# Patient Record
Sex: MALE | Race: WHITE | NOT HISPANIC OR LATINO | Employment: FULL TIME | URBAN - METROPOLITAN AREA
[De-identification: names, ages, dates, MRNs, and addresses within clinical notes are randomized per-mention and may not be internally consistent; named-entity substitution may affect disease eponyms.]

---

## 2018-08-27 ENCOUNTER — OFFICE VISIT CONVERTED (OUTPATIENT)
Dept: UROLOGY | Facility: CLINIC | Age: 53
End: 2018-08-27
Attending: UROLOGY

## 2018-09-14 ENCOUNTER — OFFICE VISIT CONVERTED (OUTPATIENT)
Dept: UROLOGY | Facility: CLINIC | Age: 53
End: 2018-09-14
Attending: UROLOGY

## 2018-12-21 ENCOUNTER — OFFICE VISIT CONVERTED (OUTPATIENT)
Dept: UROLOGY | Facility: CLINIC | Age: 53
End: 2018-12-21
Attending: UROLOGY

## 2019-05-01 ENCOUNTER — HOSPITAL ENCOUNTER (OUTPATIENT)
Dept: SLEEP MEDICINE | Facility: HOSPITAL | Age: 54
Discharge: HOME | End: 2019-05-01
Attending: INTERNAL MEDICINE

## 2019-05-02 ENCOUNTER — OUTSIDE FACILITY SERVICE (OUTPATIENT)
Dept: SLEEP MEDICINE | Facility: HOSPITAL | Age: 54
End: 2019-05-02

## 2019-05-02 PROCEDURE — 99999 PR OFFICE/OUTPT VISIT,PROCEDURE ONLY: CPT | Performed by: INTERNAL MEDICINE

## 2019-05-16 ENCOUNTER — CONVERSION ENCOUNTER (OUTPATIENT)
Dept: FAMILY MEDICINE CLINIC | Facility: CLINIC | Age: 54
End: 2019-05-16

## 2019-05-16 ENCOUNTER — OFFICE VISIT CONVERTED (OUTPATIENT)
Dept: FAMILY MEDICINE CLINIC | Facility: CLINIC | Age: 54
End: 2019-05-16
Attending: PHYSICIAN ASSISTANT

## 2019-06-04 ENCOUNTER — HOSPITAL ENCOUNTER (OUTPATIENT)
Dept: LAB | Facility: HOSPITAL | Age: 54
Discharge: HOME | End: 2019-06-04
Attending: PHYSICIAN ASSISTANT

## 2019-06-04 LAB
25(OH)D3 SERPL-MCNC: 36.8 NG/ML (ref 30–100)
ALBUMIN SERPL-MCNC: 4.3 G/DL (ref 3.5–5)
ALBUMIN/GLOB SERPL: 1.3 {RATIO} (ref 1.4–2.6)
ALP SERPL-CCNC: 52 U/L (ref 56–119)
ALT SERPL-CCNC: 27 U/L (ref 10–40)
ANION GAP SERPL CALC-SCNC: 16 MMOL/L (ref 8–19)
APPEARANCE UR: ABNORMAL
AST SERPL-CCNC: 29 U/L (ref 15–50)
BASOPHILS # BLD AUTO: 0.02 10*3/UL (ref 0–0.2)
BASOPHILS NFR BLD AUTO: 0.3 % (ref 0–3)
BILIRUB SERPL-MCNC: 0.64 MG/DL (ref 0.2–1.3)
BILIRUB UR QL: NEGATIVE
BUN SERPL-MCNC: 16 MG/DL (ref 5–25)
BUN/CREAT SERPL: 13 {RATIO} (ref 6–20)
CALCIUM SERPL-MCNC: 8.9 MG/DL (ref 8.7–10.4)
CHLORIDE SERPL-SCNC: 95 MMOL/L (ref 99–111)
CHOLEST SERPL-MCNC: 202 MG/DL (ref 107–200)
CHOLEST/HDLC SERPL: 5.8 {RATIO} (ref 3–6)
COLOR UR: ABNORMAL
CONV ABS IMM GRAN: 0.02 10*3/UL (ref 0–0.2)
CONV CO2: 28 MMOL/L (ref 22–32)
CONV COLLECTION SOURCE (UA): ABNORMAL
CONV CREATININE URINE, RANDOM: 376 MG/DL (ref 10–300)
CONV CRYSTALS: ABNORMAL /[HPF]
CONV IMMATURE GRAN: 0.3 % (ref 0–1.8)
CONV MICROALBUM.,U,RANDOM: 15.7 MG/L (ref 0–20)
CONV TOTAL PROTEIN: 7.5 G/DL (ref 6.3–8.2)
CONV UROBILINOGEN IN URINE BY AUTOMATED TEST STRIP: 1 {EHRLICHU}/DL (ref 0.1–1)
CREAT UR-MCNC: 1.2 MG/DL (ref 0.7–1.2)
DEPRECATED RDW RBC AUTO: 45.1 FL (ref 35.1–43.9)
EOSINOPHIL # BLD AUTO: 0.13 10*3/UL (ref 0–0.7)
EOSINOPHIL # BLD AUTO: 1.9 % (ref 0–7)
ERYTHROCYTE [DISTWIDTH] IN BLOOD BY AUTOMATED COUNT: 14.3 % (ref 11.6–14.4)
EST. AVERAGE GLUCOSE BLD GHB EST-MCNC: 180 MG/DL
GFR SERPLBLD BASED ON 1.73 SQ M-ARVRAT: >60 ML/MIN/{1.73_M2}
GLOBULIN UR ELPH-MCNC: 3.2 G/DL (ref 2–3.5)
GLUCOSE SERPL-MCNC: 198 MG/DL (ref 70–99)
GLUCOSE UR QL: 500 MG/DL
HBA1C MFR BLD: 17.1 G/DL (ref 14–18)
HBA1C MFR BLD: 7.9 % (ref 3.5–5.7)
HCT VFR BLD AUTO: 49.9 % (ref 42–52)
HDLC SERPL-MCNC: 35 MG/DL (ref 40–60)
HGB UR QL STRIP: NEGATIVE
KETONES UR QL STRIP: ABNORMAL MG/DL
LDLC SERPL CALC-MCNC: 141 MG/DL (ref 70–100)
LEUKOCYTE ESTERASE UR QL STRIP: NEGATIVE
LYMPHOCYTES # BLD AUTO: 1.39 10*3/UL (ref 1–5)
MCH RBC QN AUTO: 29.6 PG (ref 27–31)
MCHC RBC AUTO-ENTMCNC: 34.3 G/DL (ref 33–37)
MCV RBC AUTO: 86.5 FL (ref 80–96)
MICROALBUMIN/CREAT UR: 4.2 MG/G{CRE} (ref 0–25)
MONOCYTES # BLD AUTO: 0.7 10*3/UL (ref 0.2–1.2)
MONOCYTES NFR BLD AUTO: 10.2 % (ref 3–10)
MUCOUS THREADS URNS QL MICRO: ABNORMAL
NEUTROPHILS # BLD AUTO: 4.58 10*3/UL (ref 2–8)
NEUTROPHILS NFR BLD AUTO: 67 % (ref 30–85)
NITRITE UR QL STRIP: NEGATIVE
NRBC CBCN: 0 % (ref 0–0.7)
OSMOLALITY SERPL CALC.SUM OF ELEC: 287 MOSM/KG (ref 273–304)
PH UR STRIP.AUTO: 6 [PH] (ref 5–8)
PLATELET # BLD AUTO: 210 10*3/UL (ref 130–400)
PMV BLD AUTO: 11.2 FL (ref 9.4–12.4)
POTASSIUM SERPL-SCNC: 4.2 MMOL/L (ref 3.5–5.3)
PROT UR QL: ABNORMAL MG/DL
PSA SERPL-MCNC: 1.57 NG/ML (ref 0–4)
RBC # BLD AUTO: 5.77 10*6/UL (ref 4.7–6.1)
RBC #/AREA URNS HPF: ABNORMAL /[HPF]
SODIUM SERPL-SCNC: 135 MMOL/L (ref 135–147)
SP GR UR: 1.03 (ref 1–1.03)
T4 FREE SERPL-MCNC: 1.3 NG/DL (ref 0.9–1.8)
TESTOST SERPL-MCNC: >1500 NG/DL (ref 193–740)
TRIGL SERPL-MCNC: 130 MG/DL (ref 40–150)
TSH SERPL-ACNC: 1.62 M[IU]/L (ref 0.27–4.2)
URATE SERPL-MCNC: 5.4 MG/DL (ref 3.5–8.5)
VARIANT LYMPHS NFR BLD MANUAL: 20.3 % (ref 20–45)
VLDLC SERPL-MCNC: 26 MG/DL (ref 5–37)
WBC # BLD AUTO: 6.84 10*3/UL (ref 4.8–10.8)
WBC #/AREA URNS HPF: ABNORMAL /[HPF]

## 2019-06-05 LAB
CONV HEPATITIS C AB WITH REFLEX TO CONFIRMATION: <0.1 S/CO RATIO (ref 0–0.9)
CONV HEPATITIS COMMENT: NORMAL

## 2019-06-06 ENCOUNTER — HOSPITAL ENCOUNTER (OUTPATIENT)
Dept: SLEEP MEDICINE | Facility: HOSPITAL | Age: 54
Discharge: HOME | End: 2019-06-06
Attending: INTERNAL MEDICINE

## 2019-06-07 ENCOUNTER — OUTSIDE FACILITY SERVICE (OUTPATIENT)
Dept: SLEEP MEDICINE | Facility: HOSPITAL | Age: 54
End: 2019-06-07

## 2019-06-07 ENCOUNTER — HOSPITAL ENCOUNTER (OUTPATIENT)
Dept: SLEEP MEDICINE | Facility: HOSPITAL | Age: 54
Discharge: HOME | End: 2019-06-07
Attending: INTERNAL MEDICINE

## 2019-06-07 ENCOUNTER — HOSPITAL ENCOUNTER (OUTPATIENT)
Dept: LAB | Facility: HOSPITAL | Age: 54
Discharge: HOME | End: 2019-06-07
Attending: INTERNAL MEDICINE

## 2019-06-07 LAB
AMPHETAMINES UR QL SCN: NEGATIVE
BARBITURATES UR QL SCN: NEGATIVE
BENZODIAZ UR QL SCN: NEGATIVE
CONV COCAINE, UR: NEGATIVE
METHADONE UR QL SCN: NEGATIVE
OPIATES TESTED UR SCN: NEGATIVE
OXYCODONE UR QL SCN: NEGATIVE
PCP UR QL: NEGATIVE
THC SERPLBLD CFM-MCNC: NEGATIVE NG/ML

## 2019-06-07 PROCEDURE — OUTSIDEPOS PR OUTSIDE POS PLACEHOLDER: Performed by: INTERNAL MEDICINE

## 2019-06-12 ENCOUNTER — OUTSIDE FACILITY SERVICE (OUTPATIENT)
Dept: SLEEP MEDICINE | Facility: HOSPITAL | Age: 54
End: 2019-06-12

## 2019-06-12 PROCEDURE — OUTSIDEPOS PR OUTSIDE POS PLACEHOLDER: Performed by: INTERNAL MEDICINE

## 2019-06-17 ENCOUNTER — OFFICE VISIT CONVERTED (OUTPATIENT)
Dept: FAMILY MEDICINE CLINIC | Facility: CLINIC | Age: 54
End: 2019-06-17
Attending: PHYSICIAN ASSISTANT

## 2019-06-27 ENCOUNTER — HOSPITAL ENCOUNTER (OUTPATIENT)
Dept: OTHER | Facility: HOSPITAL | Age: 54
Discharge: HOME | End: 2019-06-27

## 2019-07-03 ENCOUNTER — OUTSIDE FACILITY SERVICE (OUTPATIENT)
Dept: SLEEP MEDICINE | Facility: HOSPITAL | Age: 54
End: 2019-07-03

## 2019-07-03 ENCOUNTER — HOSPITAL ENCOUNTER (OUTPATIENT)
Dept: SLEEP MEDICINE | Facility: HOSPITAL | Age: 54
Discharge: HOME | End: 2019-07-03
Attending: INTERNAL MEDICINE

## 2019-07-03 PROCEDURE — 99214 OFFICE O/P EST MOD 30 MIN: CPT | Performed by: INTERNAL MEDICINE

## 2019-07-24 ENCOUNTER — HOSPITAL ENCOUNTER (OUTPATIENT)
Dept: LAB | Facility: HOSPITAL | Age: 54
Discharge: HOME | End: 2019-07-24
Attending: NURSE PRACTITIONER

## 2019-07-24 ENCOUNTER — HOSPITAL ENCOUNTER (OUTPATIENT)
Dept: GENERAL RADIOLOGY | Facility: HOSPITAL | Age: 54
Discharge: HOME | End: 2019-07-24
Attending: NURSE PRACTITIONER

## 2019-07-24 ENCOUNTER — HOSPITAL ENCOUNTER (OUTPATIENT)
Dept: FAMILY MEDICINE CLINIC | Facility: CLINIC | Age: 54
Discharge: HOME | End: 2019-07-24
Attending: NURSE PRACTITIONER

## 2019-07-24 ENCOUNTER — OFFICE VISIT CONVERTED (OUTPATIENT)
Dept: FAMILY MEDICINE CLINIC | Facility: CLINIC | Age: 54
End: 2019-07-24
Attending: NURSE PRACTITIONER

## 2019-07-24 LAB
BASOPHILS # BLD AUTO: 0.02 10*3/UL (ref 0–0.2)
BASOPHILS NFR BLD AUTO: 0.3 % (ref 0–3)
CONV ABS IMM GRAN: 0.02 10*3/UL (ref 0–0.2)
CONV IMMATURE GRAN: 0.3 % (ref 0–1.8)
DEPRECATED RDW RBC AUTO: 42.6 FL (ref 35.1–43.9)
EOSINOPHIL # BLD AUTO: 0.22 10*3/UL (ref 0–0.7)
EOSINOPHIL # BLD AUTO: 3.3 % (ref 0–7)
ERYTHROCYTE [DISTWIDTH] IN BLOOD BY AUTOMATED COUNT: 13.3 % (ref 11.6–14.4)
HBA1C MFR BLD: 16.6 G/DL (ref 14–18)
HCT VFR BLD AUTO: 49.1 % (ref 42–52)
LYMPHOCYTES # BLD AUTO: 1.14 10*3/UL (ref 1–5)
MCH RBC QN AUTO: 29.5 PG (ref 27–31)
MCHC RBC AUTO-ENTMCNC: 33.8 G/DL (ref 33–37)
MCV RBC AUTO: 87.4 FL (ref 80–96)
MONOCYTES # BLD AUTO: 0.78 10*3/UL (ref 0.2–1.2)
MONOCYTES NFR BLD AUTO: 11.6 % (ref 3–10)
NEUTROPHILS # BLD AUTO: 4.56 10*3/UL (ref 2–8)
NEUTROPHILS NFR BLD AUTO: 67.6 % (ref 30–85)
NRBC CBCN: 0 % (ref 0–0.7)
PLATELET # BLD AUTO: 223 10*3/UL (ref 130–400)
PMV BLD AUTO: 10.8 FL (ref 9.4–12.4)
RBC # BLD AUTO: 5.62 10*6/UL (ref 4.7–6.1)
VARIANT LYMPHS NFR BLD MANUAL: 16.9 % (ref 20–45)
VIT B12 SERPL-MCNC: 410 PG/ML (ref 211–911)
WBC # BLD AUTO: 6.74 10*3/UL (ref 4.8–10.8)

## 2019-07-26 LAB — BACTERIA UR CULT: NORMAL

## 2019-07-30 ENCOUNTER — CONVERSION ENCOUNTER (OUTPATIENT)
Dept: SURGERY | Facility: CLINIC | Age: 54
End: 2019-07-30

## 2019-07-30 ENCOUNTER — HOSPITAL ENCOUNTER (OUTPATIENT)
Dept: OTHER | Facility: HOSPITAL | Age: 54
Discharge: HOME | End: 2019-07-30
Attending: NURSE PRACTITIONER

## 2019-07-30 ENCOUNTER — OFFICE VISIT CONVERTED (OUTPATIENT)
Dept: SURGERY | Facility: CLINIC | Age: 54
End: 2019-07-30
Attending: NURSE PRACTITIONER

## 2019-07-30 LAB
C DIFF TOX B STL QL CT TISS CULT: NEGATIVE
CONV 027 TOXIN: NEGATIVE

## 2019-08-01 LAB — BACTERIA SPEC AEROBE CULT: NORMAL

## 2019-08-02 ENCOUNTER — OFFICE VISIT CONVERTED (OUTPATIENT)
Dept: UROLOGY | Facility: CLINIC | Age: 54
End: 2019-08-02
Attending: UROLOGY

## 2019-08-08 ENCOUNTER — CONVERSION ENCOUNTER (OUTPATIENT)
Dept: FAMILY MEDICINE CLINIC | Facility: CLINIC | Age: 54
End: 2019-08-08

## 2019-08-08 ENCOUNTER — OFFICE VISIT CONVERTED (OUTPATIENT)
Dept: FAMILY MEDICINE CLINIC | Facility: CLINIC | Age: 54
End: 2019-08-08
Attending: PHYSICIAN ASSISTANT

## 2019-08-12 ENCOUNTER — HOSPITAL ENCOUNTER (OUTPATIENT)
Dept: LAB | Facility: HOSPITAL | Age: 54
Discharge: HOME | End: 2019-08-12
Attending: PHYSICIAN ASSISTANT

## 2019-08-12 LAB
CORTIS AM PEAK SERPL-MCNC: 12.3 UG/DL (ref 6–18.4)
FSH SERPL-ACNC: 0.3 M[IU]/ML
LH SERPL-ACNC: 0.1 M[IU]/ML
PROLACTIN SERPL-MCNC: 6.83 NG/ML

## 2019-08-14 LAB — CONV ESTROGENS, TOTAL, SERUM: 202 PG/ML (ref 40–115)

## 2019-08-15 LAB
CONV QUANTIFERON TB GOLD: NEGATIVE
QUANTIFERON CRITERIA: NORMAL
QUANTIFERON MITOGEN VALUE: >10 IU/ML
QUANTIFERON NIL VALUE: 0.02 IU/ML
QUANTIFERON TB1 AG VALUE: 0.02 IU/ML
QUANTIFERON TB2 AG VALUE: 0.02 IU/ML

## 2019-08-29 ENCOUNTER — HOSPITAL ENCOUNTER (OUTPATIENT)
Dept: GASTROENTEROLOGY | Facility: HOSPITAL | Age: 54
Setting detail: HOSPITAL OUTPATIENT SURGERY
Discharge: HOME | End: 2019-08-29
Attending: SURGERY

## 2019-08-29 LAB — GLUCOSE BLD-MCNC: 130 MG/DL (ref 70–99)

## 2019-09-04 ENCOUNTER — OUTSIDE FACILITY SERVICE (OUTPATIENT)
Dept: SLEEP MEDICINE | Facility: HOSPITAL | Age: 54
End: 2019-09-04

## 2019-09-04 ENCOUNTER — CONVERSION ENCOUNTER (OUTPATIENT)
Dept: SURGERY | Facility: CLINIC | Age: 54
End: 2019-09-04

## 2019-09-04 ENCOUNTER — OFFICE VISIT CONVERTED (OUTPATIENT)
Dept: UROLOGY | Facility: CLINIC | Age: 54
End: 2019-09-04
Attending: UROLOGY

## 2019-09-04 ENCOUNTER — HOSPITAL ENCOUNTER (OUTPATIENT)
Dept: SLEEP MEDICINE | Facility: HOSPITAL | Age: 54
Discharge: HOME | End: 2019-09-04
Attending: INTERNAL MEDICINE

## 2019-09-04 PROCEDURE — 99214 OFFICE O/P EST MOD 30 MIN: CPT | Performed by: INTERNAL MEDICINE

## 2019-09-13 ENCOUNTER — OFFICE VISIT CONVERTED (OUTPATIENT)
Dept: SURGERY | Facility: CLINIC | Age: 54
End: 2019-09-13
Attending: NURSE PRACTITIONER

## 2019-10-16 ENCOUNTER — OUTSIDE FACILITY SERVICE (OUTPATIENT)
Dept: SLEEP MEDICINE | Facility: HOSPITAL | Age: 54
End: 2019-10-16

## 2019-10-16 ENCOUNTER — HOSPITAL ENCOUNTER (OUTPATIENT)
Dept: SLEEP MEDICINE | Facility: HOSPITAL | Age: 54
Discharge: HOME | End: 2019-10-16
Attending: INTERNAL MEDICINE

## 2019-10-16 PROCEDURE — 99214 OFFICE O/P EST MOD 30 MIN: CPT | Performed by: INTERNAL MEDICINE

## 2019-11-09 ENCOUNTER — HOSPITAL ENCOUNTER (OUTPATIENT)
Dept: OTHER | Facility: HOSPITAL | Age: 54
Discharge: HOME | End: 2019-11-09
Attending: PHYSICIAN ASSISTANT

## 2019-11-09 LAB
25(OH)D3 SERPL-MCNC: 35.4 NG/ML (ref 30–100)
ALBUMIN SERPL-MCNC: 4.5 G/DL (ref 3.5–5)
ALBUMIN/GLOB SERPL: 1.8 {RATIO} (ref 1.4–2.6)
ALP SERPL-CCNC: 43 U/L (ref 56–119)
ALT SERPL-CCNC: 23 U/L (ref 10–40)
ANION GAP SERPL CALC-SCNC: 20 MMOL/L (ref 8–19)
APPEARANCE UR: CLEAR
AST SERPL-CCNC: 24 U/L (ref 15–50)
BASOPHILS # BLD AUTO: 0.03 10*3/UL (ref 0–0.2)
BASOPHILS NFR BLD AUTO: 0.5 % (ref 0–3)
BILIRUB SERPL-MCNC: 0.84 MG/DL (ref 0.2–1.3)
BILIRUB UR QL: NEGATIVE
BUN SERPL-MCNC: 13 MG/DL (ref 5–25)
BUN/CREAT SERPL: 12 {RATIO} (ref 6–20)
CALCIUM SERPL-MCNC: 9.2 MG/DL (ref 8.7–10.4)
CHLORIDE SERPL-SCNC: 99 MMOL/L (ref 99–111)
CHOLEST SERPL-MCNC: 123 MG/DL (ref 107–200)
CHOLEST/HDLC SERPL: 2.5 {RATIO} (ref 3–6)
COLOR UR: YELLOW
CONV ABS IMM GRAN: 0.01 10*3/UL (ref 0–0.2)
CONV CO2: 25 MMOL/L (ref 22–32)
CONV COLLECTION SOURCE (UA): ABNORMAL
CONV IMMATURE GRAN: 0.2 % (ref 0–1.8)
CONV TOTAL PROTEIN: 7 G/DL (ref 6.3–8.2)
CONV UROBILINOGEN IN URINE BY AUTOMATED TEST STRIP: 1 {EHRLICHU}/DL (ref 0.1–1)
CREAT UR-MCNC: 1.13 MG/DL (ref 0.7–1.2)
DEPRECATED RDW RBC AUTO: 44.8 FL (ref 35.1–43.9)
EOSINOPHIL # BLD AUTO: 0.13 10*3/UL (ref 0–0.7)
EOSINOPHIL # BLD AUTO: 2.1 % (ref 0–7)
ERYTHROCYTE [DISTWIDTH] IN BLOOD BY AUTOMATED COUNT: 13.9 % (ref 11.6–14.4)
EST. AVERAGE GLUCOSE BLD GHB EST-MCNC: 157 MG/DL
GFR SERPLBLD BASED ON 1.73 SQ M-ARVRAT: >60 ML/MIN/{1.73_M2}
GLOBULIN UR ELPH-MCNC: 2.5 G/DL (ref 2–3.5)
GLUCOSE SERPL-MCNC: 155 MG/DL (ref 70–99)
GLUCOSE UR QL: >=1000 MG/DL
HBA1C MFR BLD: 7.1 % (ref 3.5–5.7)
HCT VFR BLD AUTO: 48.9 % (ref 42–52)
HDLC SERPL-MCNC: 49 MG/DL (ref 40–60)
HGB BLD-MCNC: 16.2 G/DL (ref 14–18)
HGB UR QL STRIP: NEGATIVE
KETONES UR QL STRIP: NEGATIVE MG/DL
LDLC SERPL CALC-MCNC: 62 MG/DL (ref 70–100)
LEUKOCYTE ESTERASE UR QL STRIP: NEGATIVE
LYMPHOCYTES # BLD AUTO: 1.48 10*3/UL (ref 1–5)
LYMPHOCYTES NFR BLD AUTO: 24.1 % (ref 20–45)
MCH RBC QN AUTO: 28.8 PG (ref 27–31)
MCHC RBC AUTO-ENTMCNC: 33.1 G/DL (ref 33–37)
MCV RBC AUTO: 87 FL (ref 80–96)
MONOCYTES # BLD AUTO: 0.54 10*3/UL (ref 0.2–1.2)
MONOCYTES NFR BLD AUTO: 8.8 % (ref 3–10)
NEUTROPHILS # BLD AUTO: 3.94 10*3/UL (ref 2–8)
NEUTROPHILS NFR BLD AUTO: 64.3 % (ref 30–85)
NITRITE UR QL STRIP: NEGATIVE
NRBC CBCN: 0 % (ref 0–0.7)
OSMOLALITY SERPL CALC.SUM OF ELEC: 293 MOSM/KG (ref 273–304)
PH UR STRIP.AUTO: 6 [PH] (ref 5–8)
PLATELET # BLD AUTO: 208 10*3/UL (ref 130–400)
PMV BLD AUTO: 9.9 FL (ref 9.4–12.4)
POTASSIUM SERPL-SCNC: 4.2 MMOL/L (ref 3.5–5.3)
PROT UR QL: NEGATIVE MG/DL
PSA SERPL-MCNC: 2.31 NG/ML (ref 0–4)
RBC # BLD AUTO: 5.62 10*6/UL (ref 4.7–6.1)
SODIUM SERPL-SCNC: 140 MMOL/L (ref 135–147)
SP GR UR: 1.03 (ref 1–1.03)
T4 FREE SERPL-MCNC: 1.3 NG/DL (ref 0.9–1.8)
TESTOST SERPL-MCNC: 1228 NG/DL (ref 193–740)
TRIGL SERPL-MCNC: 58 MG/DL (ref 40–150)
TSH SERPL-ACNC: 1.64 M[IU]/L (ref 0.27–4.2)
VLDLC SERPL-MCNC: 12 MG/DL (ref 5–37)
WBC # BLD AUTO: 6.13 10*3/UL (ref 4.8–10.8)

## 2019-11-13 LAB — CONV ESTROGENS, TOTAL, SERUM: 34 PG/ML (ref 40–115)

## 2019-11-14 ENCOUNTER — OFFICE VISIT CONVERTED (OUTPATIENT)
Dept: FAMILY MEDICINE CLINIC | Facility: CLINIC | Age: 54
End: 2019-11-14
Attending: PHYSICIAN ASSISTANT

## 2019-12-05 ENCOUNTER — OFFICE VISIT CONVERTED (OUTPATIENT)
Dept: ORTHOPEDIC SURGERY | Facility: CLINIC | Age: 54
End: 2019-12-05
Attending: ORTHOPAEDIC SURGERY

## 2020-01-04 ENCOUNTER — HOSPITAL ENCOUNTER (OUTPATIENT)
Dept: OTHER | Facility: HOSPITAL | Age: 55
Discharge: HOME | End: 2020-01-04
Attending: INTERNAL MEDICINE

## 2020-01-04 LAB
FERRITIN SERPL-MCNC: 36 NG/ML (ref 30–300)
FSH SERPL-ACNC: 2.2 M[IU]/ML
LH SERPL-ACNC: 5.2 M[IU]/ML
PROLACTIN SERPL-MCNC: 6.24 NG/ML
TESTOST SERPL-MCNC: 424 NG/DL (ref 193–740)

## 2020-01-08 LAB
CONV ESTROGENS, TOTAL, SERUM: 161 PG/ML (ref 40–115)
METANEPH FREE SERPL-SCNC: 42 PG/ML (ref 0–62)
NORMETANEPHRINE, PL: 54 PG/ML (ref 0–145)

## 2020-01-15 ENCOUNTER — HOSPITAL ENCOUNTER (OUTPATIENT)
Dept: PERIOP | Facility: HOSPITAL | Age: 55
Setting detail: HOSPITAL OUTPATIENT SURGERY
Discharge: HOME | End: 2020-01-15
Attending: ORTHOPAEDIC SURGERY

## 2020-01-15 LAB
ANION GAP SERPL CALC-SCNC: 14 MMOL/L (ref 8–19)
BUN SERPL-MCNC: 16 MG/DL (ref 5–25)
BUN/CREAT SERPL: 15 {RATIO} (ref 6–20)
CALCIUM SERPL-MCNC: 8.9 MG/DL (ref 8.7–10.4)
CHLORIDE SERPL-SCNC: 97 MMOL/L (ref 99–111)
CONV CO2: 30 MMOL/L (ref 22–32)
CREAT UR-MCNC: 1.05 MG/DL (ref 0.7–1.2)
GFR SERPLBLD BASED ON 1.73 SQ M-ARVRAT: >60 ML/MIN/{1.73_M2}
GLUCOSE BLD-MCNC: 157 MG/DL (ref 70–99)
GLUCOSE SERPL-MCNC: 169 MG/DL (ref 70–99)
OSMOLALITY SERPL CALC.SUM OF ELEC: 289 MOSM/KG (ref 273–304)
POTASSIUM SERPL-SCNC: 4.1 MMOL/L (ref 3.5–5.3)
SODIUM SERPL-SCNC: 137 MMOL/L (ref 135–147)

## 2020-01-30 ENCOUNTER — OFFICE VISIT CONVERTED (OUTPATIENT)
Dept: ORTHOPEDIC SURGERY | Facility: CLINIC | Age: 55
End: 2020-01-30
Attending: PHYSICIAN ASSISTANT

## 2020-02-11 ENCOUNTER — CONVERSION ENCOUNTER (OUTPATIENT)
Dept: ORTHOPEDIC SURGERY | Facility: CLINIC | Age: 55
End: 2020-02-11

## 2020-02-11 ENCOUNTER — OFFICE VISIT CONVERTED (OUTPATIENT)
Dept: ORTHOPEDIC SURGERY | Facility: CLINIC | Age: 55
End: 2020-02-11
Attending: PHYSICIAN ASSISTANT

## 2020-02-14 ENCOUNTER — OFFICE VISIT CONVERTED (OUTPATIENT)
Dept: FAMILY MEDICINE CLINIC | Facility: CLINIC | Age: 55
End: 2020-02-14
Attending: PHYSICIAN ASSISTANT

## 2020-02-14 ENCOUNTER — CONVERSION ENCOUNTER (OUTPATIENT)
Dept: FAMILY MEDICINE CLINIC | Facility: CLINIC | Age: 55
End: 2020-02-14

## 2020-02-18 ENCOUNTER — CONVERSION ENCOUNTER (OUTPATIENT)
Dept: ORTHOPEDIC SURGERY | Facility: CLINIC | Age: 55
End: 2020-02-18
Attending: PHYSICIAN ASSISTANT

## 2020-03-10 ENCOUNTER — OFFICE VISIT CONVERTED (OUTPATIENT)
Dept: ORTHOPEDIC SURGERY | Facility: CLINIC | Age: 55
End: 2020-03-10
Attending: PHYSICIAN ASSISTANT

## 2020-03-31 ENCOUNTER — OFFICE VISIT CONVERTED (OUTPATIENT)
Dept: ORTHOPEDIC SURGERY | Facility: CLINIC | Age: 55
End: 2020-03-31
Attending: PHYSICIAN ASSISTANT

## 2020-05-05 ENCOUNTER — CONVERSION ENCOUNTER (OUTPATIENT)
Dept: ORTHOPEDIC SURGERY | Facility: CLINIC | Age: 55
End: 2020-05-05

## 2020-05-05 ENCOUNTER — OFFICE VISIT CONVERTED (OUTPATIENT)
Dept: ORTHOPEDIC SURGERY | Facility: CLINIC | Age: 55
End: 2020-05-05
Attending: PHYSICIAN ASSISTANT

## 2020-06-08 ENCOUNTER — HOSPITAL ENCOUNTER (OUTPATIENT)
Dept: LAB | Facility: HOSPITAL | Age: 55
Discharge: HOME | End: 2020-06-08
Attending: PHYSICIAN ASSISTANT

## 2020-06-08 LAB
25(OH)D3 SERPL-MCNC: 32 NG/ML (ref 30–100)
ALBUMIN SERPL-MCNC: 4.4 G/DL (ref 3.5–5)
ALBUMIN/GLOB SERPL: 1.5 {RATIO} (ref 1.4–2.6)
ALP SERPL-CCNC: 51 U/L (ref 56–119)
ALT SERPL-CCNC: 18 U/L (ref 10–40)
ANION GAP SERPL CALC-SCNC: 19 MMOL/L (ref 8–19)
APPEARANCE UR: CLEAR
AST SERPL-CCNC: 18 U/L (ref 15–50)
BASOPHILS # BLD AUTO: 0.03 10*3/UL (ref 0–0.2)
BASOPHILS NFR BLD AUTO: 0.4 % (ref 0–3)
BILIRUB SERPL-MCNC: 0.85 MG/DL (ref 0.2–1.3)
BILIRUB UR QL: NEGATIVE
BUN SERPL-MCNC: 17 MG/DL (ref 5–25)
BUN/CREAT SERPL: 16 {RATIO} (ref 6–20)
CALCIUM SERPL-MCNC: 9.1 MG/DL (ref 8.7–10.4)
CHLORIDE SERPL-SCNC: 96 MMOL/L (ref 99–111)
CHOLEST SERPL-MCNC: 142 MG/DL (ref 107–200)
CHOLEST/HDLC SERPL: 3.4 {RATIO} (ref 3–6)
COLOR UR: YELLOW
CONV ABS IMM GRAN: 0.01 10*3/UL (ref 0–0.2)
CONV CO2: 26 MMOL/L (ref 22–32)
CONV COLLECTION SOURCE (UA): ABNORMAL
CONV IMMATURE GRAN: 0.1 % (ref 0–1.8)
CONV TOTAL PROTEIN: 7.3 G/DL (ref 6.3–8.2)
CONV UROBILINOGEN IN URINE BY AUTOMATED TEST STRIP: 1 {EHRLICHU}/DL (ref 0.1–1)
CREAT UR-MCNC: 1.09 MG/DL (ref 0.7–1.2)
DEPRECATED RDW RBC AUTO: 42.4 FL (ref 35.1–43.9)
EOSINOPHIL # BLD AUTO: 0.38 10*3/UL (ref 0–0.7)
EOSINOPHIL # BLD AUTO: 5.5 % (ref 0–7)
ERYTHROCYTE [DISTWIDTH] IN BLOOD BY AUTOMATED COUNT: 12.5 % (ref 11.6–14.4)
EST. AVERAGE GLUCOSE BLD GHB EST-MCNC: 180 MG/DL
GFR SERPLBLD BASED ON 1.73 SQ M-ARVRAT: >60 ML/MIN/{1.73_M2}
GLOBULIN UR ELPH-MCNC: 2.9 G/DL (ref 2–3.5)
GLUCOSE SERPL-MCNC: 186 MG/DL (ref 70–99)
GLUCOSE UR QL: >=1000 MG/DL
HBA1C MFR BLD: 7.9 % (ref 3.5–5.7)
HCT VFR BLD AUTO: 50.7 % (ref 42–52)
HDLC SERPL-MCNC: 42 MG/DL (ref 40–60)
HGB BLD-MCNC: 17.6 G/DL (ref 14–18)
HGB UR QL STRIP: NEGATIVE
KETONES UR QL STRIP: ABNORMAL MG/DL
LDLC SERPL CALC-MCNC: 67 MG/DL (ref 70–100)
LEUKOCYTE ESTERASE UR QL STRIP: NEGATIVE
LYMPHOCYTES # BLD AUTO: 1.48 10*3/UL (ref 1–5)
LYMPHOCYTES NFR BLD AUTO: 21.5 % (ref 20–45)
MCH RBC QN AUTO: 32 PG (ref 27–31)
MCHC RBC AUTO-ENTMCNC: 34.7 G/DL (ref 33–37)
MCV RBC AUTO: 92.2 FL (ref 80–96)
MONOCYTES # BLD AUTO: 0.64 10*3/UL (ref 0.2–1.2)
MONOCYTES NFR BLD AUTO: 9.3 % (ref 3–10)
NEUTROPHILS # BLD AUTO: 4.33 10*3/UL (ref 2–8)
NEUTROPHILS NFR BLD AUTO: 63.2 % (ref 30–85)
NITRITE UR QL STRIP: NEGATIVE
NRBC CBCN: 0 % (ref 0–0.7)
OSMOLALITY SERPL CALC.SUM OF ELEC: 290 MOSM/KG (ref 273–304)
PH UR STRIP.AUTO: 7 [PH] (ref 5–8)
PLATELET # BLD AUTO: 197 10*3/UL (ref 130–400)
PMV BLD AUTO: 10.6 FL (ref 9.4–12.4)
POTASSIUM SERPL-SCNC: 3.6 MMOL/L (ref 3.5–5.3)
PROT UR QL: NEGATIVE MG/DL
PSA SERPL-MCNC: 1.76 NG/ML (ref 0–4)
RBC # BLD AUTO: 5.5 10*6/UL (ref 4.7–6.1)
SODIUM SERPL-SCNC: 137 MMOL/L (ref 135–147)
SP GR UR: 1.04 (ref 1–1.03)
T4 FREE SERPL-MCNC: 1.5 NG/DL (ref 0.9–1.8)
TESTOST SERPL-MCNC: 524 NG/DL (ref 193–740)
TRIGL SERPL-MCNC: 166 MG/DL (ref 40–150)
TSH SERPL-ACNC: 2.62 M[IU]/L (ref 0.27–4.2)
VLDLC SERPL-MCNC: 33 MG/DL (ref 5–37)
WBC # BLD AUTO: 6.87 10*3/UL (ref 4.8–10.8)

## 2020-06-12 ENCOUNTER — CONVERSION ENCOUNTER (OUTPATIENT)
Dept: FAMILY MEDICINE CLINIC | Facility: CLINIC | Age: 55
End: 2020-06-12

## 2020-06-12 ENCOUNTER — OFFICE VISIT CONVERTED (OUTPATIENT)
Dept: FAMILY MEDICINE CLINIC | Facility: CLINIC | Age: 55
End: 2020-06-12
Attending: PHYSICIAN ASSISTANT

## 2020-06-16 ENCOUNTER — OFFICE VISIT CONVERTED (OUTPATIENT)
Dept: ORTHOPEDIC SURGERY | Facility: CLINIC | Age: 55
End: 2020-06-16
Attending: PHYSICIAN ASSISTANT

## 2020-06-22 ENCOUNTER — OUTSIDE FACILITY SERVICE (OUTPATIENT)
Dept: SLEEP MEDICINE | Facility: HOSPITAL | Age: 55
End: 2020-06-22

## 2020-06-22 ENCOUNTER — HOSPITAL ENCOUNTER (OUTPATIENT)
Dept: SLEEP MEDICINE | Facility: HOSPITAL | Age: 55
Discharge: HOME | End: 2020-06-22
Attending: INTERNAL MEDICINE

## 2020-06-22 PROCEDURE — 99213 OFFICE O/P EST LOW 20 MIN: CPT | Performed by: INTERNAL MEDICINE

## 2020-06-30 ENCOUNTER — CONVERSION ENCOUNTER (OUTPATIENT)
Dept: ORTHOPEDIC SURGERY | Facility: CLINIC | Age: 55
End: 2020-06-30

## 2020-06-30 ENCOUNTER — OFFICE VISIT CONVERTED (OUTPATIENT)
Dept: ORTHOPEDIC SURGERY | Facility: CLINIC | Age: 55
End: 2020-06-30
Attending: PHYSICIAN ASSISTANT

## 2020-09-22 ENCOUNTER — OFFICE VISIT CONVERTED (OUTPATIENT)
Dept: ORTHOPEDIC SURGERY | Facility: CLINIC | Age: 55
End: 2020-09-22
Attending: ORTHOPAEDIC SURGERY

## 2020-10-21 ENCOUNTER — HOSPITAL ENCOUNTER (OUTPATIENT)
Dept: LAB | Facility: HOSPITAL | Age: 55
Discharge: HOME | End: 2020-10-21
Attending: PHYSICIAN ASSISTANT

## 2020-10-21 LAB
25(OH)D3 SERPL-MCNC: 34.3 NG/ML (ref 30–100)
ALBUMIN SERPL-MCNC: 4.2 G/DL (ref 3.5–5)
ALBUMIN/GLOB SERPL: 1.5 {RATIO} (ref 1.4–2.6)
ALP SERPL-CCNC: 46 U/L (ref 56–119)
ALT SERPL-CCNC: 17 U/L (ref 10–40)
AMPHETAMINES UR QL SCN: POSITIVE
ANION GAP SERPL CALC-SCNC: 17 MMOL/L (ref 8–19)
APPEARANCE UR: CLEAR
AST SERPL-CCNC: 18 U/L (ref 15–50)
BARBITURATES UR QL SCN: NEGATIVE
BASOPHILS # BLD AUTO: 0.02 10*3/UL (ref 0–0.2)
BASOPHILS NFR BLD AUTO: 0.3 % (ref 0–3)
BENZODIAZ UR QL SCN: NEGATIVE
BILIRUB SERPL-MCNC: 0.73 MG/DL (ref 0.2–1.3)
BILIRUB UR QL: NEGATIVE
BUN SERPL-MCNC: 12 MG/DL (ref 5–25)
BUN/CREAT SERPL: 11 {RATIO} (ref 6–20)
CALCIUM SERPL-MCNC: 9.5 MG/DL (ref 8.7–10.4)
CHLORIDE SERPL-SCNC: 99 MMOL/L (ref 99–111)
CHOLEST SERPL-MCNC: 113 MG/DL (ref 107–200)
CHOLEST/HDLC SERPL: 2.4 {RATIO} (ref 3–6)
COLOR UR: YELLOW
CONV ABS IMM GRAN: 0.01 10*3/UL (ref 0–0.2)
CONV CO2: 27 MMOL/L (ref 22–32)
CONV COCAINE, UR: NEGATIVE
CONV COLLECTION SOURCE (UA): ABNORMAL
CONV CREATININE URINE, RANDOM: 109.3 MG/DL (ref 10–300)
CONV IMMATURE GRAN: 0.2 % (ref 0–1.8)
CONV MICROALBUM.,U,RANDOM: <12 MG/L (ref 0–20)
CONV TOTAL PROTEIN: 7 G/DL (ref 6.3–8.2)
CONV UROBILINOGEN IN URINE BY AUTOMATED TEST STRIP: 1 {EHRLICHU}/DL (ref 0.1–1)
CREAT UR-MCNC: 1.05 MG/DL (ref 0.7–1.2)
DEPRECATED RDW RBC AUTO: 42 FL (ref 35.1–43.9)
EOSINOPHIL # BLD AUTO: 0.08 10*3/UL (ref 0–0.7)
EOSINOPHIL # BLD AUTO: 1.3 % (ref 0–7)
ERYTHROCYTE [DISTWIDTH] IN BLOOD BY AUTOMATED COUNT: 12.3 % (ref 11.6–14.4)
EST. AVERAGE GLUCOSE BLD GHB EST-MCNC: 134 MG/DL
GFR SERPLBLD BASED ON 1.73 SQ M-ARVRAT: >60 ML/MIN/{1.73_M2}
GLOBULIN UR ELPH-MCNC: 2.8 G/DL (ref 2–3.5)
GLUCOSE SERPL-MCNC: 142 MG/DL (ref 70–99)
GLUCOSE UR QL: >=1000 MG/DL
HBA1C MFR BLD: 6.3 % (ref 3.5–5.7)
HCT VFR BLD AUTO: 47.9 % (ref 42–52)
HDLC SERPL-MCNC: 48 MG/DL (ref 40–60)
HGB BLD-MCNC: 16.7 G/DL (ref 14–18)
HGB UR QL STRIP: NEGATIVE
KETONES UR QL STRIP: NEGATIVE MG/DL
LDLC SERPL CALC-MCNC: 55 MG/DL (ref 70–100)
LEUKOCYTE ESTERASE UR QL STRIP: NEGATIVE
LYMPHOCYTES # BLD AUTO: 1.24 10*3/UL (ref 1–5)
LYMPHOCYTES NFR BLD AUTO: 19.7 % (ref 20–45)
MCH RBC QN AUTO: 32.3 PG (ref 27–31)
MCHC RBC AUTO-ENTMCNC: 34.9 G/DL (ref 33–37)
MCV RBC AUTO: 92.6 FL (ref 80–96)
METHADONE UR QL SCN: NEGATIVE
MICROALBUMIN/CREAT UR: 11 MG/G{CRE} (ref 0–25)
MONOCYTES # BLD AUTO: 0.53 10*3/UL (ref 0.2–1.2)
MONOCYTES NFR BLD AUTO: 8.4 % (ref 3–10)
NEUTROPHILS # BLD AUTO: 4.4 10*3/UL (ref 2–8)
NEUTROPHILS NFR BLD AUTO: 70.1 % (ref 30–85)
NITRITE UR QL STRIP: NEGATIVE
NRBC CBCN: 0 % (ref 0–0.7)
OPIATES TESTED UR SCN: NEGATIVE
OSMOLALITY SERPL CALC.SUM OF ELEC: 290 MOSM/KG (ref 273–304)
OXYCODONE UR QL SCN: NEGATIVE
PCP UR QL: NEGATIVE
PH UR STRIP.AUTO: 7 [PH] (ref 5–8)
PLATELET # BLD AUTO: 198 10*3/UL (ref 130–400)
PMV BLD AUTO: 10.5 FL (ref 9.4–12.4)
POTASSIUM SERPL-SCNC: 4 MMOL/L (ref 3.5–5.3)
PROT UR QL: NEGATIVE MG/DL
PSA SERPL-MCNC: 1.79 NG/ML (ref 0–4)
RBC # BLD AUTO: 5.17 10*6/UL (ref 4.7–6.1)
SODIUM SERPL-SCNC: 139 MMOL/L (ref 135–147)
SP GR UR: 1.04 (ref 1–1.03)
T4 FREE SERPL-MCNC: 1.2 NG/DL (ref 0.9–1.8)
TESTOST SERPL-MCNC: 568 NG/DL (ref 193–740)
THC SERPLBLD CFM-MCNC: NEGATIVE NG/ML
TRIGL SERPL-MCNC: 51 MG/DL (ref 40–150)
TSH SERPL-ACNC: 1.46 M[IU]/L (ref 0.27–4.2)
VLDLC SERPL-MCNC: 10 MG/DL (ref 5–37)
WBC # BLD AUTO: 6.28 10*3/UL (ref 4.8–10.8)

## 2020-10-27 ENCOUNTER — OFFICE VISIT CONVERTED (OUTPATIENT)
Dept: FAMILY MEDICINE CLINIC | Facility: CLINIC | Age: 55
End: 2020-10-27
Attending: PHYSICIAN ASSISTANT

## 2020-10-27 ENCOUNTER — CONVERSION ENCOUNTER (OUTPATIENT)
Dept: FAMILY MEDICINE CLINIC | Facility: CLINIC | Age: 55
End: 2020-10-27

## 2021-01-26 ENCOUNTER — CONVERSION ENCOUNTER (OUTPATIENT)
Dept: FAMILY MEDICINE CLINIC | Facility: CLINIC | Age: 56
End: 2021-01-26

## 2021-01-26 ENCOUNTER — HOSPITAL ENCOUNTER (OUTPATIENT)
Dept: GENERAL RADIOLOGY | Facility: HOSPITAL | Age: 56
Discharge: HOME | End: 2021-01-26
Attending: NURSE PRACTITIONER

## 2021-01-26 ENCOUNTER — OFFICE VISIT CONVERTED (OUTPATIENT)
Dept: FAMILY MEDICINE CLINIC | Facility: CLINIC | Age: 56
End: 2021-01-26
Attending: NURSE PRACTITIONER

## 2021-03-01 ENCOUNTER — HOSPITAL ENCOUNTER (OUTPATIENT)
Dept: SLEEP MEDICINE | Facility: HOSPITAL | Age: 56
Discharge: HOME | End: 2021-03-01
Attending: INTERNAL MEDICINE

## 2021-03-01 ENCOUNTER — OUTSIDE FACILITY SERVICE (OUTPATIENT)
Dept: SLEEP MEDICINE | Facility: HOSPITAL | Age: 56
End: 2021-03-01

## 2021-03-01 PROCEDURE — 99214 OFFICE O/P EST MOD 30 MIN: CPT | Performed by: INTERNAL MEDICINE

## 2021-03-02 ENCOUNTER — OFFICE VISIT CONVERTED (OUTPATIENT)
Dept: ORTHOPEDIC SURGERY | Facility: CLINIC | Age: 56
End: 2021-03-02
Attending: ORTHOPAEDIC SURGERY

## 2021-03-17 ENCOUNTER — HOSPITAL ENCOUNTER (OUTPATIENT)
Dept: MRI IMAGING | Facility: HOSPITAL | Age: 56
Discharge: HOME | End: 2021-03-17
Attending: ORTHOPAEDIC SURGERY

## 2021-03-23 ENCOUNTER — OFFICE VISIT CONVERTED (OUTPATIENT)
Dept: ORTHOPEDIC SURGERY | Facility: CLINIC | Age: 56
End: 2021-03-23
Attending: ORTHOPAEDIC SURGERY

## 2021-05-12 NOTE — PROGRESS NOTES
Progress Note      Patient Name: Jung Burkett   Patient ID: [id number]   Sex: Male   YOB: 1965    Primary Care Provider: Fausto Schrader PA-C   Referring Provider: Poli Snyder    Visit Date: March 31, 2020    Provider: Cindy Maloney PA-C   Location: [facility]   Location Address: [address] [place] [address]  [id number]   Location Phone: [phone]          Chief Complaint  · Right ankle pain      History Of Present Illness  Jung Burkett is a 55 year old /White male who presents today to [facility].      Patient is following up for right ankle injury, right distal fibula fracture sustained 2/8/20 after falling down stairs. Patient is wearing a tennis shoe. Patient states pain with walking at the anterior shin, lateral ankle.           Past Medical History  Allergic rhinitis, chronic; Anxiety; Anxiety disorder; Arthritis; Blood Diseases; Cervicalgia; Depression; Diabetes; Diabetes mellitus, type 2; Diabetic Eye Exam Last Completed; Diabetic Foot Exam Last Completed; Essential hypertension; GERD; Hepatitis C Screening; High blood pressure; High cholesterol; Hyperlipemia; Hyperlipidemia; Hypertension; Hypogonadism in male; Low testosterone in male; Major depressive disorder; Nicotine dependence; Prostate Disorder; Reflux; Screening PSA (prostate specific antigen); Seasonal allergies; Vitamin D deficiency         Past Surgical History  Cholecystectomy; Colonoscopy; Gallbladder; Tonsillectomy         Medication List  Adderall 20 mg oral tablet; albuterol sulfate 2.5 mg/0.5 mL inhalation solution for nebulization; Arimidex 1 mg oral tablet; atorvastatin 20 mg oral tablet; Dexilant 60 mg oral capsule,biphase delayed releas; Flomax 0.4 mg oral capsule; Hyzaar 100-25 mg oral tablet; metoprolol succinate 50 mg oral tablet extended release 24 hr; Percocet 7.5-325 mg oral tablet; Shingrix (PF) 50 mcg/0.5 mL intramuscular suspension for reconstitution; Synjardy  "12.5-1,000 mg oral tablet; Victoza 3-Kelton 0.6 mg/0.1 mL (18 mg/3 mL) subcutaneous pen injector         Allergy List  hydrocodone-acetaminophen; NSAIDS; SULFA (SULFONAMIDES)         Family Medical History  Stroke; Heart Disease; Cancer, Unspecified; Diabetes, unspecified type; Rheumatoid arthritis; Heart Attack (MI); Prostate cancer; Renal Calculus; Family history of colon cancer; Family history of breast cancer; Family history of Arthritis         Social History  Alcohol (Current every day); Alcohol Use (Current some day); Caffeine (Current every day); lives with spouse; ; .; No known infection risk; Recreational Drug Use (Never); Second hand smoke exposure (Current some day); Tobacco (Current some day); Working         Review of Systems  · Constitutional  o Denies  o : fever, chills, weight loss  · Cardiovascular  o Denies  o : chest pain, shortness of breath  · Gastrointestinal  o Denies  o : liver disease, heartburn, nausea, blood in stools  · Genitourinary  o Denies  o : painful urination, blood in urine  · Integument  o Denies  o : rash, itching  · Neurologic  o Denies  o : headache, weakness, loss of consciousness  · Musculoskeletal  o Denies  o : painful, swollen joints  · Psychiatric  o Denies  o : drug/alcohol addiction, anxiety, depression      Vitals  Date Time BP Position Site L\R Cuff Size HR RR TEMP (F) WT  HT  BMI kg/m2 BSA m2 O2 Sat        03/31/2020 03:05 PM      100 - R   199lbs 6oz 5'  8\" 30.31 2.08 95 %          Physical Examination  · Constitutional  o Appearance  o : well developed, well-nourished, no obvious deformities present  · Head and Face  o Head  o :   § Inspection  § : normocephalic  o Face  o :   § Inspection  § : no facial lesions  · Eyes  o Conjunctivae  o : conjunctivae normal  o Sclerae  o : sclerae white  · Ears, Nose, Mouth and Throat  o Ears  o :   § External Ears  § : appearance within normal limits  § Hearing  § : intact  o Nose  o :   § External Nose  § : " appearance normal  · Neck  o Inspection/Palpation  o : normal appearance  o Range of Motion  o : full range of motion  · Respiratory  o Respiratory Effort  o : breathing unlabored  o Inspection of Chest  o : normal appearance  o Auscultation of Lungs  o : no audible wheezing or rales  · Cardiovascular  o Heart  o : regular rate  · Gastrointestinal  o Abdominal Examination  o : soft and non-tender  · Skin and Subcutaneous Tissue  o General Inspection  o : intact, no rashes  · Psychiatric  o General  o : Alert and oriented x3  o Judgement and Insight  o : judgment and insight intact  o Mood and Affect  o : mood normal, affect appropriate  · In Office Procedures  o View  o : AP/LATERAL  o Site  o : right, ankle   o Indication  o : Right ankle pain  o Study  o : X-rays ordered, taken in the office, and reviewed today.  o Xray  o : good healing distal fibula fracture  o Comparative Data  o : Comparative Data found and reviewed today   · Right Ankle-Street  o Inspection  o : swelling  o Palpation  o : tenderness at distal fibula  o Range of Motion  o : full dorsiflexion, full plantarflexion, full inversion, full eversion  o Strength  o : full dorsiflexion, full plantarflexion, full inversion, full eversion  o Neurovascular  o : normal sensation, dorsal pedal pulse 2+, posterior tibialis pulse 2+          Assessment  · Right ankle pain, unspecified chronicity     719.47/M25.571  · Fibula fracture     823.81/S82.409A      Plan  · Orders  o Ankle (Right) 2 views X-Ray ([id number]) - 719.47/M25.571 - 03/31/2020  · Medications  o Medications have been Reconciled  o Transition of Care or Provider Policy  · Instructions  o Reviewed the patient's Past Medical, Social, and Family history as well as the ROS at today's visit, no changes.  o Call or return if worsening symptoms.  o Exercise handout given.  o X-ray ordered, taken and reviewed at this visit.  o Follow Up in 4 weeks.   o Follow up in 6 weeks.  o Electronically Identified  Patient Education Materials Provided Electronically     follow up 1 month x ray             Electronically Signed by: Cindy Maloney PA-C -Author on March 31, 2020 03:48:10 PM

## 2021-05-13 NOTE — PROGRESS NOTES
Progress Note      Patient Name: Jung Burkett   Patient ID: [id number]   Sex: Male   YOB: 1965    Primary Care Provider: Fausto Schrader PA-C   Referring Provider: Poli Snyder    Visit Date: September 22, 2020    Provider: Ulisses Kenney MD   Location: [facility]   Location Address: [address] [place] [address]  [id number]   Location Phone: [phone]          Chief Complaint  · Right Wrist Pain      History Of Present Illness  Jung Burkett is a 55 year old /White male who presents today to [facility].      Patient presents today with a follow-up for right wrist pain. Patient has a history of s/p right CTR and right knee arthroscopy 1/15/20 by Dr. Kenney. Patient states that his wrist started bothering him when he went back to work. Patient states that when he squeezes his wrist it causes pain and his  is weak. Patient states he experiences a sharp pain in his wrist. Patient states if he pushes down on his thumb it pops and it gives him some relief.                       Past Medical History  Allergic rhinitis, chronic; Anxiety; Anxiety disorder; Arthritis; Blood Diseases; Cervicalgia; Depression; Diabetes; Diabetes mellitus, type 2; Diabetic Eye Exam Last Completed; Diabetic Foot Exam Last Completed; Essential hypertension; GERD; Hepatitis C Screening; High blood pressure; High cholesterol; Hyperlipemia; Hyperlipidemia; Hypertension; Hypogonadism in male; Low testosterone in male; Major depressive disorder; Nicotine dependence; Prostate Disorder; Reflux; Screening PSA (prostate specific antigen); Seasonal allergies; Vitamin D deficiency         Past Surgical History  Cholecystectomy; Colonoscopy; Gallbladder; Tonsillectomy         Medication List  Adderall 20 mg oral tablet; albuterol sulfate 2.5 mg/0.5 mL inhalation solution for nebulization; atorvastatin 20 mg oral tablet; Bydureon BCise 2 mg/0.85 mL subcutaneous auto-injector; Dexilant 60 mg oral  "capsule,biphase delayed releas; dicyclomine 20 mg oral tablet; Flomax 0.4 mg oral capsule; Hyzaar 100-25 mg oral tablet; metoprolol succinate 50 mg oral tablet extended release 24 hr; Shingrix (PF) 50 mcg/0.5 mL intramuscular suspension for reconstitution; Synjardy 12.5-1,000 mg oral tablet; trazodone 100 mg oral tablet         Allergy List  hydrocodone-acetaminophen; NSAIDS; SULFA (SULFONAMIDES)         Family Medical History  Stroke; Heart Disease; Cancer, Unspecified; Diabetes, unspecified type; Rheumatoid arthritis; Heart Attack (MI); Prostate cancer; Renal Calculus; Family history of colon cancer; Family history of breast cancer; Family history of Arthritis         Social History  Alcohol (Current every day); Alcohol Use (Current some day); Caffeine (Current every day); lives with spouse; ; .; No known infection risk; Recreational Drug Use (Never); Second hand smoke exposure (Current some day); Tobacco (Current some day); Working         Immunizations  Name Date Admin   Hepatitis A    Hepatitis A    Influenza    Influenza    Tdap          Review of Systems  · Constitutional  o Denies  o : fever, chills, weight loss  · Cardiovascular  o Denies  o : chest pain, shortness of breath  · Gastrointestinal  o Denies  o : liver disease, heartburn, nausea, blood in stools  · Genitourinary  o Denies  o : painful urination, blood in urine  · Integument  o Denies  o : rash, itching  · Neurologic  o Denies  o : headache, weakness, loss of consciousness  · Musculoskeletal  o Denies  o : painful, swollen joints  · Psychiatric  o Denies  o : drug/alcohol addiction, anxiety, depression      Vitals  Date Time BP Position Site L\R Cuff Size HR RR TEMP (F) WT  HT  BMI kg/m2 BSA m2 O2 Sat        09/22/2020 03:34 PM      82 - R   178lbs 0oz 5'  8\" 27.06 1.97 96 %          Physical Examination  · Constitutional  o Appearance  o : well developed, well-nourished, no obvious deformities present  · Head and " Face  o Head  o :   § Inspection  § : normocephalic  o Face  o :   § Inspection  § : no facial lesions  · Eyes  o Conjunctivae  o : conjunctivae normal  o Sclerae  o : sclerae white  · Ears, Nose, Mouth and Throat  o Ears  o :   § External Ears  § : appearance within normal limits  § Hearing  § : intact  o Nose  o :   § External Nose  § : appearance normal  · Neck  o Inspection/Palpation  o : normal appearance  o Range of Motion  o : full range of motion  · Respiratory  o Respiratory Effort  o : breathing unlabored  o Inspection of Chest  o : normal appearance  o Auscultation of Lungs  o : no audible wheezing or rales  · Cardiovascular  o Heart  o : regular rate  · Gastrointestinal  o Abdominal Examination  o : soft and non-tender  · Skin and Subcutaneous Tissue  o General Inspection  o : intact, no rashes  · Psychiatric  o General  o : Alert and oriented x3  o Judgement and Insight  o : judgment and insight intact  o Mood and Affect  o : mood normal, affect appropriate  · Right Wrist  o Inspection  o : Sensation grossly intact. Neurovascular intact. Pulses normal. Non-tender to palpation. No swelling, skin discoloration or atrophy. Good ROM of the wrist. Good ROM of thumb. Patient able to make a fist and wiggle fingers. Tenderness on the palm.   · Injection Note/Aspiration Note  o Site  o : right wrist  o Procedure  o : Procedure: After educating the patient, patient gave consent for procedure. After using Chloraprep, the joint space was injected. The patient tolerated the procedure well.   o Medication  o : 80 mg of DepoMedrol with 1cc of 1% Lidocaine          Assessment  · Aftercare : Right carpal tunnel release     V54.81  · Right wrist pain     719.43/M25.531      Plan  · Orders  o Depo-Medrol injection 80mg () - - 09/22/2020   Lot [id number] Exp 07 2021 [employer] Administered by JAMISON Kenney MD  o Arthrocentesis of wrist (20605) - - 09/22/2020   Lot [id number] Exp 08 01 2021 [employer] Administered by JAMISON  Pablito CHAPARRO  · Medications  o Medications have been Reconciled  o Transition of Care or Provider Policy  · Instructions  o Dr. Kenney saw and examined the patient and agrees with plan.   o X-rays reviewed by Dr. Kenney.  o Reviewed the patient's Past Medical, Social, and Family history as well as the ROS at today's visit, no changes.  o Call or return if worsening symptoms.  o Exercise handout given.  o Follow Up PRN.  o This note was transcribed by Marisela Puente. mc  o Discussed diagnosis and treatment options with the patient. Discussed bracing and injections. Patient opted for injection and tolerated it well. Patient also opted for getting a brace and at home exercises. Patient will follow-up PRN.             Electronically Signed by: Marisela Puente-, Other -Author on September 24, 2020 04:09:59 PM  Electronically Co-signed by: Ulisses Kenney MD -Reviewer on September 25, 2020 08:02:19 AM

## 2021-05-13 NOTE — PROGRESS NOTES
Progress Note      Patient Name: Jung Burkett   Patient ID: [id number]   Sex: Male   YOB: 1965    Primary Care Provider: Fausto Schrader PA-C   Referring Provider: Poli Snyder    Visit Date: June 30, 2020    Provider: Cindy Maloney PA-C   Location: [facility]   Location Address: [address] [place] [address]  [id number]   Location Phone: [phone]          Chief Complaint  · Right ankle pain  · Right knee pain      History Of Present Illness  Jung Burkett is a 55 year old /White male who presents today to [facility].      Patient is following up for right ankle injury, right distal fibula fracture sustained 2/8/20 after falling down stairs. Patient is wearing a tennis shoe. Patient states minimal pain with walking.       Past Medical History  Allergic rhinitis, chronic; Anxiety; Anxiety disorder; Arthritis; Blood Diseases; Cervicalgia; Depression; Diabetes; Diabetes mellitus, type 2; Diabetic Eye Exam Last Completed; Diabetic Foot Exam Last Completed; Essential hypertension; GERD; Hepatitis C Screening; High blood pressure; High cholesterol; Hyperlipemia; Hyperlipidemia; Hypertension; Hypogonadism in male; Low testosterone in male; Major depressive disorder; Nicotine dependence; Prostate Disorder; Reflux; Screening PSA (prostate specific antigen); Seasonal allergies; Vitamin D deficiency         Past Surgical History  Cholecystectomy; Colonoscopy; Gallbladder; Tonsillectomy         Medication List  Adderall 20 mg oral tablet; albuterol sulfate 2.5 mg/0.5 mL inhalation solution for nebulization; atorvastatin 20 mg oral tablet; Bydureon BCise 2 mg/0.85 mL subcutaneous auto-injector; Dexilant 60 mg oral capsule,biphase delayed releas; dicyclomine 20 mg oral tablet; Flomax 0.4 mg oral capsule; Hyzaar 100-25 mg oral tablet; metoprolol succinate 50 mg oral tablet extended release 24 hr; Shingrix (PF) 50 mcg/0.5 mL intramuscular suspension for reconstitution;  "Synjardy 12.5-1,000 mg oral tablet; trazodone 100 mg oral tablet         Allergy List  hydrocodone-acetaminophen; NSAIDS; SULFA (SULFONAMIDES)         Family Medical History  Stroke; Heart Disease; Cancer, Unspecified; Diabetes, unspecified type; Rheumatoid arthritis; Heart Attack (MI); Prostate cancer; Renal Calculus; Family history of colon cancer; Family history of breast cancer; Family history of Arthritis         Social History  Alcohol (Current every day); Alcohol Use (Current some day); Caffeine (Current every day); lives with spouse; ; .; No known infection risk; Recreational Drug Use (Never); Second hand smoke exposure (Current some day); Tobacco (Current some day); Working         Review of Systems  · Constitutional  o Denies  o : fever, chills, weight loss  · Cardiovascular  o Denies  o : chest pain, shortness of breath  · Gastrointestinal  o Denies  o : liver disease, heartburn, nausea, blood in stools  · Genitourinary  o Denies  o : painful urination, blood in urine  · Integument  o Denies  o : rash, itching  · Neurologic  o Denies  o : headache, weakness, loss of consciousness  · Musculoskeletal  o Denies  o : painful, swollen joints  · Psychiatric  o Denies  o : drug/alcohol addiction, anxiety, depression      Vitals  Date Time BP Position Site L\R Cuff Size HR RR TEMP (F) WT  HT  BMI kg/m2 BSA m2 O2 Sat        06/30/2020 03:15 PM      102 - R   193lbs 8oz 5'  8\" 29.42 2.05 98 %          Physical Examination  · Constitutional  o Appearance  o : well developed, well-nourished, no obvious deformities present  · Head and Face  o Head  o :   § Inspection  § : normocephalic  o Face  o :   § Inspection  § : no facial lesions  · Eyes  o Conjunctivae  o : conjunctivae normal  o Sclerae  o : sclerae white  · Ears, Nose, Mouth and Throat  o Ears  o :   § External Ears  § : appearance within normal limits  § Hearing  § : intact  o Nose  o :   § External Nose  § : appearance " normal  · Neck  o Inspection/Palpation  o : normal appearance  o Range of Motion  o : full range of motion  · Respiratory  o Respiratory Effort  o : breathing unlabored  o Inspection of Chest  o : normal appearance  o Auscultation of Lungs  o : no audible wheezing or rales  · Cardiovascular  o Heart  o : regular rate  · Gastrointestinal  o Abdominal Examination  o : soft and non-tender  · Skin and Subcutaneous Tissue  o General Inspection  o : intact, no rashes  · Psychiatric  o General  o : Alert and oriented x3  o Judgement and Insight  o : judgment and insight intact  o Mood and Affect  o : mood normal, affect appropriate  · Right Ankle-Street  o Inspection  o : no redness, no swelling, normal alignment, normal arch  o Palpation  o : ligaments non tender to palpation, No tenderness at base of the 5th Metatarsal , No tenderness of navicular, No tenderness at cuboid, No tenderness at tibiofibular synesmosis  o Range of Motion  o : full dorsiflexion, full plantarflexion, full inversion, full eversion  o Strength  o : full dorsiflexion, full plantarflexion, full inversion, full eversion  o Neurovascular  o : normal sensation, dorsal pedal pulse 2+, posterior tibialis pulse 2+  o Special Tests  o : normal heel raise, normal resisted eversion, normal resisted inversion, negative anterior drawer, negative squeeze test, negative talar tilt, negative proprioception, Negative Tinel's Test          Assessment  · Right ankle pain, unspecified chronicity     719.47/M25.571  · Right knee pain, unspecified chronicity     719.46/M25.561  · Fibula fracture     823.81/S82.409A      Plan  · Medications  o Medications have been Reconciled  o Transition of Care or Provider Policy  · Instructions  o Reviewed the patient's Past Medical, Social, and Family history as well as the ROS at today's visit, no changes.  o Call or return if worsening symptoms.  o Follow Up PRN.  o Electronically Identified Patient Education Materials Provided  Electronically            Electronically Signed by: Cindy Maloney PA-C -Author on June 30, 2020 04:33:34 PM

## 2021-05-13 NOTE — PROGRESS NOTES
Progress Note      Patient Name: Jung Burkett   Patient ID: [id number]   Sex: Male   YOB: 1965    Primary Care Provider: Fausto Schrader PA-C   Referring Provider: Poli Snyder    Visit Date: May 5, 2020    Provider: Cindy Maloney PA-C   Location: [facility]   Location Address: [address] [place] [address]  [id number]   Location Phone: [phone]          Chief Complaint  · Right ankle pain      History Of Present Illness  Jung Burkett is a 55 year old /White male who presents today to [facility].      Patient is following up for right ankle injury, right distal fibula fracture sustained 2/8/20 after falling down stairs. Patient is wearing a tennis shoe. Patient states pain with walking at the anterior shin, lateral ankle.  Patient states mild burning pain on the lateral side of the ankle.               Past Medical History  Allergic rhinitis, chronic; Anxiety; Anxiety disorder; Arthritis; Blood Diseases; Cervicalgia; Depression; Diabetes; Diabetes mellitus, type 2; Diabetic Eye Exam Last Completed; Diabetic Foot Exam Last Completed; Essential hypertension; GERD; Hepatitis C Screening; High blood pressure; High cholesterol; Hyperlipemia; Hyperlipidemia; Hypertension; Hypogonadism in male; Low testosterone in male; Major depressive disorder; Nicotine dependence; Prostate Disorder; Reflux; Screening PSA (prostate specific antigen); Seasonal allergies; Vitamin D deficiency         Past Surgical History  Cholecystectomy; Colonoscopy; Gallbladder; Tonsillectomy         Medication List  Adderall 20 mg oral tablet; albuterol sulfate 2.5 mg/0.5 mL inhalation solution for nebulization; atorvastatin 20 mg oral tablet; Dexilant 60 mg oral capsule,biphase delayed releas; Flomax 0.4 mg oral capsule; Hyzaar 100-25 mg oral tablet; metoprolol succinate 50 mg oral tablet extended release 24 hr; Percocet 7.5-325 mg oral tablet; Shingrix (PF) 50 mcg/0.5 mL intramuscular  "suspension for reconstitution; Synjardy 12.5-1,000 mg oral tablet; Victoza 3-Kelton 0.6 mg/0.1 mL (18 mg/3 mL) subcutaneous pen injector         Allergy List  hydrocodone-acetaminophen; NSAIDS; SULFA (SULFONAMIDES)         Family Medical History  Stroke; Heart Disease; Cancer, Unspecified; Diabetes, unspecified type; Rheumatoid arthritis; Heart Attack (MI); Prostate cancer; Renal Calculus; Family history of colon cancer; Family history of breast cancer; Family history of Arthritis         Social History  Alcohol (Current every day); Alcohol Use (Current some day); Caffeine (Current every day); lives with spouse; ; .; No known infection risk; Recreational Drug Use (Never); Second hand smoke exposure (Current some day); Tobacco (Current some day); Working         Review of Systems  · Constitutional  o Denies  o : fever, chills, weight loss  · Cardiovascular  o Denies  o : chest pain, shortness of breath  · Gastrointestinal  o Denies  o : liver disease, heartburn, nausea, blood in stools  · Genitourinary  o Denies  o : painful urination, blood in urine  · Integument  o Denies  o : rash, itching  · Neurologic  o Denies  o : headache, weakness, loss of consciousness  · Musculoskeletal  o Denies  o : painful, swollen joints  · Psychiatric  o Denies  o : drug/alcohol addiction, anxiety, depression      Vitals  Date Time BP Position Site L\R Cuff Size HR RR TEMP (F) WT  HT  BMI kg/m2 BSA m2 O2 Sat        05/05/2020 02:05 PM      110 - R   194lbs 8oz 5'  8\" 29.57 2.06 98 %          Physical Examination  · Constitutional  o Appearance  o : well developed, well-nourished, no obvious deformities present  · Head and Face  o Head  o :   § Inspection  § : normocephalic  o Face  o :   § Inspection  § : no facial lesions  · Eyes  o Conjunctivae  o : conjunctivae normal  o Sclerae  o : sclerae white  · Ears, Nose, Mouth and Throat  o Ears  o :   § External Ears  § : appearance within normal limits  § Hearing  § : " intact  o Nose  o :   § External Nose  § : appearance normal  · Neck  o Inspection/Palpation  o : normal appearance  o Range of Motion  o : full range of motion  · Respiratory  o Respiratory Effort  o : breathing unlabored  o Inspection of Chest  o : normal appearance  o Auscultation of Lungs  o : no audible wheezing or rales  · Cardiovascular  o Heart  o : regular rate  · Gastrointestinal  o Abdominal Examination  o : soft and non-tender  · Skin and Subcutaneous Tissue  o General Inspection  o : intact, no rashes  · Psychiatric  o General  o : Alert and oriented x3  o Judgement and Insight  o : judgment and insight intact  o Mood and Affect  o : mood normal, affect appropriate  · In Office Procedures  o View  o : AP/LATERAL  o Site  o : right, ankle  o Indication  o : Right ankle pain  o Study  o : X-rays ordered, taken in the office, and reviewed today.  o Xray  o : good healing distal fibula fracture  o Comparative Data  o : Comparative Data found and reviewed today   · Right Ankle-Street  o Inspection  o : limping gait, mild swelling, normal alignment, normal arch  o Palpation  o : ligaments non tender to palpation, No tenderness at base of the 5th Metatarsal , No tenderness of navicular, No tenderness at cuboid, No tenderness at tibiofibular synesmosis  o Range of Motion  o : full dorsiflexion, full plantarflexion, full inversion, full eversion  o Strength  o : full dorsiflexion, full plantarflexion, weak inversion, weak eversion   o Neurovascular  o : normal sensation, dorsal pedal pulse 2+, posterior tibialis pulse 2+          Assessment  · Right ankle pain, unspecified chronicity     719.47/M25.571  · Fibula fracture     823.81/S82.409A      Plan  · Orders  o Ankle (Right) [facility] Preferred View ([id number]) - 719.47/M25.571 - 05/05/2020  · Medications  o Medications have been Reconciled  o Transition of Care or Provider Policy  · Instructions  o Reviewed the patient's Past Medical, Social, and Family history as  well as the ROS at today's visit, no changes.  o Call or return if worsening symptoms.  o Exercise handout given.  o X-ray ordered, taken and reviewed at this visit.  o Follow up in 6 weeks.  o Electronically Identified Patient Education Materials Provided Electronically     Continue Physical therapy at [facility]  Follow up 6 weeks, x ray  Off work due to job requirements             Electronically Signed by: Cindy Maloney PA-C -Author on May 5, 2020 02:35:49 PM

## 2021-05-13 NOTE — PROGRESS NOTES
Progress Note      Patient Name: Jung Burkett   Patient ID: [id number]   Sex: Male   YOB: 1965    Primary Care Provider: Fausto Schrader PA-C   Referring Provider: Poli Snyder    Visit Date: June 16, 2020    Provider: Cindy Maloney PA-C   Location: [facility]   Location Address: [address] [place] [address]  [id number]   Location Phone: [phone]          Chief Complaint  · Right ankle pain      History Of Present Illness  Jung Burkett is a 55 year old /White male who presents today to [facility].      Patient is following up for right ankle injury, right distal fibula fracture sustained 2/8/20 after falling down stairs. Patient is wearing a tennis shoe. Patient states pain with walking at the anterior shin, lateral ankle.  Patient states mild burning pain on the lateral side of the ankle.               Past Medical History  Allergic rhinitis, chronic; Anxiety; Anxiety disorder; Arthritis; Blood Diseases; Cervicalgia; Depression; Diabetes; Diabetes mellitus, type 2; Diabetic Eye Exam Last Completed; Diabetic Foot Exam Last Completed; Essential hypertension; GERD; Hepatitis C Screening; High blood pressure; High cholesterol; Hyperlipemia; Hyperlipidemia; Hypertension; Hypogonadism in male; Low testosterone in male; Major depressive disorder; Nicotine dependence; Prostate Disorder; Reflux; Screening PSA (prostate specific antigen); Seasonal allergies; Vitamin D deficiency         Past Surgical History  Cholecystectomy; Colonoscopy; Gallbladder; Tonsillectomy         Medication List  Adderall 20 mg oral tablet; albuterol sulfate 2.5 mg/0.5 mL inhalation solution for nebulization; atorvastatin 20 mg oral tablet; Bydureon BCise 2 mg/0.85 mL subcutaneous auto-injector; Dexilant 60 mg oral capsule,biphase delayed releas; dicyclomine 20 mg oral tablet; Flomax 0.4 mg oral capsule; Hyzaar 100-25 mg oral tablet; metoprolol succinate 50 mg oral tablet extended  "release 24 hr; Shingrix (PF) 50 mcg/0.5 mL intramuscular suspension for reconstitution; Synjardy 12.5-1,000 mg oral tablet; trazodone 100 mg oral tablet         Allergy List  hydrocodone-acetaminophen; NSAIDS; SULFA (SULFONAMIDES)         Family Medical History  Stroke; Heart Disease; Cancer, Unspecified; Diabetes, unspecified type; Rheumatoid arthritis; Heart Attack (MI); Prostate cancer; Renal Calculus; Family history of colon cancer; Family history of breast cancer; Family history of Arthritis         Social History  Alcohol (Current every day); Alcohol Use (Current some day); Caffeine (Current every day); lives with spouse; ; .; No known infection risk; Recreational Drug Use (Never); Second hand smoke exposure (Current some day); Tobacco (Current some day); Working         Review of Systems  · Constitutional  o Denies  o : fever, chills, weight loss  · Cardiovascular  o Denies  o : chest pain, shortness of breath  · Gastrointestinal  o Denies  o : liver disease, heartburn, nausea, blood in stools  · Genitourinary  o Denies  o : painful urination, blood in urine  · Integument  o Denies  o : rash, itching  · Neurologic  o Denies  o : headache, weakness, loss of consciousness  · Musculoskeletal  o Denies  o : painful, swollen joints  · Psychiatric  o Denies  o : drug/alcohol addiction, anxiety, depression      Vitals  Date Time BP Position Site L\R Cuff Size HR RR TEMP (F) WT  HT  BMI kg/m2 BSA m2 O2 Sat        06/16/2020 01:58 PM      92 - R   201lbs 4oz 5'  8\" 30.6 2.09 97 %          Physical Examination  · Constitutional  o Appearance  o : well developed, well-nourished, no obvious deformities present  · Head and Face  o Head  o :   § Inspection  § : normocephalic  o Face  o :   § Inspection  § : no facial lesions  · Eyes  o Conjunctivae  o : conjunctivae normal  o Sclerae  o : sclerae white  · Ears, Nose, Mouth and Throat  o Ears  o :   § External Ears  § : appearance within normal " limits  § Hearing  § : intact  o Nose  o :   § External Nose  § : appearance normal  · Neck  o Inspection/Palpation  o : normal appearance  o Range of Motion  o : full range of motion  · Respiratory  o Respiratory Effort  o : breathing unlabored  o Inspection of Chest  o : normal appearance  o Auscultation of Lungs  o : no audible wheezing or rales  · Cardiovascular  o Heart  o : regular rate  · Gastrointestinal  o Abdominal Examination  o : soft and non-tender  · Skin and Subcutaneous Tissue  o General Inspection  o : intact, no rashes  · Psychiatric  o General  o : Alert and oriented x3  o Judgement and Insight  o : judgment and insight intact  o Mood and Affect  o : mood normal, affect appropriate  · In Office Procedures  o View  o : AP/LATERAL  o Site  o : right, ankle  o Indication  o : Right ankle pain  o Study  o : X-rays ordered, taken in the office, and reviewed today.  o Xray  o : good healing distal fibula fracture  o Comparative Data  o : Comparative Data found and reviewed today   · Right Ankle-Street  o Inspection  o : swelling present, no atrophy, limping gait, ecchymosis , able to bear weight   o Palpation  o : ligaments non tender to palpation, No tenderness at base of the 5th Metatarsal , No tenderness of navicular, No tenderness at cuboid, No tenderness at tibiofibular synesmosis  o Range of Motion  o : full dorsiflexion, full plantarflexion, full inversion, full eversion  o Strength  o : weak dorsiflexion, full plantarflexion, weak inversion, full eversion  o Neurovascular  o : normal sensation, dorsal pedal pulse 2+, posterior tibialis pulse 2+          Assessment  · Right ankle pain, unspecified chronicity     719.47/M25.571  · Fibula fracture     823.81/S82.409A  · Ankle sprain     845.00/S93.409A      Plan  · Orders  o Ankle (Right) [facility] Preferred View ([id number]) - 719.47/M25.571 - 06/16/2020  · Medications  o Medications have been Reconciled  o Transition of Care or Provider  Policy  · Instructions  o Reviewed the patient's Past Medical, Social, and Family history as well as the ROS at today's visit, no changes.  o Call or return if worsening symptoms.  o Exercise handout given.  o X-ray ordered, taken and reviewed at this visit.  o Follow Up in 2 weeks.  o Electronically Identified Patient Education Materials Provided Electronically     follow up 2 weeks, discuss return to work status. Patient will be required to work 10 hours/day 5 days a week without restrictions.               Electronically Signed by: Cindy Maloney PA-C -Author on June 16, 2020 02:38:25 PM

## 2021-05-13 NOTE — PROGRESS NOTES
Progress Note      Patient Name: Jung Burkett   Patient ID: [id number]   Sex: Male   YOB: 1965    Primary Care Provider: Fausto Schrader PA-C   Referring Provider: Poli Snyder    Visit Date: June 12, 2020    Provider: Fausto Schrader PA-C   Location: [facility]   Location Address: [address] [place] [address], [address]  [place], [place]  [id number]   Location Phone: [phone]          Chief Complaint  · 6 mth f/u      History Of Present Illness  Jung Burkett is a 55 year old /White male who presents for evaluation and treatment of:      6 mth follow up    Pt c/o recurring gastro disturbance.  He states that after eating, within 15-20 minutes he has to run to the bathroom.  Having recurrent episodes of diarrhea.  Ongoing problem.  Referred to Gen Surg for a CLN/ENDO performed on 3/29/19 by Dr. Lay.   Large bleeding internal hemorrhoids.  Colon/EGD - normal  Pt has been having some IBS diarrhea.      DM: Pt is currently prescribed Synjardy 12.5/1000mg.  He states that he took the last one today.  Assumed that his medication would probably change.  His last A1C was 7.9% on 6/8/20.  Increase of 0.8% from 11/19.  States he is no longer taking Victoza.      HTN:  currently prescribed Hyzaar 100-25mg QD and Metoprolol 50mg QD.  No complaints or concerns with medication.  BP WNL today.    HLD: Taking Atorvastatin 20mg QHS.  No side effects noted.  Cholesterol levels are mainly WNL with exception of Trig that are 166.    Pt has been having issues with sleep - he has been taking something to help him sleep - hydroxycycline which has not helped       Past Medical History  Disease Name Date Onset Notes   Allergic rhinitis, chronic --  --    Anxiety --  --    Anxiety disorder 05/17/2019 --    Arthritis --  --    Blood Diseases --  --    Cervicalgia --  --    Depression --  --    Diabetes --  --    Diabetes mellitus, type 2 05/17/2019 --    Diabetic Eye Exam Last Completed Jan 2019 20/20 per pt    Diabetic Foot Exam Last Completed within few mo Dr. Goldman per pt   Essential hypertension 05/17/2019 --    GERD --  --    Hepatitis C Screening 06/04/2019 Neg   High blood pressure --  --    High cholesterol --  --    Hyperlipemia --  --    Hyperlipidemia --  --    Hypertension --  --    Hypogonadism in male --  --    Low testosterone in male 05/17/2019 --    Major depressive disorder 05/17/2019 --    Nicotine dependence 05/17/2019 --    Prostate Disorder --  --    Reflux --  --    Screening PSA (prostate specific antigen) 06/04/2019 --    Seasonal allergies --  --    Vitamin D deficiency 05/17/2019 --          Past Surgical History  Procedure Name Date Notes   Cholecystectomy 1997 --    Colonoscopy 3/29/19 [facility] - repeat in 5 years   Gallbladder --  --    Tonsillectomy --  --          Medication List  Name Date Started Instructions   Adderall 20 mg oral tablet  take 1 tablet (20 mg) by oral route 2 times per day before breakfast and at 2 pm   albuterol sulfate 2.5 mg/0.5 mL inhalation solution for nebulization 07/24/2019 inhale 0.5 milliliter (2.5 mg) by nebulization route every 8 hours as needed   atorvastatin 20 mg oral tablet 10/31/2019 take 1 tablet (20 mg) by oral route once daily at bedtime for 90 days   Bydureon BCise 2 mg/0.85 mL subcutaneous auto-injector 06/12/2020 inject 2 mg by subcutaneous route every 7 days in the abdomen, thighs, or outer area of upper arm rotating injection sites for 30 days   Dexilant 60 mg oral capsule,biphase delayed releas 05/12/2020 take 1 capsule (60 mg) by oral route once daily for 90 days   Flomax 0.4 mg oral capsule 09/04/2019 take 1 capsule (0.4 mg) by oral route once daily 1/2 hour following the same meal each day for 90 days   Hyzaar 100-25 mg oral tablet 02/14/2020 take 1 tablet by oral route once daily   metoprolol succinate 50 mg oral tablet extended release 24 hr 06/05/2020 TAKE 1 TABLET BY MOUTH ONCE DAILY   Shingrix (PF) 50 mcg/0.5 mL intramuscular suspension  for reconstitution 05/16/2019 inject 0.5 milliliter (50 mcg) by intramuscular route once repeat 0.5 mL dose 2 to 6 months after the first dose (total of 2 doses)   Synjardy 12.5-1,000 mg oral tablet 06/12/2020 TAKE 1 TABLET BY MOUTH TWO TIMES DAILY for 90 days   trazodone 100 mg oral tablet 06/12/2020 take 1 tablet (100 mg) by oral route once daily at bedtime for 90 days         Allergy List  Allergen Name Date Reaction Notes   hydrocodone-acetaminophen --  nose itches --    NSAIDS --  --  --    SULFA (SULFONAMIDES) --  --  --          Family Medical History  Disease Name Relative/Age Notes   Stroke Father/   Father   Heart Disease Father/   Father   Cancer, Unspecified Mother/   Mother   Diabetes, unspecified type Mother/   Mother  Mother; Grandmother (maternal); Aunt (maternal)   Rheumatoid arthritis Mother/40s   --    Heart Attack (MI) Grandmother (maternal)/  Mother/   --    Prostate cancer Grandfather (maternal)/   Grandfather (maternal)   Renal Calculus Grandfather (maternal)/   Grandfather (maternal)   Family history of colon cancer Grandfather (paternal)/60s  Grandmother (paternal)/   Grandmother (paternal); Grandfather (paternal)/60s   Family history of breast cancer Grandmother (maternal)/50s  Mother/40s   Mother/40s; Aunt/40s; Grandmother (maternal)/50s   Family history of Arthritis Mother/   Mother         Social History  Finding Status Start/Stop Quantity Notes   Alcohol Current every day 0/0 --  drinks daily; beer   Alcohol Use Current some day --/-- --  drinks weekly, 2 drinks per day, has been drinking for 11-20 years   Caffeine Current every day 0/0 --  drinks regularly; coffee; 3-4 times per day   lives with spouse --  --/-- --  --     --  --/-- --  --    . --  --/-- --  --    No known infection risk --  --/-- --  --    Recreational Drug Use Never --/-- --  no   Second hand smoke exposure Current some day 0/0 --  yes   Tobacco Current some day --/-- 1 PPD current some day smoker, 1  "packs per day, smoked 6-10 years  05/16/2019 - Pt states he does not smoke cigarettes, smokes 1 cigar daily current some days smoker; started smoking at age 39; smoked 1 cigarette(s) per day   Working --  --/-- --  --          Immunizations  NameDate Admin Mfg Trade Name Lot Number Route Inj VIS Given VIS Publication   Hepatitis A06/12/2020 SKB HAVRIX-ADULT [id number] IM LD 06/12/2020    Comments:    Hepatitis A05/16/2019 MSD VAQTA-ADULT [id number] IM LD 05/16/2019    Comments: pt tolerated well   Cfmvrhfox44/10/2019 PMC Fluzone Quadrivalent [id number] IM LD 10/10/2019    Comments: PATIENT TOLERATED WELL   Xuxrkzfbp89/01/2018 NE Not Entered  IM NE 05/16/2019    Comments: pt states received at Dr. Hood (prior PCP)   Tdap05/16/2019 SKB BOOSTRIX [id number] IM RD 05/16/2019    Comments: pt tolerated well         Review of Systems  · Constitutional  o Denies  o : fever, fatigue, weight loss, weight gain  · Cardiovascular  o Denies  o : lower extremity edema, claudication, chest pressure, palpitations  · Respiratory  o Denies  o : shortness of breath, wheezing, cough, hemoptysis, dyspnea on exertion  · Gastrointestinal  o Admits  o : diarrhea  o Denies  o : nausea, vomiting, constipation, abdominal pain      Vitals  Date Time BP Position Site L\R Cuff Size HR RR TEMP (F) WT  HT  BMI kg/m2 BSA m2 O2 Sat        06/12/2020 07:02 /62 Sitting    80 - R   193lbs 16oz 5'  8\" 29.5 2.05 96 %          Physical Examination  · Constitutional  o Appearance  o : overweight, well developed, alert, in no acute distress  · Head and Face  o Head  o : normocephalic, atraumatic  · Ears, Nose, Mouth and Throat  o Ears  o :   § External Ears  § : external auditory canal appearance normal, no discharge present  § Otoscopic Examination  § : tympanic membranes pearly white/gray bilaterally  o Nose  o :   § External Nose  § : no lesions noted  § Nasopharynx  § : no discharge present  o Oral Cavity  o :   § Oral Mucosa  § : oral mucosa light " pink  o Throat  o :   § Oropharynx  § : tonsils without exudate, no palatal petechiae  · Neck  o Inspection/Palpation  o : normal appearance, no masses or tenderness, trachea midline  o Thyroid  o : gland size normal, nontender, no nodules or masses present on palpation  · Respiratory  o Respiratory Effort  o : breathing unlabored  o Inspection of Chest  o : chest rise symmetric bilaterally  o Auscultation of Lungs  o : clear to auscultation bilaterally throughout inspiration and expiration  · Cardiovascular  o Heart  o :   § Auscultation of Heart  § : regular rate and rhythm, no murmurs, gallops or rubs  o Peripheral Vascular System  o :   § Extremities  § : no edema  · Gastrointestinal  o Abdominal Examination  o : abdomen nontender to palpation, tone normal without rigidity or guarding, no masses present, abdominal contour scaphoid  o Liver and spleen  o : no hepatomegaly present, liver nontender to palpation, spleen not palpable  o Hernias  o : no hernias present  · Lymphatic  o Neck  o : no cervical lymphadenopathy, no supraclavicular lymphadenopathy  · Psychiatric  o Mood and Affect  o : mood normal, affect appropriate     SKIN - Scalp - erythematous scaly, thickened area           Assessment  · Anxiety disorder     300.00/F41.9  · Depression     311/F32.9  · Diabetes mellitus, type 2     250.00/E11.9  · Diarrhea     787.91/R19.7  · Essential hypertension     401.9/I10  · Hyperlipidemia     272.4/E78.5  · Insomnia, unspecified     780.52/G47.00  · Nicotine dependence     305.1/F17.200  · Screening for depression     V79.0/Z13.89  · Need for hepatitis A vaccination     V05.3/Z23  · Low testosterone in male     790.99/R79.89  · Prostate Disorder     602.9/N42.9  · Hypogonadism in male     257.2/E29.1      Plan  · Orders  o ACO-17: Screened for tobacco use AND received tobacco cessation intervention (4004F) - 305.1/F17.200 - 06/12/2020  o ACO-18: Positive screen for clinical depression using a standardized tool  and a follow-up plan documented () - V79.0/Z13.89 - 06/12/2020  o Immunization Admin Fee (Single) ([facility]) (51971) - V05.3/Z23 - 06/12/2020  o Havrix Adult Vaccine (18 yrs and older) (88290) - V05.3/Z23 - 06/12/2020   Vaccine - Hepatitis A; Dose: 0.5; Site: Left Deltoid; Route: Intramuscular; Date: 06/12/2020 07:47:00; Exp: 05/20/2022; Lot: [id number]; Mfg: Vimty; TradeName: HAVRIX-ADULT; Administered By: Louann Dickson MA; Comment: N/A  o ACO-39: Current medications updated and reviewed () - - 06/12/2020  o ACO-14: Influenza immunization administered or previously received () - - 06/12/2020  o ACO-19: Colorectal cancer screening results documented and reviewed (3017F) - - 06/12/2020  o ACO - Pt declines to or was not able to provide an Advance Care Plan or name a Surrogate Decision Maker (1124F) - - 06/12/2020  o DERMATOLOGY CONSULTATION (DERMA) - - 06/12/2020  · Medications  o dicyclomine 20 mg oral tablet   SIG: take 1 tablet (20 mg) by oral route 4 times per day   DISP: (60) tablets with 2 refills  Prescribed on 06/12/2020     o Bydureon BCise 2 mg/0.85 mL subcutaneous auto-injector   SIG: inject 2 mg by subcutaneous route every 7 days in the abdomen, thighs, or outer area of upper arm rotating injection sites   DISP: (4) Auto-injectors with 5 refills  Prescribed on 06/12/2020     o trazodone 100 mg oral tablet   SIG: take 1 tablet (100 mg) by oral route once daily at bedtime   DISP: (30) tablets with 2 refills  Prescribed on 06/12/2020     o Synjardy 12.5-1,000 mg oral tablet   SIG: TAKE 1 TABLET BY MOUTH TWO TIMES DAILY   DISP: (180) Tablet with 1 refills  Adjusted on 06/12/2020     o Medications have been Reconciled  o Transition of Care or Provider Policy  · Instructions  o Continue blood sugar monitoring daily and record. Bring your log to office visits. Call the office for readings below 70 and above 250 or any complications.  o Daily foot care. Avoid walking barefoot. Annual Dilated Eye  Exam.  o Discussed with patient blood pressure monitoring, hemoglobin A1C levels need to be below 7.0, and LDL (Lipid) goals below 70.  o Patient advised to monitor blood pressure (B/P) at home and journal readings. Patient informed that a B/P reading at home of more than 130/80 is considered hypertension. For readings greater [id number]/90 or higher patient is advised to follow up in the office with readings for management. Patient advised to limit sodium intake.  o Patient was educated and given low cholesterol diet information.  o Recommended exercise program to assist with cholesterol, weight loss and overall health improvement.  o Advised that cheeses and other sources of dairy fats, animal fats, fast food, and the extras (candy, pastries, pies, doughnuts and cookies) all contain LDL raising nutrients. Advised to increase fruits, vegetables, whole grains, and to monitor portion sizes.   o *Form of nicotine being used: cigs  o Patient was strongly encouraged to discontinue use of any nicotine containing product or minimize the use of the product.  o Depression Screen completed and scanned into the EMR under the designated folder within the patient's documents.  o Today's PHQ-9 result is _11__  o Take all medications as prescribed/directed.  o Patient instructed/educated on their diet and exercise program.  o Patient was educated/instructed on their diagnosis, treatment and medications prior to discharge from the clinic today.  o Patient counseled to stop smoking.  o Patient counseled to reduce calorie intake.  o Patient was instructed to exercise regularly.  o Discussed Covid-19 precautions including, but not limited to, social distancing, avoid touching your face, and hand washing.   · Disposition  o Call or Return if symptoms worsen or persist.  o F/U in 6 months  o Care Transition            Electronically Signed by: FARIDA SullivanC -Author on June 12, 2020 10:10:16 AM

## 2021-05-13 NOTE — PROGRESS NOTES
Progress Note      Patient Name: Jung Burkett   Patient ID: [id number]   Sex: Male   YOB: 1965    Primary Care Provider: Fausto Schrader PA-C   Referring Provider: Poli Snyder    Visit Date: October 27, 2020    Provider: Fausto Schrader PA-C   Location: [facility]   Location Address: [address] [place] [address], [address]  [place], [place]  [id number]   Location Phone: [phone]          Chief Complaint  · 4 month follow up  · discuss A1C from 10/20 labs      History Of Present Illness  Jung Burkett is a 55 year old /White male who presents for evaluation and treatment of: 4 month follow up.      pt presents today for 4 month follow up anxiety, DM2, HTN and HLD.    pt states he would like to dicuss recent labs from 10/20    Labs 10/20-A1C 6.3  king 9/20  UDS 10/20    pt will be getting flu vaccine today.    Pt is due his foot exam    No CP, SOA, HA    Pt is doing well overall.  He cont to have sweats, seeing derm         Past Medical History  Disease Name Date Onset Notes   Allergic rhinitis, chronic --  --    Anxiety --  --    Anxiety disorder 05/17/2019 --    Arthritis --  --    Blood Diseases --  --    Cervicalgia --  --    Depression --  --    Diabetes --  --    Diabetes mellitus, type 2 05/17/2019 --    Diabetic Eye Exam Last Completed Jan 2019 20/20 per pt   Diabetic Foot Exam Last Completed within few mo Dr. Goldman per pt   Essential hypertension 05/17/2019 --    GERD --  --    Hepatitis C Screening 06/04/2019 Neg   High blood pressure --  --    High cholesterol --  --    Hyperlipemia --  --    Hyperlipidemia --  --    Hypertension --  --    Hypogonadism in male --  --    Low testosterone in male 05/17/2019 --    Major depressive disorder 05/17/2019 --    Nicotine dependence 05/17/2019 --    Prostate Disorder --  --    Reflux --  --    Screening PSA (prostate specific antigen) 06/04/2019 --    Seasonal allergies --  --    Vitamin D deficiency 05/17/2019 --           Past Surgical History  Procedure Name Date Notes   Cholecystectomy 1997 --    Colonoscopy 3/29/19 [facility] - repeat in 5 years   Gallbladder --  --    Tonsillectomy --  --          Medication List  Name Date Started Instructions   Adderall 20 mg oral tablet  take 1 tablet (20 mg) by oral route 2 times per day before breakfast and at 2 pm   albuterol sulfate 2.5 mg/0.5 mL inhalation solution for nebulization 07/24/2019 inhale 0.5 milliliter (2.5 mg) by nebulization route every 8 hours as needed   atorvastatin 20 mg oral tablet 08/24/2020 TAKE 1 TABLET BY MOUTH ONCE DAILY AT BEDTIME   Bydureon BCise 2 mg/0.85 mL subcutaneous auto-injector 10/27/2020 inject 2 mg by subcutaneous route every 7 days in the abdomen, thighs, or outer area of upper arm rotating injection sites for 30 days   Dexilant 60 mg oral capsule,biphase delayed releas 10/27/2020 take 1 capsule (60 mg) by oral route once daily for 90 days   dicyclomine 20 mg oral tablet 08/21/2020 take 1 tablet (20 mg) by oral route 4 times per day for 90 days   Flomax 0.4 mg oral capsule 10/27/2020 take 1 capsule (0.4 mg) by oral route once daily 1/2 hour following the same meal each day for 90 days   glycopyrrolate 2 mg oral tablet  take 1 tablet by oral route 2 times a day for 90 days   Hyzaar 100-25 mg oral tablet 10/27/2020 take 1 tablet by oral route once daily for 90 days   metoprolol succinate 50 mg oral tablet extended release 24 hr 08/24/2020 TAKE 1 TABLET BY MOUTH ONCE DAILY   Shingrix (PF) 50 mcg/0.5 mL intramuscular suspension for reconstitution 10/27/2020 inject 0.5 milliliter (50 mcg) by intramuscular route once repeat 0.5 mL dose 2 to 6 months after the first dose (total of 2 doses)   Synjardy 12.5-1,000 mg oral tablet 06/12/2020 TAKE 1 TABLET BY MOUTH TWO TIMES DAILY for 90 days   trazodone 100 mg oral tablet 06/12/2020 take 1 tablet (100 mg) by oral route once daily at bedtime for 90 days         Allergy List  Allergen Name Date Reaction Notes    hydrocodone-acetaminophen --  nose itches --    NSAIDS --  --  --    SULFA (SULFONAMIDES) --  --  --        Allergies Reconciled  Family Medical History  Disease Name Relative/Age Notes   Stroke Father/   Father   Heart Disease Father/   Father   Cancer, Unspecified Mother/   Mother   Diabetes, unspecified type Mother/   Mother  Mother; Grandmother (maternal); Aunt (maternal)   Rheumatoid arthritis Mother/40s   --    Heart Attack (MI) Grandmother (maternal)/  Mother/   --    Prostate cancer Grandfather (maternal)/   Grandfather (maternal)   Renal Calculus Grandfather (maternal)/   Grandfather (maternal)   Family history of colon cancer Grandfather (paternal)/60s  Grandmother (paternal)/   Grandmother (paternal); Grandfather (paternal)/60s   Family history of breast cancer Grandmother (maternal)/50s  Mother/40s   Mother/40s; Aunt/40s; Grandmother (maternal)/50s   Family history of Arthritis Mother/   Mother         Social History  Finding Status Start/Stop Quantity Notes   Alcohol Current every day 0/0 --  drinks daily; beer   Alcohol Use Current some day --/-- --  drinks weekly, 2 drinks per day, has been drinking for 11-20 years   Caffeine Current every day 0/0 --  drinks regularly; coffee; 3-4 times per day   lives with spouse --  --/-- --  --     --  --/-- --  --    . --  --/-- --  --    No known infection risk --  --/-- --  --    Recreational Drug Use Never --/-- --  no   Second hand smoke exposure Current some day 0/0 --  yes   Tobacco Current some day --/-- 1 PPD current some day smoker, 1 packs per day, smoked 6-10 years  05/16/2019 - Pt states he does not smoke cigarettes, smokes 1 cigar daily current some days smoker; started smoking at age 39; smoked 1 cigarette(s) per day   Working --  --/-- --  --          Immunizations  NameDate Admin Mfg Trade Name Lot Number Route Inj VIS Given VIS Publication   Hepatitis A06/12/2020 SKB HAVRIX-ADULT [id number] IM LD 06/12/2020    Comments:    Hepatitis  "A05/16/2019 [facility] VAQTA-ADULT [id number] IM LD 05/16/2019    Comments: pt tolerated well   Nerizwrvd95/27/2020 PMC Fluzone Quadrivalent [id number] IM LD 10/27/2020 08/15/2019   Comments: pt tolerated well   Tdap05/16/2019 SKB BOOSTRIX [id number] IM RD 05/16/2019    Comments: pt tolerated well         Review of Systems  · Constitutional  o Denies  o : fever, fatigue, weight loss, weight gain  · Cardiovascular  o Denies  o : lower extremity edema, claudication, chest pressure, palpitations  · Respiratory  o Denies  o : shortness of breath, wheezing, cough, hemoptysis, dyspnea on exertion  · Gastrointestinal  o Denies  o : nausea, vomiting, diarrhea, constipation, abdominal pain      Vitals  Date Time BP Position Site L\R Cuff Size HR RR TEMP (F) WT  HT  BMI kg/m2 BSA m2 O2 Sat FR L/min FiO2        10/27/2020 02:19 /75 Sitting    82 - R   176lbs 8oz 5'  8\" 26.84 1.96 97 %            Physical Examination  · Constitutional  o Appearance  o : well developed, well-nourished, no acute distress  · Head and Face  o Head  o : normocephalic, atraumatic  · Neck  o Inspection/Palpation  o : normal appearance, no masses or tenderness, trachea midline  o Thyroid  o : gland size normal, nontender, no nodules or masses present on palpation  · Respiratory  o Respiratory Effort  o : breathing unlabored  o Inspection of Chest  o : chest rise symmetric bilaterally  o Auscultation of Lungs  o : clear to auscultation bilaterally throughout inspiration and expiration  · Cardiovascular  o Heart  o :   § Auscultation of Heart  § : regular rate and rhythm, no murmurs, gallops or rubs  o Peripheral Vascular System  o :   § Extremities  § : no edema  · Lymphatic  o Neck  o : no cervical lymphadenopathy, no supraclavicular lymphadenopathy  · Psychiatric  o Mood and Affect  o : mood normal, affect appropriate  · Left DM Foot Exam  o Sensation  o : normal sensory exam perceptible to 10-gram nylon monofilament exam (5/5), vibration and light " touch.  o Visual Inspection  o : visual inspection is normal with no signs of breakdown, ulcerations or deformities unless otherwise noted.   o Vascular  o : palpable dorsalis pedis and posterir tibialis pulses present, normal capillary refill  · Right DM Foot Exam  o Sensation  o : normal sensory exam perceptible to 10-gram nylon monofilament exam (5/5), vibration and light touch.  o Visual Inspection  o : visual inspection is normal with no signs of breakdown, ulcerations or deformities unless otherwise noted.   o Vascular  o : palpable dorsalis pedis and posterir tibialis pulses present, normal capillary refill          Results  · In-Office Procedures  o Lab procedure  § IOP - Urinalysis without Microscopy (Clinitek) [facility] (84726)   § Color Ur: Yellow   § Clarity Ur: Clear   § Glucose Ur Ql Strip: 500 mg/dL   § Bilirub Ur Ql Strip: Negative   § Ketones Ur Ql Strip: Negative   § Sp Gr Ur Qn: 1.015   § Hgb Ur Ql Strip: Negative   § pH Ur-LsCnc: 6.0   § Prot Ur Ql Strip: Negative   § Urobilinogen Ur Strip-mCnc: 0.2 E.U./dL   § Nitrite Ur Ql Strip: Negative   § WBC Est Ur Ql Strip: Negative   § IOP - Glucose, Fingerstick (24007)   § Glucose SerPl-mCnc: 142 mg/dL      Assessment  · Diabetes mellitus, type 2     250.00/E11.9  · Nicotine dependence     305.1/F17.200  · Need for influenza vaccination     V04.81/Z23  · Diabetes     250.92  · GERD     530.81  · High blood pressure     401.9/I10  · High cholesterol     272.0/E78.0  · Prostate Disorder     602.9/N42.9  · Hypogonadism in male     257.2/E29.1      Plan  · Orders  o Diabetic Foot (Motor and Sensory) Exam Completed [facility] (, , 2028F) - 250.00/E11.9 - 10/28/2020  o ACO-17: Screened for tobacco use AND received tobacco cessation intervention (4004F) - 305.1/F17.200 - 10/27/2020  o Immunization Admin Fee (Single) ([facility]) (21503) - V04.81/Z23 - 10/27/2020  o Fluzone Quadrivalent Vaccine, age 6 months + (15517) - V04.81/Z23 - 10/27/2020   Vaccine - Influenza;  Dose: 0.5; Site: Left Deltoid; Route: Intramuscular; Date: 10/27/2020 14:25:00; Exp: 06/30/2021; Lot: [id number]; Mfg: sanmPort pasteur; TradeName: Fluzone Quadrivalent; Administered By: Shruti Barcenas MA; Comment: pt tolerated well  o ACO-14: Influenza immunization administered or previously received () - V04.81/Z23 - 10/27/2020  o ACO-39: Current medications updated and reviewed (1159F, ) - - 10/27/2020  · Medications  o Bydureon BCise 2 mg/0.85 mL subcutaneous auto-injector   SIG: inject 2 mg by subcutaneous route every 7 days in the abdomen, thighs, or outer area of upper arm rotating injection sites for 30 days   DISP: (4) Pre-filled Pen Syringe with 5 refills  Refilled on 10/27/2020     o Dexilant 60 mg oral capsule,biphase delayed releas   SIG: take 1 capsule (60 mg) by oral route once daily for 90 days   DISP: (90) Capsule with 1 refills  Refilled on 10/27/2020     o Flomax 0.4 mg oral capsule   SIG: take 1 capsule (0.4 mg) by oral route once daily 1/2 hour following the same meal each day for 90 days   DISP: (90) Capsule with 4 refills  Refilled on 10/27/2020     o Hyzaar 100-25 mg oral tablet   SIG: take 1 tablet by oral route once daily for 90 days   DISP: (90) Tablet with 3 refills  Refilled on 10/27/2020     o Shingrix (PF) 50 mcg/0.5 mL intramuscular suspension for reconstitution   SIG: inject 0.5 milliliter (50 mcg) by intramuscular route once repeat 0.5 mL dose 2 to 6 months after the first dose (total of 2 doses)   DISP: (1) Kit with 0 refills  Refilled on 10/27/2020     o Medications have been Reconciled  o Transition of Care or Provider Policy  · Instructions  o Continue blood sugar monitoring daily and record. Bring your log to office visits. Call the office for readings below 70 and above 250 or any complications.  o Daily foot care. Avoid walking barefoot. Annual Dilated Eye Exam.  o Discussed with patient blood pressure monitoring, hemoglobin A1C levels need to be below 7.0, and LDL  (Lipid) goals below 70.  o *Form of nicotine being used: cigars  o Patient was strongly encouraged to discontinue use of any nicotine containing product or minimize the use of the product.  o Take all medications as prescribed/directed.  o Patient instructed/educated on their diet and exercise program.  o Patient was educated/instructed on their diagnosis, treatment and medications prior to discharge from the clinic today.  o Patient counseled to stop smoking.  o Discussed Covid-19 precautions including, but not limited to, social distancing, avoid touching your face, and hand washing.   · Disposition  o Call or Return if symptoms worsen or persist.  o F/U in 4-6 months  o Care Transition            Electronically Signed by: FARIDA SullivanC -Author on October 28, 2020 07:50:03 PM

## 2021-05-14 VITALS — HEIGHT: 68 IN | WEIGHT: 185 LBS | HEART RATE: 90 BPM | BODY MASS INDEX: 28.04 KG/M2 | OXYGEN SATURATION: 98 %

## 2021-05-14 VITALS — OXYGEN SATURATION: 94 % | HEART RATE: 90 BPM | BODY MASS INDEX: 26.52 KG/M2 | HEIGHT: 68 IN | WEIGHT: 175 LBS

## 2021-05-14 VITALS — HEIGHT: 68 IN | BODY MASS INDEX: 26.98 KG/M2 | WEIGHT: 178 LBS | OXYGEN SATURATION: 96 % | HEART RATE: 82 BPM

## 2021-05-14 VITALS
WEIGHT: 182 LBS | DIASTOLIC BLOOD PRESSURE: 61 MMHG | SYSTOLIC BLOOD PRESSURE: 107 MMHG | HEART RATE: 80 BPM | OXYGEN SATURATION: 99 %

## 2021-05-14 VITALS
OXYGEN SATURATION: 97 % | SYSTOLIC BLOOD PRESSURE: 111 MMHG | HEART RATE: 82 BPM | BODY MASS INDEX: 26.75 KG/M2 | HEIGHT: 68 IN | DIASTOLIC BLOOD PRESSURE: 75 MMHG | WEIGHT: 176.5 LBS

## 2021-05-14 NOTE — PROGRESS NOTES
Progress Note      Patient Name: Jung Burkett   Patient ID: [id number]   Sex: Male   YOB: 1965    Primary Care Provider: Fausto Schrader PA-C   Referring Provider: Poli Snyder    Visit Date: January 26, 2021    Provider: MERE Pérez   Location: [facility]   Location Address: [address] [place] [address], [address]  [place], [place]  [id number]   Location Phone: [phone]          Chief Complaint  · Lower back pain      History Of Present Illness  Jung Burkett is a 55 year old /White male who presents for evaluation and treatment of:      Pt states that he has been feeling like this for over a week and half.    Pt having lower back pain on the left side that radiates into the front of his abdominal with stabbing sharp pain. Admits urinary urgency. Denies dysuria, urgency, change in stream or color of urine, fever or urinary retention.    Pt states that yesterday he had frequency at least every hour. U/A performed in the office today which was normal. Denies fever, N/V. Admits left side sided back pain that radiates to the groin. Denies hx of kidney stones. Admits pain 3 on scale 0-10 but at times is a 10. States the pain is unbearing at times-he had to leave work today. States the pain hits its  so bad it causes him to fall. Ordered CT renal stone protocol and urine culture. Toradol 60mg IM administered in the office today.     Pt states that he went to the Chiropractor this morning and told patient that Verberte #2 is locked up. States he did an adjustment-it was painful-he was told he would be sore for a couple days. Prescribed flexeril 5mg tid prn muscle spasms.     Pt in pain when he walks and sits down, or any kid of movement.       Past Medical History  Disease Name Date Onset Notes   Allergic rhinitis, chronic --  --    Anxiety --  --    Anxiety disorder 05/17/2019 --    Arthritis --  --    Blood Diseases --  --    Cervicalgia --  --    Depression --  --     Diabetes --  --    Diabetes mellitus, type 2 05/17/2019 --    Diabetic Eye Exam Last Completed Jan 2019 20/20 per pt   Diabetic Foot Exam Last Completed within few mo Dr. Goldman per pt   Essential hypertension 05/17/2019 --    GERD --  --    Hepatitis C Screening 06/04/2019 Neg   High blood pressure --  --    High cholesterol --  --    Hyperlipemia --  --    Hyperlipidemia --  --    Hypertension --  --    Major depressive disorder 05/17/2019 --    Nicotine dependence 05/17/2019 --    Prostate Disorder --  --    Reflux --  --    Screening PSA (prostate specific antigen) 06/04/2019 --    Seasonal allergies --  --    Vitamin D deficiency 05/17/2019 --          Past Surgical History  Procedure Name Date Notes   Cholecystectomy 1997 --    Colonoscopy 3/29/19 [facility] - repeat in 5 years   Gallbladder --  --    Tonsillectomy --  --          Medication List  Name Date Started Instructions   Adderall 20 mg oral tablet  take 1 tablet (20 mg) by oral route 2 times per day before breakfast and at 2 pm   albuterol sulfate 2.5 mg/0.5 mL inhalation solution for nebulization 07/24/2019 inhale 0.5 milliliter (2.5 mg) by nebulization route every 8 hours as needed   atorvastatin 20 mg oral tablet 08/24/2020 TAKE 1 TABLET BY MOUTH ONCE DAILY AT BEDTIME   Bydureon BCise 2 mg/0.85 mL subcutaneous auto-injector 10/27/2020 inject 2 mg by subcutaneous route every 7 days in the abdomen, thighs, or outer area of upper arm rotating injection sites for 30 days   Dexilant 60 mg oral capsule,biphase delayed releas 10/27/2020 take 1 capsule (60 mg) by oral route once daily for 90 days   dicyclomine 20 mg oral tablet 08/21/2020 take 1 tablet (20 mg) by oral route 4 times per day for 90 days   Flomax 0.4 mg oral capsule 10/27/2020 take 1 capsule (0.4 mg) by oral route once daily 1/2 hour following the same meal each day for 90 days   glycopyrrolate 2 mg oral tablet  take 1 tablet by oral route 2 times a day for 90 days   Hyzaar 100-25 mg oral  tablet 10/27/2020 take 1 tablet by oral route once daily for 90 days   metoprolol succinate 50 mg oral tablet extended release 24 hr 08/24/2020 TAKE 1 TABLET BY MOUTH ONCE DAILY   Shingrix (PF) 50 mcg/0.5 mL intramuscular suspension for reconstitution 10/27/2020 inject 0.5 milliliter (50 mcg) by intramuscular route once repeat 0.5 mL dose 2 to 6 months after the first dose (total of 2 doses)   Synjardy 12.5-1,000 mg oral tablet 06/12/2020 TAKE 1 TABLET BY MOUTH TWO TIMES DAILY for 90 days   trazodone 100 mg oral tablet 12/02/2020 TAKE 1 TABLET BY MOUTH ONCE DAILY AT BEDTIME         Allergy List  Allergen Name Date Reaction Notes   hydrocodone-acetaminophen --  nose itches --    NSAIDS --  --  --    SULFA (SULFONAMIDES) --  --  --          Family Medical History  Disease Name Relative/Age Notes   Stroke Father/   Father   Heart Disease Father/   Father   Cancer, Unspecified Mother/   Mother   Diabetes, unspecified type Mother/   Mother  Mother; Grandmother (maternal); Aunt (maternal)   Rheumatoid arthritis Mother/40s   --    Heart Attack (MI) Grandmother (maternal)/  Mother/   --    Prostate cancer Grandfather (maternal)/   Grandfather (maternal)   Renal Calculus Grandfather (maternal)/   Grandfather (maternal)   Family history of colon cancer Grandfather (paternal)/60s  Grandmother (paternal)/   Grandmother (paternal); Grandfather (paternal)/60s   Family history of breast cancer Grandmother (maternal)/50s  Mother/40s   Mother/40s; Aunt/40s; Grandmother (maternal)/50s   Family history of Arthritis Mother/   Mother         Social History  Finding Status Start/Stop Quantity Notes   Alcohol Current every day 0/0 --  drinks daily; beer   Alcohol Use Current some day --/-- --  drinks weekly, 2 drinks per day, has been drinking for 11-20 years   Caffeine Current every day 0/0 --  drinks regularly; coffee; 3-4 times per day   lives with spouse --  --/-- --  --     --  --/-- --  --    . --  --/-- --  --    No  "known infection risk --  --/-- --  --    Recreational Drug Use Never --/-- --  no   Second hand smoke exposure Current some day 0/0 --  yes   Tobacco Current some day --/-- 1 PPD current some day smoker, 1 packs per day, smoked 6-10 years  05/16/2019 - Pt states he does not smoke cigarettes, smokes 1 cigar daily current some days smoker; started smoking at age 39; smoked 1 cigarette(s) per day   Working --  --/-- --  --          Immunizations  NameDate Admin Mfg Trade Name Lot Number Route Inj VIS Given VIS Publication   Hepatitis A06/12/2020 SKB HAVRIX-ADULT [id number] IM LD 06/12/2020    Comments:    Hepatitis A05/16/2019 [facility] VAQTA-ADULT [id number] IM LD 05/16/2019    Comments: pt tolerated well   Rizmtqzrj33/27/2020 PMC Fluzone Quadrivalent [id number] IM LD 10/27/2020 08/15/2019   Comments: pt tolerated well   Tdap05/16/2019 SKB BOOSTRIX [id number] IM RD 05/16/2019    Comments: pt tolerated well         Review of Systems  · Constitutional  o Denies  o : fever, fatigue, weight loss, weight gain  · Cardiovascular  o Denies  o : lower extremity edema, claudication, chest pressure, palpitations  · Respiratory  o Denies  o : shortness of breath, wheezing, cough, hemoptysis, dyspnea on exertion  · Gastrointestinal  o Denies  o : nausea, vomiting, diarrhea, constipation, abdominal pain  · Genitourinary  o Admits  o : frequency  o Denies  o : urgency, dysuria, change in urine color, urinary retention, difficulty voiding, urinary hesitancy, decreased stream  · Musculoskeletal  o Admits  o : back pain      Vitals  Date Time BP Position Site L\R Cuff Size HR RR TEMP (F) WT  HT  BMI kg/m2 BSA m2 O2 Sat FR L/min FiO2 HC       10/27/2020 02:19 /75 Sitting    82 - R   176lbs 8oz 5'  8\" 26.84 1.96 97 %      01/26/2021 10:27 /61 Sitting    80 - R   182lbs 0oz    99 %  21%          Physical Examination  · Constitutional  o Appearance  o : alert, in no acute distress, well developed, well-nourished  · Respiratory  o Auscultation of " Lungs  o : normal breath sounds throughout, no wheeze, rhonchi, or crackles  · Cardiovascular  o Heart  o : Regular rate and rhythm, Normal S1,S2 , No cardiac murmers, No S3 or S4 gallop or rubs  · Skin and Subcutaneous Tissue  o General Inspection  o : no rashes, normal skin color, warm and dry  o Digits and Nails  o : no clubbing, cyanosis, deformities or edema present, normal appearing nails  · Neurologic  o Mental Status Examination  o : alert and oriented to time, place, and person. Gait and Station: normal gait, able to stand without difficulty  · Psychiatric  o Judgment and Insight  o : judgment and insight intact  o Mood and Affect  o : normal mood and affect  · Back  o Inspection  o : no gross deformities  o Palpation  o : no tenderness to palpation, no swelling  o Range of Motion  o : normal range of motion  o Gait  o : normal gait  o Special Tests  o : negative straight leg raise          Results  · In-Office Procedures  o Lab procedure  § IOP - Urinalysis without Microscopy (Clinitek) [facility] (17038)   § Color Ur: Yellow   § Clarity Ur: Clear   § Glucose Ur Ql Strip: 500 mg/dL   § Bilirub Ur Ql Strip: Negative   § Ketones Ur Ql Strip: Negative   § Sp Gr Ur Qn: 1.015   § Hgb Ur Ql Strip: Negative   § pH Ur-LsCnc: 6.0   § Prot Ur Ql Strip: Negative   § Urobilinogen Ur Strip-mCnc: 0.2 E.U./dL   § Nitrite Ur Ql Strip: Negative   § WBC Est Ur Ql Strip: Negative       Assessment  · Nicotine dependence     305.1/F17.200  · Urinary frequency     788.41/R35.0  · Back pain     724.5/M54.9      Plan  · Orders  o ACO-17: Screened for tobacco use AND received tobacco cessation intervention (6984F) - 305.1/F17.200 - 01/26/2021  o ACO - Pt declines to or was not able to provide an Advance Care Plan or name a Surrogate Decision Maker (1124F) - - 01/26/2021  o IM - Injection Fee [facility] (25739) - 724.5/M54.9 - 01/26/2021  o ACO-39: Current medications updated and reviewed (1159F, ) - - 01/26/2021  o CT Renal Stone Protocol  (CT Abdomen and Pelvis without IV Contrast) [facility]; no Oral Prep (13067) - 788.41/R35.0, 724.5/M54.9 - 01/26/2021  o Urine Culture [facility] (07517) - 788.41/R35.0, 724.5/M54.9 - 01/26/2021  o 4.00 - Toradol Injection 60mg (-1) - 724.5/M54.9 - 01/26/2021   Injection - Toradol 60 mg; Dose: 60 mg; Site: Right Gluteus; Route: intramuscular; Date: 01/26/2021 12:18:20; Exp: 11/01/2021; Lot: [id number]; Mfg: N/A; TradeName: ketorolac; Location: [facility]; Administered By: Fany Rooney MA; Comment:   · Medications  o cyclobenzaprine 5 mg oral tablet   SIG: take 1 tablet (5 mg) by oral route 3 times per day prn   DISP: (30) Tablet with 0 refills  Prescribed on 01/26/2021     o Medications have been Reconciled  o Transition of Care or Provider Policy  · Instructions  o *Form of nicotine being used:   o Patient was strongly encouraged to discontinue use of any nicotine containing product or minimize the use of the product.  o Take all medications as prescribed/directed.  o Patient was educated/instructed on their diagnosis, treatment and medications prior to discharge from the clinic today.  o Patient instructed to seek medical attention urgently for new or worsening symptoms.  o Call the office with any concerns or questions.  o Bring all medicines with their bottles to each office visit.  o Electronically Identified Patient Education Materials Provided Electronically  · Disposition  o Call or Return if symptoms worsen or persist.            Electronically Signed by: MERE Pérez -Author on January 26, 2021 12:33:21 PM

## 2021-05-14 NOTE — PROGRESS NOTES
Progress Note      Patient Name: Jung Burkett   Patient ID: [id number]   Sex: Male   YOB: 1965    Primary Care Provider: Fausto Schrader PA-C   Referring Provider: Poli Snyder    Visit Date: March 2, 2021    Provider: Ulisses Kenney MD   Location: [facility]   Location Address: [address] [place] [address]  [id number]   Location Phone: [phone]          Chief Complaint  · Right Knee Pain      History Of Present Illness  Jung Burkett is a 56 year old /White male who presents today to [facility].      Patient presents today for an evaluation of right knee pain. Patient states he has been having increasing medial sided knee pain with no known injury or trauma. He has a history of a right knee arthroscopy on 1/15/20 and right distal fibular fracture on 2/8/20. He states he has a fear he re-tore his medial meniscus again because the pain feels similar.       Past Medical History  Allergic rhinitis, chronic; Anxiety; Anxiety disorder; Arthritis; Blood Diseases; Cervicalgia; Depression; Diabetes; Diabetes mellitus, type 2; Diabetic Eye Exam Last Completed; Diabetic Foot Exam Last Completed; Essential hypertension; GERD; Hepatitis C Screening; High blood pressure; High cholesterol; Hyperlipemia; Hyperlipidemia; Hypertension; Major depressive disorder; Nicotine dependence; Prostate Disorder; Reflux; Screening PSA (prostate specific antigen); Seasonal allergies; Vitamin D deficiency         Past Surgical History  Cholecystectomy; Colonoscopy; Gallbladder; Joint Surgery; Surgical Clips; Tonsillectomy         Medication List  Adderall 20 mg oral tablet; albuterol sulfate 2.5 mg/0.5 mL inhalation solution for nebulization; atorvastatin 20 mg oral tablet; Bydureon BCise 2 mg/0.85 mL subcutaneous auto-injector; cyclobenzaprine 5 mg oral tablet; Dexilant 60 mg oral capsule,biphase delayed releas; dicyclomine 20 mg oral tablet; Flomax 0.4 mg oral capsule;  "glycopyrrolate 2 mg oral tablet; Hyzaar 100-25 mg oral tablet; Medrol (Kelton) 4 mg oral tablets,dose pack; metoprolol succinate 50 mg oral tablet extended release 24 hr; Shingrix (PF) 50 mcg/0.5 mL intramuscular suspension for reconstitution; Synjardy 12.5-1,000 mg oral tablet; trazodone 100 mg oral tablet         Allergy List  hydrocodone-acetaminophen; NSAIDS; SULFA (SULFONAMIDES)         Family Medical History  Stroke; Heart Disease; Cancer, Unspecified; Diabetes, unspecified type; Rheumatoid arthritis; Heart Attack (MI); Prostate cancer; Renal Calculus; Family history of colon cancer; Family history of breast cancer; Family history of Arthritis         Social History  Alcohol (Current every day); Alcohol Use (Current some day); Caffeine (Current every day); lives with spouse; ; .; No known infection risk; Recreational Drug Use (Never); Second hand smoke exposure (Current some day); Tobacco (Current some day); Working         Immunizations  Name Date Admin   Hepatitis A 06/12/2020   Hepatitis A 05/16/2019   Influenza 10/27/2020   Influenza 10/10/2019   Influenza 10/01/2018   Tdap 05/16/2019         Review of Systems  · Constitutional  o Denies  o : fever, chills, weight loss  · Cardiovascular  o Denies  o : chest pain, shortness of breath  · Gastrointestinal  o Denies  o : liver disease, heartburn, nausea, blood in stools  · Genitourinary  o Denies  o : painful urination, blood in urine  · Integument  o Denies  o : rash, itching  · Neurologic  o Denies  o : headache, weakness, loss of consciousness  · Musculoskeletal  o Denies  o : painful, swollen joints  · Psychiatric  o Denies  o : drug/alcohol addiction, anxiety, depression      Vitals  Date Time BP Position Site L\R Cuff Size HR RR TEMP (F) WT  HT  BMI kg/m2 BSA m2 O2 Sat FR L/min FiO2 HC       03/02/2021 03:29 PM      90 - R   174lbs 16oz 5'  8\" 26.61 1.95 94 %            Physical Examination  · Constitutional  o Appearance  o : well developed, " well-nourished, no obvious deformities present  · Head and Face  o Head  o :   § Inspection  § : normocephalic  o Face  o :   § Inspection  § : no facial lesions  · Eyes  o Conjunctivae  o : conjunctivae normal  o Sclerae  o : sclerae white  · Ears, Nose, Mouth and Throat  o Ears  o :   § External Ears  § : appearance within normal limits  § Hearing  § : intact  o Nose  o :   § External Nose  § : appearance normal  · Neck  o Inspection/Palpation  o : normal appearance  o Range of Motion  o : full range of motion  · Respiratory  o Respiratory Effort  o : breathing unlabored  o Inspection of Chest  o : normal appearance  o Auscultation of Lungs  o : no audible wheezing or rales  · Cardiovascular  o Heart  o : regular rate  · Gastrointestinal  o Abdominal Examination  o : soft and non-tender  · Skin and Subcutaneous Tissue  o General Inspection  o : intact, no rashes  · Psychiatric  o General  o : Alert and oriented x3  o Judgement and Insight  o : judgment and insight intact  o Mood and Affect  o : mood normal, affect appropriate  · Right Knee  o Inspection  o : Sensation grossly intact. Neurovascular intact. Skin intact. Calf supple, non-tender. Pulses are pleasant. Full weight bearing. Non-antalgic gait. Tender medial joint line. Non-tender lateral joint line. Mild swelling. No skin discoloration or atrophy. Full flexion and extension. Stable to valgus/varus stress. Good strength in quadriceps, hamstrings, dorsiflexors, and plantar flexors.   · In Office Procedures  o View  o : LAT/SUNRISE/STANDING   o Site  o : right, knee  o Indication  o : Right Knee Pain  o Study  o : X-rays ordered, taken in the office, and reviewed today.  o Xray  o : No fracture or dislocation.           Assessment  · Right knee pain, unspecified chronicity     719.46/M25.561  · Right knee injury     959.7/S89.90XA      Plan  · Orders  o Knee (Right) [facility] Preferred View ([id number]) - 719.46/M25.561 - 03/02/2021  · Medications  o Medications  have been Reconciled  o Transition of Care or Provider Policy  · Instructions  o Dr. Kenney saw and examined the patient and agrees with plan.   o X-rays reviewed by Dr. Kenney.  o Reviewed the patient's Past Medical, Social, and Family history as well as the ROS at today's visit, no changes.  o Call or return if worsening symptoms.  o Follow up after MRI.  o Discussed diagnosis and treatment options with the patient. Patient opted for a right knee MRI.  o The above service was scribed by Marisela Puente on my behalf and I attest to the accuracy of the note. mc            Electronically Signed by: Marisela Puente-, Other -Author on March 4, 2021 02:17:58 PM  Electronically Co-signed by: Ulisses Kenney MD -Reviewer on March 4, 2021 08:57:57 PM

## 2021-05-14 NOTE — PROGRESS NOTES
Progress Note      Patient Name: Jung Burkett   Patient ID: [id number]   Sex: Male   YOB: 1965    Primary Care Provider: Fausto Schrader PA-C   Referring Provider: Poli Snyder    Visit Date: March 23, 2021    Provider: Ulisses Kenney MD   Location: [facility]   Location Address: [address] [place] [address]  [id number]   Location Phone: [phone]          Chief Complaint  · Right Knee Pain - MRI Results      History Of Present Illness  Jung Burkett is a 56 year old /White male who presents today to [facility].      Patient presents today for a follow-up of right knee pain. Patient states he has been having increasing medial sided knee pain with no known injury or trauma. He has a history of a right knee arthroscopy on 1/15/20 and right distal fibular fracture on 2/8/20. He states he has a fear he re-tore his medial meniscus again because the pain feels similar. Patient presents today with MRI results of his right knee. Patient states today he has moderate right knee pain.       Past Medical History  Allergic rhinitis, chronic; Anxiety; Anxiety Disorder; Arthritis; Blood Diseases; Cervicalgia; Depression; Diabetes; Diabetes mellitus, type 2; Diabetic Eye Exam Last Completed; Diabetic Foot Exam Last Completed; Essential hypertension; GERD; Hepatitis C Screening; High blood pressure; High cholesterol; Hyperlipemia; Hyperlipidemia; Hypertension; Major depressive disorder; Nicotine dependence; Prostate Disorder; Reflux; Screening PSA (prostate specific antigen); Seasonal allergies; Vitamin D deficiency         Past Surgical History  Cholecystectomy; Colonoscopy; Gallbladder; Joint Surgery; Surgical Clips; Tonsillectomy         Medication List  Adderall 20 mg oral tablet; albuterol sulfate 2.5 mg/0.5 mL inhalation solution for nebulization; atorvastatin 20 mg oral tablet; Bydureon BCise 2 mg/0.85 mL subcutaneous auto-injector; cyclobenzaprine 5 mg oral  tablet; Dexilant 60 mg oral capsule,biphase delayed releas; dicyclomine 20 mg oral tablet; Flomax 0.4 mg oral capsule; glycopyrrolate 2 mg oral tablet; Hyzaar 100-25 mg oral tablet; Medrol (Kelton) 4 mg oral tablets,dose pack; metoprolol succinate 50 mg oral tablet extended release 24 hr; Shingrix (PF) 50 mcg/0.5 mL intramuscular suspension for reconstitution; Synjardy 12.5-1,000 mg oral tablet; trazodone 100 mg oral tablet; Voltaren 1 % topical gel         Allergy List  hydrocodone-acetaminophen; NSAIDS; SULFA (SULFONAMIDES)       Allergies Reconciled  Family Medical History  Stroke; Heart Disease; Cancer, Unspecified; Diabetes, unspecified type; Rheumatoid arthritis; Heart Attack (MI); Prostate cancer; Renal Calculus; Family history of colon cancer; Family history of breast cancer; Family history of Arthritis         Social History  Alcohol (Current every day); Alcohol Use (Current some day); Caffeine (Current every day); lives with spouse; ; .; No known infection risk; Recreational Drug Use (Never); Second hand smoke exposure (Current some day); Tobacco (Current some day); Working         Immunizations  Name Date Admin   Hepatitis A 06/12/2020   Hepatitis A 05/16/2019   Influenza 10/27/2020   Influenza 10/10/2019   Influenza 10/01/2018   Tdap 05/16/2019         Review of Systems  · Constitutional  o Denies  o : fever, chills, weight loss  · Cardiovascular  o Denies  o : chest pain, shortness of breath  · Gastrointestinal  o Denies  o : liver disease, heartburn, nausea, blood in stools  · Genitourinary  o Denies  o : painful urination, blood in urine  · Integument  o Denies  o : rash, itching  · Neurologic  o Denies  o : headache, weakness, loss of consciousness  · Musculoskeletal  o Denies  o : painful, swollen joints  · Psychiatric  o Denies  o : drug/alcohol addiction, anxiety, depression      Vitals  Date Time BP Position Site L\R Cuff Size HR RR TEMP (F) WT  HT  BMI kg/m2 BSA m2 O2 Sat FR L/min  "FiO2 HC       03/23/2021 03:24 PM      90 - R   184lbs 16oz 5'  8\" 28.13 2.01 98 %            Physical Examination  · Constitutional  o Appearance  o : well developed, well-nourished, no obvious deformities present  · Head and Face  o Head  o :   § Inspection  § : normocephalic  o Face  o :   § Inspection  § : no facial lesions  · Eyes  o Conjunctivae  o : conjunctivae normal  o Sclerae  o : sclerae white  · Ears, Nose, Mouth and Throat  o Ears  o :   § External Ears  § : appearance within normal limits  § Hearing  § : intact  o Nose  o :   § External Nose  § : appearance normal  · Neck  o Inspection/Palpation  o : normal appearance  o Range of Motion  o : full range of motion  · Respiratory  o Respiratory Effort  o : breathing unlabored  o Inspection of Chest  o : normal appearance  o Auscultation of Lungs  o : no audible wheezing or rales  · Cardiovascular  o Heart  o : regular rate  · Gastrointestinal  o Abdominal Examination  o : soft and non-tender  · Skin and Subcutaneous Tissue  o General Inspection  o : intact, no rashes  · Psychiatric  o General  o : Alert and oriented x3  o Judgement and Insight  o : judgment and insight intact  o Mood and Affect  o : mood normal, affect appropriate  · Right Knee  o Inspection  o : Sensation grossly intact. Neurovascular intact. Skin intact. Calf supple, non-tender. Pulses are pleasant. Full weight bearing. Non-antalgic gait. Tender medial joint line. Non-tender lateral joint line. Mild swelling. No skin discoloration or atrophy. Full flexion and extension. Stable to valgus/varus stress. Good strength in quadriceps, hamstrings, dorsiflexors, and plantar flexors.   · Imaging  o Imaging  o : 3/17/21 RIGHT KNEE MRI: 1. Partial-thickness curvilinear radial tear through the medial meniscus posterior horn with attenuation of the meniscal body that most likely represents postoperative changes from partial meniscectomy. 2. Mild chondromalacia in the medial compartment. 3. Small " joint effusion with mild synovial thickening and a thin popliteal cyst.           Assessment  · Primary osteoarthritis of right knee     715.16/M17.11  · Right Knee MMT (medial meniscus tear)     836.0/S83.249A  · Right knee pain, unspecified chronicity     719.46/M25.561      Plan  · Medications  o Medications have been Reconciled  o Transition of Care or Provider Policy  · Instructions  o Dr. Kenney saw and examined the patient and agrees with plan.   o MRI reviewed by Dr. Kenney.  o Reviewed the patient's Past Medical, Social, and Family history as well as the ROS at today's visit, no changes.  o Call or return if worsening symptoms.  o Follow Up PRN.  o The above service was scribed by Marisela Puente on my behalf and I attest to the accuracy of the note. mc   o Discussed treatment plans with the patient. Discussed conservative management options such as injections, bracing, medication and therapy. Patient states he has a brace at home that he does occasionally use. Patient given a prescription for Voltaren gel for his knee.            Electronically Signed by: Marisela Puente-, Other -Author on March 25, 2021 11:43:03 AM  Electronically Co-signed by: Ulisses Kenney MD -Reviewer on March 25, 2021 07:50:52 PM

## 2021-05-15 VITALS
HEART RATE: 70 BPM | WEIGHT: 195 LBS | BODY MASS INDEX: 29.55 KG/M2 | SYSTOLIC BLOOD PRESSURE: 118 MMHG | OXYGEN SATURATION: 98 % | DIASTOLIC BLOOD PRESSURE: 64 MMHG | HEIGHT: 68 IN

## 2021-05-15 VITALS
HEART RATE: 100 BPM | BODY MASS INDEX: 30.65 KG/M2 | OXYGEN SATURATION: 98 % | SYSTOLIC BLOOD PRESSURE: 153 MMHG | TEMPERATURE: 98.7 F | HEIGHT: 68 IN | WEIGHT: 202.25 LBS | DIASTOLIC BLOOD PRESSURE: 91 MMHG

## 2021-05-15 VITALS
HEART RATE: 80 BPM | OXYGEN SATURATION: 96 % | BODY MASS INDEX: 29.4 KG/M2 | SYSTOLIC BLOOD PRESSURE: 110 MMHG | DIASTOLIC BLOOD PRESSURE: 62 MMHG | WEIGHT: 194 LBS | HEIGHT: 68 IN

## 2021-05-15 VITALS — OXYGEN SATURATION: 99 % | HEIGHT: 68 IN | WEIGHT: 185 LBS | BODY MASS INDEX: 28.04 KG/M2 | HEART RATE: 113 BPM

## 2021-05-15 VITALS
HEIGHT: 68 IN | OXYGEN SATURATION: 100 % | DIASTOLIC BLOOD PRESSURE: 82 MMHG | SYSTOLIC BLOOD PRESSURE: 119 MMHG | WEIGHT: 190 LBS | HEART RATE: 111 BPM | DIASTOLIC BLOOD PRESSURE: 89 MMHG | SYSTOLIC BLOOD PRESSURE: 137 MMHG | HEART RATE: 125 BPM | BODY MASS INDEX: 28.79 KG/M2

## 2021-05-15 VITALS — HEART RATE: 100 BPM | OXYGEN SATURATION: 95 % | WEIGHT: 199.37 LBS | BODY MASS INDEX: 30.21 KG/M2 | HEIGHT: 68 IN

## 2021-05-15 VITALS — BODY MASS INDEX: 29.48 KG/M2 | HEART RATE: 110 BPM | WEIGHT: 194.5 LBS | OXYGEN SATURATION: 98 % | HEIGHT: 68 IN

## 2021-05-15 VITALS — HEIGHT: 68 IN | RESPIRATION RATE: 16 BRPM | WEIGHT: 197.37 LBS | BODY MASS INDEX: 29.91 KG/M2

## 2021-05-15 VITALS
HEART RATE: 91 BPM | WEIGHT: 204.37 LBS | HEIGHT: 68 IN | BODY MASS INDEX: 30.97 KG/M2 | DIASTOLIC BLOOD PRESSURE: 80 MMHG | SYSTOLIC BLOOD PRESSURE: 146 MMHG | OXYGEN SATURATION: 95 %

## 2021-05-15 VITALS — HEIGHT: 68 IN | BODY MASS INDEX: 29.33 KG/M2 | WEIGHT: 193.5 LBS | OXYGEN SATURATION: 98 % | HEART RATE: 102 BPM

## 2021-05-15 VITALS — WEIGHT: 188 LBS | BODY MASS INDEX: 28.49 KG/M2 | HEIGHT: 68 IN | HEART RATE: 111 BPM | OXYGEN SATURATION: 98 %

## 2021-05-15 VITALS
OXYGEN SATURATION: 96 % | BODY MASS INDEX: 30.92 KG/M2 | HEART RATE: 87 BPM | HEIGHT: 68 IN | DIASTOLIC BLOOD PRESSURE: 95 MMHG | SYSTOLIC BLOOD PRESSURE: 147 MMHG | WEIGHT: 204 LBS

## 2021-05-15 VITALS — HEIGHT: 68 IN | BODY MASS INDEX: 28.64 KG/M2 | WEIGHT: 189 LBS | RESPIRATION RATE: 16 BRPM

## 2021-05-15 VITALS — WEIGHT: 201.25 LBS | OXYGEN SATURATION: 97 % | BODY MASS INDEX: 30.5 KG/M2 | HEIGHT: 68 IN | HEART RATE: 92 BPM

## 2021-05-15 VITALS — BODY MASS INDEX: 29.86 KG/M2 | WEIGHT: 197 LBS | RESPIRATION RATE: 14 BRPM | HEIGHT: 68 IN

## 2021-05-15 VITALS — HEART RATE: 78 BPM | OXYGEN SATURATION: 98 % | BODY MASS INDEX: 28.04 KG/M2 | WEIGHT: 185 LBS | HEIGHT: 68 IN

## 2021-05-15 VITALS — HEART RATE: 112 BPM | HEIGHT: 68 IN | WEIGHT: 188 LBS | BODY MASS INDEX: 28.49 KG/M2 | OXYGEN SATURATION: 96 %

## 2021-05-15 VITALS — HEART RATE: 82 BPM | BODY MASS INDEX: 28.04 KG/M2 | WEIGHT: 185 LBS | OXYGEN SATURATION: 95 % | HEIGHT: 68 IN

## 2021-05-15 VITALS
HEIGHT: 68 IN | SYSTOLIC BLOOD PRESSURE: 119 MMHG | HEART RATE: 99 BPM | OXYGEN SATURATION: 99 % | BODY MASS INDEX: 29.33 KG/M2 | DIASTOLIC BLOOD PRESSURE: 90 MMHG | WEIGHT: 193.5 LBS

## 2021-05-15 VITALS — RESPIRATION RATE: 16 BRPM | BODY MASS INDEX: 28.49 KG/M2 | WEIGHT: 188 LBS | HEIGHT: 68 IN

## 2021-05-16 VITALS — HEIGHT: 68 IN | WEIGHT: 172 LBS | BODY MASS INDEX: 26.07 KG/M2 | RESPIRATION RATE: 14 BRPM

## 2021-05-16 VITALS — HEIGHT: 68 IN | BODY MASS INDEX: 27.89 KG/M2 | RESPIRATION RATE: 14 BRPM | WEIGHT: 184 LBS

## 2021-05-16 VITALS — RESPIRATION RATE: 16 BRPM | WEIGHT: 173 LBS | HEIGHT: 68 IN | BODY MASS INDEX: 26.22 KG/M2

## 2021-06-11 ENCOUNTER — TELEPHONE (OUTPATIENT)
Dept: SLEEP MEDICINE | Facility: HOSPITAL | Age: 56
End: 2021-06-11

## 2021-06-11 RX ORDER — DEXTROAMPHETAMINE SACCHARATE, AMPHETAMINE ASPARTATE, DEXTROAMPHETAMINE SULFATE AND AMPHETAMINE SULFATE 5; 5; 5; 5 MG/1; MG/1; MG/1; MG/1
TABLET ORAL
COMMUNITY
End: 2021-06-15 | Stop reason: SDUPTHER

## 2021-06-15 DIAGNOSIS — G47.11 IDIOPATHIC HYPERSOMNIA: Primary | ICD-10-CM

## 2021-06-15 RX ORDER — DEXTROAMPHETAMINE SACCHARATE, AMPHETAMINE ASPARTATE, DEXTROAMPHETAMINE SULFATE AND AMPHETAMINE SULFATE 5; 5; 5; 5 MG/1; MG/1; MG/1; MG/1
20 TABLET ORAL 3 TIMES DAILY
Qty: 90 TABLET | Refills: 0 | Status: SHIPPED | OUTPATIENT
Start: 2021-06-15 | End: 2021-07-14 | Stop reason: SDUPTHER

## 2021-06-15 RX ORDER — DEXTROAMPHETAMINE SACCHARATE, AMPHETAMINE ASPARTATE, DEXTROAMPHETAMINE SULFATE AND AMPHETAMINE SULFATE 5; 5; 5; 5 MG/1; MG/1; MG/1; MG/1
20 TABLET ORAL 3 TIMES DAILY
Qty: 90 TABLET | Refills: 0 | Status: CANCELLED | OUTPATIENT
Start: 2021-06-15

## 2021-06-21 RX ORDER — EMPAGLIFLOZIN AND METFORMIN HYDROCHLORIDE 12.5; 1 MG/1; MG/1
TABLET ORAL
Qty: 180 TABLET | Refills: 3 | Status: SHIPPED | OUTPATIENT
Start: 2021-06-21 | End: 2021-07-13

## 2021-07-13 ENCOUNTER — TELEPHONE (OUTPATIENT)
Dept: ORTHOPEDIC SURGERY | Facility: CLINIC | Age: 56
End: 2021-07-13

## 2021-07-13 ENCOUNTER — OFFICE VISIT (OUTPATIENT)
Dept: ORTHOPEDIC SURGERY | Facility: CLINIC | Age: 56
End: 2021-07-13

## 2021-07-13 ENCOUNTER — PREP FOR SURGERY (OUTPATIENT)
Dept: OTHER | Facility: HOSPITAL | Age: 56
End: 2021-07-13

## 2021-07-13 VITALS — WEIGHT: 180 LBS | HEART RATE: 89 BPM | BODY MASS INDEX: 27.28 KG/M2 | HEIGHT: 68 IN | OXYGEN SATURATION: 98 %

## 2021-07-13 DIAGNOSIS — M75.102 TEAR OF LEFT ROTATOR CUFF, UNSPECIFIED TEAR EXTENT, UNSPECIFIED WHETHER TRAUMATIC: Primary | ICD-10-CM

## 2021-07-13 DIAGNOSIS — S46.012A TRAUMATIC COMPLETE TEAR OF LEFT ROTATOR CUFF, INITIAL ENCOUNTER: Primary | ICD-10-CM

## 2021-07-13 PROCEDURE — 99213 OFFICE O/P EST LOW 20 MIN: CPT | Performed by: PHYSICIAN ASSISTANT

## 2021-07-13 RX ORDER — CEFAZOLIN SODIUM IN 0.9 % NACL 3 G/100 ML
3 INTRAVENOUS SOLUTION, PIGGYBACK (ML) INTRAVENOUS ONCE
Status: CANCELLED | OUTPATIENT
Start: 2021-07-13 | End: 2021-07-13

## 2021-07-13 RX ORDER — GLYCOPYRROLATE 2 MG/1
TABLET ORAL
COMMUNITY
End: 2021-07-13

## 2021-07-13 RX ORDER — GUANFACINE 1 MG/1
TABLET ORAL
COMMUNITY
End: 2021-07-13

## 2021-07-13 RX ORDER — DEXLANSOPRAZOLE 60 MG/1
60 CAPSULE, DELAYED RELEASE ORAL NIGHTLY
COMMUNITY
Start: 2021-06-05 | End: 2021-12-14

## 2021-07-13 RX ORDER — GLYCOPYRRONIUM 2.4 G/100G
CLOTH TOPICAL
COMMUNITY
Start: 2021-06-26 | End: 2021-07-13

## 2021-07-13 RX ORDER — CEFAZOLIN SODIUM 2 G/100ML
2 INJECTION, SOLUTION INTRAVENOUS ONCE
Status: CANCELLED | OUTPATIENT
Start: 2021-07-13 | End: 2021-07-13

## 2021-07-13 RX ORDER — VALSARTAN AND HYDROCHLOROTHIAZIDE 160; 25 MG/1; MG/1
1 TABLET ORAL DAILY
COMMUNITY
End: 2021-07-27

## 2021-07-13 RX ORDER — OLMESARTAN MEDOXOMIL AND HYDROCHLOROTHIAZIDE 40/25 40; 25 MG/1; MG/1
TABLET ORAL
COMMUNITY
End: 2021-07-13

## 2021-07-13 RX ORDER — LIRAGLUTIDE 6 MG/ML
1.8 INJECTION SUBCUTANEOUS
COMMUNITY
End: 2021-07-13

## 2021-07-13 RX ORDER — CIPROFLOXACIN 500 MG/1
TABLET, FILM COATED ORAL
COMMUNITY
End: 2021-07-13

## 2021-07-13 RX ORDER — LOSARTAN POTASSIUM AND HYDROCHLOROTHIAZIDE 25; 100 MG/1; MG/1
1 TABLET ORAL DAILY
COMMUNITY
Start: 2021-06-19 | End: 2021-11-19

## 2021-07-13 RX ORDER — METOPROLOL SUCCINATE 50 MG/1
50 TABLET, EXTENDED RELEASE ORAL DAILY
COMMUNITY
Start: 2021-06-19 | End: 2021-09-10

## 2021-07-13 RX ORDER — AMLODIPINE BESYLATE 5 MG/1
TABLET ORAL
COMMUNITY
End: 2021-07-13

## 2021-07-13 RX ORDER — METHYLPREDNISOLONE 4 MG/1
TABLET ORAL
COMMUNITY
Start: 2021-01-29 | End: 2021-07-13

## 2021-07-13 RX ORDER — TRAZODONE HYDROCHLORIDE 100 MG/1
100 TABLET ORAL NIGHTLY
COMMUNITY
Start: 2021-06-19 | End: 2021-11-19

## 2021-07-13 RX ORDER — TAMSULOSIN HYDROCHLORIDE 0.4 MG/1
1 CAPSULE ORAL NIGHTLY
COMMUNITY
Start: 2021-06-19 | End: 2021-11-19

## 2021-07-13 RX ORDER — EMPAGLIFLOZIN AND METFORMIN HYDROCHLORIDE 12.5; 1 MG/1; MG/1
1 TABLET ORAL 2 TIMES DAILY
COMMUNITY
End: 2022-11-29

## 2021-07-13 RX ORDER — ASPIRIN 325 MG
TABLET ORAL
COMMUNITY
End: 2021-07-13

## 2021-07-13 RX ORDER — VENLAFAXINE HYDROCHLORIDE 37.5 MG/1
CAPSULE, EXTENDED RELEASE ORAL
COMMUNITY
End: 2021-07-13

## 2021-07-13 RX ORDER — ATORVASTATIN CALCIUM 20 MG/1
20 TABLET, FILM COATED ORAL NIGHTLY
COMMUNITY
Start: 2021-06-19 | End: 2021-09-10

## 2021-07-13 RX ORDER — EXENATIDE 2 MG/.85ML
INJECTION, SUSPENSION, EXTENDED RELEASE SUBCUTANEOUS
COMMUNITY
Start: 2021-04-19 | End: 2021-07-13

## 2021-07-13 NOTE — PROGRESS NOTES
"Chief Complaint  Initial Evaluation and Pain of the Left Shoulder    Subjective          Jung Burkett presents to [facility] for left shoulder pain.  Patient has had left shoulder pain for roughly 2 years.  He states he was lifting weights, doing a bench press, when the left side of the weights jerked down and he caught it with his left upper extremity.  He states he had pain for about a week at that time which improved.  Over the past 2 months or so he has had worsening pain in the left shoulder without any additional known injury.  He works at [employer] and handles heavy [employer].  He states he is having difficulty lifting and tolerating pain with movement.  Patient presents today with an MRI arthrogram of the left shoulder.  Additionally patient sees a chiropractor who ordered an MRI of his cervical spine showing multilevel degenerative disc disease with evidence for cord compression at several levels, he has been referred to Dr. Canales and is waiting to get an appointment at this time.  The patient does state that he has pain from his neck radiating down his upper extremity into his fingers, he states a few days ago he had complete arm numbness when he woke up that resolved with movement.     Objective   Vital Signs:   Pulse 89   Ht 172.7 cm (68\")   Wt 81.6 kg (180 lb)   SpO2 98%   BMI 27.37 kg/m²       Physical Exam  Constitutional:       Appearance: Normal appearance. He is well-developed and normal weight.   HENT:      Head: Normocephalic.      Right Ear: Hearing and external ear normal.      Left Ear: Hearing and external ear normal.      Nose: Nose normal.   Eyes:      Conjunctiva/sclera: Conjunctivae normal.   Cardiovascular:      Rate and Rhythm: Normal rate.   Pulmonary:      Effort: Pulmonary effort is normal.      Breath sounds: No wheezing or rales.   Abdominal:      Palpations: Abdomen is soft.      Tenderness: There is no abdominal tenderness.   Musculoskeletal: "      Cervical back: Normal range of motion.   Skin:     Findings: No rash.   Neurological:      Mental Status: He is alert and oriented to person, place, and time.   Psychiatric:         Mood and Affect: Mood and affect normal.         Judgment: Judgment normal.     Ortho Exam  Left shoulder: Skin intact without swelling, no tenderness to palpation, pain with forward flexion, passive flexion is full, passive abduction is full, passive internal rotation is full.  Positive empty can, positive crossover test, positive full can.  Strength is 3 out of 5 in the left upper extremity compared to the right upper extremity with resisted shoulder flexion and extension.  Sensation to light touch is intact in the upper extremities bilaterally at present.  Radial pulses 2+, cap refill less than 2 seconds.  Result Review :   MRI left shoulder arthrogram 7/8/2021: Impression: 7 x 9 mm full-thickness anterior distal footplate insertion supraspinatus tendon likely superimposed on low-grade chronic tendinosis.  Infraspinatus tendinosis with prominent edema in the mild tenderness junction could represent a component of low-grade muscle strain.  Attenuation of the deep surface cranial fibers subscapularis tendon possibly representing a partial-thickness tear.  AC joint arthropathy with mild juxta articular inflammation/edema.  Developing subacromial spurring thickened coracoacromial ligament both contributing to narrowing of the subacromial outlet.         Imaging Results (Most Recent)     None                Assessment and Plan    Problem List Items Addressed This Visit        Musculoskeletal and Injuries    Tear of left rotator cuff - Primary    Current Assessment & Plan     Dr. Kenney saw and evaluated this patient today, patient interested in surgical options.  Surgical options, risk and benefits and rehabilitation timeline were discussed today.  Patient elected to undergo left shoulder arthroscopy with rotator cuff repair.  He will  follow up postoperatively.               Follow Up   Return for Post-operatively.  Patient Instructions   Follow-up 2 weeks postop    Patient was given instructions and counseling regarding his condition or for health maintenance advice. Please see specific information pulled into the AVS if appropriate.

## 2021-07-13 NOTE — H&P (VIEW-ONLY)
"Chief Complaint  Initial Evaluation and Pain of the Left Shoulder    Subjective          Jung Burkett presents to [facility] for left shoulder pain.  Patient has had left shoulder pain for roughly 2 years.  He states he was lifting weights, doing a bench press, when the left side of the weights jerked down and he caught it with his left upper extremity.  He states he had pain for about a week at that time which improved.  Over the past 2 months or so he has had worsening pain in the left shoulder without any additional known injury.  He works at [employer] and handles heavy [employer].  He states he is having difficulty lifting and tolerating pain with movement.  Patient presents today with an MRI arthrogram of the left shoulder.  Additionally patient sees a chiropractor who ordered an MRI of his cervical spine showing multilevel degenerative disc disease with evidence for cord compression at several levels, he has been referred to Dr. Canales and is waiting to get an appointment at this time.  The patient does state that he has pain from his neck radiating down his upper extremity into his fingers, he states a few days ago he had complete arm numbness when he woke up that resolved with movement.     Objective   Vital Signs:   Pulse 89   Ht 172.7 cm (68\")   Wt 81.6 kg (180 lb)   SpO2 98%   BMI 27.37 kg/m²       Physical Exam  Constitutional:       Appearance: Normal appearance. He is well-developed and normal weight.   HENT:      Head: Normocephalic.      Right Ear: Hearing and external ear normal.      Left Ear: Hearing and external ear normal.      Nose: Nose normal.   Eyes:      Conjunctiva/sclera: Conjunctivae normal.   Cardiovascular:      Rate and Rhythm: Normal rate.   Pulmonary:      Effort: Pulmonary effort is normal.      Breath sounds: No wheezing or rales.   Abdominal:      Palpations: Abdomen is soft.      Tenderness: There is no abdominal tenderness.   Musculoskeletal: "      Cervical back: Normal range of motion.   Skin:     Findings: No rash.   Neurological:      Mental Status: He is alert and oriented to person, place, and time.   Psychiatric:         Mood and Affect: Mood and affect normal.         Judgment: Judgment normal.     Ortho Exam  Left shoulder: Skin intact without swelling, no tenderness to palpation, pain with forward flexion, passive flexion is full, passive abduction is full, passive internal rotation is full.  Positive empty can, positive crossover test, positive full can.  Strength is 3 out of 5 in the left upper extremity compared to the right upper extremity with resisted shoulder flexion and extension.  Sensation to light touch is intact in the upper extremities bilaterally at present.  Radial pulses 2+, cap refill less than 2 seconds.  Result Review :   MRI left shoulder arthrogram 7/8/2021: Impression: 7 x 9 mm full-thickness anterior distal footplate insertion supraspinatus tendon likely superimposed on low-grade chronic tendinosis.  Infraspinatus tendinosis with prominent edema in the mild tenderness junction could represent a component of low-grade muscle strain.  Attenuation of the deep surface cranial fibers subscapularis tendon possibly representing a partial-thickness tear.  AC joint arthropathy with mild juxta articular inflammation/edema.  Developing subacromial spurring thickened coracoacromial ligament both contributing to narrowing of the subacromial outlet.         Imaging Results (Most Recent)     None                Assessment and Plan    Problem List Items Addressed This Visit        Musculoskeletal and Injuries    Tear of left rotator cuff - Primary    Current Assessment & Plan     Dr. Kenney saw and evaluated this patient today, patient interested in surgical options.  Surgical options, risk and benefits and rehabilitation timeline were discussed today.  Patient elected to undergo left shoulder arthroscopy with rotator cuff repair.  He will  follow up postoperatively.               Follow Up   Return for Post-operatively.  Patient Instructions   Follow-up 2 weeks postop    Patient was given instructions and counseling regarding his condition or for health maintenance advice. Please see specific information pulled into the AVS if appropriate.

## 2021-07-13 NOTE — TELEPHONE ENCOUNTER
PER PATIENT HE HAS BEEN FULLY VACCINATED, BUT DOES NOT HAVE HIS CARD WITH HIM TODAY. PER PATIENT HE WILL BRING HIS COVID-19 VACCINATION RECORD CARD TO THE OFFICE PRIOR TO HIS SCHEDULED SURGERY ON 7/21/2021 TO SCAN INTO HIS CHART.

## 2021-07-14 DIAGNOSIS — G47.11 IDIOPATHIC HYPERSOMNIA: ICD-10-CM

## 2021-07-14 RX ORDER — DEXTROAMPHETAMINE SACCHARATE, AMPHETAMINE ASPARTATE, DEXTROAMPHETAMINE SULFATE AND AMPHETAMINE SULFATE 5; 5; 5; 5 MG/1; MG/1; MG/1; MG/1
20 TABLET ORAL 3 TIMES DAILY
Qty: 90 TABLET | Refills: 0 | Status: SHIPPED | OUTPATIENT
Start: 2021-07-14 | End: 2021-08-13 | Stop reason: SDUPTHER

## 2021-07-15 RX ORDER — DEXTROAMPHETAMINE SACCHARATE, AMPHETAMINE ASPARTATE, DEXTROAMPHETAMINE SULFATE AND AMPHETAMINE SULFATE 5; 5; 5; 5 MG/1; MG/1; MG/1; MG/1
20 TABLET ORAL 3 TIMES DAILY
Qty: 90 TABLET | Refills: 0 | OUTPATIENT
Start: 2021-07-15

## 2021-07-21 ENCOUNTER — HOSPITAL ENCOUNTER (OUTPATIENT)
Facility: HOSPITAL | Age: 56
Setting detail: HOSPITAL OUTPATIENT SURGERY
Discharge: HOME | End: 2021-07-21
Attending: ORTHOPAEDIC SURGERY | Admitting: ORTHOPAEDIC SURGERY

## 2021-07-21 ENCOUNTER — ANESTHESIA EVENT (OUTPATIENT)
Dept: PERIOP | Facility: HOSPITAL | Age: 56
End: 2021-07-21

## 2021-07-21 ENCOUNTER — ANESTHESIA (OUTPATIENT)
Dept: PERIOP | Facility: HOSPITAL | Age: 56
End: 2021-07-21

## 2021-07-21 VITALS
HEART RATE: 54 BPM | BODY MASS INDEX: 27.36 KG/M2 | TEMPERATURE: 97 F | SYSTOLIC BLOOD PRESSURE: 132 MMHG | DIASTOLIC BLOOD PRESSURE: 67 MMHG | WEIGHT: 180.56 LBS | OXYGEN SATURATION: 95 % | HEIGHT: 68 IN | RESPIRATION RATE: 13 BRPM

## 2021-07-21 DIAGNOSIS — S46.012A TRAUMATIC COMPLETE TEAR OF LEFT ROTATOR CUFF, INITIAL ENCOUNTER: ICD-10-CM

## 2021-07-21 LAB
GLUCOSE BLDC GLUCOMTR-MCNC: 175 MG/DL (ref 70–130)
QT INTERVAL: 404 MS

## 2021-07-21 PROCEDURE — 76942 ECHO GUIDE FOR BIOPSY: CPT | Performed by: ORTHOPAEDIC SURGERY

## 2021-07-21 PROCEDURE — 25010000003 MEPERIDINE PER 100 MG: Performed by: ANESTHESIOLOGY

## 2021-07-21 PROCEDURE — 25010000003 CEFAZOLIN IN DEXTROSE 2-4 GM/100ML-% SOLUTION: Performed by: ORTHOPAEDIC SURGERY

## 2021-07-21 PROCEDURE — C1713 ANCHOR/SCREW BN/BN,TIS/BN: HCPCS | Performed by: ORTHOPAEDIC SURGERY

## 2021-07-21 PROCEDURE — 29824 SHO ARTHRS SRG DSTL CLAVICLC: CPT | Performed by: ORTHOPAEDIC SURGERY

## 2021-07-21 PROCEDURE — 82962 GLUCOSE BLOOD TEST: CPT

## 2021-07-21 PROCEDURE — 93005 ELECTROCARDIOGRAM TRACING: CPT | Performed by: ORTHOPAEDIC SURGERY

## 2021-07-21 PROCEDURE — 25010000002 FENTANYL CITRATE (PF) 50 MCG/ML SOLUTION: Performed by: ANESTHESIOLOGY

## 2021-07-21 PROCEDURE — 25010000002 ROPIVACAINE PER 1 MG: Performed by: ANESTHESIOLOGY

## 2021-07-21 PROCEDURE — 25010000002 PROPOFOL 10 MG/ML EMULSION: Performed by: ANESTHESIOLOGY

## 2021-07-21 PROCEDURE — 23412 REPAIR ROTATOR CUFF CHRONIC: CPT | Performed by: ORTHOPAEDIC SURGERY

## 2021-07-21 PROCEDURE — 25010000002 NEOSTIGMINE 10 MG/10ML SOLUTION: Performed by: ANESTHESIOLOGY

## 2021-07-21 PROCEDURE — 93010 ELECTROCARDIOGRAM REPORT: CPT | Performed by: INTERNAL MEDICINE

## 2021-07-21 PROCEDURE — L3670 SO ACRO/CLAV CAN WEB PRE OTS: HCPCS | Performed by: ORTHOPAEDIC SURGERY

## 2021-07-21 PROCEDURE — 25010000002 HYDROMORPHONE 1 MG/ML SOLUTION: Performed by: ANESTHESIOLOGY

## 2021-07-21 PROCEDURE — 25010000002 ONDANSETRON PER 1 MG: Performed by: ANESTHESIOLOGY

## 2021-07-21 DEVICE — ICONIX SPEED ANCHOR 2.3MM 3 STRANDS 1.2MM XBRAID TT SUTURE TAPE
Type: IMPLANTABLE DEVICE | Site: SHOULDER | Status: FUNCTIONAL
Brand: ICONIX

## 2021-07-21 DEVICE — IMPLANTABLE DEVICE
Type: IMPLANTABLE DEVICE | Site: SHOULDER | Status: FUNCTIONAL
Brand: QUATTRO® LINK KNOTLESS ANCHOR

## 2021-07-21 RX ORDER — SODIUM CHLORIDE, SODIUM LACTATE, POTASSIUM CHLORIDE, CALCIUM CHLORIDE 600; 310; 30; 20 MG/100ML; MG/100ML; MG/100ML; MG/100ML
9 INJECTION, SOLUTION INTRAVENOUS CONTINUOUS PRN
Status: DISCONTINUED | OUTPATIENT
Start: 2021-07-21 | End: 2021-07-21 | Stop reason: HOSPADM

## 2021-07-21 RX ORDER — EPHEDRINE SULFATE 50 MG/ML
INJECTION, SOLUTION INTRAVENOUS AS NEEDED
Status: DISCONTINUED | OUTPATIENT
Start: 2021-07-21 | End: 2021-07-21 | Stop reason: SURG

## 2021-07-21 RX ORDER — ONDANSETRON 2 MG/ML
4 INJECTION INTRAMUSCULAR; INTRAVENOUS ONCE AS NEEDED
Status: DISCONTINUED | OUTPATIENT
Start: 2021-07-21 | End: 2021-07-21 | Stop reason: HOSPADM

## 2021-07-21 RX ORDER — MAGNESIUM HYDROXIDE 1200 MG/15ML
LIQUID ORAL AS NEEDED
Status: DISCONTINUED | OUTPATIENT
Start: 2021-07-21 | End: 2021-07-21 | Stop reason: HOSPADM

## 2021-07-21 RX ORDER — MEPERIDINE HYDROCHLORIDE 25 MG/ML
12.5 INJECTION INTRAMUSCULAR; INTRAVENOUS; SUBCUTANEOUS
Status: DISCONTINUED | OUTPATIENT
Start: 2021-07-21 | End: 2021-07-21 | Stop reason: HOSPADM

## 2021-07-21 RX ORDER — PROPOFOL 10 MG/ML
VIAL (ML) INTRAVENOUS AS NEEDED
Status: DISCONTINUED | OUTPATIENT
Start: 2021-07-21 | End: 2021-07-21 | Stop reason: SURG

## 2021-07-21 RX ORDER — OXYCODONE AND ACETAMINOPHEN 7.5; 325 MG/1; MG/1
1-2 TABLET ORAL EVERY 4 HOURS PRN
Qty: 40 TABLET | Refills: 0 | Status: SHIPPED | OUTPATIENT
Start: 2021-07-21 | End: 2021-07-27

## 2021-07-21 RX ORDER — OXYCODONE AND ACETAMINOPHEN 7.5; 325 MG/1; MG/1
1 TABLET ORAL ONCE AS NEEDED
Status: CANCELLED | OUTPATIENT
Start: 2021-07-21 | End: 2021-07-28

## 2021-07-21 RX ORDER — OXYCODONE HYDROCHLORIDE 5 MG/1
5 TABLET ORAL
Status: COMPLETED | OUTPATIENT
Start: 2021-07-21 | End: 2021-07-21

## 2021-07-21 RX ORDER — GLYCOPYRROLATE 0.2 MG/ML
INJECTION INTRAMUSCULAR; INTRAVENOUS AS NEEDED
Status: DISCONTINUED | OUTPATIENT
Start: 2021-07-21 | End: 2021-07-21 | Stop reason: SURG

## 2021-07-21 RX ORDER — PROMETHAZINE HYDROCHLORIDE 25 MG/1
25 SUPPOSITORY RECTAL ONCE AS NEEDED
Status: DISCONTINUED | OUTPATIENT
Start: 2021-07-21 | End: 2021-07-21 | Stop reason: HOSPADM

## 2021-07-21 RX ORDER — ROPIVACAINE HYDROCHLORIDE 5 MG/ML
INJECTION, SOLUTION EPIDURAL; INFILTRATION; PERINEURAL
Status: COMPLETED | OUTPATIENT
Start: 2021-07-21 | End: 2021-07-21

## 2021-07-21 RX ORDER — CEFAZOLIN SODIUM IN 0.9 % NACL 3 G/100 ML
3 INTRAVENOUS SOLUTION, PIGGYBACK (ML) INTRAVENOUS ONCE
Status: DISCONTINUED | OUTPATIENT
Start: 2021-07-21 | End: 2021-07-21 | Stop reason: SDUPTHER

## 2021-07-21 RX ORDER — MIDAZOLAM HYDROCHLORIDE 2 MG/2ML
4 INJECTION, SOLUTION INTRAMUSCULAR; INTRAVENOUS ONCE
Status: DISCONTINUED | OUTPATIENT
Start: 2021-07-21 | End: 2021-07-21 | Stop reason: HOSPADM

## 2021-07-21 RX ORDER — PROMETHAZINE HYDROCHLORIDE 12.5 MG/1
25 TABLET ORAL ONCE AS NEEDED
Status: DISCONTINUED | OUTPATIENT
Start: 2021-07-21 | End: 2021-07-21 | Stop reason: HOSPADM

## 2021-07-21 RX ORDER — ROCURONIUM BROMIDE 10 MG/ML
INJECTION, SOLUTION INTRAVENOUS AS NEEDED
Status: DISCONTINUED | OUTPATIENT
Start: 2021-07-21 | End: 2021-07-21 | Stop reason: SURG

## 2021-07-21 RX ORDER — CEFAZOLIN SODIUM 2 G/100ML
2 INJECTION, SOLUTION INTRAVENOUS ONCE
Status: COMPLETED | OUTPATIENT
Start: 2021-07-21 | End: 2021-07-21

## 2021-07-21 RX ORDER — NEOSTIGMINE METHYLSULFATE 1 MG/ML
INJECTION, SOLUTION INTRAVENOUS AS NEEDED
Status: DISCONTINUED | OUTPATIENT
Start: 2021-07-21 | End: 2021-07-21 | Stop reason: SURG

## 2021-07-21 RX ORDER — ACETAMINOPHEN 500 MG
1000 TABLET ORAL ONCE
Status: COMPLETED | OUTPATIENT
Start: 2021-07-21 | End: 2021-07-21

## 2021-07-21 RX ORDER — LIDOCAINE HYDROCHLORIDE 20 MG/ML
INJECTION, SOLUTION INFILTRATION; PERINEURAL AS NEEDED
Status: DISCONTINUED | OUTPATIENT
Start: 2021-07-21 | End: 2021-07-21 | Stop reason: SURG

## 2021-07-21 RX ORDER — FENTANYL CITRATE 50 UG/ML
INJECTION, SOLUTION INTRAMUSCULAR; INTRAVENOUS AS NEEDED
Status: DISCONTINUED | OUTPATIENT
Start: 2021-07-21 | End: 2021-07-21 | Stop reason: SURG

## 2021-07-21 RX ADMIN — FENTANYL CITRATE 100 MCG: 50 INJECTION INTRAMUSCULAR; INTRAVENOUS at 12:07

## 2021-07-21 RX ADMIN — HYDROMORPHONE HYDROCHLORIDE 0.5 MG: 1 INJECTION, SOLUTION INTRAMUSCULAR; INTRAVENOUS; SUBCUTANEOUS at 14:08

## 2021-07-21 RX ADMIN — EPHEDRINE SULFATE 10 MG: 50 INJECTION INTRAVENOUS at 12:26

## 2021-07-21 RX ADMIN — SODIUM CHLORIDE, POTASSIUM CHLORIDE, SODIUM LACTATE AND CALCIUM CHLORIDE 9 ML/HR: 600; 310; 30; 20 INJECTION, SOLUTION INTRAVENOUS at 10:48

## 2021-07-21 RX ADMIN — LIDOCAINE HYDROCHLORIDE 50 MG: 20 INJECTION, SOLUTION INFILTRATION; PERINEURAL at 12:07

## 2021-07-21 RX ADMIN — MEPERIDINE HYDROCHLORIDE 12.5 MG: 25 INJECTION INTRAMUSCULAR; INTRAVENOUS; SUBCUTANEOUS at 14:07

## 2021-07-21 RX ADMIN — HYDROMORPHONE HYDROCHLORIDE 0.5 MG: 1 INJECTION, SOLUTION INTRAMUSCULAR; INTRAVENOUS; SUBCUTANEOUS at 13:55

## 2021-07-21 RX ADMIN — OXYCODONE HYDROCHLORIDE 5 MG: 5 TABLET ORAL at 14:35

## 2021-07-21 RX ADMIN — PROPOFOL 150 MG: 10 INJECTION, EMULSION INTRAVENOUS at 12:07

## 2021-07-21 RX ADMIN — ROCURONIUM BROMIDE 50 MG: 10 INJECTION INTRAVENOUS at 12:07

## 2021-07-21 RX ADMIN — EPHEDRINE SULFATE 10 MG: 50 INJECTION INTRAVENOUS at 12:53

## 2021-07-21 RX ADMIN — ACETAMINOPHEN 1000 MG: 500 TABLET ORAL at 10:47

## 2021-07-21 RX ADMIN — ONDANSETRON 4 MG: 2 INJECTION INTRAMUSCULAR; INTRAVENOUS at 14:26

## 2021-07-21 RX ADMIN — GLYCOPYRROLATE 0.4 MCG: 0.2 INJECTION INTRAMUSCULAR; INTRAVENOUS at 13:25

## 2021-07-21 RX ADMIN — NEOSTIGMINE METHYLSULFATE 3 MG: 1 INJECTION, SOLUTION INTRAVENOUS at 13:25

## 2021-07-21 RX ADMIN — ROPIVACAINE HYDROCHLORIDE 30 ML: 5 INJECTION, SOLUTION EPIDURAL; INFILTRATION; PERINEURAL at 11:07

## 2021-07-21 RX ADMIN — CEFAZOLIN SODIUM 2 G: 2 INJECTION, SOLUTION INTRAVENOUS at 12:04

## 2021-07-21 RX ADMIN — OXYCODONE HYDROCHLORIDE 5 MG: 5 TABLET ORAL at 14:52

## 2021-07-21 NOTE — OP NOTE
SHOULDER ARTHROSCOPY WITH ROTATOR CUFF REPAIR  Procedure Report    Patient Name:  Jung Burkett  YOB: 1965    Date of Surgery:  7/21/2021     Indications:  shoulder pain/injury, failed conservative care    Pre-op Diagnosis:   Traumatic complete tear of left rotator cuff, initial encounter [S46.012A]       Post-Op Diagnosis Codes:     * Traumatic complete tear of left rotator cuff, initial encounter [S46.012A],severe biceps tendinitis    Procedure/CPT® Codes:      Procedure(s):  Left SHOULDER ARTHROSCOPY, extensive arthroscopic SUBACROMINAL DECOMPRESSION, arthroscopic DISTAL CLAVICLE RESECTION, arthroscopic biceps tenotomy, MINI OPEN  ROTATOR CUFF REPAIR    Staff:  Surgeon(s):  Ulisses Kenney MD    Assistant: Huong Ding    Anesthesia: General    Estimated Blood Loss: none    Implants:    Implant Name Type Inv. Item Serial No.  Lot No. LRB No. Used Action   SUT/ANCH QUATTRO LINK KNOTLSS 4.5 - [id number] Implant SUT/ANCH QUATTRO LINK KNOTLSS 4.5  [facility] [id number] Left 1 Implanted   SUT/ANCH ICONIX/SPEED SP XBRAID TRPL/LOAD 1.2MM - [id number] Implant SUT/ANCH ICONIX/SPEED SP XBRAID TRPL/LOAD 1.2MM  [employer] [id number] Left 1 Implanted       Specimen:          None        Findings: + rotator cuff tear    Complications: none    Description of Procedure: The patient was brought to the operating room and had a preoperative antibiotic and preoperative block. The patient was placed in a modified beach chair position and was prepped and draped in sterile fashion. A posterior portal was made. The scope was inserted to the glenohumeral joint. There was no labral tear, there was severe biceps tendinitis, and anterior portal was made, and a biceps tenotomy was performed.  And there was a full-thickness rotator cuff tear. Attention was then turned to the subacromial space where an extensive subacromial decompression was then performed using a VAPR, shaver and a ady. This included an  acromioplasty and bursectomy, inspection of the rotator cuff.  Attention was then turned to the distal clavicle where a distal claviculectomy was then performed using a VAPR and a ady. Following this, a mini open incision was made. The deltoid was split and retractors placed. The rotator cuff tear was identified and then this was repaired using a single medial row anchor and  lateral row anchor. A good repair was achieved. This was then thoroughly irrigated, closed using #1 Vicryl, 2-0 Vicryl and 3-0 Monocryl, and 3-0 nylon was used for the portals. Sterile dressing was applied followed by application of an ice pack and UltraSling. The patient was then extubated and taken to the recovery room in stable condition. No complications.    Assistant: Huong Ding  was responsible for performing the following activities: Retraction, Suction, Irrigation and Placing Dressing and their skilled assistance was necessary for the success of this case.    Ulisses Kenney MD     Date: 7/21/2021  Time: 13:28 EDT

## 2021-07-21 NOTE — ANESTHESIA PROCEDURE NOTES
Peripheral Block    Pre-sedation assessment completed: 7/21/2021 11:02 AM    Patient reassessed immediately prior to procedure    Patient location during procedure: pre-op  Start time: 7/21/2021 11:02 AM  Stop time: 7/21/2021 11:07 AM  Reason for block: at surgeon's request and post-op pain management  Performed by  Anesthesiologist: Michele Meraz MD  Preanesthetic Checklist  Completed: patient identified, IV checked, site marked, risks and benefits discussed, surgical consent, monitors and equipment checked, pre-op evaluation and timeout performed  Prep:  Pt Position: supine (HOB elevated)  Sterile barriers:cap, washed/disinfected hands, sterile barriers, gloves, mask, partial drape and alcohol skin prep  Prep: ChloraPrep  Patient monitoring: blood pressure monitoring, continuous pulse oximetry and EKG  Procedure  Sedation:yes  Performed under: local infiltration  Guidance:ultrasound guided and nerve stimulator  ULTRASOUND INTERPRETATION.  Using ultrasound guidance a 22 G gauge needle was placed in close proximity to the brachial plexus nerve, at which point, under ultrasound guidance anesthetic was injected in the area of the nerve and spread of the anesthesia was seen on ultrasound in close proximity thereto.  There were no abnormalities seen on ultrasound; a digital image was taken; and the patient tolerated the procedure with no complications. Images:still images obtained, printed/placed on chart    Laterality:left  Block Type:interscalene  Injection Technique:single-shot  Needle Type:echogenic  Needle Gauge:22 G (2in)  Resistance on Injection: none    Medications Used: ropivacaine (NAROPIN) 0.5 % injection, 30 mL  Med admintered at 7/21/2021 11:07 AM      Post Assessment  Injection Assessment: negative aspiration for heme, no paresthesia on injection and incremental injection  Patient Tolerance:comfortable throughout block  Complications:no  Additional Notes  The block or continuous infusion is requested by  the referring physician for management of postoperative pain, or pain related to a procedure. Ultrasound guidance (deemed medically necessary). Painless injection, pt was awake and conversant during the procedure without complications. Needle and surrounding structures visualized throughout procedure. No adverse reactions or complications seen during this period. Post-procedure image showed no signs of complication, and anatomy was consistent with an uncomplicated nerve blockade.

## 2021-07-21 NOTE — DISCHARGE INSTRUCTIONS
DISCHARGE INSTRUCTIONS  Post-Operative  Arthroscopic Shoulder Surgery      • For your surgery you had:  ? General anesthesia (you may have a sore throat for the first 24 hours)  ? You received an anesthesia medication today that can cause hormonal forms of birth control to be ineffective. You should use a different form of birth control (to prevent pregnancy) for 7 days.   ? IV sedation.  ? Local anesthesia  ? Monitored anesthesia care  • You may experience dizziness, drowsiness, or light-headedness for several hours following surgery/procedure.  • Do not stay alone today or tonight.  • Limit your activity for 24 hours.  • Resume your diet slowly.  Follow whatever special dietary instructions you may have been given by your doctor.  • You should not drive or operate machinery or drink alcohol for 24 hours or while you are taking pain medication.  • You should not sign legally binding documents.  • Post-Operative Day #1 is counted as the 1st day after surgery.  [] If you had a regional block, you will not have control of your arm for up to 12 hours.  Protect the arm with a sling or follow your physician's specific instructions.  Post-Operative Day #2  • Remove your dressing.  Under the dressing you will either have sutures or staples.  Change dressing once or twice daily.  Place a dry dressing or band-aids over the small incisions.  • Prior to dressing change, wash your hands.  Post-Operative Day #4  • You may shower.  During the shower, you may let the water hit the incision and roll down but do not scrub or place any soap on the incision.  Do not soak it.  Pat it dry and do not put any ointments on the incision unless directed by surgeon. Then replace the dry dressing.    General Instructions  [] Use ice pack to shoulder for 72 hours.  This can help with reducing swelling and pain relief.  Apply 20 minutes on and 20 minutes off.  Never place ice directly on skin.  Avoid getting dressing wet.  ? The Cold Therapy  System can help reduce swelling and decrease pain.  Utilize device for 30-60 minutes per session, with 30-60 minute breaks in between sessions.  It is recommended to use, as directed, for the first 72 hours after surgery until bedtime.  After 72 hours, continue using the device as needed until your follow-up appointment with your physician.  Never place directly on skin.  Please refer to the instruction sheet given.  • Keep arm elevated with sling to decrease swelling and minimize throbbing pain.  The sling will provide support for your shoulder.  • It is important to remove the sling 3-5 times daily to work on range-of-motion of the elbow, wrist and hand.  • You will receive a prescription for physical therapy, unless otherwise instructed by physician.  • After most shoulder surgeries, it is permissible to start exercises by slowing trying to crawl your fingers up a wall until your arm is parallel to the floor.  While doing this support the affected arm at the elbow or closer to the shoulder.  You may also lean over and let the arm and hand do small or large circles or write the alphabet with your hanging arm to keep it loose.  It is normal to have some pain with these gentle exercises.  The exercises help reduce swelling and pain overall and help to avoid a stiff shoulder post-operatively.  • It is okay to come out of the sling for showering or for certain activities of daily living but avoid any lifting or carrying with the affected arm until instructed by the physician especially if you have had a repair of the rotator cuff or some type of reconstructive procedure.  • It is okay to come out of the sling and support the arm with a pillow if you remain sedentary.  In general, sling use is preferred following a repair or reconstruction for 4 weeks.  If you have had a SAD (sub acromial decompression), continue sling use more liberally because there was not a repair or reconstruction that could be harmed.           DISCHARGE INSTRUCTIONS  Post-Operative   Arthroscopic Shoulder Surgery      • Exercise fingers for 10 minutes every hour while awake.  • The physician will typically inform the family and the patient whether or not a repair or reconstruction could be harmed.  • If you have had a rotator cuff repair or reconstructive procedure, do not let the arm drop down suddenly from an elevated position down to your side despite improvements made in pain and over the 3 months following repair and reconstruction.  It generally takes 8-12 weeks before the repair or reconstruction does not rely on sutures and anchors that are placed for the repair.  • You are generally given a prescription for a narcotic medication.  Take as prescribed.  You may use over-the-counter anti-inflammatory medications such as Motrin or Aleve for additional pain or fever reduction if not allergic.  If you are taking the pain medication prescribed, do not take Tylenol too unless you consult with the pharmacist. The pain medications generally have Tylenol in them and too much Tylenol can be harmful.  Sometimes it is recommended to take an anti-inflammatory in between doses of prescription pain medication if additional pain relief is needed.  Consult with your physician or your pharmacist before taking over-the-counter pain medications/anti-inflammatories.  • It is not uncommon to have a fever post-operatively especially in the first 24-48 hours.  Anti-inflammatories can be used for fever reduction also.  • It is normal to have some redness or irritation around skin sutures or staples, however, if you have any expanding areas of redness with a persistent fever and increasing pain notify the physician as soon as possible.  • The incidence of blood clots is low following arthroscopic procedures, however, if you notice any increasing pain or swelling in your calves or legs, contact the physician as soon as possible.   • It is difficult to sleep in the customary  fashion following a shoulder surgery.  It is usually necessary to sleep with the shoulder above the level of the heart such as in a recliner, couch or with the head of the bed elevated.  This should slowly improve over the weeks following shoulder surgery.  • If unable to urinate 6 to 8 hours after surgery or urinating frequently in small amounts, notify the physician or go to the nearest Emergency Room.  SPECIAL INSTRUCTIONS:        NOTIFY YOUR PHYSICIAN IF YOU EXPERIENCE:  1. Numbness of fingers.  2. Inability to move fingers.  3. Extreme coldness, paleness or blue dis-coloration of fingers.  4. Any pain other than from the incision, such as swelling of the arm that blocks circulation of fingers.  • Follow verbal instructions from your doctor.  • Medications per physician instructions as indicated on Discharge Medication Information Sheet.  You should see  ______________________for follow-up care  on     Phone number: __________________________________  • Missing your appointment/follow-up could lead to serious complications  • If you have an emergency and cannot reach your doctor, go to an Emergency Room nearest your home.

## 2021-07-21 NOTE — ANESTHESIA POSTPROCEDURE EVALUATION
Patient: Jung Burkett    Procedure Summary     Date: 07/21/21 Room / Location: [facility] OR  / [facility]    Anesthesia Start: 1204 Anesthesia Stop: 1337    Procedure: SHOULDER ARTHROSCOPY,SUBACROMINAL DECOMPRESSION, DISTAL CLAVICLE RESECTION, MINI OPEN  ROTATOR CUFF REPAIR (Left Shoulder) Diagnosis:       Traumatic complete tear of left rotator cuff, initial encounter      (Traumatic complete tear of left rotator cuff, initial encounter [S46.012A])    Surgeons: Ulisses Kenney MD Provider: Michele Meraz MD    Anesthesia Type: general, regional ASA Status: 3          Anesthesia Type: general, regional    Vitals  Vitals Value Taken Time   /79 07/21/21 1347   Temp 36.2 °C (97.1 °F) 07/21/21 1335   Pulse 58 07/21/21 1351   Resp 16 07/21/21 1335   SpO2 96 % 07/21/21 1351   Vitals shown include unvalidated device data.        Post Anesthesia Care and Evaluation    Patient location during evaluation: bedside  Patient participation: complete - patient participated  Level of consciousness: awake  Pain score: 0  Pain management: adequate  Airway patency: patent  Anesthetic complications: No anesthetic complications  PONV Status: none  Cardiovascular status: acceptable and stable  Respiratory status: acceptable and room air  Hydration status: acceptable    Comments: An Anesthesiologist personally participated in the most demanding procedures (including induction and emergence if applicable) in the anesthesia plan, monitored the course of anesthesia administration at frequent intervals and remained physically present and available for immediate diagnosis and treatment of emergencies.

## 2021-07-21 NOTE — ADDENDUM NOTE
Addendum  created 07/21/21 1357 by Michele Meraz MD    Attestation recorded in Intraprocedure, Intraprocedure Attestations filed

## 2021-07-21 NOTE — ANESTHESIA PREPROCEDURE EVALUATION
Anesthesia Evaluation     Patient summary reviewed and Nursing notes reviewed   no history of anesthetic complications:  NPO Solid Status: > 8 hours  NPO Liquid Status: > 2 hours           Airway   Mallampati: I  TM distance: >3 FB  Neck ROM: full  No difficulty expected  Dental      Pulmonary - negative pulmonary ROS and normal exam    breath sounds clear to auscultation  Cardiovascular - normal exam  Exercise tolerance: good (4-7 METS)    Rhythm: regular    (+) hypertension,       Neuro/Psych- negative ROS  GI/Hepatic/Renal/Endo    (+)  GERD,  diabetes mellitus,     Musculoskeletal (-) negative ROS    Abdominal    Substance History - negative use     OB/GYN negative ob/gyn ROS         Other - negative ROS                       Anesthesia Plan    ASA 3     general and regional   (Patient understands anesthesia not responsible for dental damage. Regional anesthesia options discussed with patient. Pt accepts regional block.)  intravenous induction     Anesthetic plan, all risks, benefits, and alternatives have been provided, discussed and informed consent has been obtained with: patient.  Use of blood products discussed with patient .   Plan discussed with CRNA.

## 2021-07-21 NOTE — ANESTHESIA PROCEDURE NOTES
Airway  Date/Time: 7/21/2021 12:10 PM    General Information and Staff    Patient location during procedure: OR  CRNA: Cielo Givens CRNA    Indications and Patient Condition  Indications for airway management: airway protection    Preoxygenated: yes  MILS maintained throughout  Mask difficulty assessment: 1 - vent by mask    Final Airway Details  Final airway type: endotracheal airway      Successful airway: ETT  Cuffed: yes   Successful intubation technique: direct laryngoscopy  Facilitating devices/methods: intubating stylet  Endotracheal tube insertion site: oral  Blade: Gregorio  Blade size: 3  ETT size (mm): 7.0  Cormack-Lehane Classification: grade I - full view of glottis  Placement verified by: chest auscultation and capnometry   Measured from: lips  Number of attempts at approach: 2  Assessment: lips, teeth, and gum same as pre-op and atraumatic intubation               detailed exam

## 2021-07-22 LAB — GLUCOSE BLDC GLUCOMTR-MCNC: 175 MG/DL (ref 70–130)

## 2021-07-26 DIAGNOSIS — M75.102 TEAR OF LEFT ROTATOR CUFF, UNSPECIFIED TEAR EXTENT, UNSPECIFIED WHETHER TRAUMATIC: Primary | ICD-10-CM

## 2021-07-26 PROBLEM — E11.9 DIABETES MELLITUS, TYPE 2: Status: ACTIVE | Noted: 2019-05-17

## 2021-07-26 PROBLEM — J30.2 SEASONAL ALLERGIC RHINITIS: Status: ACTIVE | Noted: 2021-07-26

## 2021-07-26 PROBLEM — F32.9 MAJOR DEPRESSIVE DISORDER: Status: ACTIVE | Noted: 2019-05-17

## 2021-07-26 PROBLEM — I10 ESSENTIAL HYPERTENSION: Status: ACTIVE | Noted: 2019-05-17

## 2021-07-26 PROBLEM — E55.9 VITAMIN D DEFICIENCY: Status: ACTIVE | Noted: 2019-05-17

## 2021-07-26 PROBLEM — K21.9 ESOPHAGEAL REFLUX: Status: ACTIVE | Noted: 2021-07-26

## 2021-07-26 PROBLEM — F41.9 ANXIETY DISORDER: Status: ACTIVE | Noted: 2019-05-17

## 2021-07-26 PROBLEM — E78.5 HYPERLIPIDEMIA: Status: ACTIVE | Noted: 2021-07-26

## 2021-07-26 PROBLEM — M54.2 CERVICALGIA: Status: ACTIVE | Noted: 2021-07-26

## 2021-07-26 PROBLEM — F17.200 NICOTINE DEPENDENCE: Status: ACTIVE | Noted: 2019-05-17

## 2021-07-26 RX ORDER — TRAMADOL HYDROCHLORIDE 50 MG/1
50 TABLET ORAL EVERY 4 HOURS PRN
Qty: 42 TABLET | Refills: 0 | Status: SHIPPED | OUTPATIENT
Start: 2021-07-26 | End: 2021-08-12 | Stop reason: SDUPTHER

## 2021-07-27 ENCOUNTER — OFFICE VISIT (OUTPATIENT)
Dept: FAMILY MEDICINE CLINIC | Facility: CLINIC | Age: 56
End: 2021-07-27

## 2021-07-27 VITALS
HEART RATE: 100 BPM | BODY MASS INDEX: 28.16 KG/M2 | HEIGHT: 68 IN | OXYGEN SATURATION: 96 % | SYSTOLIC BLOOD PRESSURE: 126 MMHG | WEIGHT: 185.8 LBS | DIASTOLIC BLOOD PRESSURE: 83 MMHG

## 2021-07-27 DIAGNOSIS — I10 ESSENTIAL HYPERTENSION: ICD-10-CM

## 2021-07-27 DIAGNOSIS — E78.00 PURE HYPERCHOLESTEROLEMIA: ICD-10-CM

## 2021-07-27 DIAGNOSIS — E55.9 VITAMIN D DEFICIENCY: ICD-10-CM

## 2021-07-27 DIAGNOSIS — R79.89 LOW TESTOSTERONE IN MALE: ICD-10-CM

## 2021-07-27 DIAGNOSIS — E11.65 TYPE 2 DIABETES MELLITUS WITH HYPERGLYCEMIA, WITHOUT LONG-TERM CURRENT USE OF INSULIN (HCC): Primary | ICD-10-CM

## 2021-07-27 PROCEDURE — 99214 OFFICE O/P EST MOD 30 MIN: CPT | Performed by: PHYSICIAN ASSISTANT

## 2021-07-27 RX ORDER — DICYCLOMINE HCL 20 MG
20 TABLET ORAL EVERY 6 HOURS
COMMUNITY
End: 2021-10-29

## 2021-07-27 RX ORDER — GLYCOPYRRONIUM 2.4 G/100G
CLOTH TOPICAL
COMMUNITY
End: 2021-10-29

## 2021-07-27 NOTE — PROGRESS NOTES
"Chief Complaint  Anxiety (6 month follow up), Depression, Diabetes, Hypertension, Hyperlipidemia, and Vitamin D Deficiency    Subjective          Jung Burkett presents to [facility]  History of Present Illness  Pt presents today for 6 month follow up.    Pt recently had Lt shoulder arthroscopy surgery/[place]. Pt is currently doing PT 3xweek.  Pt states his fingers and hand is numb w/pain running down his arm.  Pt has a f/u 8/5 with Chel Anton.    Pt states he is still having neck pain mainly on left side of neck. Pt has had a recent MRI C-Spine 7/1/21. Pt has appt w/ on 9/9/21.    [employer]-flu like symptoms    Pt does monitor his bs, this am 150-160.    Labs 10/21/20  A1C 6.3  cln 8/29/19    Pt is having severe neck and mid back, and lower back pain for mos.  He had MRI in 2015 and recent 2021 - which showed progression from mild to moderate to moderate to severe at C4-5-6-7.    He is thinking of applying for SSDI.    Objective   Vital Signs:   /83 (BP Location: Right arm)   Pulse 100   Ht 172.7 cm (68\")   Wt 84.3 kg (185 lb 12.8 oz)   SpO2 96%   BMI 28.25 kg/m²     Physical Exam  Vitals and nursing note reviewed.   Constitutional:       Appearance: Normal appearance.   HENT:      Head: Normocephalic and atraumatic.   Cardiovascular:      Rate and Rhythm: Normal rate and regular rhythm.   Pulmonary:      Effort: Pulmonary effort is normal.      Breath sounds: Normal breath sounds.   Musculoskeletal:      Cervical back: Neck supple.   Neurological:      Mental Status: He is alert.   Psychiatric:         Mood and Affect: Mood normal.         Behavior: Behavior normal.        Left shoulder - limited rom, in shoulder immobilizer, discolored, bruising, no discharge, well healing surgical scars    Result Review :     Common labs    Common Labsle 10/21/20 10/21/20 10/21/20 10/21/20 10/21/20    [id number] 0842 [id number] [id number]   Glucose  142 (A)      BUN  12    "   Creatinine  1.05      Sodium  139      Potassium  4.0      Chloride  99      Calcium  9.5      Albumin  4.2      Total Bilirubin  0.73      Alkaline Phosphatase  46 (A)      AST (SGOT)  18      ALT (SGPT)  17      WBC 6.28       Hemoglobin 16.7       Hematocrit 47.9       Platelets 198       Total Cholesterol   113     Triglycerides   51     HDL Cholesterol   48     LDL Cholesterol    55 (A)     Hemoglobin A1C    6.3 (A)    PSA     1.79   (A) Abnormal value       Comments are available for some flowsheets but are not being displayed.                     Assessment and Plan      Diagnoses and all orders for this visit:    1. Type 2 diabetes mellitus with hyperglycemia, without long-term current use of insulin (CMS/[facility]) (Primary)  -     TSH Rfx On Abnormal To Free T4; Future  -     Hemoglobin A1c; Future  -     MicroAlbumin, Urine, Random - Urine, Clean Catch; Future  -     Discontinue: Semaglutide,0.25 or 0.5MG/DOS, (OZEMPIC) 2 MG/1.5ML solution pen-injector; Inject 0.5 mg under the skin into the appropriate area as directed 1 (One) Time Per Week for 30 days.  Dispense: 1 pen; Refill: 5  -     Semaglutide,0.25 or 0.5MG/DOS, (OZEMPIC) 2 MG/1.5ML solution pen-injector; Inject 0.5 mg under the skin into the appropriate area as directed 1 (One) Time Per Week for 90 days.  Dispense: 3 pen; Refill: 3  -     Iron level; Future    2. Essential hypertension  Overview:  Controlled with toprol 50 and hyzaar    Orders:  -     CBC & Differential; Future  -     TSH Rfx On Abnormal To Free T4; Future  -     Urinalysis With Microscopic If Indicated (No Culture) - Urine, Clean Catch; Future  -     Iron level; Future    3. Pure hypercholesterolemia  Overview:  Controlled with diet and lipitor    Orders:  -     Comprehensive Metabolic Panel; Future  -     Lipid Panel; Future    4. Vitamin D deficiency  -     Vitamin D 25 Hydroxy; Future    5. Low testosterone in male  -     TSH Rfx On Abnormal To Free T4; Future  -     Testosterone;  Future  -     PSA DIAGNOSTIC; Future  -     Iron level; Future       Follow Up     Return in about 4 months (around 11/27/2021).    I have reviewed information obtained and documented by others and I have confirmed the accuracy of this documented note.     Patient was given instructions and counseling regarding his condition or for health maintenance advice. Please see specific information pulled into the AVS if appropriate.     RIVERA Harris

## 2021-07-30 ENCOUNTER — LAB (OUTPATIENT)
Dept: LAB | Facility: HOSPITAL | Age: 56
End: 2021-07-30

## 2021-07-30 DIAGNOSIS — I10 ESSENTIAL HYPERTENSION: ICD-10-CM

## 2021-07-30 DIAGNOSIS — R79.89 LOW TESTOSTERONE IN MALE: ICD-10-CM

## 2021-07-30 DIAGNOSIS — E11.65 TYPE 2 DIABETES MELLITUS WITH HYPERGLYCEMIA, WITHOUT LONG-TERM CURRENT USE OF INSULIN (HCC): ICD-10-CM

## 2021-07-30 DIAGNOSIS — E78.00 PURE HYPERCHOLESTEROLEMIA: ICD-10-CM

## 2021-07-30 DIAGNOSIS — E55.9 VITAMIN D DEFICIENCY: ICD-10-CM

## 2021-07-30 LAB
25(OH)D3 SERPL-MCNC: 29.2 NG/ML
ALBUMIN SERPL-MCNC: 4.1 G/DL (ref 3.5–5.2)
ALBUMIN UR-MCNC: <1.2 MG/DL
ALBUMIN/GLOB SERPL: 1.4 G/DL
ALP SERPL-CCNC: 52 U/L (ref 39–117)
ALT SERPL W P-5'-P-CCNC: 22 U/L (ref 1–41)
ANION GAP SERPL CALCULATED.3IONS-SCNC: 12.4 MMOL/L (ref 5–15)
AST SERPL-CCNC: 12 U/L (ref 1–40)
BASOPHILS # BLD AUTO: 0.04 10*3/MM3 (ref 0–0.2)
BASOPHILS NFR BLD AUTO: 0.5 % (ref 0–1.5)
BILIRUB SERPL-MCNC: 0.7 MG/DL (ref 0–1.2)
BILIRUB UR QL STRIP: NEGATIVE
BUN SERPL-MCNC: 16 MG/DL (ref 6–20)
BUN/CREAT SERPL: 16.3 (ref 7–25)
CALCIUM SPEC-SCNC: 9.2 MG/DL (ref 8.6–10.5)
CHLORIDE SERPL-SCNC: 98 MMOL/L (ref 98–107)
CHOLEST SERPL-MCNC: 162 MG/DL (ref 0–200)
CLARITY UR: CLEAR
CO2 SERPL-SCNC: 24.6 MMOL/L (ref 22–29)
COLOR UR: YELLOW
CREAT SERPL-MCNC: 0.98 MG/DL (ref 0.76–1.27)
DEPRECATED RDW RBC AUTO: 40.5 FL (ref 37–54)
EOSINOPHIL # BLD AUTO: 0.18 10*3/MM3 (ref 0–0.4)
EOSINOPHIL NFR BLD AUTO: 2.2 % (ref 0.3–6.2)
ERYTHROCYTE [DISTWIDTH] IN BLOOD BY AUTOMATED COUNT: 11.9 % (ref 12.3–15.4)
GFR SERPL CREATININE-BSD FRML MDRD: 79 ML/MIN/1.73
GLOBULIN UR ELPH-MCNC: 3 GM/DL
GLUCOSE SERPL-MCNC: 198 MG/DL (ref 65–99)
GLUCOSE UR STRIP-MCNC: ABNORMAL MG/DL
HBA1C MFR BLD: 7.1 % (ref 4.8–5.6)
HCT VFR BLD AUTO: 45.7 % (ref 37.5–51)
HDLC SERPL-MCNC: 40 MG/DL (ref 40–60)
HGB BLD-MCNC: 15.8 G/DL (ref 13–17.7)
HGB UR QL STRIP.AUTO: NEGATIVE
IMM GRANULOCYTES # BLD AUTO: 0.03 10*3/MM3 (ref 0–0.05)
IMM GRANULOCYTES NFR BLD AUTO: 0.4 % (ref 0–0.5)
IRON 24H UR-MRATE: 174 MCG/DL (ref 59–158)
KETONES UR QL STRIP: NEGATIVE
LDLC SERPL CALC-MCNC: 93 MG/DL (ref 0–100)
LDLC/HDLC SERPL: 2.22 {RATIO}
LEUKOCYTE ESTERASE UR QL STRIP.AUTO: NEGATIVE
LYMPHOCYTES # BLD AUTO: 1.75 10*3/MM3 (ref 0.7–3.1)
LYMPHOCYTES NFR BLD AUTO: 21.5 % (ref 19.6–45.3)
MCH RBC QN AUTO: 32 PG (ref 26.6–33)
MCHC RBC AUTO-ENTMCNC: 34.6 G/DL (ref 31.5–35.7)
MCV RBC AUTO: 92.5 FL (ref 79–97)
MONOCYTES # BLD AUTO: 0.85 10*3/MM3 (ref 0.1–0.9)
MONOCYTES NFR BLD AUTO: 10.4 % (ref 5–12)
NEUTROPHILS NFR BLD AUTO: 5.29 10*3/MM3 (ref 1.7–7)
NEUTROPHILS NFR BLD AUTO: 65 % (ref 42.7–76)
NITRITE UR QL STRIP: NEGATIVE
NRBC BLD AUTO-RTO: 0 /100 WBC (ref 0–0.2)
PH UR STRIP.AUTO: 6 [PH] (ref 5–8)
PLATELET # BLD AUTO: 233 10*3/MM3 (ref 140–450)
PMV BLD AUTO: 10.5 FL (ref 6–12)
POTASSIUM SERPL-SCNC: 4.2 MMOL/L (ref 3.5–5.2)
PROT SERPL-MCNC: 7.1 G/DL (ref 6–8.5)
PROT UR QL STRIP: NEGATIVE
PSA SERPL-MCNC: 2.34 NG/ML (ref 0–4)
RBC # BLD AUTO: 4.94 10*6/MM3 (ref 4.14–5.8)
SODIUM SERPL-SCNC: 135 MMOL/L (ref 136–145)
SP GR UR STRIP: >1.03 (ref 1–1.03)
TESTOST SERPL-MCNC: 400 NG/DL (ref 193–740)
TRIGL SERPL-MCNC: 167 MG/DL (ref 0–150)
TSH SERPL DL<=0.05 MIU/L-ACNC: 2.37 UIU/ML (ref 0.27–4.2)
UROBILINOGEN UR QL STRIP: ABNORMAL
VLDLC SERPL-MCNC: 29 MG/DL (ref 5–40)
WBC # BLD AUTO: 8.14 10*3/MM3 (ref 3.4–10.8)

## 2021-07-30 PROCEDURE — 82043 UR ALBUMIN QUANTITATIVE: CPT

## 2021-07-30 PROCEDURE — 84443 ASSAY THYROID STIM HORMONE: CPT

## 2021-07-30 PROCEDURE — 83036 HEMOGLOBIN GLYCOSYLATED A1C: CPT

## 2021-07-30 PROCEDURE — 81003 URINALYSIS AUTO W/O SCOPE: CPT

## 2021-07-30 PROCEDURE — 36415 COLL VENOUS BLD VENIPUNCTURE: CPT

## 2021-07-30 PROCEDURE — 80053 COMPREHEN METABOLIC PANEL: CPT

## 2021-07-30 PROCEDURE — 85025 COMPLETE CBC W/AUTO DIFF WBC: CPT

## 2021-07-30 PROCEDURE — 84403 ASSAY OF TOTAL TESTOSTERONE: CPT

## 2021-07-30 PROCEDURE — 83540 ASSAY OF IRON: CPT

## 2021-07-30 PROCEDURE — 82306 VITAMIN D 25 HYDROXY: CPT

## 2021-07-30 PROCEDURE — 80061 LIPID PANEL: CPT

## 2021-07-30 PROCEDURE — 84153 ASSAY OF PSA TOTAL: CPT

## 2021-08-01 DIAGNOSIS — E83.19 IRON OVERLOAD: Primary | ICD-10-CM

## 2021-08-02 ENCOUNTER — TELEPHONE (OUTPATIENT)
Dept: FAMILY MEDICINE CLINIC | Facility: CLINIC | Age: 56
End: 2021-08-02

## 2021-08-02 DIAGNOSIS — R79.0 ABNORMAL SERUM IRON LEVEL: Primary | ICD-10-CM

## 2021-08-02 NOTE — TELEPHONE ENCOUNTER
Hub staff attempted to follow warm transfer process and was unsuccessful     Caller: Jung Burkett    Relationship to patient: Self    Best call back number: [phone]    Patient is needing: PATIENT STATED THAT HE MISDEED A CALL AND BELIEVES IT WAS ABOUT LAB RESULTS AND A REFERRAL, REQUESTING CALL BACK ASAP

## 2021-08-02 NOTE — TELEPHONE ENCOUNTER
----- Message from RIVERA Harris sent at 8/1/2021  3:35 PM EDT -----  Poor control of DM and Chol - LDL needs to be less than 70 since DM  Iron Level - is elevated - if taking supplement he should stop- I placed order for Hemochromatosis mutation test, he will need to return to lab, non fasting. consult Hematology.

## 2021-08-05 ENCOUNTER — OFFICE VISIT (OUTPATIENT)
Dept: ORTHOPEDIC SURGERY | Facility: CLINIC | Age: 56
End: 2021-08-05

## 2021-08-05 ENCOUNTER — CONSULT (OUTPATIENT)
Dept: ONCOLOGY | Facility: HOSPITAL | Age: 56
End: 2021-08-05

## 2021-08-05 ENCOUNTER — LAB (OUTPATIENT)
Dept: ONCOLOGY | Facility: HOSPITAL | Age: 56
End: 2021-08-05

## 2021-08-05 VITALS — HEIGHT: 68 IN | WEIGHT: 185 LBS | OXYGEN SATURATION: 99 % | HEART RATE: 60 BPM | BODY MASS INDEX: 28.04 KG/M2

## 2021-08-05 VITALS
DIASTOLIC BLOOD PRESSURE: 64 MMHG | TEMPERATURE: 97.4 F | HEIGHT: 67 IN | OXYGEN SATURATION: 99 % | HEART RATE: 73 BPM | SYSTOLIC BLOOD PRESSURE: 117 MMHG | RESPIRATION RATE: 16 BRPM | BODY MASS INDEX: 29.17 KG/M2 | WEIGHT: 185.85 LBS

## 2021-08-05 DIAGNOSIS — R79.0 ABNORMAL BLOOD LEVEL OF IRON: ICD-10-CM

## 2021-08-05 DIAGNOSIS — Z47.89 AFTERCARE FOLLOWING SURGERY OF THE MUSCULOSKELETAL SYSTEM: Primary | ICD-10-CM

## 2021-08-05 DIAGNOSIS — R79.0 ABNORMAL BLOOD LEVEL OF IRON: Primary | ICD-10-CM

## 2021-08-05 LAB
FERRITIN SERPL-MCNC: 45 NG/ML (ref 30–400)
IRON 24H UR-MRATE: 103 MCG/DL (ref 59–158)
IRON SATN MFR SERPL: 26 % (ref 20–50)
TIBC SERPL-MCNC: 396 MCG/DL (ref 298–536)
TRANSFERRIN SERPL-MCNC: 266 MG/DL (ref 200–360)

## 2021-08-05 PROCEDURE — 99204 OFFICE O/P NEW MOD 45 MIN: CPT | Performed by: INTERNAL MEDICINE

## 2021-08-05 PROCEDURE — G0463 HOSPITAL OUTPT CLINIC VISIT: HCPCS

## 2021-08-05 PROCEDURE — 99024 POSTOP FOLLOW-UP VISIT: CPT | Performed by: PHYSICIAN ASSISTANT

## 2021-08-05 PROCEDURE — 84466 ASSAY OF TRANSFERRIN: CPT

## 2021-08-05 PROCEDURE — 83540 ASSAY OF IRON: CPT

## 2021-08-05 PROCEDURE — 82728 ASSAY OF FERRITIN: CPT

## 2021-08-05 PROCEDURE — 36415 COLL VENOUS BLD VENIPUNCTURE: CPT

## 2021-08-05 PROCEDURE — 81256 HFE GENE: CPT

## 2021-08-05 NOTE — PROGRESS NOTES
Chief Complaint  elevated iron (-new)    Nicola Schrader PA Ball, Randy, PA    Subjective          Jung YIP Madelaine presents to [facility] for evaluation of elevated iron level.  Patient reports that he had elevated hemoglobin several years ago which was felt to be related to testosterone use.  He was seen by an outside hematologist and had phlebotomies intermittently for a couple of years.  He has since stopped the testosterone.  His hemoglobin is normalized.  He reports at the same time, there was question of an elevated iron level.  He does not recall any formal evaluation of that.  While he was doing phlebotomies, he states that his iron level actually dropped.  Last month, he had left shoulder surgery for torn rotator cuff.  He was also seen by his primary care provider.  Additional lab work was obtained including an iron profile which showed an elevated serum iron level.  He does not know of any family members that have elevated iron levels.  He is otherwise in good health.  He denies any other complaints.    Review of Systems   Constitutional: Positive for fatigue (5/10). Negative for appetite change, diaphoresis, fever, unexpected weight gain and unexpected weight loss.   HENT: Negative for hearing loss, sore throat and voice change.    Eyes: Negative for blurred vision, double vision, pain, redness and visual disturbance.   Respiratory: Negative for cough, shortness of breath and wheezing.    Cardiovascular: Negative for chest pain, palpitations and leg swelling.   Endocrine: Negative for cold intolerance, heat intolerance, polydipsia and polyuria.   Genitourinary: Negative for decreased urine volume, difficulty urinating, frequency and urinary incontinence.   Musculoskeletal: Positive for back pain. Negative for arthralgias, joint swelling and myalgias.   Skin: Negative for color change, rash, skin lesions and bruise.   Neurological: Positive for numbness (left hand).  Negative for dizziness, seizures and headache.   Hematological: Negative for adenopathy. Does not bruise/bleed easily.   Psychiatric/Behavioral: Negative for depressed mood. The patient is not nervous/anxious.      Current Outpatient Medications on File Prior to Visit   Medication Sig Dispense Refill   • amphetamine-dextroamphetamine (Adderall) 20 MG tablet Take 1 tablet by mouth 3 (Three) Times a Day. 90 tablet 0   • atorvastatin (LIPITOR) 20 MG tablet Take 20 mg by mouth Every Night.     • Dexilant 60 MG capsule Take 60 mg by mouth Every Night.     • dicyclomine (BENTYL) 20 MG tablet Take 20 mg by mouth Every 6 (Six) Hours.     • Empagliflozin-metFORMIN HCl (Synjardy) 12.5-1000 MG tablet Take 1 tablet by mouth 2 (two) times a day.     • Glycopyrronium Tosylate (Qbrexza) 2.4 % pads Apply  topically.     • losartan-hydrochlorothiazide (HYZAAR) 100-25 MG per tablet Take 1 tablet by mouth Daily.     • metoprolol succinate XL (TOPROL-XL) 50 MG 24 hr tablet Take 50 mg by mouth Daily.     • Semaglutide,0.25 or 0.5MG/DOS, (OZEMPIC) 2 MG/1.5ML solution pen-injector Inject 0.5 mg under the skin into the appropriate area as directed 1 (One) Time Per Week for 90 days. 3 pen 3   • tamsulosin (FLOMAX) 0.4 MG capsule 24 hr capsule Take 1 capsule by mouth Every Night.     • traMADol (ULTRAM) 50 MG tablet Take 1 tablet by mouth Every 4 (Four) Hours As Needed for Moderate Pain . 42 tablet 0   • traZODone (DESYREL) 100 MG tablet Take 100 mg by mouth Every Night.       No current facility-administered medications on file prior to visit.       Allergies   Allergen Reactions   • Hydrocodone-Acetaminophen Itching   • Nsaids Arrhythmia   • Oxycodone-Acetaminophen Shortness Of Breath   • Sulfa Antibiotics Hives     Past Medical History:   Diagnosis Date   • Acid reflux    • Anxiety    • Anxiety disorder 05/17/2019   • Arthritis     generalized   • Blood disease     none   • Cervicalgia    • Chronic allergic rhinitis    • Depression    •  Diabetes mellitus, type 2 (CMS/[facility]) 05/17/2019    DOES NOT CHECK BS   • Diabetic eye exam (CMS/[facility]) 01/2019 20/20 PER PT    • Encounter for diabetic foot exam (CMS/HCC)     WITHIN FEW MO  PER PT    • Essential hypertension 05/17/2019   • GERD (gastroesophageal reflux disease)    • High blood pressure    • High cholesterol    • Hyperlipemia    • Hyperlipidemia    • Hypertension    • Major depressive disorder 05/17/2019   • Nicotine dependence 05/17/2019   • Prostate disorder     BPH   • Rotator cuff tear, left    • Seasonal allergies    • Vitamin D deficiency 05/17/2019    no current issues     Past Surgical History:   Procedure Laterality Date   • CHOLECYSTECTOMY  1997   • COLONOSCOPY      ELISA- REPEAT IN 5 YEARS    • GALLBLADDER SURGERY     • KNEE ARTHROSCOPY W/ MENISCAL REPAIR Right    • OTHER SURGICAL HISTORY      SURGICAL CLIPS/gallbladder removed   • OTHER SURGICAL HISTORY      right knee meniscus tear repair   • SHOULDER ARTHROSCOPY W/ ROTATOR CUFF REPAIR Left 7/21/2021    Procedure: SHOULDER ARTHROSCOPY,SUBACROMINAL DECOMPRESSION, DISTAL CLAVICLE RESECTION, MINI OPEN  ROTATOR CUFF REPAIR;  Surgeon: Ulisses Kenney MD;  Location: [facility];  Service: Orthopedics;  Laterality: Left;   • TONSILLECTOMY       Social History     Socioeconomic History   • Marital status:      Spouse name: Not on file   • Number of children: Not on file   • Years of education: Not on file   • Highest education level: Not on file   Tobacco Use   • Smoking status: Current Some Day Smoker     Years: 10.00     Types: Cigars   • Smokeless tobacco: Never Used   • Tobacco comment: 1 cigar daily   Substance and Sexual Activity   • Alcohol use: Yes     Comment: Drinks daily; beer. Occasionally drinks 2 drinks per day, has been drinking for 6-10 years.    • Drug use: Never     Family History   Problem Relation Age of Onset   • Cancer Mother    • Diabetes Mother    • Rheum arthritis Mother         40'S   • Heart  "attack Mother    • Breast cancer Mother         40'S   • Arthritis Mother    • Stroke Father    • Heart disease Father    • Heart attack Maternal Grandmother    • Breast cancer Maternal Grandmother         50'S   • Prostate cancer Maternal Grandfather    • Nephrolithiasis Maternal Grandfather    • Colon cancer Paternal Grandmother    • Colon cancer Paternal Grandfather         60'S   • Breast cancer Other         40'S   • Heart attack Maternal Aunt    • Malig Hyperthermia Neg Hx        Objective   Physical Exam  Vitals reviewed. Exam conducted with a chaperone present.   Constitutional:       General: He is not in acute distress.     Appearance: Normal appearance.   HENT:      Head: Normocephalic and atraumatic.   Eyes:      Conjunctiva/sclera: Conjunctivae normal.      Pupils: Pupils are equal, round, and reactive to light.   Cardiovascular:      Rate and Rhythm: Normal rate and regular rhythm.      Heart sounds: Normal heart sounds. No murmur heard.   No gallop.    Pulmonary:      Effort: Pulmonary effort is normal.      Breath sounds: Normal breath sounds.   Abdominal:      General: Abdomen is flat. Bowel sounds are normal. There is no distension.      Palpations: Abdomen is soft.      Tenderness: There is no abdominal tenderness.      Comments: No HSM or masses   Musculoskeletal:      Cervical back: Neck supple. No tenderness.      Right lower leg: No edema.      Left lower leg: No edema.      Comments: Left arm in an immobilizing brace   Lymphadenopathy:      Cervical: No cervical adenopathy.   Skin:     Comments: No bronzing of the skin   Neurological:      Mental Status: He is alert and oriented to person, place, and time.   Psychiatric:         Mood and Affect: Mood normal.         Behavior: Behavior normal.         Vitals:    08/05/21 0832   BP: 117/64   Pulse: 73   Resp: 16   Temp: 97.4 °F (36.3 °C)   SpO2: 99%   Weight: 84.3 kg (185 lb 13.6 oz)   Height: 171 cm (67.32\")   PainSc:   4   PainLoc: " Back  Comment: AND NECK     ECOG score: 1         PHQ-9 Total Score:                      Result Review :   The following data was reviewed by: Titi Guzman MD on 08/05/2021:  Lab Results   Component Value Date    HGB 15.8 07/30/2021    HCT 45.7 07/30/2021    MCV 92.5 07/30/2021     07/30/2021    WBC 8.14 07/30/2021    NEUTROABS 5.29 07/30/2021    LYMPHSABS 1.75 07/30/2021    MONOSABS 0.85 07/30/2021    EOSABS 0.18 07/30/2021    BASOSABS 0.04 07/30/2021     Lab Results   Component Value Date    GLUCOSE 198 (H) 07/30/2021    BUN 16 07/30/2021    CREATININE 0.98 07/30/2021     (L) 07/30/2021    K 4.2 07/30/2021    CL 98 07/30/2021    CO2 24.6 07/30/2021    CALCIUM 9.2 07/30/2021    PROTEINTOT 7.1 07/30/2021    ALBUMIN 4.10 07/30/2021    BILITOT 0.7 07/30/2021    ALKPHOS 52 07/30/2021    AST 12 07/30/2021    ALT 22 07/30/2021     Data reviewed: Primary care notes reviewed.  Recent ferritin level reviewed at 154.        Assessment and Plan    Diagnoses and all orders for this visit:    1. Abnormal blood level of iron (Primary)  Assessment & Plan:  Patient is an elevated serum iron level.  He reports a possible history of elevated iron in the past and was evaluated by an outside hematologist.  Those records are unavailable for review.  Given the question of iron metabolism disorder, I will repeat lab work today including iron profile, ferritin and HFE gene mutation.  I will call him with results of the testing.  Further recommendations based on results.    Orders:  -     Iron Profile; Future  -     Ferritin; Future  -     Hemochromatosis Mutation; Future              Patient was given instructions and counseling regarding his condition or for health maintenance advice. Please see specific information pulled into the AVS if appropriate.     Titi Guzman MD    8/5/2021

## 2021-08-05 NOTE — PROGRESS NOTES
"Answers for HPI/ROS submitted by the patient on 8/3/2021  What is the primary reason for your visit?: Other  Please describe your symptoms.: Follow up from surgery  Have you had these symptoms before?: No  How long have you been having these symptoms?: Greater than 2 weeks  Please list any medications you are currently taking for this condition.: Tramadol HCl 50 mg tabs  Please describe any probable cause for these symptoms. : Surgery    Chief Complaint  Follow-up of the Left Shoulder    Subjective          Jung Burkett presents to [facility] for s/p left shoulder arthroscopy with HENNA, DC, biceps tenotomy and mini-open RCR by Dr. Kenney on 07/21/21. Patient reports having pain of his shoulder. He states he has been using ice and taking tramadol for his pain. He states pain has improved some since the procedure. He is currently going to PT three times weekly. His therapist states ROM has been difficult, due to patients pain. He states he has used TENS unit on his shoulder, which helped to improve some of his symptoms. PT states he has made some progress in ROM.     Objective   Vital Signs:   Pulse 60   Ht 172.7 cm (68\")   Wt 83.9 kg (185 lb)   SpO2 99%   BMI 28.13 kg/m²       Physical Exam  Constitutional:       Appearance: Normal appearance. He is well-developed and normal weight.   HENT:      Head: Normocephalic.      Right Ear: Hearing and external ear normal.      Left Ear: Hearing and external ear normal.      Nose: Nose normal.   Eyes:      Conjunctiva/sclera: Conjunctivae normal.   Cardiovascular:      Rate and Rhythm: Normal rate.   Pulmonary:      Effort: Pulmonary effort is normal.      Breath sounds: No wheezing or rales.   Abdominal:      Palpations: Abdomen is soft.      Tenderness: There is no abdominal tenderness.   Musculoskeletal:      Cervical back: Normal range of motion.   Skin:     Findings: No rash.   Neurological:      Mental Status: He is alert and " oriented to person, place, and time.   Psychiatric:         Mood and Affect: Mood and affect normal.         Judgment: Judgment normal.     Ortho Exam  Left shoulder: Incisions are well-healing.  No swelling, atrophy or discoloration.  Mildly tender across the shoulder.  PROM with forward flexion 95 degrees, 100 degrees of abduction, 20 degrees of external rotation.  Good muscle tone of the deltoid.  Full AROM of the elbow and of the wrist.  Sensation intact.  Neurovascular intact.  Radial and ulnar pulse are 2+.    Result Review :   The following data was reviewed by: RIVERA Quinteros on 08/05/2021:         Imaging Results (Most Recent)     None                Assessment and Plan    Problem List Items Addressed This Visit        Musculoskeletal and Injuries    Aftercare following left shoulder arthroscopy - Primary          Follow Up   Return in about 4 weeks (around 9/2/2021).  Patient Instructions   No heavy lifting, pushing or pulling.   Continue with care of incision.   Continue use of sling for two additional weeks. Able to remove for hygiene, gentle ROM and PT.   Continue with POC at physical therapy.   Ice for associated swelling.   Follow up in 4 weeks.     Patient was given instructions and counseling regarding his condition or for health maintenance advice. Please see specific information pulled into the AVS if appropriate.

## 2021-08-05 NOTE — PATIENT INSTRUCTIONS
No heavy lifting, pushing or pulling.   Continue with care of incision.   Continue use of sling for two additional weeks. Able to remove for hygiene, gentle ROM and PT.   Continue with POC at physical therapy.   Ice for associated swelling.   Follow up in 4 weeks.

## 2021-08-05 NOTE — ASSESSMENT & PLAN NOTE
Patient is an elevated serum iron level.  He reports a possible history of elevated iron in the past and was evaluated by an outside hematologist.  Those records are unavailable for review.  Given the question of iron metabolism disorder, I will repeat lab work today including iron profile, ferritin and HFE gene mutation.  I will call him with results of the testing.  Further recommendations based on results.

## 2021-08-10 LAB — HFE GENE MUT ANL BLD/T: NORMAL

## 2021-08-12 DIAGNOSIS — M75.102 TEAR OF LEFT ROTATOR CUFF, UNSPECIFIED TEAR EXTENT, UNSPECIFIED WHETHER TRAUMATIC: ICD-10-CM

## 2021-08-12 RX ORDER — TRAMADOL HYDROCHLORIDE 50 MG/1
50 TABLET ORAL EVERY 4 HOURS PRN
Qty: 42 TABLET | Refills: 0 | Status: SHIPPED | OUTPATIENT
Start: 2021-08-12 | End: 2021-09-07 | Stop reason: SDUPTHER

## 2021-08-13 DIAGNOSIS — G47.11 IDIOPATHIC HYPERSOMNIA: ICD-10-CM

## 2021-08-13 RX ORDER — DEXTROAMPHETAMINE SACCHARATE, AMPHETAMINE ASPARTATE, DEXTROAMPHETAMINE SULFATE AND AMPHETAMINE SULFATE 5; 5; 5; 5 MG/1; MG/1; MG/1; MG/1
20 TABLET ORAL 3 TIMES DAILY
Qty: 90 TABLET | Refills: 0 | Status: SHIPPED | OUTPATIENT
Start: 2021-08-13 | End: 2021-08-30 | Stop reason: SDUPTHER

## 2021-08-30 ENCOUNTER — OFFICE VISIT (OUTPATIENT)
Dept: SLEEP MEDICINE | Facility: HOSPITAL | Age: 56
End: 2021-08-30

## 2021-08-30 VITALS
DIASTOLIC BLOOD PRESSURE: 84 MMHG | WEIGHT: 180 LBS | OXYGEN SATURATION: 98 % | TEMPERATURE: 97.4 F | HEART RATE: 73 BPM | HEIGHT: 68 IN | SYSTOLIC BLOOD PRESSURE: 113 MMHG | BODY MASS INDEX: 27.28 KG/M2

## 2021-08-30 DIAGNOSIS — G47.11 IDIOPATHIC HYPERSOMNIA: Primary | ICD-10-CM

## 2021-08-30 PROCEDURE — G0463 HOSPITAL OUTPT CLINIC VISIT: HCPCS

## 2021-08-30 PROCEDURE — 99214 OFFICE O/P EST MOD 30 MIN: CPT | Performed by: INTERNAL MEDICINE

## 2021-08-30 RX ORDER — DEXTROAMPHETAMINE SACCHARATE, AMPHETAMINE ASPARTATE, DEXTROAMPHETAMINE SULFATE AND AMPHETAMINE SULFATE 5; 5; 5; 5 MG/1; MG/1; MG/1; MG/1
20 TABLET ORAL 3 TIMES DAILY
Qty: 90 TABLET | Refills: 0 | Status: SHIPPED | OUTPATIENT
Start: 2021-08-30 | End: 2021-10-25 | Stop reason: SDUPTHER

## 2021-08-30 NOTE — PROGRESS NOTES
"  [address] [address]  Phone: [phone]  Fax: [phone]      SLEEP CLINIC FOLLOW UP PROGRESS NOTE.    Jung Burkett  1965  56 y.o.  male      PCP: Nicola Schrader PA      Date of visit: 8/30/2021    Chief Complaint   Patient presents with   • Daytime Sleepiness       HPI:  This is a 56 y.o. years old patient who has a history of idiopathic hypersomnia and ADHD who is on Adderall 20 mg 3 times a day and doing well.  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  He says the medication has helped him and is able to function.      Medications and allergies are reviewed by me and documented in the encounter.     SOCIAL ( habits pertaining to sleep medicine)  History of tobacco use:Yes smoke cigars  History of alcohol use: 6 per week  Caffeine use: 1    REVIEW OF SYSTEMS:   Kelso Sleepiness Scale :Total score: 15   Nasal congestion:No   Dry mouth/nose:No   Post nasal drip; No   Acid reflux/Heartburn:Yes   Abd bloating:No   Morning headache:No   Anxiety:No   Depression:No     PHYSICAL EXAMINATION:  CONSTITUTIONAL:  Vitals:    08/30/21 1500   BP: 113/84   Pulse: 73   Temp: 97.4 °F (36.3 °C)   SpO2: 98%   Weight: 81.6 kg (180 lb)   Height: 172.7 cm (68\")    Body mass index is 27.37 kg/m².   NOSE: nasal passages are clear, no nasal polyps, septum in the midline.  THROAT: throat is clear, oral airway Mallampati class 2  RESP SYSTEM: Breath sounds are normal, no wheezes or crackles  CARDIOVASULAR: Heart rate is regular without murmur. No edema          ASSESSMENT AND PLAN:  · Idiopathic hypersomnia.  Patient is unstable on Adderall 20 mg 3 times a day and I have renewed his medications and will see him in 6 months for follow-up.  [place] has been checked on PDMP and has no problems.  He gets his medications from [facility]  · ADHD,    · Return in about 6 months " (around 2/28/2022) for Next scheduled follow-up. . Patient's questions were answered.        Jaimie Kamara MD  Sleep Medicine  Medical Director, [facility]  8/30/2021

## 2021-09-07 ENCOUNTER — OFFICE VISIT (OUTPATIENT)
Dept: ORTHOPEDIC SURGERY | Facility: CLINIC | Age: 56
End: 2021-09-07

## 2021-09-07 VITALS — HEART RATE: 104 BPM | HEIGHT: 68 IN | OXYGEN SATURATION: 96 % | WEIGHT: 180 LBS | BODY MASS INDEX: 27.28 KG/M2

## 2021-09-07 DIAGNOSIS — M75.102 TEAR OF LEFT ROTATOR CUFF, UNSPECIFIED TEAR EXTENT, UNSPECIFIED WHETHER TRAUMATIC: ICD-10-CM

## 2021-09-07 DIAGNOSIS — Z47.89 AFTERCARE FOLLOWING SURGERY OF THE MUSCULOSKELETAL SYSTEM: Primary | ICD-10-CM

## 2021-09-07 PROCEDURE — 99024 POSTOP FOLLOW-UP VISIT: CPT | Performed by: PHYSICIAN ASSISTANT

## 2021-09-07 RX ORDER — CYCLOBENZAPRINE HCL 10 MG
10 TABLET ORAL NIGHTLY PRN
Qty: 30 TABLET | Refills: 0 | Status: SHIPPED | OUTPATIENT
Start: 2021-09-07 | End: 2022-11-04 | Stop reason: SDUPTHER

## 2021-09-07 RX ORDER — TRAMADOL HYDROCHLORIDE 50 MG/1
TABLET ORAL
Qty: 30 TABLET | Refills: 0 | Status: SHIPPED | OUTPATIENT
Start: 2021-09-07 | End: 2021-10-21

## 2021-09-07 NOTE — PROGRESS NOTES
"Chief Complaint  Pain of the Left Shoulder    Subjective          Jung Burkett presents to [facility] for follow-up on left shoulder status post left shoulder arthroscopy with SCD, DCR, biceps tenotomy and mini RCR by Dr. Feldman 7/21/2021.  Patient has been having a lot of pain in the left shoulder.  He states certain movements at PT cause exquisite pain which she feels is holding him back with progressing forward with range of motion and strengthening.  He states he has painful popping in the posterior aspect of his shoulder with certain movements.  He was concerned he may have retore his rotator cuff, he denies known injury, but states that he has had to struggle getting out of chairs using his arms to push himself up from a seated position. He takes tramadol for pain relief, requests refill today.  He states he is allergic to most oral NSAIDs and other narcotic pain medications.  He has used Voltaren gel in the past and has tolerated this medicine well.  He is currently doing PT twice weekly and home exercises daily.  Patient admits to tingling in the left fingertips.  He states he has known degenerative disc disease in the cervical spine, he has an appointment to see Dr. Canales, neurosurgery, 9/9/2021 for further treatment options.    Objective   Allergies   Allergen Reactions   • Hydrocodone-Acetaminophen Itching   • Nsaids Arrhythmia   • Oxycodone-Acetaminophen Shortness Of Breath   • Sulfa Antibiotics Hives       Vital Signs:   Pulse 104   Ht 172.7 cm (68\")   Wt 81.6 kg (180 lb)   SpO2 96%   BMI 27.37 kg/m²       Physical Exam  Constitutional:       Appearance: Normal appearance. Patient is well-developed and normal weight.   HENT:      Head: Normocephalic.      Right Ear: Hearing and external ear normal.      Left Ear: Hearing and external ear normal.      Nose: Nose normal.   Eyes:      Conjunctiva/sclera: Conjunctivae normal.   Cardiovascular:      Rate and Rhythm: " Normal rate.   Pulmonary:      Effort: Pulmonary effort is normal.      Breath sounds: No wheezing or rales.   Abdominal:      Palpations: Abdomen is soft.      Tenderness: There is no abdominal tenderness.   Musculoskeletal:      Cervical back: Normal range of motion.   Skin:     Findings: No rash.   Neurological:      Mental Status: Patient is alert and oriented to person, place, and time.   Psychiatric:         Mood and Affect: Mood and affect normal.         Judgment: Judgment normal.     Ortho Exam  Left shoulder: Well-healed surgical incisions, tenderness about the shoulder, no swelling, skin intact.  Active flexion to 90, abduction to 100 and internal rotation to L5, limited external rotation.  Sensation to light touch intact in the upper extremities, good range of motion left elbow wrist and digits, radial pulse 2+.  Result Review :            Imaging Results (Most Recent)     None                Assessment and Plan    Problem List Items Addressed This Visit        Musculoskeletal and Injuries    Tear of left rotator cuff    Aftercare following left shoulder arthroscopy - Primary    Current Assessment & Plan     Patient will begin using Voltaren gel to the shoulder, tramadol refilled today.  Flexeril refilled today.  Recommend continuation of PT and home exercises.  Recommend icing after activity.  Recommend no pushing pulling or lifting at this time.  Follow-up in 6 weeks for recheck.  Work note provided today.               Follow Up   Return in about 6 weeks (around 10/19/2021) for Recheck.  Patient Instructions   Patient will begin using Voltaren gel to the shoulder, tramadol refilled today.  Flexeril refilled today.  Recommend continuation of PT and home exercises.  Recommend icing after activity.  Recommend no pushing pulling or lifting at this time.  Follow-up in 6 weeks for recheck.  Work note provided today.    Patient was given instructions and counseling regarding his condition or for health  maintenance advice. Please see specific information pulled into the AVS if appropriate.

## 2021-09-07 NOTE — PATIENT INSTRUCTIONS
Patient will begin using Voltaren gel to the shoulder, tramadol refilled today.  Flexeril refilled today.  Recommend continuation of PT and home exercises.  Recommend icing after activity.  Recommend no pushing pulling or lifting at this time.  Follow-up in 6 weeks for recheck.  Work note provided today.   255.7

## 2021-09-07 NOTE — ASSESSMENT & PLAN NOTE
Patient will begin using Voltaren gel to the shoulder, tramadol refilled today.  Flexeril refilled today.  Recommend continuation of PT and home exercises.  Recommend icing after activity.  Recommend no pushing pulling or lifting at this time.  Follow-up in 6 weeks for recheck.  Work note provided today.

## 2021-09-09 ENCOUNTER — OFFICE VISIT (OUTPATIENT)
Dept: NEUROSURGERY | Facility: CLINIC | Age: 56
End: 2021-09-09

## 2021-09-09 VITALS
BODY MASS INDEX: 27.54 KG/M2 | SYSTOLIC BLOOD PRESSURE: 124 MMHG | WEIGHT: 181.7 LBS | DIASTOLIC BLOOD PRESSURE: 83 MMHG | HEIGHT: 68 IN

## 2021-09-09 DIAGNOSIS — M43.10 ACQUIRED SPONDYLOLISTHESIS: ICD-10-CM

## 2021-09-09 DIAGNOSIS — M47.812 CERVICAL SPONDYLOSIS WITHOUT MYELOPATHY: Primary | ICD-10-CM

## 2021-09-09 DIAGNOSIS — M54.6 PAIN IN THORACIC SPINE: ICD-10-CM

## 2021-09-09 DIAGNOSIS — I10 ESSENTIAL HYPERTENSION: ICD-10-CM

## 2021-09-09 DIAGNOSIS — E78.5 HYPERLIPIDEMIA, UNSPECIFIED HYPERLIPIDEMIA TYPE: Primary | ICD-10-CM

## 2021-09-09 PROCEDURE — 99203 OFFICE O/P NEW LOW 30 MIN: CPT | Performed by: NEUROLOGICAL SURGERY

## 2021-09-09 NOTE — PROGRESS NOTES
"Chief Complaint  Neck Pain    Subjective          Jung Burkett who is a 56 y.o. year old male who presents to [facility] for Evaluation of the Spine.     The patient complains of pain located in the Cervical, Thoracic and Lumbar Spine.  Patients states the pain has been present for 6 years.  The pain came on gradually.  The pain scale level is 2.  The pain radiates into the bilateral hands at time.  The pain is waxing/waning and described as sharp.  The pain is worse in the morning. Patient states nothing improves the pain.  Patient states anything can worsen the pain.    He had a fall striking the edge of his pool years ago and has thoracic spine pain.     He also has a known spondylolisthesis (since 1990s) with lower back pain. This has flared up about 4 times this year and can be severe when it flares up.     Associated Symptoms Include: Tingling  Conservative Interventions Include: Pain Medications that were ineffective., Muscle Relaxants that were somewhat effective. and  Chiropractor that was ineffective.    History of Previous Spinal Surgery?: No    He reports that he has been smoking cigars. He has smoked for the past 10.00 years. He has never used smokeless tobacco.    Review of Systems   Musculoskeletal: Positive for arthralgias, back pain, myalgias, neck pain and neck stiffness.   Neurological: Positive for numbness.        Objective   Vital Signs:   /83 (BP Location: Right arm, Patient Position: Sitting)   Ht 172.7 cm (68\")   Wt 82.4 kg (181 lb 11.2 oz)   BMI 27.63 kg/m²       Physical Exam  Constitutional:       Appearance: Normal appearance.   Neck:      Comments: Mild reduction in ROM  Musculoskeletal:      Comments: Timid to move left shoulder secondary to recent rotator cuff surgery   Neurological:      Mental Status: He is alert.      Sensory: No sensory deficit.      Motor: No weakness (unable to fully assess left arm).      Deep Tendon " Reflexes: Reflexes normal (2/4 in BUE/BLE, Neg Harrison's).   Psychiatric:         Mood and Affect: Mood normal.          Result Review :   I personally reviewed the patient's MRI scan which shows multilevel cervical spondylosis with mild stenosis and multilevel foraminal stenosis.         Assessment and Plan    Diagnoses and all orders for this visit:    1. Cervical spondylosis without myelopathy (Primary)  Comments:  Multilevel    2. Acquired spondylolisthesis  Comments:  L5 pars defects (known since 1990s)    3. Pain in thoracic spine    Surgery would not likely help the neck pain. He also has mid back pain and lower back pain. Surgery could help the lower back pain if it were to become more significant.      We discussed the importance of smoking/nicotine cessation. Smoking/nicotine use has multiple health risks. In particular related to the spine, nicotine increases the incidence of lower back pain, speeds up the progression of degenerative disc disease and dramatically reduces healing after spine surgery (particularly a fusion operation).     It is recommended he have physical therapy.  Follow Up   No follow-ups on file.  Patient was given instructions and counseling regarding his condition or for health maintenance advice. Please see specific information pulled into the AVS if appropriate.

## 2021-09-10 RX ORDER — METOPROLOL SUCCINATE 50 MG/1
TABLET, EXTENDED RELEASE ORAL
Qty: 90 TABLET | Refills: 1 | Status: SHIPPED | OUTPATIENT
Start: 2021-09-10 | End: 2022-01-12 | Stop reason: HOSPADM

## 2021-09-10 RX ORDER — ATORVASTATIN CALCIUM 20 MG/1
TABLET, FILM COATED ORAL
Qty: 90 TABLET | Refills: 1 | Status: SHIPPED | OUTPATIENT
Start: 2021-09-10 | End: 2022-03-28

## 2021-09-16 ENCOUNTER — TELEPHONE (OUTPATIENT)
Dept: NEUROSURGERY | Facility: CLINIC | Age: 56
End: 2021-09-16

## 2021-09-16 NOTE — TELEPHONE ENCOUNTER
Caller: Jung Burkett    Relationship: Self    Best call back number: [phone]    What orders are you requesting (i.e. lab or imaging):PHYSICAL THEAPY    In what timeframe would the patient need to come in:ASAP    Where will you receive your lab/imaging services:PHYSICAL THERAPY WAY    Additional notes:PT CALLED AND STATES THAT HE HAS NOT HEARD ANYTHING ABOUT HIS PHYSICAL THERAPY-PT STATES THAT HE IS CURRENTLY AT THE FACILITY THAT HE WOULD LIKE TO COMPLETE HIS THERAPY. PLEASE ADVISE THANK YOU!  PT STATED THAT HE NEEDS DOCUMENTATION FOR HIS SHORT TERM DISABILITY STATING THAT  IS REFERRING HIM TO PHYSICAL THERAPY

## 2021-09-21 ENCOUNTER — TELEPHONE (OUTPATIENT)
Dept: NEUROSURGERY | Facility: CLINIC | Age: 56
End: 2021-09-21

## 2021-09-21 NOTE — TELEPHONE ENCOUNTER
"Spoke to pt and he is asking that Dr. Canales \"update\" his office note. He stated that he is needing it to say that he recommended that he do PT.    He is on short term disability and he needs a more in depth office note or letter        "

## 2021-10-12 ENCOUNTER — TELEPHONE (OUTPATIENT)
Dept: FAMILY MEDICINE CLINIC | Facility: CLINIC | Age: 56
End: 2021-10-12

## 2021-10-19 ENCOUNTER — OFFICE VISIT (OUTPATIENT)
Dept: ORTHOPEDIC SURGERY | Facility: CLINIC | Age: 56
End: 2021-10-19

## 2021-10-19 VITALS — HEART RATE: 98 BPM | HEIGHT: 68 IN | WEIGHT: 187.8 LBS | BODY MASS INDEX: 28.46 KG/M2 | OXYGEN SATURATION: 96 %

## 2021-10-19 DIAGNOSIS — Z47.89 AFTERCARE FOLLOWING SURGERY OF THE MUSCULOSKELETAL SYSTEM: Primary | ICD-10-CM

## 2021-10-19 PROCEDURE — 99024 POSTOP FOLLOW-UP VISIT: CPT | Performed by: PHYSICIAN ASSISTANT

## 2021-10-19 RX ORDER — METHYLPREDNISOLONE 4 MG/1
TABLET ORAL
Qty: 21 TABLET | Refills: 0 | Status: SHIPPED | OUTPATIENT
Start: 2021-10-19 | End: 2021-10-29

## 2021-10-19 NOTE — PATIENT INSTRUCTIONS
Recommend continuation of PT and home exercises.  Medrol Dosepak called in for patient.  Continue tramadol.  Discussed no heavy pushing pulling or lifting.  Follow-up in 4 weeks for recheck.

## 2021-10-19 NOTE — PROGRESS NOTES
"Chief Complaint  Follow-up and Pain of the Left Shoulder    Subjective          Jung Burkett presents to [facility] for follow-up on left shoulder status post left shoulder arthroscopy with SCD, DCR, biceps tenotomy and mini RCR by Dr. Kenney 7/21/2021.  States he still having some pain and popping within the shoulder.  He is concerned that he may have tore the rotator cuff, denies any known injury but he does state about a week ago he was getting out of a folding chair and felt his left shoulder jerk which caused more pain.  He has an allergy to NSAIDs.  He is been taking tramadol for pain.  He is doing outpatient PT and home exercises.  He states he saw Dr. Canales, neurosurgery regarding his neck and back.  Dr. Canales told him he was not a surgical candidate for the cervical spine but may need lumbar spine surgery down the road.    Objective   Allergies   Allergen Reactions   • Hydrocodone-Acetaminophen Itching   • Nsaids Arrhythmia   • Oxycodone-Acetaminophen Shortness Of Breath   • Sulfa Antibiotics Hives   • Voltaren [Diclofenac] Palpitations       Vital Signs:   Pulse 98   Ht 172.7 cm (68\")   Wt 85.2 kg (187 lb 12.8 oz)   SpO2 96%   BMI 28.55 kg/m²       Physical Exam  Constitutional:       Appearance: Normal appearance. Patient is well-developed and normal weight.   HENT:      Head: Normocephalic.      Right Ear: Hearing and external ear normal.      Left Ear: Hearing and external ear normal.      Nose: Nose normal.   Eyes:      Conjunctiva/sclera: Conjunctivae normal.   Cardiovascular:      Rate and Rhythm: Normal rate.   Pulmonary:      Effort: Pulmonary effort is normal.      Breath sounds: No wheezing or rales.   Abdominal:      Palpations: Abdomen is soft.      Tenderness: There is no abdominal tenderness.   Musculoskeletal:      Cervical back: Normal range of motion.   Skin:     Findings: No rash.   Neurological:      Mental Status: Patient is alert and oriented " to person, place, and time.   Psychiatric:         Mood and Affect: Mood and affect normal.         Judgment: Judgment normal.     Ortho Exam  Right shoulder: Skin intact without swelling, well-healing surgical incisions without erythema dehiscence or purulent drainage, no tenderness to palpation active flexion 120 abduction 115 external rotation 45.  Passive flexion 142 abduction 160 and external rotation to 70.  Sensation light touch is intact and radial pulses are 2+, good range of motion right elbow wrist and digits.  Result Review :            Imaging Results (Most Recent)     None                Assessment and Plan    Problem List Items Addressed This Visit        Musculoskeletal and Injuries    Aftercare following left shoulder arthroscopy - Primary    Current Assessment & Plan     Recommend continuation of PT and home exercises.  Medrol Dosepak called in for patient.  Continue tramadol.  Discussed no heavy pushing pulling or lifting.  Follow-up in 4 weeks for recheck.               Follow Up   Return in about 4 weeks (around 11/16/2021) for Recheck.  Patient Instructions   Recommend continuation of PT and home exercises.  Medrol Dosepak called in for patient.  Continue tramadol.  Discussed no heavy pushing pulling or lifting.  Follow-up in 4 weeks for recheck.    Patient was given instructions and counseling regarding his condition or for health maintenance advice. Please see specific information pulled into the AVS if appropriate.

## 2021-10-20 DIAGNOSIS — M75.102 TEAR OF LEFT ROTATOR CUFF, UNSPECIFIED TEAR EXTENT, UNSPECIFIED WHETHER TRAUMATIC: ICD-10-CM

## 2021-10-21 RX ORDER — TRAMADOL HYDROCHLORIDE 50 MG/1
TABLET ORAL
Qty: 28 TABLET | Refills: 0 | Status: SHIPPED | OUTPATIENT
Start: 2021-10-21 | End: 2023-02-27

## 2021-10-25 DIAGNOSIS — G47.11 IDIOPATHIC HYPERSOMNIA: ICD-10-CM

## 2021-10-25 RX ORDER — DEXTROAMPHETAMINE SACCHARATE, AMPHETAMINE ASPARTATE, DEXTROAMPHETAMINE SULFATE AND AMPHETAMINE SULFATE 5; 5; 5; 5 MG/1; MG/1; MG/1; MG/1
20 TABLET ORAL 3 TIMES DAILY
Qty: 90 TABLET | Refills: 0 | Status: SHIPPED | OUTPATIENT
Start: 2021-10-25 | End: 2021-11-24 | Stop reason: SDUPTHER

## 2021-10-29 ENCOUNTER — OFFICE VISIT (OUTPATIENT)
Dept: FAMILY MEDICINE CLINIC | Facility: CLINIC | Age: 56
End: 2021-10-29

## 2021-10-29 VITALS
BODY MASS INDEX: 27.71 KG/M2 | HEIGHT: 68 IN | OXYGEN SATURATION: 99 % | WEIGHT: 182.8 LBS | DIASTOLIC BLOOD PRESSURE: 71 MMHG | SYSTOLIC BLOOD PRESSURE: 109 MMHG | HEART RATE: 90 BPM

## 2021-10-29 DIAGNOSIS — I10 ESSENTIAL HYPERTENSION: Primary | ICD-10-CM

## 2021-10-29 DIAGNOSIS — R79.89 LOW TESTOSTERONE LEVEL IN MALE: ICD-10-CM

## 2021-10-29 DIAGNOSIS — E78.00 PURE HYPERCHOLESTEROLEMIA: ICD-10-CM

## 2021-10-29 DIAGNOSIS — E55.9 VITAMIN D DEFICIENCY: ICD-10-CM

## 2021-10-29 DIAGNOSIS — Z23 FLU VACCINE NEED: ICD-10-CM

## 2021-10-29 DIAGNOSIS — K58.0 IRRITABLE BOWEL SYNDROME WITH DIARRHEA: ICD-10-CM

## 2021-10-29 DIAGNOSIS — R00.2 PALPITATIONS: ICD-10-CM

## 2021-10-29 DIAGNOSIS — E11.65 TYPE 2 DIABETES MELLITUS WITH HYPERGLYCEMIA, WITHOUT LONG-TERM CURRENT USE OF INSULIN (HCC): ICD-10-CM

## 2021-10-29 PROCEDURE — 90471 IMMUNIZATION ADMIN: CPT | Performed by: PHYSICIAN ASSISTANT

## 2021-10-29 PROCEDURE — 99214 OFFICE O/P EST MOD 30 MIN: CPT | Performed by: PHYSICIAN ASSISTANT

## 2021-10-29 PROCEDURE — 90686 IIV4 VACC NO PRSV 0.5 ML IM: CPT | Performed by: PHYSICIAN ASSISTANT

## 2021-10-29 RX ORDER — HYOSCYAMINE SULFATE 0.12 MG/1
0.12 TABLET SUBLINGUAL 2 TIMES DAILY PRN
Qty: 180 EACH | Refills: 1 | Status: SHIPPED | OUTPATIENT
Start: 2021-10-29 | End: 2022-01-12 | Stop reason: HOSPADM

## 2021-10-29 RX ORDER — SEMAGLUTIDE 1.34 MG/ML
1 INJECTION, SOLUTION SUBCUTANEOUS WEEKLY
COMMUNITY
End: 2022-02-28

## 2021-10-29 NOTE — PROGRESS NOTES
Chief Complaint  Diabetes (4 month follow up), Hypertension, and Hyperlipidemia    Subjective          Jung Burkett presents to [facility]  History of Present Illness    Jung Burkett is a 56 y.o. male who presents today for a 4 month follow up DM, HTN, HLD    Pt c/o light headedness and dizziness when both standing and seated. Pt stated he monitors BP at home occasionally and it runs okay.     Labs-7/2021 A1c: 7.10  Colonoscopy-8/2019    Pt does not check BS at home.     Pt will receive flu vaccine in office today.     Pt is drinking well, staying hydrating.     No CP, SOA, HA  Some palpitations occ - had cardiac work up in past.      Current Outpatient Medications:   •  amphetamine-dextroamphetamine (Adderall) 20 MG tablet, Take 1 tablet by mouth 3 (Three) Times a Day., Disp: 90 tablet, Rfl: 0  •  atorvastatin (LIPITOR) 20 MG tablet, TAKE 1 TABLET BY MOUTH ONCE DAILY AT BEDTIME, Disp: 90 tablet, Rfl: 1  •  cyclobenzaprine (FLEXERIL) 10 MG tablet, Take 1 tablet by mouth At Night As Needed for Muscle Spasms., Disp: 30 tablet, Rfl: 0  •  Dexilant 60 MG capsule, Take 60 mg by mouth Every Night., Disp: , Rfl:   •  Empagliflozin-metFORMIN HCl (Synjardy) 12.5-1000 MG tablet, Take 1 tablet by mouth 2 (two) times a day., Disp: , Rfl:   •  losartan-hydrochlorothiazide (HYZAAR) 100-25 MG per tablet, Take 1 tablet by mouth Daily., Disp: , Rfl:   •  metoprolol succinate XL (TOPROL-XL) 50 MG 24 hr tablet, TAKE 1 TABLET BY MOUTH ONCE DAILY, Disp: 90 tablet, Rfl: 1  •  Semaglutide, 1 MG/DOSE, (Ozempic, 1 MG/DOSE,) 2 MG/1.5ML solution pen-injector, Inject 1 mg under the skin into the appropriate area as directed 1 (One) Time Per Week., Disp: , Rfl:   •  tamsulosin (FLOMAX) 0.4 MG capsule 24 hr capsule, Take 1 capsule by mouth Every Night., Disp: , Rfl:   •  traMADol (ULTRAM) 50 MG tablet, TAKE 1 TABLET BY MOUTH EVERY 6 HOURS AS NEEDED FOR PAIN, Disp: 28 tablet, Rfl: 0  •  traZODone  "(DESYREL) 100 MG tablet, Take 100 mg by mouth Every Night., Disp: , Rfl:   •  Hyoscyamine Sulfate SL (Levsin/SL) 0.125 MG sublingual tablet, Place 0.125 mg under the tongue 2 (Two) Times a Day As Needed (IBS)., Disp: 180 each, Rfl: 1    Objective      Vital Signs:   /71 (BP Location: Left arm)   Pulse 90   Ht 172.7 cm (68\")   Wt 82.9 kg (182 lb 12.8 oz)   SpO2 99%   BMI 27.79 kg/m²    Estimated body mass index is 27.79 kg/m² as calculated from the following:    Height as of this encounter: 172.7 cm (68\").    Weight as of this encounter: 82.9 kg (182 lb 12.8 oz).     Physical Exam  Vitals and nursing note reviewed.   Constitutional:       Appearance: Normal appearance.   HENT:      Head: Normocephalic and atraumatic.   Cardiovascular:      Rate and Rhythm: Normal rate and regular rhythm.   Pulmonary:      Effort: Pulmonary effort is normal.      Breath sounds: Normal breath sounds.   Musculoskeletal:      Cervical back: Neck supple.   Neurological:      Mental Status: He is alert.   Psychiatric:         Mood and Affect: Mood normal.         Behavior: Behavior normal.        Result Review :     Common labs    Common Labsle 7/30/21 7/30/21 7/30/21 7/30/21 7/30/21 7/30/21    0943 0943 0943 0943 0943 0950   Glucose    198 (A)     BUN    16     Creatinine    0.98     eGFR Non African Am    79     Sodium    135 (A)     Potassium    4.2     Chloride    98     Calcium    9.2     Albumin    4.10     Total Bilirubin    0.7     Alkaline Phosphatase    52     AST (SGOT)    12     ALT (SGPT)    22     WBC 8.14        Hemoglobin 15.8        Hematocrit 45.7        Platelets 233        Total Cholesterol   162      Triglycerides   167 (A)      HDL Cholesterol   40      LDL Cholesterol    93      Hemoglobin A1C  7.10 (A)       Microalbumin, Urine      <1.2   PSA     2.340    (A) Abnormal value                          Assessment and Plan      Diagnoses and all orders for this visit:    1. Essential hypertension " (Primary)  Overview:  Controlled with toprol 50 and hyzaar    Orders:  -     CBC & Differential; Future  -     Comprehensive Metabolic Panel; Future  -     Lipid Panel; Future  -     TSH Rfx On Abnormal To Free T4; Future  -     Urinalysis With Microscopic If Indicated (No Culture) - Urine, Clean Catch; Future    2. Flu vaccine need  -     FluLaval/Fluarix/Fluzone >6 Months ([id number])    3. Pure hypercholesterolemia  Overview:  Controlled with diet and lipitor    Orders:  -     Lipid Panel; Future    4. Vitamin D deficiency  -     Vitamin D 25 Hydroxy; Future    5. Type 2 diabetes mellitus with hyperglycemia, without long-term current use of insulin (HCC)  -     Comprehensive Metabolic Panel; Future  -     Hemoglobin A1c; Future    6. Palpitations  -     CBC & Differential; Future  -     Comprehensive Metabolic Panel; Future  -     TSH Rfx On Abnormal To Free T4; Future  -     Adult Transthoracic Echo Complete W/ Cont if Necessary Per Protocol; Future  -     Holter monitor - 48 hour; Future    7. Low testosterone level in male  -     PSA DIAGNOSTIC  -     Testosterone; Future    8. Irritable bowel syndrome with diarrhea  -     Hyoscyamine Sulfate SL (Levsin/SL) 0.125 MG sublingual tablet; Place 0.125 mg under the tongue 2 (Two) Times a Day As Needed (IBS).  Dispense: 180 each; Refill: 1     Pt will cut hyzaar in half    Follow Up     Return in about 4 months (around 2/28/2022).    I have reviewed information obtained and documented by others and I have confirmed the accuracy of this documented note.     Patient was given instructions and counseling regarding his condition or for health maintenance advice. Please see specific information pulled into the AVS if appropriate.     RIVERA Harris

## 2021-11-04 ENCOUNTER — TELEPHONE (OUTPATIENT)
Dept: FAMILY MEDICINE CLINIC | Facility: CLINIC | Age: 56
End: 2021-11-04

## 2021-11-04 NOTE — TELEPHONE ENCOUNTER
Caller: Jung Burkett    Relationship: Self    Best call back number: [phone]    What is the best time to reach you: ANY    Who are you requesting to speak with (clinical staff, provider,  specific staff member): CLINICAL STAFF    What was the call regarding: PATIENT CALLED WANTING TO KNOW THE STATUS OF HIS ULTRASOUND AND HEART MONITOR TESTS    Do you require a callback: YES

## 2021-11-05 NOTE — TELEPHONE ENCOUNTER
PATIENT IS CALLING BACK IN TO SEE IF ANY UPDATES HAVE BEEN DONE ABOUT HIS HEART MONITOR AND ULTRA SOUND.    ERIC LEY NOT IN THE OFFICE TODAY.      SUGGESTED PUTTING NOTE BACK.

## 2021-11-10 ENCOUNTER — TELEPHONE (OUTPATIENT)
Dept: FAMILY MEDICINE CLINIC | Facility: CLINIC | Age: 56
End: 2021-11-10

## 2021-11-17 ENCOUNTER — HOSPITAL ENCOUNTER (OUTPATIENT)
Dept: CARDIOLOGY | Facility: HOSPITAL | Age: 56
Discharge: HOME | End: 2021-11-17
Admitting: PHYSICIAN ASSISTANT

## 2021-11-17 DIAGNOSIS — R00.2 PALPITATIONS: ICD-10-CM

## 2021-11-17 PROCEDURE — 93225 XTRNL ECG REC<48 HRS REC: CPT

## 2021-11-18 ENCOUNTER — OFFICE VISIT (OUTPATIENT)
Dept: ORTHOPEDIC SURGERY | Facility: CLINIC | Age: 56
End: 2021-11-18

## 2021-11-18 ENCOUNTER — TELEPHONE (OUTPATIENT)
Dept: FAMILY MEDICINE CLINIC | Facility: CLINIC | Age: 56
End: 2021-11-18

## 2021-11-18 ENCOUNTER — APPOINTMENT (OUTPATIENT)
Dept: CARDIOLOGY | Facility: HOSPITAL | Age: 56
End: 2021-11-18

## 2021-11-18 VITALS — HEIGHT: 68 IN | WEIGHT: 182 LBS | HEART RATE: 100 BPM | OXYGEN SATURATION: 98 % | BODY MASS INDEX: 27.58 KG/M2

## 2021-11-18 DIAGNOSIS — I10 ESSENTIAL HYPERTENSION: Primary | ICD-10-CM

## 2021-11-18 DIAGNOSIS — Z47.89 AFTERCARE FOLLOWING SURGERY OF THE MUSCULOSKELETAL SYSTEM: Primary | ICD-10-CM

## 2021-11-18 DIAGNOSIS — R79.89 LOW TESTOSTERONE IN MALE: ICD-10-CM

## 2021-11-18 DIAGNOSIS — G47.00 INSOMNIA, UNSPECIFIED TYPE: ICD-10-CM

## 2021-11-18 PROCEDURE — 99213 OFFICE O/P EST LOW 20 MIN: CPT | Performed by: ORTHOPAEDIC SURGERY

## 2021-11-18 NOTE — PROGRESS NOTES
"Chief Complaint  Follow-up of the Left Shoulder     Subjective      Jung Burkett presents to [facility] for a follow-up of left shoulder. Patient is status post left shoulder arthroscopy with SCD, DCR, biceps tenotomy and mini RCR by Dr. Kenney 7/21/2021. Patient reports having pain in the neck that radiates down the arm. He saw Dr. Canales told him his neck is too far gone to consider surgical intervention. He does qualify for back surgery. He was offered therapy but couldn’t proceed due to him trying to recover from his shoulder. He states that he has pain in the shoulder due to how tight it is.  He is attending PT. He reports PT can passively get full motion in the shoulder but doing it himself is difficult. Therapist is concerned his shoulder is beginning to freeze. He does do home exercises as well, especially before bed. He states he wakes up with significant stiffness.     Allergies   Allergen Reactions   • Hydrocodone-Acetaminophen Itching   • Nsaids Arrhythmia   • Oxycodone-Acetaminophen Shortness Of Breath   • Sulfa Antibiotics Hives   • Voltaren [Diclofenac] Palpitations        Social History     Socioeconomic History   • Marital status:    Tobacco Use   • Smoking status: Current Some Day Smoker     Years: 10.00     Types: Cigars   • Smokeless tobacco: Never Used   • Tobacco comment: 1 cigar daily   Vaping Use   • Vaping Use: Never used   Substance and Sexual Activity   • Alcohol use: Yes     Comment: Drinks daily; beer. Occasionally drinks 2 drinks per day, has been drinking for 6-10 years.    • Drug use: Never   • Sexual activity: Defer        Review of Systems     Objective   Vital Signs:   Pulse 100   Ht 172.7 cm (68\")   Wt 82.6 kg (182 lb)   SpO2 98%   BMI 27.67 kg/m²       Physical Exam  Constitutional:       Appearance: Normal appearance. Patient is well-developed and normal weight.   HENT:      Head: Normocephalic.      Right Ear: Hearing and external " ear normal.      Left Ear: Hearing and external ear normal.      Nose: Nose normal.   Eyes:      Conjunctiva/sclera: Conjunctivae normal.   Cardiovascular:      Rate and Rhythm: Normal rate.   Pulmonary:      Effort: Pulmonary effort is normal.      Breath sounds: No wheezing or rales.   Abdominal:      Palpations: Abdomen is soft.      Tenderness: There is no abdominal tenderness.   Musculoskeletal:      Cervical back: Normal range of motion.   Skin:     Findings: No rash.   Neurological:      Mental Status: Patient is alert and oriented to person, place, and time.   Psychiatric:         Mood and Affect: Mood and affect normal.         Judgment: Judgment normal.       Ortho Exam      LEFT SHOULDER: Forward elevation to 120 degrees with pain. Good tone of deltoid, biceps, triceps, wrist extensors, and wrist flexors.  Sensation grossly intact. Neurovascular intact.  Full elbow flexion and extension. ER to 30 degrees with pain. Abduction to 90 degrees with pain.       Procedures      Imaging Results (Most Recent)     None           Result Review :         No results found.           Assessment and Plan     DX: Aftercare following left shoulder arthroscopy     We will continue physical therapy.     Call or return if worsening symptoms.    Follow Up     4 weeks.       Patient was given instructions and counseling regarding his condition or for health maintenance advice. Please see specific information pulled into the AVS if appropriate.     Scribed for Ulisses Kenney MD by Marisela Puente.  11/18/21   15:54 EST        I have personally performed the services described in this document as scribed by the above individual and it is both accurate and complete. Ulisses Kenney MD 11/19/21

## 2021-11-18 NOTE — TELEPHONE ENCOUNTER
Just sending a reminder to you regarding the lab work that Nicola has ordered. Please get at your convince. Thank you.

## 2021-11-19 RX ORDER — TRAZODONE HYDROCHLORIDE 100 MG/1
TABLET ORAL
Qty: 90 TABLET | Refills: 1 | Status: SHIPPED | OUTPATIENT
Start: 2021-11-19 | End: 2022-05-31

## 2021-11-19 RX ORDER — LOSARTAN POTASSIUM AND HYDROCHLOROTHIAZIDE 25; 100 MG/1; MG/1
TABLET ORAL
Qty: 90 TABLET | Refills: 1 | Status: SHIPPED | OUTPATIENT
Start: 2021-11-19 | End: 2022-05-31

## 2021-11-19 RX ORDER — TAMSULOSIN HYDROCHLORIDE 0.4 MG/1
CAPSULE ORAL
Qty: 90 CAPSULE | Refills: 1 | Status: SHIPPED | OUTPATIENT
Start: 2021-11-19 | End: 2022-09-12

## 2021-11-24 DIAGNOSIS — G47.11 IDIOPATHIC HYPERSOMNIA: ICD-10-CM

## 2021-11-24 RX ORDER — DEXTROAMPHETAMINE SACCHARATE, AMPHETAMINE ASPARTATE, DEXTROAMPHETAMINE SULFATE AND AMPHETAMINE SULFATE 5; 5; 5; 5 MG/1; MG/1; MG/1; MG/1
20 TABLET ORAL 3 TIMES DAILY
Qty: 90 TABLET | Refills: 0 | Status: SHIPPED | OUTPATIENT
Start: 2021-11-24 | End: 2022-01-12 | Stop reason: HOSPADM

## 2021-11-24 RX ORDER — DEXTROAMPHETAMINE SACCHARATE, AMPHETAMINE ASPARTATE, DEXTROAMPHETAMINE SULFATE AND AMPHETAMINE SULFATE 5; 5; 5; 5 MG/1; MG/1; MG/1; MG/1
20 TABLET ORAL 3 TIMES DAILY
Qty: 90 TABLET | Refills: 0 | Status: CANCELLED | OUTPATIENT
Start: 2021-11-24

## 2021-12-02 LAB
MAXIMAL PREDICTED HEART RATE: 163 BPM
STRESS TARGET HR: 139 BPM

## 2021-12-03 ENCOUNTER — TELEPHONE (OUTPATIENT)
Dept: FAMILY MEDICINE CLINIC | Facility: CLINIC | Age: 56
End: 2021-12-03

## 2021-12-03 NOTE — TELEPHONE ENCOUNTER
Advised pt of results and discussed cardio referral. Pt states he has echo schd for 12/8 in [place]. Pt wcb to advise what dr fisher wants to be ref'd to

## 2021-12-03 NOTE — TELEPHONE ENCOUNTER
----- Message from RIVERA Harris sent at 12/3/2021  1:29 PM EST -----  48 Hour Holter - consult Cardio routinely

## 2021-12-08 ENCOUNTER — HOSPITAL ENCOUNTER (OUTPATIENT)
Dept: CARDIOLOGY | Facility: HOSPITAL | Age: 56
Discharge: HOME | End: 2021-12-08
Admitting: PHYSICIAN ASSISTANT

## 2021-12-08 VITALS
BODY MASS INDEX: 27.58 KG/M2 | SYSTOLIC BLOOD PRESSURE: 131 MMHG | DIASTOLIC BLOOD PRESSURE: 84 MMHG | HEIGHT: 68 IN | WEIGHT: 182 LBS

## 2021-12-08 DIAGNOSIS — R00.2 PALPITATIONS: ICD-10-CM

## 2021-12-08 LAB
AORTIC DIMENSIONLESS INDEX: 0.8 (DI)
BH CV ECHO MEAS - AO MAX PG (FULL): 1.1 MMHG
BH CV ECHO MEAS - AO MAX PG: 5.3 MMHG
BH CV ECHO MEAS - AO MEAN PG (FULL): 0.88 MMHG
BH CV ECHO MEAS - AO MEAN PG: 2.8 MMHG
BH CV ECHO MEAS - AO V2 MAX: 115.5 CM/SEC
BH CV ECHO MEAS - AO V2 MEAN: 77.3 CM/SEC
BH CV ECHO MEAS - AO V2 VTI: 23.3 CM
BH CV ECHO MEAS - ASC AORTA: 2.9 CM
BH CV ECHO MEAS - AVA(I,A): 2.6 CM^2
BH CV ECHO MEAS - AVA(I,D): 2.6 CM^2
BH CV ECHO MEAS - AVA(V,A): 2.9 CM^2
BH CV ECHO MEAS - AVA(V,D): 2.9 CM^2
BH CV ECHO MEAS - BSA(HAYCOCK): 2 M^2
BH CV ECHO MEAS - BSA: 2 M^2
BH CV ECHO MEAS - BZI_BMI: 27.7 KILOGRAMS/M^2
BH CV ECHO MEAS - BZI_METRIC_HEIGHT: 172.7 CM
BH CV ECHO MEAS - BZI_METRIC_WEIGHT: 82.6 KG
BH CV ECHO MEAS - EDV(CUBED): 141.6 ML
BH CV ECHO MEAS - EDV(MOD-SP2): 51 ML
BH CV ECHO MEAS - EDV(MOD-SP4): 82 ML
BH CV ECHO MEAS - EDV(TEICH): 130.2 ML
BH CV ECHO MEAS - EF(CUBED): 73.5 %
BH CV ECHO MEAS - EF(MOD-BP): 53 %
BH CV ECHO MEAS - EF(MOD-SP2): 49 %
BH CV ECHO MEAS - EF(MOD-SP4): 61 %
BH CV ECHO MEAS - EF(TEICH): 64.9 %
BH CV ECHO MEAS - ESV(CUBED): 37.6 ML
BH CV ECHO MEAS - ESV(MOD-SP2): 26 ML
BH CV ECHO MEAS - ESV(MOD-SP4): 32 ML
BH CV ECHO MEAS - ESV(TEICH): 45.7 ML
BH CV ECHO MEAS - FS: 35.7 %
BH CV ECHO MEAS - IVS/LVPW: 0.91
BH CV ECHO MEAS - IVSD: 0.85 CM
BH CV ECHO MEAS - LAT PEAK E' VEL: 9.9 CM/SEC
BH CV ECHO MEAS - LV DIASTOLIC VOL/BSA (35-75): 41.8 ML/M^2
BH CV ECHO MEAS - LV MASS(C)D: 166.8 GRAMS
BH CV ECHO MEAS - LV MASS(C)DI: 84.9 GRAMS/M^2
BH CV ECHO MEAS - LV MAX PG: 4.3 MMHG
BH CV ECHO MEAS - LV MEAN PG: 1.9 MMHG
BH CV ECHO MEAS - LV SYSTOLIC VOL/BSA (12-30): 16.3 ML/M^2
BH CV ECHO MEAS - LV V1 MAX: 103.3 CM/SEC
BH CV ECHO MEAS - LV V1 MEAN: 62.6 CM/SEC
BH CV ECHO MEAS - LV V1 VTI: 18.5 CM
BH CV ECHO MEAS - LVIDD: 5.2 CM
BH CV ECHO MEAS - LVIDS: 3.3 CM
BH CV ECHO MEAS - LVLD AP2: 7.6 CM
BH CV ECHO MEAS - LVLD AP4: 7.6 CM
BH CV ECHO MEAS - LVLS AP2: 5.9 CM
BH CV ECHO MEAS - LVLS AP4: 6.6 CM
BH CV ECHO MEAS - LVOT AREA (M): 3.1 CM^2
BH CV ECHO MEAS - LVOT AREA: 3.3 CM^2
BH CV ECHO MEAS - LVOT DIAM: 2 CM
BH CV ECHO MEAS - LVPWD: 0.93 CM
BH CV ECHO MEAS - MED PEAK E' VEL: 6.6 CM/SEC
BH CV ECHO MEAS - MV A DUR: 0.13 SEC
BH CV ECHO MEAS - MV A MAX VEL: 97.7 CM/SEC
BH CV ECHO MEAS - MV DEC SLOPE: 410.3 CM/SEC^2
BH CV ECHO MEAS - MV DEC TIME: 181 SEC
BH CV ECHO MEAS - MV E MAX VEL: 62.6 CM/SEC
BH CV ECHO MEAS - MV E/A: 0.64
BH CV ECHO MEAS - MV MAX PG: 4.7 MMHG
BH CV ECHO MEAS - MV MEAN PG: 1.4 MMHG
BH CV ECHO MEAS - MV P1/2T MAX VEL: 86.3 CM/SEC
BH CV ECHO MEAS - MV P1/2T: 61.6 MSEC
BH CV ECHO MEAS - MV V2 MAX: 108.6 CM/SEC
BH CV ECHO MEAS - MV V2 MEAN: 51.7 CM/SEC
BH CV ECHO MEAS - MV V2 VTI: 23.3 CM
BH CV ECHO MEAS - MVA P1/2T LCG: 2.5 CM^2
BH CV ECHO MEAS - MVA(P1/2T): 3.6 CM^2
BH CV ECHO MEAS - MVA(VTI): 2.6 CM^2
BH CV ECHO MEAS - PA ACC TIME: 0.11 SEC
BH CV ECHO MEAS - PA MAX PG (FULL): 1.1 MMHG
BH CV ECHO MEAS - PA MAX PG: 2.7 MMHG
BH CV ECHO MEAS - PA PR(ACCEL): 27.9 MMHG
BH CV ECHO MEAS - PA V2 MAX: 82.5 CM/SEC
BH CV ECHO MEAS - PVA(V,A): 2.8 CM^2
BH CV ECHO MEAS - PVA(V,D): 2.8 CM^2
BH CV ECHO MEAS - QP/QS: 0.8
BH CV ECHO MEAS - RAP SYSTOLE: 3 MMHG
BH CV ECHO MEAS - RV MAX PG: 1.6 MMHG
BH CV ECHO MEAS - RV MEAN PG: 0.77 MMHG
BH CV ECHO MEAS - RV V1 MAX: 63 CM/SEC
BH CV ECHO MEAS - RV V1 MEAN: 41 CM/SEC
BH CV ECHO MEAS - RV V1 VTI: 13.3 CM
BH CV ECHO MEAS - RVOT AREA: 3.7 CM^2
BH CV ECHO MEAS - RVOT DIAM: 2.2 CM
BH CV ECHO MEAS - SI(CUBED): 53 ML/M^2
BH CV ECHO MEAS - SI(LVOT): 31.1 ML/M^2
BH CV ECHO MEAS - SI(MOD-SP2): 12.7 ML/M^2
BH CV ECHO MEAS - SI(MOD-SP4): 25.5 ML/M^2
BH CV ECHO MEAS - SI(TEICH): 43 ML/M^2
BH CV ECHO MEAS - SV(CUBED): 104 ML
BH CV ECHO MEAS - SV(LVOT): 61 ML
BH CV ECHO MEAS - SV(MOD-SP2): 25 ML
BH CV ECHO MEAS - SV(MOD-SP4): 50 ML
BH CV ECHO MEAS - SV(RVOT): 48.9 ML
BH CV ECHO MEAS - SV(TEICH): 84.4 ML
BH CV ECHO MEAS - TAPSE (>1.6): 2.6 CM
BH CV ECHO MEASUREMENTS AVERAGE E/E' RATIO: 7.59
BH CV XLRA - TDI S': 12.7 CM/SEC
LEFT ATRIUM VOLUME INDEX: 19 ML/M2
LV EF 2D ECHO EST: 53 %
MAXIMAL PREDICTED HEART RATE: 163 BPM
STRESS TARGET HR: 139 BPM

## 2021-12-08 PROCEDURE — 93306 TTE W/DOPPLER COMPLETE: CPT

## 2021-12-08 PROCEDURE — 93356 MYOCRD STRAIN IMG SPCKL TRCK: CPT

## 2021-12-08 PROCEDURE — 93306 TTE W/DOPPLER COMPLETE: CPT | Performed by: INTERNAL MEDICINE

## 2021-12-08 PROCEDURE — 93356 MYOCRD STRAIN IMG SPCKL TRCK: CPT | Performed by: INTERNAL MEDICINE

## 2021-12-10 ENCOUNTER — TELEPHONE (OUTPATIENT)
Dept: FAMILY MEDICINE CLINIC | Facility: CLINIC | Age: 56
End: 2021-12-10

## 2021-12-10 NOTE — TELEPHONE ENCOUNTER
----- Message from RIVERA Harris sent at 12/9/2021  5:48 PM EST -----  ECHO - overall normal with mild diastolic dysfunction, continue BP meds

## 2021-12-13 DIAGNOSIS — K21.9 GASTROESOPHAGEAL REFLUX DISEASE, UNSPECIFIED WHETHER ESOPHAGITIS PRESENT: Primary | ICD-10-CM

## 2021-12-14 ENCOUNTER — TELEPHONE (OUTPATIENT)
Dept: FAMILY MEDICINE CLINIC | Facility: CLINIC | Age: 56
End: 2021-12-14

## 2021-12-14 RX ORDER — DEXLANSOPRAZOLE 60 MG/1
CAPSULE, DELAYED RELEASE ORAL
Qty: 90 CAPSULE | Refills: 0 | Status: SHIPPED | OUTPATIENT
Start: 2021-12-14 | End: 2022-03-14

## 2021-12-14 NOTE — TELEPHONE ENCOUNTER
PATIENT STOPPED BY ASKING IF YOU HAD A CARDIOLOGIST YOU WOULD RECOMMEND FOR HIM TO SEE.  ALSO WANTED TO LET YOU KNOW THAT HE STOPPED TAKING ADDERAL AND IT HAS HELPED WITH HIS PALIPITATIONS   PLEASE ADVISE ON THE CARDIOLOGIST  CONTACT # [phone]

## 2021-12-16 ENCOUNTER — TELEPHONE (OUTPATIENT)
Dept: ORTHOPEDIC SURGERY | Facility: CLINIC | Age: 56
End: 2021-12-16

## 2021-12-16 NOTE — TELEPHONE ENCOUNTER
Caller: PACO CABRAL    Relationship: SELF    Best call back number: [phone]    What form or medical record are you requesting: LONG TERM DISABILITY    Who is requesting this form or medical record from you: [employer]    PLEASE CALL PATIENT    Timeframe paperwork needed: ASAP    Additional notes: NATASHA @ [employer]: [phone]

## 2021-12-23 ENCOUNTER — TELEPHONE (OUTPATIENT)
Dept: FAMILY MEDICINE CLINIC | Facility: CLINIC | Age: 56
End: 2021-12-23

## 2021-12-23 ENCOUNTER — OFFICE VISIT (OUTPATIENT)
Dept: ORTHOPEDIC SURGERY | Facility: CLINIC | Age: 56
End: 2021-12-23

## 2021-12-23 VITALS — OXYGEN SATURATION: 98 % | BODY MASS INDEX: 27.58 KG/M2 | WEIGHT: 182 LBS | HEIGHT: 68 IN | HEART RATE: 78 BPM

## 2021-12-23 DIAGNOSIS — Z47.89 AFTERCARE FOLLOWING SURGERY OF THE MUSCULOSKELETAL SYSTEM: Primary | ICD-10-CM

## 2021-12-23 PROCEDURE — 20610 DRAIN/INJ JOINT/BURSA W/O US: CPT | Performed by: PHYSICIAN ASSISTANT

## 2021-12-23 PROCEDURE — 99213 OFFICE O/P EST LOW 20 MIN: CPT | Performed by: PHYSICIAN ASSISTANT

## 2021-12-23 RX ORDER — BUPIVACAINE HYDROCHLORIDE 2.5 MG/ML
5 INJECTION, SOLUTION INFILTRATION; PERINEURAL
Status: COMPLETED | OUTPATIENT
Start: 2021-12-23 | End: 2021-12-23

## 2021-12-23 RX ORDER — TRIAMCINOLONE ACETONIDE 40 MG/ML
40 INJECTION, SUSPENSION INTRA-ARTICULAR; INTRAMUSCULAR
Status: COMPLETED | OUTPATIENT
Start: 2021-12-23 | End: 2021-12-23

## 2021-12-23 RX ADMIN — TRIAMCINOLONE ACETONIDE 40 MG: 40 INJECTION, SUSPENSION INTRA-ARTICULAR; INTRAMUSCULAR at 13:47

## 2021-12-23 RX ADMIN — BUPIVACAINE HYDROCHLORIDE 5 ML: 2.5 INJECTION, SOLUTION INFILTRATION; PERINEURAL at 13:47

## 2021-12-23 NOTE — PATIENT INSTRUCTIONS
Patient is doing well, has made improvements with range of motion and strength in PT.  Continue PT and home exercises.  He elected to receive left shoulder steroid injection today, risks and benefits were discussed and he tolerated the procedure well.  We will plan to follow-up in 6 weeks for recheck.

## 2021-12-23 NOTE — PROGRESS NOTES
"Chief Complaint  Follow-up of the Left Shoulder    Subjective          Jung Burkett presents to [facility] for follow-up on left shoulder status post left shoulder scope with SCD, DCR, biceps tenotomy and mini RCR by Dr. Kenney   7/21/2021.  Patient states he is made good improvements in therapy with range of motion and strength.  He states certain exercises still cause him pain and he feels weak while doing.  He states he has pain anteriorly, points to biceps tendon region.  States he feels like a tendon is moving around whenever he is moving the shoulder.  He states he recently went to reach under a cabinet and felt a sharp pain in the shoulder.  The pain has improved since.  He does not take NSAIDs due to an allergy.  Patient saw Dr. Canales, neurology previously who recommends possible low back surgery but states he is not a candidate for cervical spine surgery.    Objective   Allergies   Allergen Reactions   • Hydrocodone-Acetaminophen Itching   • Nsaids Arrhythmia   • Oxycodone-Acetaminophen Shortness Of Breath   • Sulfa Antibiotics Hives   • Voltaren [Diclofenac] Palpitations       Vital Signs:   Pulse 78   Ht 172.7 cm (68\")   Wt 82.6 kg (182 lb)   SpO2 98%   BMI 27.67 kg/m²       Physical Exam  Constitutional:       Appearance: Normal appearance. Patient is well-developed and normal weight.   HENT:      Head: Normocephalic.      Right Ear: Hearing and external ear normal.      Left Ear: Hearing and external ear normal.      Nose: Nose normal.   Eyes:      Conjunctiva/sclera: Conjunctivae normal.   Cardiovascular:      Rate and Rhythm: Normal rate.   Pulmonary:      Effort: Pulmonary effort is normal.      Breath sounds: No wheezing or rales.   Abdominal:      Palpations: Abdomen is soft.      Tenderness: There is no abdominal tenderness.   Musculoskeletal:      Cervical back: Normal range of motion.   Skin:     Findings: No rash.   Neurological:      Mental Status: " Patient is alert and oriented to person, place, and time.   Psychiatric:         Mood and Affect: Mood and affect normal.         Judgment: Judgment normal.     Ortho Exam  Left shoulder: Skin intact, well-healed surgical incision, tenderness over the anterior shoulder near the biceps tendon.  Sensation light touch intact and radial pulse 2+, good range of motion left elbow wrist and digits.  Active flexion 165 abduction 130 internal rotation L5 and external rotation is near full.  Result Review :            Imaging Results (Most Recent)     None         Large Joint Arthrocentesis: L subacromial bursa  Date/Time: 12/23/2021 1:47 PM  Consent given by: patient  Site marked: site marked  Timeout: Immediately prior to procedure a time out was called to verify the correct patient, procedure, equipment, support staff and site/side marked as required   Supporting Documentation  Indications: pain   Procedure Details  Location: shoulder - L subacromial bursa  Needle gauge: 21G.  Medications administered: 5 mL bupivacaine 0.25 %; 40 mg triamcinolone acetonide 40 MG/ML  Patient tolerance: patient tolerated the procedure well with no immediate complications            Assessment and Plan    Problem List Items Addressed This Visit        Musculoskeletal and Injuries    Aftercare following left shoulder arthroscopy - Primary    Current Assessment & Plan     Patient is doing well, has made improvements with range of motion and strength in PT.  Continue PT and home exercises.  He elected to receive left shoulder steroid injection today, risks and benefits were discussed and he tolerated the procedure well.  We will plan to follow-up in 6 weeks for recheck.         Relevant Orders    Large Joint Arthrocentesis: L subacromial bursa          Follow Up   Return in about 6 weeks (around 2/3/2022) for Recheck.  Patient Instructions   Patient is doing well, has made improvements with range of motion and strength in PT.  Continue PT and  home exercises.  He elected to receive left shoulder steroid injection today, risks and benefits were discussed and he tolerated the procedure well.  We will plan to follow-up in 6 weeks for recheck.    Patient was given instructions and counseling regarding his condition or for health maintenance advice. Please see specific information pulled into the AVS if appropriate.

## 2021-12-23 NOTE — TELEPHONE ENCOUNTER
PATIENT STOPPED IN TO ASK ABOUT LONG TERM   DISABILITY PAPERWORK. I DID NOT SEE IT IN HERE. hE ALSO HAD QUESTIONS ABOUT A REFERRAL TO CARDIO WHICH IS NOT IN HERE. iF WE CAN PLEASE CALL HIM AND LET HIM KNOW ABOUT THE REFERRAL HE WOULD APPRECIATE IT.  CONTACT # [phone]

## 2022-01-03 ENCOUNTER — TELEPHONE (OUTPATIENT)
Dept: FAMILY MEDICINE CLINIC | Facility: CLINIC | Age: 57
End: 2022-01-03

## 2022-01-03 NOTE — TELEPHONE ENCOUNTER
PATIENT CAME IN AND ASKED TO HAVE A REFERRAL AND APPOINTMENT MADE  FOR CARDIOLOGIST THAT ERIC RECOMMENDED.  PATIENT ALSO SENT TO ORTHO FOR SHOULDER AND NEURO FOR NECK BUT WAS NOT CLEARED FOR LONG TERM DISABILITY UNTIL ORTHO IS DONE WITH SHOULDER. ORTHO NOT NO LONGER ABLT TO DO DISABLILTY DUE TO IT BECOMING LONG TERM. PATIENT HAS MANY QUESTIONS ABOUT IF WE ARE GOING TO FILL OUT THE LONG TERM DISABILITY PAPERWORK WHEN WE GET IT. HE HAS GIVEN ERIC LEYS INFO TO LONG TERM DISABILITY [employer]   PLEASE CONTACT HIM -[phone]

## 2022-01-11 ENCOUNTER — OFFICE VISIT (OUTPATIENT)
Dept: FAMILY MEDICINE CLINIC | Facility: CLINIC | Age: 57
End: 2022-01-11

## 2022-01-11 VITALS
OXYGEN SATURATION: 97 % | WEIGHT: 180 LBS | SYSTOLIC BLOOD PRESSURE: 116 MMHG | DIASTOLIC BLOOD PRESSURE: 79 MMHG | HEIGHT: 68 IN | BODY MASS INDEX: 27.28 KG/M2 | HEART RATE: 88 BPM

## 2022-01-11 DIAGNOSIS — G89.29 CHRONIC LEFT SHOULDER PAIN: Chronic | ICD-10-CM

## 2022-01-11 DIAGNOSIS — M25.512 CHRONIC LEFT SHOULDER PAIN: Chronic | ICD-10-CM

## 2022-01-11 DIAGNOSIS — J45.20 MILD INTERMITTENT ASTHMA, UNSPECIFIED WHETHER COMPLICATED: ICD-10-CM

## 2022-01-11 DIAGNOSIS — M54.2 CERVICAL PAIN (NECK): Chronic | ICD-10-CM

## 2022-01-11 DIAGNOSIS — R00.2 PALPITATIONS: Primary | ICD-10-CM

## 2022-01-11 PROCEDURE — 93000 ELECTROCARDIOGRAM COMPLETE: CPT | Performed by: PHYSICIAN ASSISTANT

## 2022-01-11 PROCEDURE — 99214 OFFICE O/P EST MOD 30 MIN: CPT | Performed by: PHYSICIAN ASSISTANT

## 2022-01-11 RX ORDER — ALBUTEROL SULFATE 2.5 MG/3ML
2.5 SOLUTION RESPIRATORY (INHALATION) EVERY 4 HOURS PRN
Qty: 90 EACH | Refills: 6 | Status: SHIPPED | OUTPATIENT
Start: 2022-01-11 | End: 2023-02-27

## 2022-01-11 RX ORDER — ALBUTEROL SULFATE 90 UG/1
2 AEROSOL, METERED RESPIRATORY (INHALATION) EVERY 4 HOURS PRN
Qty: 18 G | Refills: 0 | Status: SHIPPED | OUTPATIENT
Start: 2022-01-11 | End: 2023-02-27

## 2022-01-11 NOTE — PROGRESS NOTES
Chief Complaint  Cough and Nasal Congestion    Subjective          Jung Burkett presents to [facility]  History of Present Illness  Pt presents today to discuss long term disability.    Pt states he had left shoulder surgery in July performed by . pt states  office filled out paperwork for short term and advised pt he would have to have his pcp fill out forms for Long term.    Pt states for the last 2 weeks he has a productive cough(clear), congestion and sinus issues. Pt states everything is clear. Denies any fever, body aches or issues with taste/smell.   Pt has had all covid vaccines.    Pt would also like a referral to cardio due to palpitations.    Labs pending    Pt has not been able to get a paycheck.  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  No lifting with left shoulder - 5 lbs.  Pt is unable to abduct his arm past 90 degrees.    Past Medical History:   Diagnosis Date   • Acid reflux    • Anxiety    • Anxiety disorder 05/17/2019   • Arthritis     generalized   • Blood disease     none   • Cervicalgia    • Chronic allergic rhinitis    • Depression    • Diabetes mellitus, type 2 (HCC) 05/17/2019    DOES NOT CHECK BS   • Diabetic eye exam ([facility]) 01/2019 20/20 PER PT    • Encounter for diabetic foot exam ([facility])     WITHIN FEW MO  PER PT    • Essential hypertension 05/17/2019   • GERD (gastroesophageal reflux disease)    • High blood pressure    • High cholesterol    • Hyperlipemia    • Hyperlipidemia    • Hypertension    • Major depressive disorder 05/17/2019   • Nicotine dependence 05/17/2019   • Prostate disorder     BPH   • Rotator cuff tear, left    • Seasonal allergies    • Vitamin D deficiency 05/17/2019    no current issues      Family  History   Problem Relation Age of Onset   • Cancer Mother    • Diabetes Mother    • Rheum arthritis Mother         40'S   • Heart attack Mother    • Breast cancer Mother         40'S   • Arthritis Mother    • Stroke Father    • Heart disease Father    • Heart attack Maternal Grandmother    • Breast cancer Maternal Grandmother         50'S   • Prostate cancer Maternal Grandfather    • Nephrolithiasis Maternal Grandfather    • Colon cancer Paternal Grandmother    • Colon cancer Paternal Grandfather         60'S   • Breast cancer Other         40'S   • Heart attack Maternal Aunt    • Malig Hyperthermia Neg Hx       Past Surgical History:   Procedure Laterality Date   • CHOLECYSTECTOMY  1997   • COLONOSCOPY      ELISA- REPEAT IN 5 YEARS    • GALLBLADDER SURGERY     • KNEE ARTHROSCOPY W/ MENISCAL REPAIR Right    • OTHER SURGICAL HISTORY      SURGICAL CLIPS/gallbladder removed   • OTHER SURGICAL HISTORY      right knee meniscus tear repair   • SHOULDER ARTHROSCOPY W/ ROTATOR CUFF REPAIR Left 7/21/2021    Procedure: SHOULDER ARTHROSCOPY,SUBACROMINAL DECOMPRESSION, DISTAL CLAVICLE RESECTION, MINI OPEN  ROTATOR CUFF REPAIR;  Surgeon: Ulisses Kenney MD;  Location: [facility] OR [facility];  Service: Orthopedics;  Laterality: Left;   • TONSILLECTOMY          Current Outpatient Medications:   •  atorvastatin (LIPITOR) 20 MG tablet, TAKE 1 TABLET BY MOUTH ONCE DAILY AT BEDTIME, Disp: 90 tablet, Rfl: 1  •  Dexilant 60 MG capsule, TAKE 1 CAPSULE BY MOUTH EVERY DAY, Disp: 90 capsule, Rfl: 0  •  Empagliflozin-metFORMIN HCl (Synjardy) 12.5-1000 MG tablet, Take 1 tablet by mouth 2 (two) times a day., Disp: , Rfl:   •  losartan-hydrochlorothiazide (HYZAAR) 100-25 MG per tablet, TAKE 1 TABLET BY MOUTH ONCE DAILY, Disp: 90 tablet, Rfl: 1  •  Semaglutide, 1 MG/DOSE, (Ozempic, 1 MG/DOSE,) 2 MG/1.5ML solution pen-injector, Inject 1 mg under the skin into the appropriate area as directed 1 (One) Time Per Week., Disp: , Rfl:   •  tamsulosin  "(FLOMAX) 0.4 MG capsule 24 hr capsule, TAKE 1 CAPSULE BY MOUTH  ONCE DAILY 1/2 HOUR  FOLLOWING SAME MEAL EACH  DAY, Disp: 90 capsule, Rfl: 1  •  traMADol (ULTRAM) 50 MG tablet, TAKE 1 TABLET BY MOUTH EVERY 6 HOURS AS NEEDED FOR PAIN, Disp: 28 tablet, Rfl: 0  •  traZODone (DESYREL) 100 MG tablet, TAKE 1 TABLET BY MOUTH ONCE DAILY AT BEDTIME, Disp: 90 tablet, Rfl: 1  •  albuterol (PROVENTIL) (2.5 MG/3ML) 0.083% nebulizer solution, Take 2.5 mg by nebulization Every 4 (Four) Hours As Needed for Wheezing., Disp: 90 each, Rfl: 6  •  albuterol sulfate  (90 Base) MCG/ACT inhaler, Inhale 2 puffs Every 4 (Four) Hours As Needed for Wheezing., Disp: 18 g, Rfl: 0  •  amphetamine-dextroamphetamine (Adderall) 20 MG tablet, Take 1 tablet by mouth 3 (Three) Times a Day., Disp: 90 tablet, Rfl: 0  •  cyclobenzaprine (FLEXERIL) 10 MG tablet, Take 1 tablet by mouth At Night As Needed for Muscle Spasms., Disp: 30 tablet, Rfl: 0  •  Hyoscyamine Sulfate SL (Levsin/SL) 0.125 MG sublingual tablet, Place 0.125 mg under the tongue 2 (Two) Times a Day As Needed (IBS)., Disp: 180 each, Rfl: 1  •  metoprolol succinate XL (TOPROL-XL) 50 MG 24 hr tablet, TAKE 1 TABLET BY MOUTH ONCE DAILY, Disp: 90 tablet, Rfl: 1    Objective   Vital Signs:     /79 (BP Location: Right arm)   Pulse 88   Ht 172.7 cm (68\")   Wt 81.6 kg (180 lb)   SpO2 97%   BMI 27.37 kg/m²    Estimated body mass index is 27.37 kg/m² as calculated from the following:    Height as of this encounter: 172.7 cm (68\").    Weight as of this encounter: 81.6 kg (180 lb).     Physical Exam  Vitals and nursing note reviewed.   Constitutional:       Appearance: Normal appearance.   HENT:      Head: Normocephalic and atraumatic.   Cardiovascular:      Rate and Rhythm: Normal rate and regular rhythm.   Pulmonary:      Effort: Pulmonary effort is normal.      Breath sounds: Normal breath sounds.   Musculoskeletal:      Cervical back: Neck supple.   Neurological:      Mental Status: " He is alert.   Psychiatric:         Mood and Affect: Mood normal.         Behavior: Behavior normal.        Result Review :            ECG 12 Lead    Date/Time: 1/11/2022 8:26 AM  Performed by: Nicola Schrader PA  Authorized by: Nicola Schrader PA   Comparison: not compared with previous ECG   Rhythm: sinus rhythm  Ectopy: atrial premature contractions  Rate: normal    Clinical impression: abnormal EKG                Assessment and Plan      Diagnoses and all orders for this visit:    1. Palpitations (Primary)  -     Ambulatory Referral to Cardiology  -     ECG 12 Lead    2. Cervical pain (neck)  Comments:  continue PT, perhaps pain mgmt in future    3. Chronic left shoulder pain  Comments:  Continue Ortho and PT    4. Mild intermittent asthma, unspecified whether complicated  -     albuterol sulfate  (90 Base) MCG/ACT inhaler; Inhale 2 puffs Every 4 (Four) Hours As Needed for Wheezing.  Dispense: 18 g; Refill: 0  -     albuterol (PROVENTIL) (2.5 MG/3ML) 0.083% nebulizer solution; Take 2.5 mg by nebulization Every 4 (Four) Hours As Needed for Wheezing.  Dispense: 90 each; Refill: 6       Follow Up     June 14, 2021 - pt went out of work shoulder surgery    Pt has follow up with ortho Feb 3rd    Return in about 2 months (around 3/11/2022).    Patient was given instructions and counseling regarding his condition or for health maintenance advice. Please see specific information pulled into the AVS if appropriate.     I have reviewed information obtained and documented by others and I have confirmed the accuracy of this documented note.    RIVERA Harris

## 2022-01-12 ENCOUNTER — OFFICE VISIT (OUTPATIENT)
Dept: CARDIOLOGY | Facility: CLINIC | Age: 57
End: 2022-01-12

## 2022-01-12 VITALS
BODY MASS INDEX: 28.34 KG/M2 | HEIGHT: 68 IN | HEART RATE: 102 BPM | DIASTOLIC BLOOD PRESSURE: 84 MMHG | SYSTOLIC BLOOD PRESSURE: 138 MMHG | WEIGHT: 187 LBS

## 2022-01-12 DIAGNOSIS — I51.9 DIASTOLIC DYSFUNCTION, LEFT VENTRICLE: ICD-10-CM

## 2022-01-12 DIAGNOSIS — I49.1 PAC (PREMATURE ATRIAL CONTRACTION): ICD-10-CM

## 2022-01-12 DIAGNOSIS — R06.09 EXERTIONAL DYSPNEA: ICD-10-CM

## 2022-01-12 DIAGNOSIS — R00.2 INTERMITTENT PALPITATIONS: Primary | ICD-10-CM

## 2022-01-12 DIAGNOSIS — Z82.49 FAMILY HISTORY OF PREMATURE CORONARY ARTERY DISEASE: ICD-10-CM

## 2022-01-12 PROCEDURE — 99204 OFFICE O/P NEW MOD 45 MIN: CPT | Performed by: INTERNAL MEDICINE

## 2022-01-12 RX ORDER — METOPROLOL SUCCINATE 50 MG/1
50 TABLET, EXTENDED RELEASE ORAL DAILY
Qty: 90 TABLET | Refills: 3 | Status: SHIPPED | OUTPATIENT
Start: 2022-01-12 | End: 2022-09-12

## 2022-01-12 NOTE — PROGRESS NOTES
Chief Complaint  Palpitations    Subjective            Jung Burkett presents to [facility]  History of Present Illness  Mr. Burkett is a new patient who presents for initial evaluation today.  He was referred by his PCP (RIVERA Harris) due to recurrent episodes of intermittent palpitations over the last few months.  He had a recent 48 hour Holter monitor, which revealed underlying sinus rhythm with mildly frequent isolated PACs and rare PVCs, but no evidence of arrhythmias.  He also had an echocardiogram last month, which demonstrated normal left ventricular systolic function (estimated EF = 55-60%) with grade I diastolic dysfunction and no significant valvular disease.  Mr. Burkett states his palpitations occur multiple times per day and typically resolve in less than 30 seconds.  He cannot identify any precipitating factors for these episodes.  He does not report any episodes of chest pain/pressure, but does have mild chronic exertional dyspnea, which is increased slightly in the last 6 months.  His exercise tolerance remains stable.  No orthopnea, PND, peripheral edema, lightheadedness, or syncope reported.  He smokes an occasional cigar, but has no history of cigarette use or smokeless tobacco use.  His BP was mildly elevated at 138/84 today, but he states his BP readings are usually normal when checked at home.      Past Medical History:   Diagnosis Date   • Acid reflux    • Anxiety    • Anxiety disorder 05/17/2019   • Arthritis     generalized   • Blood disease     none   • Cervicalgia    • Chronic allergic rhinitis    • Depression    • Diabetes mellitus, type 2 (HCC) 05/17/2019    DOES NOT CHECK BS   • Diabetic eye exam (HCC) 01/2019    20/20 PER PT    • Encounter for diabetic foot exam (HCC)     WITHIN FEW MO  PER PT    • Essential hypertension 05/17/2019   • GERD (gastroesophageal reflux disease)    • High blood pressure    • High cholesterol    • Hyperlipemia     • Hyperlipidemia    • Hypertension    • Major depressive disorder 05/17/2019   • Nicotine dependence 05/17/2019   • Prostate disorder     BPH   • Rotator cuff tear, left    • Seasonal allergies    • Vitamin D deficiency 05/17/2019    no current issues       Past Surgical History:   • CHOLECYSTECTOMY   • COLONOSCOPY    ELISA- REPEAT IN 5 YEARS    • GALLBLADDER SURGERY   • KNEE ARTHROSCOPY W/ MENISCAL REPAIR   • OTHER SURGICAL HISTORY    SURGICAL CLIPS/gallbladder removed   • OTHER SURGICAL HISTORY    right knee meniscus tear repair   • SHOULDER ARTHROSCOPY W/ ROTATOR CUFF REPAIR    Procedure: SHOULDER ARTHROSCOPY,SUBACROMINAL DECOMPRESSION, DISTAL CLAVICLE RESECTION, MINI OPEN  ROTATOR CUFF REPAIR;  Surgeon: Ulisses Kenney MD;  [facility]: [facility] OR [facility];  Service: Orthopedics;  Laterality: Left;   • TONSILLECTOMY       Social History     Tobacco Use   • Smoking status: Current Some Day Smoker     Years: 10.00     Types: Cigars   • Smokeless tobacco: Never Used   • Tobacco comment: 1 cigar daily   Vaping Use   • Vaping Use: Never used   Substance Use Topics   • Alcohol use: Yes     Comment: Drinks daily; beer. Occasionally drinks 2 drinks per day, has been drinking for 6-10 years.    • Drug use: Never       Family History   Problem Relation Age of Onset   • Cancer Mother    • Diabetes Mother    • Rheum arthritis Mother         40'S   • Heart attack Mother    • Breast cancer Mother         40'S   • Arthritis Mother    • Stroke Father    • Heart disease Father    • Heart attack Maternal Grandmother    • Breast cancer Maternal Grandmother         50'S   • Prostate cancer Maternal Grandfather    • Nephrolithiasis Maternal Grandfather    • Colon cancer Paternal Grandmother    • Colon cancer Paternal Grandfather         60'S   • Breast cancer Other         40'S   • Heart attack Maternal Aunt    • Malig Hyperthermia Neg Hx         Current Outpatient Medications on File Prior to Visit   Medication Sig   • albuterol  (PROVENTIL) (2.5 MG/3ML) 0.083% nebulizer solution Take 2.5 mg by nebulization Every 4 (Four) Hours As Needed for Wheezing.   • albuterol sulfate  (90 Base) MCG/ACT inhaler Inhale 2 puffs Every 4 (Four) Hours As Needed for Wheezing.   • atorvastatin (LIPITOR) 20 MG tablet TAKE 1 TABLET BY MOUTH ONCE DAILY AT BEDTIME   • cyclobenzaprine (FLEXERIL) 10 MG tablet Take 1 tablet by mouth At Night As Needed for Muscle Spasms.   • Dexilant 60 MG capsule TAKE 1 CAPSULE BY MOUTH EVERY DAY   • Empagliflozin-metFORMIN HCl (Synjardy) 12.5-1000 MG tablet Take 1 tablet by mouth 2 (two) times a day.   • losartan-hydrochlorothiazide (HYZAAR) 100-25 MG per tablet TAKE 1 TABLET BY MOUTH ONCE DAILY   • Semaglutide, 1 MG/DOSE, (Ozempic, 1 MG/DOSE,) 2 MG/1.5ML solution pen-injector Inject 1 mg under the skin into the appropriate area as directed 1 (One) Time Per Week.   • tamsulosin (FLOMAX) 0.4 MG capsule 24 hr capsule TAKE 1 CAPSULE BY MOUTH  ONCE DAILY 1/2 HOUR  FOLLOWING SAME MEAL EACH  DAY   • traMADol (ULTRAM) 50 MG tablet TAKE 1 TABLET BY MOUTH EVERY 6 HOURS AS NEEDED FOR PAIN   • traZODone (DESYREL) 100 MG tablet TAKE 1 TABLET BY MOUTH ONCE DAILY AT BEDTIME   • [DISCONTINUED] amphetamine-dextroamphetamine (Adderall) 20 MG tablet Take 1 tablet by mouth 3 (Three) Times a Day.   • [DISCONTINUED] Hyoscyamine Sulfate SL (Levsin/SL) 0.125 MG sublingual tablet Place 0.125 mg under the tongue 2 (Two) Times a Day As Needed (IBS).   • [DISCONTINUED] metoprolol succinate XL (TOPROL-XL) 50 MG 24 hr tablet TAKE 1 TABLET BY MOUTH ONCE DAILY     No current facility-administered medications on file prior to visit.       Allergies   Allergen Reactions   • Hydrocodone-Acetaminophen Itching   • Nsaids Arrhythmia   • Oxycodone-Acetaminophen Shortness Of Breath   • Sulfa Antibiotics Hives   • Voltaren [Diclofenac] Palpitations       Review of Systems   Constitutional: Negative for chills, fever and unexpected weight gain.   HENT: Negative for  "hearing loss and sore throat.    Eyes: Negative for pain and visual disturbance.   Respiratory: Positive for shortness of breath. Negative for cough, chest tightness and wheezing.    Cardiovascular: Positive for palpitations. Negative for chest pain and leg swelling.   Gastrointestinal: Negative for abdominal pain, blood in stool and vomiting.   Endocrine: Negative for cold intolerance, heat intolerance and polydipsia.   Genitourinary: Negative for dysuria and hematuria.   Musculoskeletal: Positive for back pain. Negative for joint swelling and myalgias.   Skin: Negative for color change, pallor and rash.   Neurological: Negative for tremors, syncope, weakness, light-headedness and headache.   Hematological: Negative for adenopathy. Does not bruise/bleed easily.        Objective     /84 (Patient Position: Sitting)   Pulse 102   Ht 172.7 cm (68\")   Wt 84.8 kg (187 lb)   BMI 28.43 kg/m²       Physical Exam  Constitutional:       General: He is not in acute distress.     Appearance: Normal appearance.   HENT:      Head: Atraumatic.      Mouth/Throat:      Mouth: Mucous membranes are moist.      Pharynx: Oropharynx is clear. No oropharyngeal exudate.   Eyes:      General: No scleral icterus.     Conjunctiva/sclera: Conjunctivae normal.   Neck:      Vascular: No carotid bruit or JVD.   Cardiovascular:      Rate and Rhythm: Normal rate and regular rhythm.  No extrasystoles are present.     Chest Wall: PMI is not displaced.      Pulses: Normal pulses.           Radial pulses are 2+ on the right side and 2+ on the left side.      Heart sounds: S1 normal and S2 normal. No murmur heard.  No friction rub. No gallop. No S3 or S4 sounds.    Pulmonary:      Effort: Pulmonary effort is normal. No tachypnea or respiratory distress.      Breath sounds: No decreased breath sounds, wheezing, rhonchi or rales.   Chest:      Chest wall: No tenderness.   Abdominal:      General: Bowel sounds are normal. There is no distension. "      Palpations: Abdomen is soft. There is no mass.      Tenderness: There is no abdominal tenderness.   Musculoskeletal:         General: No swelling, tenderness or deformity.      Cervical back: Neck supple. No tenderness.      Right lower leg: No edema.      Left lower leg: No edema.   Skin:     General: Skin is warm and dry.      Coloration: Skin is not jaundiced.      Findings: No erythema or rash.      Nails: There is no clubbing.   Neurological:      General: No focal deficit present.      Mental Status: He is alert and oriented to person, place, and time.      Motor: No weakness.   Psychiatric:         Mood and Affect: Mood normal.         Behavior: Behavior normal.       Result Review :     The following data was reviewed by: Daron Vinson MD on 2022:    CMP    CMP 21   Glucose 198 (A)   BUN 16   Creatinine 0.98   eGFR Non African Am 79   Sodium 135 (A)   Potassium 4.2   Chloride 98   Calcium 9.2   Albumin 4.10   Total Bilirubin 0.7   Alkaline Phosphatase 52   AST (SGOT) 12   ALT (SGPT) 22   (A) Abnormal value            CBC    CBC 21   WBC 8.14   RBC 4.94   Hemoglobin 15.8   Hematocrit 45.7   MCV 92.5   MCH 32.0   MCHC 34.6   RDW 11.9 (A)   Platelets 233   (A) Abnormal value            Lipid Panel    Lipid Panel 21   Total Cholesterol 162   Triglycerides 167 (A)   HDL Cholesterol 40   VLDL Cholesterol 29   LDL Cholesterol  93   LDL/HDL Ratio 2.22   (A) Abnormal value            48 hour Holter Monitor 21:  · 48-hour Holter recordings demonstrated the followin. The underlying rhythm was sinus and sinus arrhythmia(normal variant). 2. The minimum heart rate was 65 bpm and maximum 152 bpm 3. Mildly frequent isolated premature ventricular complexes were noted (a total of 434 PVCs). 4. A total of 2095 premature supraventricular complexes were recorded. 5. Several episodes of sinus tachycardia in the range of 126 to 152 bpm. 6. There were no pauses 7. No evidence of atrial  flutter or fibrillation 8. There were 0 patient triggered events. No symptomatic correlation.    Echocardiogram 12/8/21:  · Left ventricular ejection fraction appears to be 56 - 60%. Left ventricular systolic function is normal.  · Left ventricular diastolic function is consistent with (grade I) impaired relaxation.  · Normal right ventricular cavity size and systolic function noted.  · There is no evidence of pericardial effusion.         Assessment and Plan      Diagnoses and all orders for this visit:    1. Intermittent palpitations (Primary)  -     metoprolol succinate XL (TOPROL-XL) 50 MG 24 hr tablet; Take 1 tablet by mouth Daily.  Dispense: 90 tablet; Refill: 3    2. PAC (premature atrial contraction)  -     metoprolol succinate XL (TOPROL-XL) 50 MG 24 hr tablet; Take 1 tablet by mouth Daily.  Dispense: 90 tablet; Refill: 3    3. Diastolic dysfunction, left ventricle    4. Exertional dyspnea  -     Treadmill Stress Test; Future    5. Family history of premature coronary artery disease  -     Treadmill Stress Test; Future    -Intermittent palpitations: We will start therapy with Toprol-XL 50 mg daily for apparent symptomatic PACs.  His recent Holter monitor also showed rare isolated PVCs, but no evidence of arrhythmias or other significant abnormalities.  Patient's recent echocardiogram demonstrated no significant structural heart disease.    -Diastolic dysfunction:  Mild (grade I) per recent echo, and he has no signs/symptoms of decompensated CHF.  Will monitor and I also instructed him to maintain a low-salt diet.  Optimize afterload reduction.    -Chronic exertional dyspnea with a family history of premature CAD:  His symptoms may represent an anginal equivalent, so we will obtain a treadmill stress test for further evaluation and cardiovascular risk stratification.        Follow Up     Return in about 4 months (around 5/12/2022) for Next scheduled follow up.    Patient was given instructions and counseling  regarding his condition or for health maintenance advice. Please see specific information pulled into the AVS if appropriate.     Jung Burkett  reports that he has been smoking cigars. He has smoked for the past 10.00 years. He has never used smokeless tobacco.. I have educated him on the risk of diseases from using tobacco products such as cancer, COPD and heart disease.     I advised him to quit and he is willing to quit. We have discussed the following method/s for tobacco cessation:  Counseling [place] OTC Cessation Products.  Together we have set a quit date for 1 month from today.  He will follow up with me in 4 months or sooner to check on his progress.    I spent 3.5 minutes counseling the patient.

## 2022-01-18 ENCOUNTER — TELEPHONE (OUTPATIENT)
Dept: FAMILY MEDICINE CLINIC | Facility: CLINIC | Age: 57
End: 2022-01-18

## 2022-01-18 NOTE — TELEPHONE ENCOUNTER
Caller: ELIEZER RG    Best call back number: [phone]    What form or medical record are you requesting: MEDICAL RECORDS FROM 6/16/21 TO PRESENT    Who is requesting this form or medical record from you: ELIEZER RG    How would you like to receive the form or medical records (pick-up, mail, fax): FAX    If fax, what is the fax number: [phone]    Timeframe paperwork needed: ASAP    Additional notes: VINEET STATED THAT THEY REQUESTED MEDICAL RECORDS FROM THE OFFICE BUT OFFICE WAS HAVING TROUBLE WITH THEIR FAX. SHE WOULD LIKE TO KNOW IF THE MEDICAL RECORDS CAN BE FAXED OVER NOW. THEY NEED THE RECORDS ASAP.

## 2022-01-18 NOTE — TELEPHONE ENCOUNTER
Contacted Lynn back and advised there has been no formal (signed) request for medical records and in order to release records we have to have a request to send to [id number]. Provided fax and phone  number for office for any further questions.

## 2022-01-19 ENCOUNTER — TELEPHONE (OUTPATIENT)
Dept: CARDIOLOGY | Facility: CLINIC | Age: 57
End: 2022-01-19

## 2022-01-20 ENCOUNTER — TELEPHONE (OUTPATIENT)
Dept: FAMILY MEDICINE CLINIC | Facility: CLINIC | Age: 57
End: 2022-01-20

## 2022-02-07 ENCOUNTER — TELEPHONE (OUTPATIENT)
Dept: SLEEP MEDICINE | Facility: HOSPITAL | Age: 57
End: 2022-02-07

## 2022-02-07 DIAGNOSIS — G47.11 IDIOPATHIC HYPERSOMNIA: Primary | ICD-10-CM

## 2022-02-07 RX ORDER — DEXTROAMPHETAMINE SACCHARATE, AMPHETAMINE ASPARTATE, DEXTROAMPHETAMINE SULFATE AND AMPHETAMINE SULFATE 5; 5; 5; 5 MG/1; MG/1; MG/1; MG/1
20 TABLET ORAL 3 TIMES DAILY
Qty: 90 TABLET | Refills: 0 | Status: SHIPPED | OUTPATIENT
Start: 2022-02-07 | End: 2022-03-16 | Stop reason: SDUPTHER

## 2022-02-08 ENCOUNTER — OFFICE VISIT (OUTPATIENT)
Dept: ORTHOPEDIC SURGERY | Facility: CLINIC | Age: 57
End: 2022-02-08

## 2022-02-08 VITALS — HEIGHT: 68 IN | BODY MASS INDEX: 26.52 KG/M2 | HEART RATE: 103 BPM | WEIGHT: 175 LBS | OXYGEN SATURATION: 98 %

## 2022-02-08 DIAGNOSIS — M75.102 TEAR OF LEFT ROTATOR CUFF, UNSPECIFIED TEAR EXTENT, UNSPECIFIED WHETHER TRAUMATIC: ICD-10-CM

## 2022-02-08 DIAGNOSIS — Z47.89 AFTERCARE FOLLOWING SURGERY OF THE MUSCULOSKELETAL SYSTEM: Primary | ICD-10-CM

## 2022-02-08 PROCEDURE — 99213 OFFICE O/P EST LOW 20 MIN: CPT | Performed by: PHYSICIAN ASSISTANT

## 2022-02-08 NOTE — PATIENT INSTRUCTIONS
Patient is still experiencing exquisite pain in the lateral aspect of the humeral head, around the anterior aspect of the shoulder and distal clavicle.  He denies any new injuries.  His therapist has expressed concern for further rotator cuff tear or injury.  We will obtain an MRI of the left shoulder without contrast and follow-up thereafter to develop treatment plan and discuss results.

## 2022-02-08 NOTE — PROGRESS NOTES
"Chief Complaint  Pain of the Left Shoulder    Subjective           Jung Burkett presents to [facility] for follow-up on left shoulder status post left shoulder scope with SAD, DCR, biceps tenotomy and mini RCR by Dr. Kenney 7/21/2021.  Patient states steroid shot given at last visit provided very little relief for short amount of time.  Still has pain in the anterior aspect of the shoulder and the lateral aspect of the humeral head.  Denies numbness or tingling.  Has made some improvement in therapy but still feels weak and has not achieved full range of motion.  Patient has an allergy to NSAIDs.  He has not been taking much for pain, states tramadol and Tylenol were not making much of a difference.  He is right-hand dominant and not working at this time.  He states when he was working he had to use the left arm quite a bit.  Patient seems discouraged today.    Objective   Allergies   Allergen Reactions   • Hydrocodone-Acetaminophen Itching   • Nsaids Arrhythmia   • Oxycodone-Acetaminophen Shortness Of Breath   • Sulfa Antibiotics Hives   • Voltaren [Diclofenac] Palpitations       Vital Signs:   Pulse 103   Ht 172.7 cm (68\")   Wt 79.4 kg (175 lb)   SpO2 98%   BMI 26.61 kg/m²       Physical Exam  Constitutional:       Appearance: Normal appearance. Patient is well-developed and normal weight.   HENT:      Head: Normocephalic.      Right Ear: Hearing and external ear normal.      Left Ear: Hearing and external ear normal.      Nose: Nose normal.   Eyes:      Conjunctiva/sclera: Conjunctivae normal.   Cardiovascular:      Rate and Rhythm: Normal rate.   Pulmonary:      Effort: Pulmonary effort is normal.      Breath sounds: No wheezing or rales.   Abdominal:      Palpations: Abdomen is soft.      Tenderness: There is no abdominal tenderness.   Musculoskeletal:      Cervical back: Normal range of motion.   Skin:     Findings: No rash.   Neurological:      Mental Status: Patient is " alert and oriented to person, place, and time.   Psychiatric:         Mood and Affect: Mood and affect normal.         Judgment: Judgment normal.     Ortho Exam  Left shoulder with well-healed surgical incisions, no erythema dehiscence or purulent drainage, tenderness over the lateral anterior aspect of the humeral head, active forward flexion 140 abduction 142 external rotation 70.  Passive forward flexion 155 abduction 166.  Sensations intact, radial pulse 2+, good range of motion left elbow wrist digits, positive empty and full can testing.  Result Review :            Imaging Results (Most Recent)     None                Assessment and Plan    Problem List Items Addressed This Visit        Musculoskeletal and Injuries    Tear of left rotator cuff    Aftercare following left shoulder arthroscopy - Primary    Current Assessment & Plan     Patient is still experiencing exquisite pain in the lateral aspect of the humeral head, around the anterior aspect of the shoulder and distal clavicle.  He denies any new injuries.  His therapist has expressed concern for further rotator cuff tear or injury.  We will obtain an MRI of the left shoulder without contrast and follow-up thereafter to develop treatment plan and discuss results.         Relevant Orders    MRI Shoulder Left Without Contrast          Follow Up   Return for After MRI, Recheck.  Patient Instructions   Patient is still experiencing exquisite pain in the lateral aspect of the humeral head, around the anterior aspect of the shoulder and distal clavicle.  He denies any new injuries.  His therapist has expressed concern for further rotator cuff tear or injury.  We will obtain an MRI of the left shoulder without contrast and follow-up thereafter to develop treatment plan and discuss results.    Patient was given instructions and counseling regarding his condition or for health maintenance advice. Please see specific information pulled into the AVS if appropriate.

## 2022-02-25 ENCOUNTER — HOSPITAL ENCOUNTER (OUTPATIENT)
Dept: MRI IMAGING | Facility: HOSPITAL | Age: 57
Discharge: HOME | End: 2022-02-25
Admitting: PHYSICIAN ASSISTANT

## 2022-02-25 DIAGNOSIS — Z47.89 AFTERCARE FOLLOWING SURGERY OF THE MUSCULOSKELETAL SYSTEM: ICD-10-CM

## 2022-02-25 PROCEDURE — 73221 MRI JOINT UPR EXTREM W/O DYE: CPT

## 2022-02-28 ENCOUNTER — OFFICE VISIT (OUTPATIENT)
Dept: FAMILY MEDICINE CLINIC | Facility: CLINIC | Age: 57
End: 2022-02-28

## 2022-02-28 ENCOUNTER — OFFICE VISIT (OUTPATIENT)
Dept: SLEEP MEDICINE | Facility: HOSPITAL | Age: 57
End: 2022-02-28

## 2022-02-28 VITALS
BODY MASS INDEX: 27.28 KG/M2 | DIASTOLIC BLOOD PRESSURE: 86 MMHG | TEMPERATURE: 97.2 F | OXYGEN SATURATION: 97 % | HEART RATE: 81 BPM | HEIGHT: 68 IN | WEIGHT: 180 LBS | SYSTOLIC BLOOD PRESSURE: 118 MMHG

## 2022-02-28 VITALS
HEIGHT: 68 IN | HEART RATE: 99 BPM | DIASTOLIC BLOOD PRESSURE: 83 MMHG | BODY MASS INDEX: 27.89 KG/M2 | SYSTOLIC BLOOD PRESSURE: 128 MMHG | OXYGEN SATURATION: 96 % | WEIGHT: 184 LBS

## 2022-02-28 DIAGNOSIS — F41.9 ANXIETY: ICD-10-CM

## 2022-02-28 DIAGNOSIS — G89.29 CHRONIC MIDLINE LOW BACK PAIN WITHOUT SCIATICA: Chronic | ICD-10-CM

## 2022-02-28 DIAGNOSIS — M54.2 CERVICAL PAIN (NECK): Chronic | ICD-10-CM

## 2022-02-28 DIAGNOSIS — M54.50 CHRONIC MIDLINE LOW BACK PAIN WITHOUT SCIATICA: Chronic | ICD-10-CM

## 2022-02-28 DIAGNOSIS — E78.00 PURE HYPERCHOLESTEROLEMIA: Chronic | ICD-10-CM

## 2022-02-28 DIAGNOSIS — G47.11 IDIOPATHIC HYPERSOMNIA: Primary | ICD-10-CM

## 2022-02-28 DIAGNOSIS — Z23 NEED FOR PNEUMOCOCCAL VACCINE: ICD-10-CM

## 2022-02-28 DIAGNOSIS — M25.512 CHRONIC LEFT SHOULDER PAIN: Chronic | ICD-10-CM

## 2022-02-28 DIAGNOSIS — G89.29 CHRONIC LEFT SHOULDER PAIN: Chronic | ICD-10-CM

## 2022-02-28 DIAGNOSIS — R19.7 DIARRHEA, UNSPECIFIED TYPE: ICD-10-CM

## 2022-02-28 DIAGNOSIS — E11.65 TYPE 2 DIABETES MELLITUS WITH HYPERGLYCEMIA, WITHOUT LONG-TERM CURRENT USE OF INSULIN: Primary | ICD-10-CM

## 2022-02-28 PROCEDURE — 99214 OFFICE O/P EST MOD 30 MIN: CPT | Performed by: PHYSICIAN ASSISTANT

## 2022-02-28 PROCEDURE — 99214 OFFICE O/P EST MOD 30 MIN: CPT | Performed by: INTERNAL MEDICINE

## 2022-02-28 PROCEDURE — G0463 HOSPITAL OUTPT CLINIC VISIT: HCPCS | Performed by: INTERNAL MEDICINE

## 2022-02-28 RX ORDER — DICYCLOMINE HYDROCHLORIDE 10 MG/1
10 CAPSULE ORAL
Qty: 120 CAPSULE | Refills: 2 | Status: SHIPPED | OUTPATIENT
Start: 2022-02-28 | End: 2022-08-19

## 2022-03-01 RX ORDER — BUSPIRONE HYDROCHLORIDE 10 MG/1
10 TABLET ORAL 3 TIMES DAILY
Qty: 30 TABLET | Refills: 2 | Status: SHIPPED | OUTPATIENT
Start: 2022-03-01 | End: 2022-03-14

## 2022-03-03 ENCOUNTER — OFFICE VISIT (OUTPATIENT)
Dept: ORTHOPEDIC SURGERY | Facility: CLINIC | Age: 57
End: 2022-03-03

## 2022-03-03 VITALS — BODY MASS INDEX: 27.28 KG/M2 | HEART RATE: 82 BPM | OXYGEN SATURATION: 98 % | HEIGHT: 68 IN | WEIGHT: 180 LBS

## 2022-03-03 DIAGNOSIS — Z47.89 AFTERCARE FOLLOWING SURGERY OF THE MUSCULOSKELETAL SYSTEM: Primary | ICD-10-CM

## 2022-03-03 PROCEDURE — 99213 OFFICE O/P EST LOW 20 MIN: CPT | Performed by: PHYSICIAN ASSISTANT

## 2022-03-03 RX ORDER — METHYLPREDNISOLONE 4 MG/1
TABLET ORAL
Qty: 21 TABLET | Refills: 0 | Status: SHIPPED | OUTPATIENT
Start: 2022-03-03 | End: 2022-06-28

## 2022-03-03 NOTE — PATIENT INSTRUCTIONS
Patient still has significant pain and popping sensations.  Discussed patient with Dr. Kenney who also spoke with and evaluated the patient.  Recommend he take a steroid pack.  Follow-up in 6 weeks for recheck with no imaging at that time, may consider subacromial steroid injection at that time.  Continue home exercises.  Patient has PT for the neck and back coming up, he plans to focus on this at therapy.

## 2022-03-03 NOTE — PROGRESS NOTES
"Chief Complaint  Follow-up and Pain of the Left Shoulder    Subjective          Jung Burkett presents to [facility] for follow-up on left shoulder status post left shoulder scope with mini RCR, biceps tenotomy, subacromial decompression and distal clavicle resection by Dr. Kenney 7/21/2021.  Patient has been going to PT, having steroid injections (most recently 2/8/2022) taking Tylenol and is still reporting pain and popping with range of motion.  He is achieved good range of motion although it is painful.  Denies any new injuries.  At last visit given failure to improve MRI ordered.  Here today to discuss results and develop further treatment plan.  Patient has been off work since surgery.  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      Objective   Allergies   Allergen Reactions   • Hydrocodone-Acetaminophen Itching   • Nsaids Arrhythmia   • Oxycodone-Acetaminophen Shortness Of Breath   • Sulfa Antibiotics Hives   • Voltaren [Diclofenac] Palpitations       Vital Signs:   Pulse 82   Ht 172.7 cm (68\")   Wt 81.6 kg (180 lb)   SpO2 98%   BMI 27.37 kg/m²       Physical Exam  Constitutional:       Appearance: Normal appearance. Patient is well-developed and normal weight.   HENT:      Head: Normocephalic.      Right Ear: Hearing and external ear normal.      Left Ear: Hearing and external ear normal.      Nose: Nose normal.   Eyes:      Conjunctiva/sclera: Conjunctivae normal.   Cardiovascular:      Rate and Rhythm: Normal rate.   Pulmonary:      Effort: Pulmonary effort is normal.      Breath sounds: No wheezing or rales.   Abdominal:      Palpations: Abdomen is soft.      Tenderness: There is no abdominal tenderness.   Musculoskeletal:      Cervical back: Normal range of motion.   Skin:     Findings: No rash.   Neurological:      Mental Status: Patient is alert and oriented to person, place, and time.   Psychiatric:         Mood and Affect: Mood and " affect normal.         Judgment: Judgment normal.     Ortho Exam  Left shoulder: Well-healed surgical incision, no erythema dehiscence or purulent drainage, tenderness about the humeral head and bicipital groove, Sensation intact, radial pulse 2+, good range of motion left elbow wrist digits.  Flexion 155 abduction 120 internal rotation L5 external rotation 35.  Result Review :            Imaging Results (Most Recent)     None       MRI Shoulder Left Without Contrast    Result Date: 2/25/2022  Narrative: PROCEDURE: MRI SHOULDER LEFT WO CONTRAST  COMPARISON: [facility], , SHOULDER >OR= 2V LT, 1/04/2006, 18:25.  INDICATIONS: Left shoulder pain and popping  with limited range of motion, no known injury. Status post rotator cuff repair 7/2021.      TECHNIQUE: A variety of imaging planes and parameters were utilized for visualization of suspected pathology.  Images were performed without contrast.   FINDINGS:  Status post arthroscopy and rotator cuff repair.  Suture anchors are present at the greater tuberosity.  There is heterogeneous hyperintense signal and thinning of the distal supraspinatus and infraspinatus tendons.  There is small amount of fluid signal at the supraspinatus and anterior infraspinatus insertions without significant fluid signal defect.  The findings may be related to prior tears and postoperative changes.  No significant full thickness defect is seen.  A small recurrent or new partial thickness tear cannot be completely excluded.  There is subscapularis tendinopathy without definite high-grade tear.  The teres minor tendon appears intact.  No significant rotator cuff muscle edema or atrophy.  There is hyperintense signal in the long head of the biceps tendon in the upper bicipital groove and intra-articular portion suggesting tendinopathy and partial tear.  The tendon is normally positioned.  No full thickness defect is seen.  There is a small amount of fluid in the  subacromial/subdeltoid bursa.  No significant glenohumeral effusion.  Glenohumeral alignment appears grossly normal.  There appears to be mild glenohumeral osteophytosis with suspected partial thickness cartilage thinning of the humeral head and glenoid.  There is diminution and distortion of the posterior inferior labrum suggesting chronic degeneration and tear.  No significant focal fluid signal defect or paralabral cyst is seen at this time.  There does not appear to be significant periarticular edema at the glenohumeral joint.  No evidence of fracture.  There are degenerative changes at the acromioclavicular joint.  There is a small amount of fluid signal at the acromioclavicular joint.  No significant periarticular edema or malalignment is seen.  No definite axillary adenopathy is seen.  No significant deltoid muscle edema is seen.  There is mild deltoid muscle atrophy and suspected postoperative changes.       Impression:   1. Postoperative changes of rotator cuff repair with signal changes at the distal supraspinatus and anterior infraspinatus which may be the sequelae of prior tears and postoperative changes.  A new or recurrent partial thickness tear cannot be excluded.  No significant full thickness tendon defect is seen at this time. 2. Mild glenohumeral osteoarthritis. 3. Tendinopathy and suspected partial tear of the long head of the biceps tendon. 4. Small amount of fluid in the subacromial/subdeltoid bursa which could indicate mild bursitis.      YESENIA GUSTAFSON MD       Electronically Signed and Approved By: YESENIA GUSTAFSON MD on 2/25/2022 at 14:12                      Assessment and Plan    Problem List Items Addressed This Visit        Musculoskeletal and Injuries    Aftercare following left shoulder arthroscopy - Primary    Current Assessment & Plan     Patient still has significant pain and popping sensations.  Discussed patient with Dr. Kenney who also spoke with and evaluated the patient.  Recommend  he take a steroid pack.  Follow-up in 6 weeks for recheck with no imaging at that time, may consider subacromial steroid injection at that time.  Continue home exercises.  Patient has PT for the neck and back coming up, he plans to focus on this at therapy.               Follow Up   Return in about 6 weeks (around 4/14/2022) for Recheck.  Patient Instructions   Patient still has significant pain and popping sensations.  Discussed patient with Dr. Kenney who also spoke with and evaluated the patient.  Recommend he take a steroid pack.  Follow-up in 6 weeks for recheck with no imaging at that time, may consider subacromial steroid injection at that time.  Continue home exercises.  Patient has PT for the neck and back coming up, he plans to focus on this at therapy.    Patient was given instructions and counseling regarding his condition or for health maintenance advice. Please see specific information pulled into the AVS if appropriate.

## 2022-03-10 ENCOUNTER — TELEPHONE (OUTPATIENT)
Dept: FAMILY MEDICINE CLINIC | Facility: CLINIC | Age: 57
End: 2022-03-10

## 2022-03-14 ENCOUNTER — OFFICE VISIT (OUTPATIENT)
Dept: FAMILY MEDICINE CLINIC | Facility: CLINIC | Age: 57
End: 2022-03-14

## 2022-03-14 VITALS
HEIGHT: 68 IN | OXYGEN SATURATION: 99 % | DIASTOLIC BLOOD PRESSURE: 81 MMHG | WEIGHT: 191 LBS | SYSTOLIC BLOOD PRESSURE: 138 MMHG | HEART RATE: 77 BPM | BODY MASS INDEX: 28.95 KG/M2

## 2022-03-14 DIAGNOSIS — K21.9 GASTROESOPHAGEAL REFLUX DISEASE, UNSPECIFIED WHETHER ESOPHAGITIS PRESENT: ICD-10-CM

## 2022-03-14 DIAGNOSIS — E11.65 UNCONTROLLED TYPE 2 DIABETES MELLITUS WITH HYPERGLYCEMIA: ICD-10-CM

## 2022-03-14 DIAGNOSIS — G89.29 CHRONIC LEFT SHOULDER PAIN: Primary | ICD-10-CM

## 2022-03-14 DIAGNOSIS — M54.2 CERVICAL PAIN (NECK): ICD-10-CM

## 2022-03-14 DIAGNOSIS — F41.9 ANXIETY: ICD-10-CM

## 2022-03-14 DIAGNOSIS — M25.512 CHRONIC LEFT SHOULDER PAIN: Primary | ICD-10-CM

## 2022-03-14 DIAGNOSIS — E78.00 PURE HYPERCHOLESTEROLEMIA: ICD-10-CM

## 2022-03-14 PROCEDURE — 99213 OFFICE O/P EST LOW 20 MIN: CPT | Performed by: PHYSICIAN ASSISTANT

## 2022-03-14 RX ORDER — DEXLANSOPRAZOLE 60 MG/1
CAPSULE, DELAYED RELEASE ORAL
Qty: 90 CAPSULE | Refills: 0 | Status: SHIPPED | OUTPATIENT
Start: 2022-03-14 | End: 2022-06-23 | Stop reason: SDUPTHER

## 2022-03-14 RX ORDER — BUSPIRONE HYDROCHLORIDE 10 MG/1
TABLET ORAL
Qty: 60 TABLET | Refills: 2 | Status: SHIPPED | OUTPATIENT
Start: 2022-03-14 | End: 2022-03-15 | Stop reason: SDUPTHER

## 2022-03-14 NOTE — PROGRESS NOTES
Chief Complaint  Hypertension (2 month follow up), Hyperlipidemia, Diabetes, and Anxiety    Subjective          Jung Burkett presents to [facility]  History of Present Illness  Pt presents today for 2 month follow up.    Pt states he would like to discuss MRI left shoulder from [facility]. Pt states it seems to be doing better. Pt states he is holding off on pt to see if the steroids will help.    Pt states he does not check his bs at home.  Pt states he has not started back taking the ozempic yet, will start back today. Pt states he stopped due to abd pain before last appt 2/28.    Pt states he is currently taking the bentyl up to 3-4xday. Pt states the IBS is improving. However he is unsure if it from the bentyl, or enzymes he has been taking or stopping the ozempic.    Labs pending from 10/29/21    No CP, SOA, HA  Pt is doing better overall.    Past Medical History:   Diagnosis Date   • Acid reflux    • Anxiety    • Anxiety disorder 05/17/2019   • Arthritis     generalized   • Blood disease     none   • Cervicalgia    • Chronic allergic rhinitis    • Depression    • Diabetes mellitus, type 2 (HCC) 05/17/2019    DOES NOT CHECK BS   • Diabetic eye exam (HCC) 01/2019 20/20 PER PT    • Encounter for diabetic foot exam ([facility])     WITHIN FEW MO  PER PT    • Essential hypertension 05/17/2019   • GERD (gastroesophageal reflux disease)    • High blood pressure    • High cholesterol    • Hyperlipemia    • Hyperlipidemia    • Hypertension    • Major depressive disorder 05/17/2019   • Nicotine dependence 05/17/2019   • Prostate disorder     BPH   • Rotator cuff tear, left    • Seasonal allergies    • Vitamin D deficiency 05/17/2019    no current issues      Family History   Problem Relation Age of Onset   • Cancer Mother    • Diabetes Mother    • Rheum arthritis Mother         40'S   • Heart attack Mother    • Breast cancer Mother         40'S   • Arthritis Mother    •  Stroke Father    • Heart disease Father    • Heart attack Maternal Grandmother    • Breast cancer Maternal Grandmother         50'S   • Prostate cancer Maternal Grandfather    • Nephrolithiasis Maternal Grandfather    • Colon cancer Paternal Grandmother    • Colon cancer Paternal Grandfather         60'S   • Breast cancer Other         40'S   • Heart attack Maternal Aunt    • Malig Hyperthermia Neg Hx       Past Surgical History:   Procedure Laterality Date   • CHOLECYSTECTOMY  1997   • COLONOSCOPY      ELISA- REPEAT IN 5 YEARS    • GALLBLADDER SURGERY     • KNEE ARTHROSCOPY W/ MENISCAL REPAIR Right    • OTHER SURGICAL HISTORY      SURGICAL CLIPS/gallbladder removed   • OTHER SURGICAL HISTORY      right knee meniscus tear repair   • SHOULDER ARTHROSCOPY W/ ROTATOR CUFF REPAIR Left 7/21/2021    Procedure: SHOULDER ARTHROSCOPY,SUBACROMINAL DECOMPRESSION, DISTAL CLAVICLE RESECTION, MINI OPEN  ROTATOR CUFF REPAIR;  Surgeon: Ulisses Kenney MD;  Location: [facility] OR [facility] [place];  Service: Orthopedics;  Laterality: Left;   • TONSILLECTOMY          Current Outpatient Medications:   •  albuterol (PROVENTIL) (2.5 MG/3ML) 0.083% nebulizer solution, Take 2.5 mg by nebulization Every 4 (Four) Hours As Needed for Wheezing., Disp: 90 each, Rfl: 6  •  albuterol sulfate  (90 Base) MCG/ACT inhaler, Inhale 2 puffs Every 4 (Four) Hours As Needed for Wheezing., Disp: 18 g, Rfl: 0  •  amphetamine-dextroamphetamine (ADDERALL) 20 MG tablet, Take 1 tablet by mouth 3 (Three) Times a Day., Disp: 90 tablet, Rfl: 0  •  atorvastatin (LIPITOR) 20 MG tablet, TAKE 1 TABLET BY MOUTH ONCE DAILY AT BEDTIME, Disp: 90 tablet, Rfl: 1  •  busPIRone (BUSPAR) 10 MG tablet, Take 1 tablet by mouth 3 (Three) Times a Day., Disp: 30 tablet, Rfl: 2  •  cyclobenzaprine (FLEXERIL) 10 MG tablet, Take 1 tablet by mouth At Night As Needed for Muscle Spasms., Disp: 30 tablet, Rfl: 0  •  Dexilant 60 MG capsule, TAKE 1 CAPSULE BY MOUTH EVERY DAY, Disp: 90 capsule,  "Rfl: 0  •  dicyclomine (Bentyl) 10 MG capsule, Take 1 capsule by mouth 4 (Four) Times a Day Before Meals & at Bedtime., Disp: 120 capsule, Rfl: 2  •  DIGESTIVE ENZYMES PO, Take  by mouth., Disp: , Rfl:   •  Empagliflozin-metFORMIN HCl (Synjardy) 12.5-1000 MG tablet, Take 1 tablet by mouth 2 (two) times a day., Disp: , Rfl:   •  losartan-hydrochlorothiazide (HYZAAR) 100-25 MG per tablet, TAKE 1 TABLET BY MOUTH ONCE DAILY, Disp: 90 tablet, Rfl: 1  •  methylPREDNISolone (MEDROL) 4 MG dose pack, Use as directed by package instructions, Disp: 21 tablet, Rfl: 0  •  metoprolol succinate XL (TOPROL-XL) 50 MG 24 hr tablet, Take 1 tablet by mouth Daily., Disp: 90 tablet, Rfl: 3  •  Semaglutide,0.25 or 0.5MG/DOS, (OZEMPIC) 2 MG/1.5ML solution pen-injector, Inject 0.5 mg under the skin into the appropriate area as directed 1 (One) Time Per Week for 30 days., Disp: 1 pen, Rfl: 5  •  tamsulosin (FLOMAX) 0.4 MG capsule 24 hr capsule, TAKE 1 CAPSULE BY MOUTH  ONCE DAILY 1/2 HOUR  FOLLOWING SAME MEAL EACH  DAY, Disp: 90 capsule, Rfl: 1  •  traMADol (ULTRAM) 50 MG tablet, TAKE 1 TABLET BY MOUTH EVERY 6 HOURS AS NEEDED FOR PAIN, Disp: 28 tablet, Rfl: 0  •  traZODone (DESYREL) 100 MG tablet, TAKE 1 TABLET BY MOUTH ONCE DAILY AT BEDTIME, Disp: 90 tablet, Rfl: 1    Objective     Vital Signs:     /81 (BP Location: Right arm)   Pulse 77   Ht 172.7 cm (67.99\")   Wt 86.6 kg (191 lb)   SpO2 99%   BMI 29.05 kg/m²    Estimated body mass index is 29.05 kg/m² as calculated from the following:    Height as of this encounter: 172.7 cm (67.99\").    Weight as of this encounter: 86.6 kg (191 lb).     Wt Readings from Last 3 Encounters:   03/14/22 86.6 kg (191 lb)   03/03/22 81.6 kg (180 lb)   02/28/22 81.6 kg (180 lb)     BP Readings from Last 3 Encounters:   03/14/22 138/81   02/28/22 118/86   02/28/22 128/83     Physical Exam  Vitals and nursing note reviewed.   Constitutional:       Appearance: Normal appearance.   HENT:      Head: " Normocephalic and atraumatic.   Cardiovascular:      Rate and Rhythm: Normal rate and regular rhythm.   Pulmonary:      Effort: Pulmonary effort is normal.      Breath sounds: Normal breath sounds.   Musculoskeletal:      Cervical back: Neck supple. Tenderness present.   Neurological:      Mental Status: He is alert.   Psychiatric:         Mood and Affect: Mood normal.         Behavior: Behavior normal.        Left Shoulder - limited ROM    Result Review :     Common labs    Common Labsle 7/30/21 7/30/21 7/30/21 7/30/21 7/30/21 7/30/21    0943 0943 0943 0943 0943 0950   Glucose    198 (A)     BUN    16     Creatinine    0.98     eGFR Non African Am    79     Sodium    135 (A)     Potassium    4.2     Chloride    98     Calcium    9.2     Albumin    4.10     Total Bilirubin    0.7     Alkaline Phosphatase    52     AST (SGOT)    12     ALT (SGPT)    22     WBC 8.14        Hemoglobin 15.8        Hematocrit 45.7        Platelets 233        Total Cholesterol   162      Triglycerides   167 (A)      HDL Cholesterol   40      LDL Cholesterol    93      Hemoglobin A1C  7.10 (A)       Microalbumin, Urine      <1.2   PSA     2.340    (A) Abnormal value                   MRI Shoulder Left Without Contrast (02/25/2022 13:32)       Pt will restart ozempic low dose.  If his s/s return he will stop    Assessment and Plan      Diagnoses and all orders for this visit:    1. Chronic left shoulder pain (Primary)    2. Cervical pain (neck)    3. Pure hypercholesterolemia    4. Uncontrolled type 2 diabetes mellitus with hyperglycemia (HCC)     Cont ortho and neurosurgeon  Pt will be seeing PT for his neck and back.  He has finished PT for his left shoulder    Follow Up     Return in about 3 months (around 6/14/2022).    Patient was given instructions and counseling regarding his condition or for health maintenance advice. Please see specific information pulled into the AVS if appropriate.     I have reviewed information obtained and  documented by others and I have confirmed the accuracy of this documented note.    RIVERA Harris

## 2022-03-15 ENCOUNTER — TELEPHONE (OUTPATIENT)
Dept: FAMILY MEDICINE CLINIC | Facility: CLINIC | Age: 57
End: 2022-03-15

## 2022-03-15 DIAGNOSIS — F41.9 ANXIETY: ICD-10-CM

## 2022-03-15 RX ORDER — BUSPIRONE HYDROCHLORIDE 10 MG/1
10 TABLET ORAL 3 TIMES DAILY
Qty: 90 TABLET | Refills: 1 | Status: SHIPPED | OUTPATIENT
Start: 2022-03-15 | End: 2022-03-22 | Stop reason: SDUPTHER

## 2022-03-16 ENCOUNTER — LAB (OUTPATIENT)
Dept: LAB | Facility: HOSPITAL | Age: 57
End: 2022-03-16

## 2022-03-16 DIAGNOSIS — R19.7 DIARRHEA, UNSPECIFIED TYPE: ICD-10-CM

## 2022-03-16 DIAGNOSIS — E11.65 TYPE 2 DIABETES MELLITUS WITH HYPERGLYCEMIA, WITHOUT LONG-TERM CURRENT USE OF INSULIN: ICD-10-CM

## 2022-03-16 DIAGNOSIS — E83.19 IRON OVERLOAD: ICD-10-CM

## 2022-03-16 DIAGNOSIS — E55.9 VITAMIN D DEFICIENCY: ICD-10-CM

## 2022-03-16 DIAGNOSIS — E78.00 PURE HYPERCHOLESTEROLEMIA: ICD-10-CM

## 2022-03-16 DIAGNOSIS — R00.2 PALPITATIONS: ICD-10-CM

## 2022-03-16 DIAGNOSIS — R79.89 LOW TESTOSTERONE LEVEL IN MALE: ICD-10-CM

## 2022-03-16 DIAGNOSIS — G47.11 IDIOPATHIC HYPERSOMNIA: ICD-10-CM

## 2022-03-16 DIAGNOSIS — I10 ESSENTIAL HYPERTENSION: ICD-10-CM

## 2022-03-16 LAB
25(OH)D3 SERPL-MCNC: 19.7 NG/ML (ref 30–100)
ALBUMIN SERPL-MCNC: 4.6 G/DL (ref 3.5–5.2)
ALBUMIN/GLOB SERPL: 1.8 G/DL
ALP SERPL-CCNC: 48 U/L (ref 39–117)
ALT SERPL W P-5'-P-CCNC: 21 U/L (ref 1–41)
ANION GAP SERPL CALCULATED.3IONS-SCNC: 10.4 MMOL/L (ref 5–15)
AST SERPL-CCNC: 16 U/L (ref 1–40)
BASOPHILS # BLD AUTO: 0.01 10*3/MM3 (ref 0–0.2)
BASOPHILS NFR BLD AUTO: 0.2 % (ref 0–1.5)
BILIRUB SERPL-MCNC: 0.7 MG/DL (ref 0–1.2)
BILIRUB UR QL STRIP: NEGATIVE
BUN SERPL-MCNC: 16 MG/DL (ref 6–20)
BUN/CREAT SERPL: 17 (ref 7–25)
CALCIUM SPEC-SCNC: 9.5 MG/DL (ref 8.6–10.5)
CHLORIDE SERPL-SCNC: 97 MMOL/L (ref 98–107)
CHOLEST SERPL-MCNC: 144 MG/DL (ref 0–200)
CLARITY UR: CLEAR
CO2 SERPL-SCNC: 27.6 MMOL/L (ref 22–29)
COLOR UR: YELLOW
CREAT SERPL-MCNC: 0.94 MG/DL (ref 0.76–1.27)
DEPRECATED RDW RBC AUTO: 43 FL (ref 37–54)
EGFRCR SERPLBLD CKD-EPI 2021: 94 ML/MIN/1.73
EOSINOPHIL # BLD AUTO: 0.09 10*3/MM3 (ref 0–0.4)
EOSINOPHIL NFR BLD AUTO: 1.4 % (ref 0.3–6.2)
ERYTHROCYTE [DISTWIDTH] IN BLOOD BY AUTOMATED COUNT: 13 % (ref 12.3–15.4)
GLOBULIN UR ELPH-MCNC: 2.6 GM/DL
GLUCOSE SERPL-MCNC: 186 MG/DL (ref 65–99)
GLUCOSE UR STRIP-MCNC: ABNORMAL MG/DL
HCT VFR BLD AUTO: 49.7 % (ref 37.5–51)
HDLC SERPL-MCNC: 56 MG/DL (ref 40–60)
HGB BLD-MCNC: 16.6 G/DL (ref 13–17.7)
HGB UR QL STRIP.AUTO: NEGATIVE
IMM GRANULOCYTES # BLD AUTO: 0.03 10*3/MM3 (ref 0–0.05)
IMM GRANULOCYTES NFR BLD AUTO: 0.5 % (ref 0–0.5)
KETONES UR QL STRIP: NEGATIVE
LDLC SERPL CALC-MCNC: 73 MG/DL (ref 0–100)
LDLC/HDLC SERPL: 1.3 {RATIO}
LEUKOCYTE ESTERASE UR QL STRIP.AUTO: NEGATIVE
LYMPHOCYTES # BLD AUTO: 1.37 10*3/MM3 (ref 0.7–3.1)
LYMPHOCYTES NFR BLD AUTO: 21.1 % (ref 19.6–45.3)
MCH RBC QN AUTO: 30.7 PG (ref 26.6–33)
MCHC RBC AUTO-ENTMCNC: 33.4 G/DL (ref 31.5–35.7)
MCV RBC AUTO: 91.9 FL (ref 79–97)
MONOCYTES # BLD AUTO: 0.55 10*3/MM3 (ref 0.1–0.9)
MONOCYTES NFR BLD AUTO: 8.5 % (ref 5–12)
NEUTROPHILS NFR BLD AUTO: 4.44 10*3/MM3 (ref 1.7–7)
NEUTROPHILS NFR BLD AUTO: 68.3 % (ref 42.7–76)
NITRITE UR QL STRIP: NEGATIVE
NRBC BLD AUTO-RTO: 0 /100 WBC (ref 0–0.2)
PH UR STRIP.AUTO: 6 [PH] (ref 5–8)
PLATELET # BLD AUTO: 221 10*3/MM3 (ref 140–450)
PMV BLD AUTO: 10.5 FL (ref 6–12)
POTASSIUM SERPL-SCNC: 3.9 MMOL/L (ref 3.5–5.2)
PROT SERPL-MCNC: 7.2 G/DL (ref 6–8.5)
PROT UR QL STRIP: NEGATIVE
PSA SERPL-MCNC: 1.44 NG/ML (ref 0–4)
RBC # BLD AUTO: 5.41 10*6/MM3 (ref 4.14–5.8)
SODIUM SERPL-SCNC: 135 MMOL/L (ref 136–145)
SP GR UR STRIP: >=1.03 (ref 1–1.03)
TESTOST SERPL-MCNC: 557 NG/DL (ref 193–740)
TRIGL SERPL-MCNC: 77 MG/DL (ref 0–150)
TSH SERPL DL<=0.05 MIU/L-ACNC: 1.52 UIU/ML (ref 0.27–4.2)
UROBILINOGEN UR QL STRIP: ABNORMAL
VLDLC SERPL-MCNC: 15 MG/DL (ref 5–40)
WBC NRBC COR # BLD: 6.49 10*3/MM3 (ref 3.4–10.8)

## 2022-03-16 PROCEDURE — 84403 ASSAY OF TOTAL TESTOSTERONE: CPT

## 2022-03-16 PROCEDURE — 86003 ALLG SPEC IGE CRUDE XTRC EA: CPT

## 2022-03-16 PROCEDURE — 82306 VITAMIN D 25 HYDROXY: CPT

## 2022-03-16 PROCEDURE — 36415 COLL VENOUS BLD VENIPUNCTURE: CPT

## 2022-03-16 PROCEDURE — 83036 HEMOGLOBIN GLYCOSYLATED A1C: CPT

## 2022-03-16 PROCEDURE — 81256 HFE GENE: CPT

## 2022-03-16 PROCEDURE — 80050 GENERAL HEALTH PANEL: CPT

## 2022-03-16 PROCEDURE — 84153 ASSAY OF PSA TOTAL: CPT | Performed by: PHYSICIAN ASSISTANT

## 2022-03-16 PROCEDURE — 81003 URINALYSIS AUTO W/O SCOPE: CPT

## 2022-03-16 PROCEDURE — 80061 LIPID PANEL: CPT

## 2022-03-16 RX ORDER — DEXTROAMPHETAMINE SACCHARATE, AMPHETAMINE ASPARTATE, DEXTROAMPHETAMINE SULFATE AND AMPHETAMINE SULFATE 5; 5; 5; 5 MG/1; MG/1; MG/1; MG/1
20 TABLET ORAL 3 TIMES DAILY
Qty: 90 TABLET | Refills: 0 | Status: SHIPPED | OUTPATIENT
Start: 2022-03-16 | End: 2022-04-13 | Stop reason: SDUPTHER

## 2022-03-16 RX ORDER — DEXTROAMPHETAMINE SACCHARATE, AMPHETAMINE ASPARTATE, DEXTROAMPHETAMINE SULFATE AND AMPHETAMINE SULFATE 5; 5; 5; 5 MG/1; MG/1; MG/1; MG/1
20 TABLET ORAL 3 TIMES DAILY
Qty: 90 TABLET | Refills: 0 | Status: CANCELLED | OUTPATIENT
Start: 2022-03-16

## 2022-03-17 LAB — HBA1C MFR BLD: 7.9 % (ref 4.8–5.6)

## 2022-03-18 ENCOUNTER — TELEPHONE (OUTPATIENT)
Dept: FAMILY MEDICINE CLINIC | Facility: CLINIC | Age: 57
End: 2022-03-18

## 2022-03-18 DIAGNOSIS — E55.9 VITAMIN D DEFICIENCY: Primary | ICD-10-CM

## 2022-03-18 RX ORDER — ERGOCALCIFEROL 1.25 MG/1
50000 CAPSULE ORAL WEEKLY
Qty: 13 CAPSULE | Refills: 1 | Status: SHIPPED | OUTPATIENT
Start: 2022-03-18 | End: 2022-12-14 | Stop reason: SDUPTHER

## 2022-03-18 NOTE — TELEPHONE ENCOUNTER
----- Message from RIVERA Harris sent at 3/17/2022  9:57 PM EDT -----  Vitamin D Deficiency noted - begin Vitamin D 50,000 IU weekly #[id number]    Poor control of DM - pt will need to tighten diet and exercise or we will need to increase meds.

## 2022-03-20 LAB
CLAM IGE QN: <0.1 KU/L
CODFISH IGE QN: <0.1 KU/L
CONV CLASS DESCRIPTION: ABNORMAL
CORN IGE QN: <0.1 KU/L
COW MILK IGE QN: <0.1 KU/L
EGG WHITE IGE QN: 0.6 KU/L
PEANUT IGE QN: <0.1 KU/L
SCALLOP IGE QN: <0.1 KU/L
SESAME SEED IGE QN: <0.1 KU/L
SHRIMP IGE QN: <0.1 KU/L
SOYBEAN IGE QN: <0.1 KU/L
WALNUT IGE QN: <0.1 KU/L
WHEAT IGE QN: <0.1 KU/L

## 2022-03-21 ENCOUNTER — TELEPHONE (OUTPATIENT)
Dept: FAMILY MEDICINE CLINIC | Facility: CLINIC | Age: 57
End: 2022-03-21

## 2022-03-21 LAB — HFE GENE MUT ANL BLD/T: NORMAL

## 2022-03-21 NOTE — TELEPHONE ENCOUNTER
----- Message from RIVERA Harris sent at 3/21/2022  6:44 AM EDT -----  Egg white sensitivity noted

## 2022-03-22 ENCOUNTER — TELEPHONE (OUTPATIENT)
Dept: FAMILY MEDICINE CLINIC | Facility: CLINIC | Age: 57
End: 2022-03-22

## 2022-03-22 DIAGNOSIS — F41.9 ANXIETY: ICD-10-CM

## 2022-03-22 RX ORDER — BUSPIRONE HYDROCHLORIDE 10 MG/1
10 TABLET ORAL 3 TIMES DAILY
Qty: 270 TABLET | Refills: 0 | Status: SHIPPED | OUTPATIENT
Start: 2022-03-22 | End: 2023-02-28 | Stop reason: SDUPTHER

## 2022-03-22 NOTE — TELEPHONE ENCOUNTER
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

## 2022-03-27 DIAGNOSIS — E78.5 HYPERLIPIDEMIA, UNSPECIFIED HYPERLIPIDEMIA TYPE: ICD-10-CM

## 2022-03-28 RX ORDER — ATORVASTATIN CALCIUM 20 MG/1
TABLET, FILM COATED ORAL
Qty: 90 TABLET | Refills: 1 | Status: SHIPPED | OUTPATIENT
Start: 2022-03-28 | End: 2022-11-29 | Stop reason: SDUPTHER

## 2022-04-13 DIAGNOSIS — G47.11 IDIOPATHIC HYPERSOMNIA: ICD-10-CM

## 2022-04-13 RX ORDER — DEXTROAMPHETAMINE SACCHARATE, AMPHETAMINE ASPARTATE, DEXTROAMPHETAMINE SULFATE AND AMPHETAMINE SULFATE 5; 5; 5; 5 MG/1; MG/1; MG/1; MG/1
20 TABLET ORAL 3 TIMES DAILY
Qty: 90 TABLET | Refills: 0 | Status: CANCELLED | OUTPATIENT
Start: 2022-04-13

## 2022-04-13 RX ORDER — DEXTROAMPHETAMINE SACCHARATE, AMPHETAMINE ASPARTATE, DEXTROAMPHETAMINE SULFATE AND AMPHETAMINE SULFATE 5; 5; 5; 5 MG/1; MG/1; MG/1; MG/1
20 TABLET ORAL 3 TIMES DAILY
Qty: 90 TABLET | Refills: 0 | Status: SHIPPED | OUTPATIENT
Start: 2022-04-13 | End: 2022-05-12 | Stop reason: SDUPTHER

## 2022-04-14 ENCOUNTER — LAB (OUTPATIENT)
Dept: LAB | Facility: HOSPITAL | Age: 57
End: 2022-04-14

## 2022-04-14 ENCOUNTER — TRANSCRIBE ORDERS (OUTPATIENT)
Dept: LAB | Facility: HOSPITAL | Age: 57
End: 2022-04-14

## 2022-04-14 DIAGNOSIS — J30.89 PERENNIAL ALLERGIC RHINITIS: ICD-10-CM

## 2022-04-14 DIAGNOSIS — R19.8 SYMPTOMS INVOLVING ABDOMEN AND PELVIS: ICD-10-CM

## 2022-04-14 DIAGNOSIS — J30.1 ALLERGIC RHINITIS DUE TO POLLEN, UNSPECIFIED SEASONALITY: ICD-10-CM

## 2022-04-14 DIAGNOSIS — R06.00 DYSPNEA, UNSPECIFIED TYPE: ICD-10-CM

## 2022-04-14 DIAGNOSIS — J30.1 ALLERGIC RHINITIS DUE TO POLLEN, UNSPECIFIED SEASONALITY: Primary | ICD-10-CM

## 2022-04-14 PROCEDURE — 36415 COLL VENOUS BLD VENIPUNCTURE: CPT

## 2022-04-14 PROCEDURE — 83520 IMMUNOASSAY QUANT NOS NONAB: CPT

## 2022-04-16 LAB — TRYPTASE SERPL-MCNC: 6.6 UG/L (ref 2.2–13.2)

## 2022-04-19 ENCOUNTER — OFFICE VISIT (OUTPATIENT)
Dept: ORTHOPEDIC SURGERY | Facility: CLINIC | Age: 57
End: 2022-04-19

## 2022-04-19 VITALS — HEIGHT: 68 IN | BODY MASS INDEX: 27.58 KG/M2 | HEART RATE: 61 BPM | OXYGEN SATURATION: 95 % | WEIGHT: 182 LBS

## 2022-04-19 DIAGNOSIS — Z47.89 AFTERCARE FOLLOWING SURGERY OF THE MUSCULOSKELETAL SYSTEM: Primary | ICD-10-CM

## 2022-04-19 PROCEDURE — 99212 OFFICE O/P EST SF 10 MIN: CPT | Performed by: PHYSICIAN ASSISTANT

## 2022-04-19 RX ORDER — SEMAGLUTIDE 1.34 MG/ML
INJECTION, SOLUTION SUBCUTANEOUS WEEKLY
COMMUNITY
End: 2022-07-26

## 2022-04-19 NOTE — PROGRESS NOTES
"Chief Complaint  Pain and Follow-up of the Left Shoulder    Subjective          Jung Burkett presents to [facility] for follow-up on left shoulder status post left shoulder scope with RCR, biceps tenotomy, subacromial decompression and distal clavicle resection 7/21/2021 by Dr. Kenney.  Patient has been through several months of PT. Still has some pain in the shoulder as well as popping with range of motion.  Certain positions cause the patient significant pain.  He takes Tylenol as needed, can take NSAIDs due to allergy.  Patient has done several months of physical therapy and has achieved relatively good range of motion but still has pain and popping.  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  He has been off work since surgery.  He is currently in therapy for his neck and back.    Objective   Allergies   Allergen Reactions   • Hydrocodone-Acetaminophen Itching   • Nsaids Arrhythmia   • Oxycodone-Acetaminophen Shortness Of Breath   • Sulfa Antibiotics Hives   • Voltaren [Diclofenac] Palpitations       Vital Signs:   Pulse 61   Ht 172.7 cm (68\")   Wt 82.6 kg (182 lb)   SpO2 95%   BMI 27.67 kg/m²       Physical Exam  Constitutional:       Appearance: Normal appearance. Patient is well-developed and normal weight.   HENT:      Head: Normocephalic.      Right Ear: Hearing and external ear normal.      Left Ear: Hearing and external ear normal.      Nose: Nose normal.   Eyes:      Conjunctiva/sclera: Conjunctivae normal.   Cardiovascular:      Rate and Rhythm: Normal rate.   Pulmonary:      Effort: Pulmonary effort is normal.      Breath sounds: No wheezing or rales.   Abdominal:      Palpations: Abdomen is soft.      Tenderness: There is no abdominal tenderness.   Musculoskeletal:      Cervical back: Normal range of motion.   Skin:     Findings: No rash.   Neurological:      Mental Status: Patient is alert and oriented to person, place, and time.   Psychiatric:  "        Mood and Affect: Mood and affect normal.         Judgment: Judgment normal.     Ortho Exam  Left shoulder: Skin intact no swelling, well-healed surgical incision, flexion 150 abduction 95 internal rotation to the pocket.  Sensation light touch intact, radial pulse 2+, good range of motion elbow wrist digits.  Positive De La Vega.  Result Review :            Imaging Results (Most Recent)     None         Narrative & Impression   PROCEDURE:  MRI SHOULDER LEFT WO CONTRAST     COMPARISON: [facility], CR, SHOULDER >OR= 2V LT, 1/04/2006, 18:25.     INDICATIONS:  Left shoulder pain and popping  with limited range of motion, no known injury. Status   post rotator cuff repair 7/2021.                         TECHNIQUE:    A variety of imaging planes and parameters were utilized for visualization of suspected   pathology.  Images were performed without contrast.       FINDINGS:          Status post arthroscopy and rotator cuff repair.  Suture anchors are present at the greater   tuberosity.  There is heterogeneous hyperintense signal and thinning of the distal supraspinatus   and infraspinatus tendons.  There is small amount of fluid signal at the supraspinatus and anterior   infraspinatus insertions without significant fluid signal defect.  The findings may be related to   prior tears and postoperative changes.  No significant full thickness defect is seen.  A small   recurrent or new partial thickness tear cannot be completely excluded.     There is subscapularis tendinopathy without definite high-grade tear.  The teres minor tendon   appears intact.  No significant rotator cuff muscle edema or atrophy.     There is hyperintense signal in the long head of the biceps tendon in the upper bicipital groove   and intra-articular portion suggesting tendinopathy and partial tear.  The tendon is normally   positioned.  No full thickness defect is seen.  There is a small amount of fluid in the    subacromial/subdeltoid bursa.     No significant glenohumeral effusion.  Glenohumeral alignment appears grossly normal.  There   appears to be mild glenohumeral osteophytosis with suspected partial thickness cartilage thinning   of the humeral head and glenoid.     There is diminution and distortion of the posterior inferior labrum suggesting chronic degeneration   and tear.  No significant focal fluid signal defect or paralabral cyst is seen at this time.  There   does not appear to be significant periarticular edema at the glenohumeral joint.  No evidence of   fracture.  There are degenerative changes at the acromioclavicular joint.  There is a small amount   of fluid signal at the acromioclavicular joint.  No significant periarticular edema or malalignment   is seen.  No definite axillary adenopathy is seen.  No significant deltoid muscle edema is seen.    There is mild deltoid muscle atrophy and suspected postoperative changes.        IMPRESSION:                 1. Postoperative changes of rotator cuff repair with signal changes at the distal supraspinatus and   anterior infraspinatus which may be the sequelae of prior tears and postoperative changes.  A new   or recurrent partial thickness tear cannot be excluded.  No significant full thickness tendon   defect is seen at this time.  2. Mild glenohumeral osteoarthritis.  3. Tendinopathy and suspected partial tear of the long head of the biceps tendon.  4. Small amount of fluid in the subacromial/subdeltoid bursa which could indicate mild bursitis.                 Assessment and Plan    Problem List Items Addressed This Visit        Musculoskeletal and Injuries    Aftercare following left shoulder arthroscopy - Primary    Current Assessment & Plan     Patient still has significant pain and popping within the shoulder.  Treatment options were discussed.  He would hold off on PT at this time as he is currently in PT for his neck and back.  He declines steroid  injection.  He would like to follow-up as needed moving forward.                 Follow Up   Return if symptoms worsen or fail to improve.  Patient Instructions   Patient still has significant pain and popping within the shoulder.  Treatment options were discussed.  He would hold off on PT at this time as he is currently in PT for his neck and back.  He declines steroid injection.  He would like to follow-up as needed moving forward.    Patient was given instructions and counseling regarding his condition or for health maintenance advice. Please see specific information pulled into the AVS if appropriate.

## 2022-04-19 NOTE — PATIENT INSTRUCTIONS
Patient still has significant pain and popping within the shoulder.  Treatment options were discussed.  He would hold off on PT at this time as he is currently in PT for his neck and back.  He declines steroid injection.  He would like to follow-up as needed moving forward.

## 2022-05-12 DIAGNOSIS — G47.11 IDIOPATHIC HYPERSOMNIA: ICD-10-CM

## 2022-05-12 RX ORDER — DEXTROAMPHETAMINE SACCHARATE, AMPHETAMINE ASPARTATE, DEXTROAMPHETAMINE SULFATE AND AMPHETAMINE SULFATE 5; 5; 5; 5 MG/1; MG/1; MG/1; MG/1
20 TABLET ORAL 3 TIMES DAILY
Qty: 90 TABLET | Refills: 0 | Status: SHIPPED | OUTPATIENT
Start: 2022-05-12 | End: 2022-06-13 | Stop reason: SDUPTHER

## 2022-05-29 DIAGNOSIS — G47.00 INSOMNIA, UNSPECIFIED TYPE: ICD-10-CM

## 2022-05-29 DIAGNOSIS — I10 ESSENTIAL HYPERTENSION: ICD-10-CM

## 2022-05-31 RX ORDER — DICYCLOMINE HCL 20 MG
TABLET ORAL
Qty: 360 TABLET | Refills: 0 | Status: SHIPPED | OUTPATIENT
Start: 2022-05-31 | End: 2022-09-12

## 2022-05-31 RX ORDER — LOSARTAN POTASSIUM AND HYDROCHLOROTHIAZIDE 25; 100 MG/1; MG/1
TABLET ORAL
Qty: 90 TABLET | Refills: 1 | Status: SHIPPED | OUTPATIENT
Start: 2022-05-31 | End: 2022-11-29 | Stop reason: SDUPTHER

## 2022-05-31 RX ORDER — TRAZODONE HYDROCHLORIDE 100 MG/1
TABLET ORAL
Qty: 90 TABLET | Refills: 1 | Status: SHIPPED | OUTPATIENT
Start: 2022-05-31 | End: 2022-11-29 | Stop reason: SDUPTHER

## 2022-06-13 DIAGNOSIS — G47.11 IDIOPATHIC HYPERSOMNIA: ICD-10-CM

## 2022-06-13 RX ORDER — DEXTROAMPHETAMINE SACCHARATE, AMPHETAMINE ASPARTATE, DEXTROAMPHETAMINE SULFATE AND AMPHETAMINE SULFATE 5; 5; 5; 5 MG/1; MG/1; MG/1; MG/1
20 TABLET ORAL 3 TIMES DAILY
Qty: 90 TABLET | Refills: 0 | Status: SHIPPED | OUTPATIENT
Start: 2022-06-13 | End: 2022-07-18 | Stop reason: SDUPTHER

## 2022-06-23 ENCOUNTER — TELEPHONE (OUTPATIENT)
Dept: FAMILY MEDICINE CLINIC | Facility: CLINIC | Age: 57
End: 2022-06-23

## 2022-06-23 DIAGNOSIS — K21.9 GASTROESOPHAGEAL REFLUX DISEASE, UNSPECIFIED WHETHER ESOPHAGITIS PRESENT: ICD-10-CM

## 2022-06-23 RX ORDER — DEXLANSOPRAZOLE 60 MG/1
1 CAPSULE, DELAYED RELEASE ORAL DAILY
Qty: 90 CAPSULE | Refills: 1 | Status: SHIPPED | OUTPATIENT
Start: 2022-06-23 | End: 2022-11-29 | Stop reason: SDUPTHER

## 2022-06-23 NOTE — TELEPHONE ENCOUNTER
Caller: Jung Burkett    Relationship: Self    Best call back number: [phone]    What medications are you currently taking:   Current Outpatient Medications on File Prior to Visit   Medication Sig Dispense Refill   • albuterol (PROVENTIL) (2.5 MG/3ML) 0.083% nebulizer solution Take 2.5 mg by nebulization Every 4 (Four) Hours As Needed for Wheezing. 90 each 6   • albuterol sulfate  (90 Base) MCG/ACT inhaler Inhale 2 puffs Every 4 (Four) Hours As Needed for Wheezing. 18 g 0   • amphetamine-dextroamphetamine (ADDERALL) 20 MG tablet Take 1 tablet by mouth 3 (Three) Times a Day. 90 tablet 0   • atorvastatin (LIPITOR) 20 MG tablet TAKE 1 TABLET BY MOUTH ONCE DAILY AT BEDTIME 90 tablet 1   • busPIRone (BUSPAR) 10 MG tablet Take 1 tablet by mouth 3 (Three) Times a Day. 270 tablet 0   • cyclobenzaprine (FLEXERIL) 10 MG tablet Take 1 tablet by mouth At Night As Needed for Muscle Spasms. 30 tablet 0   • Dexilant 60 MG capsule TAKE 1 CAPSULE BY MOUTH EVERY DAY 90 capsule 0   • dicyclomine (Bentyl) 10 MG capsule Take 1 capsule by mouth 4 (Four) Times a Day Before Meals & at Bedtime. 120 capsule 2   • dicyclomine (BENTYL) 20 MG tablet TAKE 1 TABLET BY MOUTH 4  TIMES DAILY 360 tablet 0   • DIGESTIVE ENZYMES PO Take  by mouth.     • Empagliflozin-metFORMIN HCl (Synjardy) 12.5-1000 MG tablet Take 1 tablet by mouth 2 (two) times a day.     • losartan-hydrochlorothiazide (HYZAAR) 100-25 MG per tablet TAKE 1 TABLET BY MOUTH ONCE DAILY 90 tablet 1   • methylPREDNISolone (MEDROL) 4 MG dose pack Use as directed by package instructions 21 tablet 0   • metoprolol succinate XL (TOPROL-XL) 50 MG 24 hr tablet Take 1 tablet by mouth Daily. 90 tablet 3   • Semaglutide,0.25 or 0.5MG/DOS, (Ozempic, 0.25 or 0.5 MG/DOSE,) 2 MG/1.5ML solution pen-injector Inject  under the skin into the appropriate area as directed 1 (One) Time Per Week.     • tamsulosin (FLOMAX) 0.4 MG capsule 24 hr capsule TAKE 1 CAPSULE BY MOUTH  ONCE DAILY  1/2 HOUR  FOLLOWING SAME MEAL EACH  DAY 90 capsule 1   • traMADol (ULTRAM) 50 MG tablet TAKE 1 TABLET BY MOUTH EVERY 6 HOURS AS NEEDED FOR PAIN 28 tablet 0   • traZODone (DESYREL) 100 MG tablet TAKE 1 TABLET BY MOUTH ONCE DAILY AT BEDTIME 90 tablet 1   • vitamin D (ERGOCALCIFEROL) 1.25 MG (07829 UT) capsule capsule Take 1 capsule by mouth 1 (One) Time Per Week. 13 capsule 1     No current facility-administered medications on file prior to visit.          When did you start taking these medications: YEARS    Which medication are you concerned about: Dexilant 60 MG capsule    Who prescribed you this medication: ERIC LEY    What are your concerns: THE PATIENT STATED THIS MEDICATION JUST BECAME A GENERIC AND THE PHARMACY WILL NO LONGER FILL THIS. HE WOULD LIKE AN ALTERNATE MEDICATION THAT IS COMPARABLE SENT TO [facility] ASAP. HE IS COMPLETELY OUT OF MEDICATION AND HE CANNOT TAKE NEXIUM, THIS MEDICATION MADE HIM SICK.     THE PATIENT WOULD LIKE A CALL BACK TO DISCUSS     How long have you had these concerns: 06/23/22

## 2022-06-23 NOTE — TELEPHONE ENCOUNTER
Confirmed with [facility]. They only carry generic brand now and insurance wont cover. Pt ok with sending refill to [facility]. Pt is out today but will use something otc to get him by.

## 2022-06-28 ENCOUNTER — OFFICE VISIT (OUTPATIENT)
Dept: FAMILY MEDICINE CLINIC | Facility: CLINIC | Age: 57
End: 2022-06-28

## 2022-06-28 VITALS
OXYGEN SATURATION: 98 % | WEIGHT: 184 LBS | HEIGHT: 68 IN | DIASTOLIC BLOOD PRESSURE: 87 MMHG | HEART RATE: 85 BPM | SYSTOLIC BLOOD PRESSURE: 125 MMHG | BODY MASS INDEX: 27.89 KG/M2

## 2022-06-28 DIAGNOSIS — G89.29 CHRONIC LEFT SHOULDER PAIN: ICD-10-CM

## 2022-06-28 DIAGNOSIS — I10 ESSENTIAL HYPERTENSION: ICD-10-CM

## 2022-06-28 DIAGNOSIS — E11.65 UNCONTROLLED TYPE 2 DIABETES MELLITUS WITH HYPERGLYCEMIA: ICD-10-CM

## 2022-06-28 DIAGNOSIS — E78.00 PURE HYPERCHOLESTEROLEMIA: Chronic | ICD-10-CM

## 2022-06-28 DIAGNOSIS — M25.512 CHRONIC LEFT SHOULDER PAIN: ICD-10-CM

## 2022-06-28 DIAGNOSIS — M54.2 CERVICAL PAIN (NECK): Primary | Chronic | ICD-10-CM

## 2022-06-28 DIAGNOSIS — E78.5 HYPERLIPIDEMIA, UNSPECIFIED HYPERLIPIDEMIA TYPE: ICD-10-CM

## 2022-06-28 DIAGNOSIS — E55.9 VITAMIN D DEFICIENCY: Chronic | ICD-10-CM

## 2022-06-28 PROCEDURE — 99214 OFFICE O/P EST MOD 30 MIN: CPT | Performed by: PHYSICIAN ASSISTANT

## 2022-06-28 NOTE — PROGRESS NOTES
Chief Complaint  Neck Pain and Back Pain    Subjective          Jung Burkett presents to [facility]  Pt here today for 3 month follow up.    Pt reports still having neck and back pain. Pt had been doing physical therapy. Pt states his ROM has improved but the pain increased. The therapist recommended to stop therapy sessions.         Long term disability company has referred him to [employer] - a group of attorneys who assist him with disability with Government.    He is having lots of discomfort in his neck and mid back and hands.      Right rib pain for the past 10 years.  He sees chiro and will get it back in place.  He fell on pool wall and broke his rib.    Chronic right shoulder pain - some better after his surgery.  He was discharged from PT    Pt has been at his employer for 34 years.    Past Medical History:   Diagnosis Date   • Acid reflux    • Anxiety    • Anxiety disorder 05/17/2019   • Arthritis     generalized   • Blood disease     none   • Cervicalgia    • Chronic allergic rhinitis    • Depression    • Diabetes mellitus, type 2 ([facility]) 05/17/2019    DOES NOT CHECK BS   • Diabetic eye exam ([facility]) 01/2019 20/20 PER PT    • Encounter for diabetic foot exam ([facility])     WITHIN FEW MO  PER PT    • Essential hypertension 05/17/2019   • GERD (gastroesophageal reflux disease)    • High blood pressure    • High cholesterol    • Hyperlipemia    • Hyperlipidemia    • Hypertension    • Major depressive disorder 05/17/2019   • Nicotine dependence 05/17/2019   • Prostate disorder     BPH   • Rotator cuff tear, left    • Seasonal allergies    • Vitamin D deficiency 05/17/2019    no current issues      Family History   Problem Relation Age of Onset   • Cancer Mother    • Diabetes Mother    • Rheum arthritis Mother         40'S   • Heart attack Mother    • Breast cancer Mother         40'S   • Arthritis Mother    • Stroke Father    • Heart disease Father    • Heart attack  Maternal Grandmother    • Breast cancer Maternal Grandmother         50'S   • Prostate cancer Maternal Grandfather    • Nephrolithiasis Maternal Grandfather    • Colon cancer Paternal Grandmother    • Colon cancer Paternal Grandfather         60'S   • Breast cancer Other         40'S   • Heart attack Maternal Aunt    • Malig Hyperthermia Neg Hx       Past Surgical History:   Procedure Laterality Date   • CHOLECYSTECTOMY  1997   • COLONOSCOPY      ELISA- REPEAT IN 5 YEARS    • GALLBLADDER SURGERY     • KNEE ARTHROSCOPY W/ MENISCAL REPAIR Right    • OTHER SURGICAL HISTORY      SURGICAL CLIPS/gallbladder removed   • OTHER SURGICAL HISTORY      right knee meniscus tear repair   • SHOULDER ARTHROSCOPY W/ ROTATOR CUFF REPAIR Left 7/21/2021    Procedure: SHOULDER ARTHROSCOPY,SUBACROMINAL DECOMPRESSION, DISTAL CLAVICLE RESECTION, MINI OPEN  ROTATOR CUFF REPAIR;  Surgeon: Ulisses Kenney MD;  [place] [facility] [place] [facility] [place]  Service: Orthopedics;  Laterality: Left;   • TONSILLECTOMY          Current Outpatient Medications:   •  albuterol (PROVENTIL) (2.5 MG/3ML) 0.083% nebulizer solution, Take 2.5 mg by nebulization Every 4 (Four) Hours As Needed for Wheezing., Disp: 90 each, Rfl: 6  •  albuterol sulfate  (90 Base) MCG/ACT inhaler, Inhale 2 puffs Every 4 (Four) Hours As Needed for Wheezing., Disp: 18 g, Rfl: 0  •  amphetamine-dextroamphetamine (ADDERALL) 20 MG tablet, Take 1 tablet by mouth 3 (Three) Times a Day., Disp: 90 tablet, Rfl: 0  •  atorvastatin (LIPITOR) 20 MG tablet, TAKE 1 TABLET BY MOUTH ONCE DAILY AT BEDTIME, Disp: 90 tablet, Rfl: 1  •  busPIRone (BUSPAR) 10 MG tablet, Take 1 tablet by mouth 3 (Three) Times a Day., Disp: 270 tablet, Rfl: 0  •  CBD (cannabidiol) oral oil, Take  by mouth., Disp: , Rfl:   •  dexlansoprazole (Dexilant) 60 MG capsule, Take 1 capsule by mouth Daily., Disp: 90 capsule, Rfl: 1  •  dicyclomine (BENTYL) 20 MG tablet, TAKE 1 TABLET BY MOUTH 4  TIMES DAILY, Disp: 360 tablet, Rfl: 0  •   "Empagliflozin-metFORMIN HCl (Synjardy) 12.5-1000 MG tablet, Take 1 tablet by mouth 2 (two) times a day., Disp: , Rfl:   •  losartan-hydrochlorothiazide (HYZAAR) 100-25 MG per tablet, TAKE 1 TABLET BY MOUTH ONCE DAILY, Disp: 90 tablet, Rfl: 1  •  metoprolol succinate XL (TOPROL-XL) 50 MG 24 hr tablet, Take 1 tablet by mouth Daily., Disp: 90 tablet, Rfl: 3  •  Semaglutide,0.25 or 0.5MG/DOS, (Ozempic, 0.25 or 0.5 MG/DOSE,) 2 MG/1.5ML solution pen-injector, Inject  under the skin into the appropriate area as directed 1 (One) Time Per Week., Disp: , Rfl:   •  tamsulosin (FLOMAX) 0.4 MG capsule 24 hr capsule, TAKE 1 CAPSULE BY MOUTH  ONCE DAILY 1/2 HOUR  FOLLOWING SAME MEAL EACH  DAY, Disp: 90 capsule, Rfl: 1  •  traZODone (DESYREL) 100 MG tablet, TAKE 1 TABLET BY MOUTH ONCE DAILY AT BEDTIME, Disp: 90 tablet, Rfl: 1  •  vitamin D (ERGOCALCIFEROL) 1.25 MG (60683 UT) capsule capsule, Take 1 capsule by mouth 1 (One) Time Per Week., Disp: 13 capsule, Rfl: 1  •  cyclobenzaprine (FLEXERIL) 10 MG tablet, Take 1 tablet by mouth At Night As Needed for Muscle Spasms., Disp: 30 tablet, Rfl: 0  •  dicyclomine (Bentyl) 10 MG capsule, Take 1 capsule by mouth 4 (Four) Times a Day Before Meals & at Bedtime., Disp: 120 capsule, Rfl: 2  •  DIGESTIVE ENZYMES PO, Take  by mouth., Disp: , Rfl:   •  traMADol (ULTRAM) 50 MG tablet, TAKE 1 TABLET BY MOUTH EVERY 6 HOURS AS NEEDED FOR PAIN, Disp: 28 tablet, Rfl: 0    Objective     Vital Signs:     /87   Pulse 85   Ht 172.7 cm (68\")   Wt 83.5 kg (184 lb)   SpO2 98%   BMI 27.98 kg/m²    Estimated body mass index is 27.98 kg/m² as calculated from the following:    Height as of this encounter: 172.7 cm (68\").    Weight as of this encounter: 83.5 kg (184 lb).     Wt Readings from Last 3 Encounters:   06/28/22 83.5 kg (184 lb)   04/19/22 82.6 kg (182 lb)   03/14/22 86.6 kg (191 lb)     BP Readings from Last 3 Encounters:   06/28/22 125/87   03/14/22 138/81   02/28/22 118/86     Physical " Exam  Vitals and nursing note reviewed.   Constitutional:       Appearance: Normal appearance.   HENT:      Head: Normocephalic and atraumatic.   Cardiovascular:      Rate and Rhythm: Normal rate and regular rhythm.      Heart sounds: Normal heart sounds.   Pulmonary:      Effort: Pulmonary effort is normal.      Breath sounds: Normal breath sounds.   Musculoskeletal:      Cervical back: Neck supple. Tenderness present.   Neurological:      Mental Status: He is alert.   Psychiatric:         Mood and Affect: Mood normal.         Behavior: Behavior normal.        Result Review :     Common labs    Common Labsle 7/30/21 7/30/21 7/30/21 7/30/21 7/30/21 7/30/21 3/16/22 3/16/22 3/16/22 3/16/22 3/16/22    0943 0943 0943 0943 0943 0950 1130 1130 1130 1130 1130   Glucose    198 (A)      186 (A)    BUN    16      16    Creatinine    0.98      0.94    eGFR Non African Am    79          Sodium    135 (A)      135 (A)    Potassium    4.2      3.9    Chloride    98      97 (A)    Calcium    9.2      9.5    Albumin    4.10      4.60    Total Bilirubin    0.7      0.7    Alkaline Phosphatase    52      48    AST (SGOT)    12      16    ALT (SGPT)    22      21    WBC 8.14      6.49       Hemoglobin 15.8      16.6       Hematocrit 45.7      49.7       Platelets 233      221       Total Cholesterol   162     144      Triglycerides   167 (A)     77      HDL Cholesterol   40     56      LDL Cholesterol    93     73      Hemoglobin A1C  7.10 (A)         7.90 (A)   Microalbumin, Urine      <1.2        PSA     2.340    1.440     (A) Abnormal value                   Pt is allergic to NSAIDs - also cant take tumeric         Assessment and Plan      Diagnoses and all orders for this visit:    1. Cervical pain (neck) (Primary)  Comments:  Uncontrolled seeing specialist  Orders:  -     Ambulatory Referral to Pain Management  -     HARRY; Future  -     C-reactive protein; Future  -     Rheumatoid factor; Future  -     Antistreptolysin O screen;  Future  -     Uric acid; Future  -     Sedimentation Rate; Future    2. Vitamin D deficiency  Comments:  Stable on Vit D 50,000 IU weekly  Orders:  -     Vitamin D 25 Hydroxy; Future    3. Pure hypercholesterolemia  Comments:  Stable on lipitor 20mg nightly  Orders:  -     CBC & Differential; Future  -     Comprehensive Metabolic Panel; Future  -     Lipid Panel; Future    4. Chronic left shoulder pain  -     Ambulatory Referral to Pain Management  -     HARRY; Future  -     C-reactive protein; Future  -     Rheumatoid factor; Future  -     Antistreptolysin O screen; Future  -     Uric acid; Future  -     Sedimentation Rate; Future    5. Hyperlipidemia, unspecified hyperlipidemia type  Overview:  Controlled with diet and lipitor      6. Essential hypertension  Overview:  Controlled with toprol 50 and hyzaar    Orders:  -     CBC & Differential; Future  -     Comprehensive Metabolic Panel; Future  -     Lipid Panel; Future  -     TSH Rfx On Abnormal To Free T4; Future    7. Uncontrolled type 2 diabetes mellitus with hyperglycemia (HCC)  -     Comprehensive Metabolic Panel; Future  -     Hemoglobin A1c; Future     Follow Up     Return in about 4 months (around 10/28/2022).    Patient was given instructions and counseling regarding his condition or for health maintenance advice. Please see specific information pulled into the AVS if appropriate.     I have reviewed information obtained and documented by others and I have confirmed the accuracy of this documented note.    RIVERA Harris

## 2022-07-01 ENCOUNTER — LAB (OUTPATIENT)
Dept: LAB | Facility: HOSPITAL | Age: 57
End: 2022-07-01

## 2022-07-01 DIAGNOSIS — E55.9 VITAMIN D DEFICIENCY: Chronic | ICD-10-CM

## 2022-07-01 DIAGNOSIS — G89.29 CHRONIC LEFT SHOULDER PAIN: ICD-10-CM

## 2022-07-01 DIAGNOSIS — M25.512 CHRONIC LEFT SHOULDER PAIN: ICD-10-CM

## 2022-07-01 DIAGNOSIS — I10 ESSENTIAL HYPERTENSION: ICD-10-CM

## 2022-07-01 DIAGNOSIS — E11.65 UNCONTROLLED TYPE 2 DIABETES MELLITUS WITH HYPERGLYCEMIA: ICD-10-CM

## 2022-07-01 DIAGNOSIS — E78.00 PURE HYPERCHOLESTEROLEMIA: ICD-10-CM

## 2022-07-01 DIAGNOSIS — M54.2 CERVICAL PAIN (NECK): Chronic | ICD-10-CM

## 2022-07-01 LAB
25(OH)D3 SERPL-MCNC: 51.2 NG/ML (ref 30–100)
ALBUMIN SERPL-MCNC: 4.3 G/DL (ref 3.5–5.2)
ALBUMIN/GLOB SERPL: 1.5 G/DL
ALP SERPL-CCNC: 45 U/L (ref 39–117)
ALT SERPL W P-5'-P-CCNC: 25 U/L (ref 1–41)
ANION GAP SERPL CALCULATED.3IONS-SCNC: 9.5 MMOL/L (ref 5–15)
ASO AB SERPL-ACNC: NEGATIVE [IU]/ML
AST SERPL-CCNC: 22 U/L (ref 1–40)
BASOPHILS # BLD AUTO: 0.02 10*3/MM3 (ref 0–0.2)
BASOPHILS NFR BLD AUTO: 0.3 % (ref 0–1.5)
BILIRUB SERPL-MCNC: 0.3 MG/DL (ref 0–1.2)
BUN SERPL-MCNC: 18 MG/DL (ref 6–20)
BUN/CREAT SERPL: 15.4 (ref 7–25)
CALCIUM SPEC-SCNC: 9.1 MG/DL (ref 8.6–10.5)
CHLORIDE SERPL-SCNC: 99 MMOL/L (ref 98–107)
CHOLEST SERPL-MCNC: 104 MG/DL (ref 0–200)
CHROMATIN AB SERPL-ACNC: <10 IU/ML (ref 0–14)
CO2 SERPL-SCNC: 26.5 MMOL/L (ref 22–29)
CREAT SERPL-MCNC: 1.17 MG/DL (ref 0.76–1.27)
CRP SERPL-MCNC: <0.3 MG/DL (ref 0–0.5)
DEPRECATED RDW RBC AUTO: 39.7 FL (ref 37–54)
EGFRCR SERPLBLD CKD-EPI 2021: 72.3 ML/MIN/1.73
EOSINOPHIL # BLD AUTO: 0.12 10*3/MM3 (ref 0–0.4)
EOSINOPHIL NFR BLD AUTO: 1.7 % (ref 0.3–6.2)
ERYTHROCYTE [DISTWIDTH] IN BLOOD BY AUTOMATED COUNT: 12.4 % (ref 12.3–15.4)
ERYTHROCYTE [SEDIMENTATION RATE] IN BLOOD: 6 MM/HR (ref 0–20)
GLOBULIN UR ELPH-MCNC: 2.8 GM/DL
GLUCOSE SERPL-MCNC: 152 MG/DL (ref 65–99)
HBA1C MFR BLD: 7.1 % (ref 4.8–5.6)
HCT VFR BLD AUTO: 49 % (ref 37.5–51)
HDLC SERPL-MCNC: 37 MG/DL (ref 40–60)
HGB BLD-MCNC: 17.4 G/DL (ref 13–17.7)
IMM GRANULOCYTES # BLD AUTO: 0.02 10*3/MM3 (ref 0–0.05)
IMM GRANULOCYTES NFR BLD AUTO: 0.3 % (ref 0–0.5)
LDLC SERPL CALC-MCNC: 39 MG/DL (ref 0–100)
LDLC/HDLC SERPL: 0.91 {RATIO}
LYMPHOCYTES # BLD AUTO: 1.46 10*3/MM3 (ref 0.7–3.1)
LYMPHOCYTES NFR BLD AUTO: 20.7 % (ref 19.6–45.3)
MCH RBC QN AUTO: 31.5 PG (ref 26.6–33)
MCHC RBC AUTO-ENTMCNC: 35.5 G/DL (ref 31.5–35.7)
MCV RBC AUTO: 88.8 FL (ref 79–97)
MONOCYTES # BLD AUTO: 0.66 10*3/MM3 (ref 0.1–0.9)
MONOCYTES NFR BLD AUTO: 9.3 % (ref 5–12)
NEUTROPHILS NFR BLD AUTO: 4.78 10*3/MM3 (ref 1.7–7)
NEUTROPHILS NFR BLD AUTO: 67.7 % (ref 42.7–76)
NRBC BLD AUTO-RTO: 0 /100 WBC (ref 0–0.2)
PLATELET # BLD AUTO: 238 10*3/MM3 (ref 140–450)
PMV BLD AUTO: 10.4 FL (ref 6–12)
POTASSIUM SERPL-SCNC: 4.2 MMOL/L (ref 3.5–5.2)
PROT SERPL-MCNC: 7.1 G/DL (ref 6–8.5)
RBC # BLD AUTO: 5.52 10*6/MM3 (ref 4.14–5.8)
SODIUM SERPL-SCNC: 135 MMOL/L (ref 136–145)
TRIGL SERPL-MCNC: 167 MG/DL (ref 0–150)
TSH SERPL DL<=0.05 MIU/L-ACNC: 1.44 UIU/ML (ref 0.27–4.2)
URATE SERPL-MCNC: 3.9 MG/DL (ref 3.4–7)
VLDLC SERPL-MCNC: 28 MG/DL (ref 5–40)
WBC NRBC COR # BLD: 7.06 10*3/MM3 (ref 3.4–10.8)

## 2022-07-01 PROCEDURE — 85652 RBC SED RATE AUTOMATED: CPT

## 2022-07-01 PROCEDURE — 36415 COLL VENOUS BLD VENIPUNCTURE: CPT

## 2022-07-01 PROCEDURE — 80061 LIPID PANEL: CPT

## 2022-07-01 PROCEDURE — 84550 ASSAY OF BLOOD/URIC ACID: CPT

## 2022-07-01 PROCEDURE — 86140 C-REACTIVE PROTEIN: CPT

## 2022-07-01 PROCEDURE — 86225 DNA ANTIBODY NATIVE: CPT

## 2022-07-01 PROCEDURE — 86063 ANTISTREPTOLYSIN O SCREEN: CPT

## 2022-07-01 PROCEDURE — 82306 VITAMIN D 25 HYDROXY: CPT

## 2022-07-01 PROCEDURE — 83036 HEMOGLOBIN GLYCOSYLATED A1C: CPT

## 2022-07-01 PROCEDURE — 86431 RHEUMATOID FACTOR QUANT: CPT

## 2022-07-01 PROCEDURE — 86038 ANTINUCLEAR ANTIBODIES: CPT

## 2022-07-01 PROCEDURE — 80050 GENERAL HEALTH PANEL: CPT

## 2022-07-05 ENCOUNTER — TELEPHONE (OUTPATIENT)
Dept: FAMILY MEDICINE CLINIC | Facility: CLINIC | Age: 57
End: 2022-07-05

## 2022-07-05 NOTE — TELEPHONE ENCOUNTER
----- Message from RIVERA Harris sent at 7/2/2022  8:24 AM EDT -----  Poor control of DM - improvement noted - Hgba1c 7.1

## 2022-07-07 ENCOUNTER — TELEPHONE (OUTPATIENT)
Dept: FAMILY MEDICINE CLINIC | Facility: CLINIC | Age: 57
End: 2022-07-07

## 2022-07-07 LAB
DSDNA IGG SERPL IA-ACNC: NEGATIVE [IU]/ML
NUCLEAR IGG SER IA-RTO: NEGATIVE

## 2022-07-11 ENCOUNTER — TELEPHONE (OUTPATIENT)
Dept: FAMILY MEDICINE CLINIC | Facility: CLINIC | Age: 57
End: 2022-07-11

## 2022-07-11 NOTE — TELEPHONE ENCOUNTER
Caller: [facility]    Relationship: Other    Best call back number: [phone]    REFERENCE HERNANDEZ: [id number]    What is the best time to reach you: ANYTIME     Who are you requesting to speak with (clinical staff, provider,  specific staff member): CLINICAL    Do you know the name of the person who called:     What was the call regarding:      TONYA - [facility] SAID A PA IS NEEDED FOR THE MEDICATION OZEMPIC. SHE SAID ONE WAS SENT ELECTRONICALLY AND THERE WAS AN ERROR MESSAGE SHE WAS CALLING TO HELP WITH. SHE IS REQUESTING A CALL BACK           Do you require a callback: YES

## 2022-07-12 ENCOUNTER — TELEPHONE (OUTPATIENT)
Dept: FAMILY MEDICINE CLINIC | Facility: CLINIC | Age: 57
End: 2022-07-12

## 2022-07-18 ENCOUNTER — TELEPHONE (OUTPATIENT)
Dept: SLEEP MEDICINE | Facility: HOSPITAL | Age: 57
End: 2022-07-18

## 2022-07-18 DIAGNOSIS — G47.11 IDIOPATHIC HYPERSOMNIA: ICD-10-CM

## 2022-07-18 RX ORDER — DEXTROAMPHETAMINE SACCHARATE, AMPHETAMINE ASPARTATE, DEXTROAMPHETAMINE SULFATE AND AMPHETAMINE SULFATE 5; 5; 5; 5 MG/1; MG/1; MG/1; MG/1
20 TABLET ORAL 3 TIMES DAILY
Qty: 90 TABLET | Refills: 0 | Status: SHIPPED | OUTPATIENT
Start: 2022-07-18 | End: 2022-08-19 | Stop reason: SDUPTHER

## 2022-07-26 ENCOUNTER — TELEPHONE (OUTPATIENT)
Dept: FAMILY MEDICINE CLINIC | Facility: CLINIC | Age: 57
End: 2022-07-26

## 2022-07-26 DIAGNOSIS — E11.65 UNCONTROLLED TYPE 2 DIABETES MELLITUS WITH HYPERGLYCEMIA: Primary | ICD-10-CM

## 2022-07-26 NOTE — TELEPHONE ENCOUNTER
Caller: Jung Burkett    Relationship: Self    Best call back number: [phone]    What medication are you requesting: TIRZEPATIDE    What are your current symptoms: PATIENT IS JUST REQUESTING TO POSSIBLY CHANGE HIS DIABETES MEDICATION       If a prescription is needed, what is your preferred pharmacy and phone number: [facility]  ([facility] - [place], [place] - [address] [phone] [facility] [phone] FX

## 2022-08-02 ENCOUNTER — TELEPHONE (OUTPATIENT)
Dept: FAMILY MEDICINE CLINIC | Facility: CLINIC | Age: 57
End: 2022-08-02

## 2022-08-02 NOTE — TELEPHONE ENCOUNTER
Caller: Jung Burkett    Relationship to patient: Self    Best call back number: [phone]    Date of positive COVID19 test: TODAY     COVID19 symptoms: COUGH, FATIGUE, HARD TO BREAT, LOW GRADE FEVER.     Additional information or concerns: PATIENT IS WANTING TO SEE IF HE COULD GET THE PAXLOVID MEDICATION DUE TO HIS ASTHMA, PLEASE CALL PATIENT AND ADVICE IF SOMETHING WITH BE CALLED IN.     What is the patients preferred pharmacy: [facility] - [place] - [address]  [phone] [facility] [phone] FX

## 2022-08-03 NOTE — TELEPHONE ENCOUNTER
Phoned patient and gave him Marielle instructions that he needs to be seen, the patient stated that he will just wait and see how thing go. I did advise the patient if any shortness of breath to go to the urgent care.

## 2022-08-19 ENCOUNTER — TELEPHONE (OUTPATIENT)
Dept: FAMILY MEDICINE CLINIC | Facility: CLINIC | Age: 57
End: 2022-08-19

## 2022-08-19 DIAGNOSIS — G47.11 IDIOPATHIC HYPERSOMNIA: ICD-10-CM

## 2022-08-19 DIAGNOSIS — E11.65 UNCONTROLLED TYPE 2 DIABETES MELLITUS WITH HYPERGLYCEMIA: Primary | ICD-10-CM

## 2022-08-19 RX ORDER — DEXTROAMPHETAMINE SACCHARATE, AMPHETAMINE ASPARTATE, DEXTROAMPHETAMINE SULFATE AND AMPHETAMINE SULFATE 5; 5; 5; 5 MG/1; MG/1; MG/1; MG/1
20 TABLET ORAL 3 TIMES DAILY
Qty: 90 TABLET | Refills: 0 | Status: SHIPPED | OUTPATIENT
Start: 2022-08-19 | End: 2022-09-28 | Stop reason: SDUPTHER

## 2022-08-19 RX ORDER — TIRZEPATIDE 5 MG/.5ML
5 INJECTION, SOLUTION SUBCUTANEOUS WEEKLY
Qty: 2 ML | Refills: 0 | Status: SHIPPED | OUTPATIENT
Start: 2022-08-19 | End: 2022-09-09

## 2022-08-19 NOTE — TELEPHONE ENCOUNTER
Caller: Jung Burkett    Relationship: Self    Best call back number: [phone]     Requested Prescriptions:   MOUNJARO TIRZEPATIDE INJECTION   HE STATED THAT HE IS READY FOR THE OTHER DOSE. HE IS CURRENTLY ON 2.5 MG   Pharmacy where request should be sent: [facility] - [facility] [address] - [phone]  - [phone] FX     Additional details provided by patient: PATIENT IS NEEDING A REFILL.PLEASE CALL AND ADVISE.     Does the patient have less than a 3 day supply:  [x] Yes  [] No    Kristy Correa Rep   08/19/22 08:49 EDT

## 2022-08-29 ENCOUNTER — OFFICE VISIT (OUTPATIENT)
Dept: SLEEP MEDICINE | Facility: HOSPITAL | Age: 57
End: 2022-08-29

## 2022-08-29 VITALS
OXYGEN SATURATION: 99 % | BODY MASS INDEX: 28.36 KG/M2 | HEIGHT: 68 IN | WEIGHT: 187.1 LBS | DIASTOLIC BLOOD PRESSURE: 88 MMHG | HEART RATE: 80 BPM | SYSTOLIC BLOOD PRESSURE: 135 MMHG

## 2022-08-29 DIAGNOSIS — G47.11 IDIOPATHIC HYPERSOMNIA: Primary | ICD-10-CM

## 2022-08-29 PROCEDURE — 99214 OFFICE O/P EST MOD 30 MIN: CPT | Performed by: INTERNAL MEDICINE

## 2022-08-29 PROCEDURE — G0463 HOSPITAL OUTPT CLINIC VISIT: HCPCS | Performed by: INTERNAL MEDICINE

## 2022-08-29 NOTE — PROGRESS NOTES
"  [address] [address]  Phone: [phone]  Fax: [phone]      SLEEP CLINIC FOLLOW UP PROGRESS NOTE.    Jung Burkett  1965  57 y.o.  male      PCP: Nicola Schrader PA      Date of visit: 8/29/2022    Chief Complaint   Patient presents with   • Daytime Sleepiness       HPI:  This is a 57 y.o. years old patient who has a history of idiopathic hypersomnia and ADHD who is on Adderall 20 mg 3 times a day and doing well.  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  He says the medication has helped him and is able to function.  In the last 12 months patient is not able to work because of his shoulder problem.  Recently also has developed palpitation for which he is started on beta-blocker     Medications and allergies are reviewed by me and documented in the encounter.     SOCIAL ( habits pertaining to sleep medicine)  History of tobacco use:Yes smoke cigars  History of alcohol use: 6 per week  Caffeine use: 1    REVIEW OF SYSTEMS:   [place] Sleepiness Scale :Total score: 12   Nasal congestion:No   Dry mouth/nose:No   Post nasal drip; No   Acid reflux/Heartburn:Yes   Abd bloating:No   Morning headache:No   Anxiety:No   Depression:No     PHYSICAL EXAMINATION:  CONSTITUTIONAL:  Vitals:    08/29/22 1500   BP: 135/88   Pulse: 80   SpO2: 99%   Weight: 84.9 kg (187 lb 1.6 oz)   Height: 172.7 cm (67.99\")    Body mass index is 28.46 kg/m².   NOSE: nasal passages are clear, no nasal polyps, septum in the midline.  THROAT: throat is clear, oral airway Mallampati class 2  RESP SYSTEM: Breath sounds are normal, no wheezes or crackles  CARDIOVASULAR: Heart rate is regular without murmur. No edema      Celestine checked no problem, 8/29/2022      ASSESSMENT AND PLAN:  · Idiopathic hypersomnia.  Patient is unstable on Adderall 20 mg 3 times a day and I have renewed his " medications and will see him in 6 months for follow-up.  Celestine has been checked on PDMP and has no problems.  He gets his medications from [facility]  · ADHD,    · Palpitation, on beta-blocker  · Return in about 6 months (around 2/28/2023) for Next scheduled follow-up. . Patient's questions were answered.        Jaimie Kamara MD  Sleep Medicine  Medical Director, [facility], [facility]  8/29/2022

## 2022-09-09 DIAGNOSIS — E11.65 UNCONTROLLED TYPE 2 DIABETES MELLITUS WITH HYPERGLYCEMIA: ICD-10-CM

## 2022-09-09 RX ORDER — TIRZEPATIDE 5 MG/.5ML
INJECTION, SOLUTION SUBCUTANEOUS
Qty: 2 ML | Refills: 0 | Status: SHIPPED | OUTPATIENT
Start: 2022-09-09 | End: 2022-10-19 | Stop reason: SDUPTHER

## 2022-09-11 DIAGNOSIS — I49.1 PAC (PREMATURE ATRIAL CONTRACTION): ICD-10-CM

## 2022-09-11 DIAGNOSIS — R79.89 LOW TESTOSTERONE IN MALE: ICD-10-CM

## 2022-09-11 DIAGNOSIS — R00.2 INTERMITTENT PALPITATIONS: ICD-10-CM

## 2022-09-12 RX ORDER — METOPROLOL SUCCINATE 50 MG/1
TABLET, EXTENDED RELEASE ORAL
Qty: 90 TABLET | Refills: 1 | Status: SHIPPED | OUTPATIENT
Start: 2022-09-12 | End: 2023-02-28 | Stop reason: SDUPTHER

## 2022-09-12 RX ORDER — DICYCLOMINE HCL 20 MG
TABLET ORAL
Qty: 360 TABLET | Refills: 0 | Status: SHIPPED | OUTPATIENT
Start: 2022-09-12 | End: 2023-02-28 | Stop reason: SDUPTHER

## 2022-09-12 RX ORDER — TAMSULOSIN HYDROCHLORIDE 0.4 MG/1
CAPSULE ORAL
Qty: 90 CAPSULE | Refills: 1 | Status: SHIPPED | OUTPATIENT
Start: 2022-09-12 | End: 2023-02-28 | Stop reason: SDUPTHER

## 2022-09-22 ENCOUNTER — TELEPHONE (OUTPATIENT)
Dept: SLEEP MEDICINE | Facility: HOSPITAL | Age: 57
End: 2022-09-22

## 2022-09-28 DIAGNOSIS — G47.11 IDIOPATHIC HYPERSOMNIA: ICD-10-CM

## 2022-09-28 RX ORDER — DEXTROAMPHETAMINE SACCHARATE, AMPHETAMINE ASPARTATE, DEXTROAMPHETAMINE SULFATE AND AMPHETAMINE SULFATE 5; 5; 5; 5 MG/1; MG/1; MG/1; MG/1
20 TABLET ORAL 3 TIMES DAILY
Qty: 90 TABLET | Refills: 0 | Status: SHIPPED | OUTPATIENT
Start: 2022-09-28 | End: 2022-11-07 | Stop reason: SDUPTHER

## 2022-10-18 DIAGNOSIS — E11.65 UNCONTROLLED TYPE 2 DIABETES MELLITUS WITH HYPERGLYCEMIA: ICD-10-CM

## 2022-10-19 DIAGNOSIS — E11.65 UNCONTROLLED TYPE 2 DIABETES MELLITUS WITH HYPERGLYCEMIA: ICD-10-CM

## 2022-10-19 RX ORDER — TIRZEPATIDE 5 MG/.5ML
INJECTION, SOLUTION SUBCUTANEOUS
Qty: 2 ML | Refills: 0 | OUTPATIENT
Start: 2022-10-19

## 2022-10-19 RX ORDER — TIRZEPATIDE 5 MG/.5ML
5 INJECTION, SOLUTION SUBCUTANEOUS WEEKLY
Qty: 2 ML | Refills: 0 | Status: SHIPPED | OUTPATIENT
Start: 2022-10-19 | End: 2022-11-23

## 2022-10-19 NOTE — TELEPHONE ENCOUNTER
There is no sig on the RX and I have never seen pt so note sure what that should be please clarify

## 2022-11-04 NOTE — TELEPHONE ENCOUNTER
Caller: Jung Burkett    Relationship: Self    Best call back number: [phone]     Requested Prescriptions:   Requested Prescriptions     Pending Prescriptions Disp Refills   • cyclobenzaprine (FLEXERIL) 10 MG tablet 30 tablet 0     Sig: Take 1 tablet by mouth At Night As Needed for Muscle Spasms.        [facility] [address] - [phone] [facility] [phone]      Additional details provided by patient: PATIENT STATES THAT THIS HAS BEEN PRESCRIBED THROUGH THIS OFFICE BEFORE BY ANOTHER PROVIDER FOR BACK PAIN. PATIENT HAS A DIAGNOSIS FOR THE PAIN BUT IS UNSURE OF THE NAME OF THE CONDITION. PATIENT WOULD LIKE A CALL IF UNABLE TO PRESCRIBE.    Does the patient have less than a 3 day supply:  [x] Yes  [] No    Kristy Gandara Rep   11/04/22 13:58 EDT

## 2022-11-07 DIAGNOSIS — G47.11 IDIOPATHIC HYPERSOMNIA: ICD-10-CM

## 2022-11-07 RX ORDER — DEXTROAMPHETAMINE SACCHARATE, AMPHETAMINE ASPARTATE, DEXTROAMPHETAMINE SULFATE AND AMPHETAMINE SULFATE 5; 5; 5; 5 MG/1; MG/1; MG/1; MG/1
20 TABLET ORAL 3 TIMES DAILY
Qty: 90 TABLET | Refills: 0 | Status: SHIPPED | OUTPATIENT
Start: 2022-11-07 | End: 2022-12-19 | Stop reason: SDUPTHER

## 2022-11-07 RX ORDER — CYCLOBENZAPRINE HCL 10 MG
10 TABLET ORAL NIGHTLY PRN
Qty: 30 TABLET | Refills: 0 | Status: SHIPPED | OUTPATIENT
Start: 2022-11-07 | End: 2023-02-27 | Stop reason: ALTCHOICE

## 2022-11-23 ENCOUNTER — PATIENT MESSAGE (OUTPATIENT)
Dept: FAMILY MEDICINE CLINIC | Facility: CLINIC | Age: 57
End: 2022-11-23

## 2022-11-23 DIAGNOSIS — E55.9 VITAMIN D DEFICIENCY: ICD-10-CM

## 2022-11-23 DIAGNOSIS — I10 ESSENTIAL HYPERTENSION: Primary | ICD-10-CM

## 2022-11-23 DIAGNOSIS — E11.65 UNCONTROLLED TYPE 2 DIABETES MELLITUS WITH HYPERGLYCEMIA: ICD-10-CM

## 2022-11-23 DIAGNOSIS — E78.2 MIXED HYPERLIPIDEMIA: ICD-10-CM

## 2022-11-23 DIAGNOSIS — E11.65 TYPE 2 DIABETES MELLITUS WITH HYPERGLYCEMIA, WITHOUT LONG-TERM CURRENT USE OF INSULIN: ICD-10-CM

## 2022-11-23 DIAGNOSIS — Z12.5 SCREENING FOR PROSTATE CANCER: ICD-10-CM

## 2022-11-23 RX ORDER — TIRZEPATIDE 5 MG/.5ML
INJECTION, SOLUTION SUBCUTANEOUS
Qty: 2 ML | Refills: 0 | Status: SHIPPED | OUTPATIENT
Start: 2022-11-23 | End: 2022-12-14 | Stop reason: SDUPTHER

## 2022-11-28 NOTE — TELEPHONE ENCOUNTER
From: Jung Burkett  To: MERE Yang  Sent: 11/23/2022 5:38 PM EST  Subject: Blood work     I have an appointment on Tuesday November 29. I would like to get my labs done before my appointment.

## 2022-11-29 ENCOUNTER — OFFICE VISIT (OUTPATIENT)
Dept: FAMILY MEDICINE CLINIC | Facility: CLINIC | Age: 57
End: 2022-11-29

## 2022-11-29 ENCOUNTER — LAB (OUTPATIENT)
Dept: LAB | Facility: HOSPITAL | Age: 57
End: 2022-11-29

## 2022-11-29 VITALS
DIASTOLIC BLOOD PRESSURE: 69 MMHG | HEIGHT: 68 IN | SYSTOLIC BLOOD PRESSURE: 103 MMHG | WEIGHT: 191 LBS | OXYGEN SATURATION: 96 % | BODY MASS INDEX: 28.95 KG/M2 | HEART RATE: 74 BPM

## 2022-11-29 DIAGNOSIS — I10 ESSENTIAL HYPERTENSION: ICD-10-CM

## 2022-11-29 DIAGNOSIS — E78.2 MIXED HYPERLIPIDEMIA: ICD-10-CM

## 2022-11-29 DIAGNOSIS — Z12.5 SCREENING FOR PROSTATE CANCER: ICD-10-CM

## 2022-11-29 DIAGNOSIS — E78.5 HYPERLIPIDEMIA, UNSPECIFIED HYPERLIPIDEMIA TYPE: ICD-10-CM

## 2022-11-29 DIAGNOSIS — Z00.00 ANNUAL PHYSICAL EXAM: Primary | ICD-10-CM

## 2022-11-29 DIAGNOSIS — G47.00 INSOMNIA, UNSPECIFIED TYPE: ICD-10-CM

## 2022-11-29 DIAGNOSIS — E11.65 TYPE 2 DIABETES MELLITUS WITH HYPERGLYCEMIA, WITHOUT LONG-TERM CURRENT USE OF INSULIN: ICD-10-CM

## 2022-11-29 DIAGNOSIS — E11.9 TYPE 2 DIABETES MELLITUS WITHOUT COMPLICATION, WITHOUT LONG-TERM CURRENT USE OF INSULIN: ICD-10-CM

## 2022-11-29 DIAGNOSIS — K21.9 GASTROESOPHAGEAL REFLUX DISEASE, UNSPECIFIED WHETHER ESOPHAGITIS PRESENT: ICD-10-CM

## 2022-11-29 DIAGNOSIS — E55.9 VITAMIN D DEFICIENCY: ICD-10-CM

## 2022-11-29 LAB
ALBUMIN SERPL-MCNC: 4.2 G/DL (ref 3.5–5.2)
ALBUMIN UR-MCNC: <1.2 MG/DL
ALBUMIN/GLOB SERPL: 1.8 G/DL
ALP SERPL-CCNC: 45 U/L (ref 39–117)
ALT SERPL W P-5'-P-CCNC: 20 U/L (ref 1–41)
ANION GAP SERPL CALCULATED.3IONS-SCNC: 6.5 MMOL/L (ref 5–15)
AST SERPL-CCNC: 17 U/L (ref 1–40)
BASOPHILS # BLD AUTO: 0.02 10*3/MM3 (ref 0–0.2)
BASOPHILS NFR BLD AUTO: 0.4 % (ref 0–1.5)
BILIRUB SERPL-MCNC: 0.3 MG/DL (ref 0–1.2)
BUN SERPL-MCNC: 14 MG/DL (ref 6–20)
BUN/CREAT SERPL: 10.7 (ref 7–25)
CALCIUM SPEC-SCNC: 8.6 MG/DL (ref 8.6–10.5)
CHLORIDE SERPL-SCNC: 104 MMOL/L (ref 98–107)
CHOLEST SERPL-MCNC: 115 MG/DL (ref 0–200)
CO2 SERPL-SCNC: 27.5 MMOL/L (ref 22–29)
CREAT SERPL-MCNC: 1.31 MG/DL (ref 0.76–1.27)
DEPRECATED RDW RBC AUTO: 40.2 FL (ref 37–54)
EGFRCR SERPLBLD CKD-EPI 2021: 63.1 ML/MIN/1.73
EOSINOPHIL # BLD AUTO: 0.1 10*3/MM3 (ref 0–0.4)
EOSINOPHIL NFR BLD AUTO: 1.8 % (ref 0.3–6.2)
ERYTHROCYTE [DISTWIDTH] IN BLOOD BY AUTOMATED COUNT: 12 % (ref 12.3–15.4)
GLOBULIN UR ELPH-MCNC: 2.3 GM/DL
GLUCOSE SERPL-MCNC: 175 MG/DL (ref 65–99)
HBA1C MFR BLD: 6.5 % (ref 4.8–5.6)
HCT VFR BLD AUTO: 42.4 % (ref 37.5–51)
HDLC SERPL-MCNC: 42 MG/DL (ref 40–60)
HGB BLD-MCNC: 15.1 G/DL (ref 13–17.7)
IMM GRANULOCYTES # BLD AUTO: 0.02 10*3/MM3 (ref 0–0.05)
IMM GRANULOCYTES NFR BLD AUTO: 0.4 % (ref 0–0.5)
LDLC SERPL CALC-MCNC: 49 MG/DL (ref 0–100)
LDLC/HDLC SERPL: 1.07 {RATIO}
LYMPHOCYTES # BLD AUTO: 1.56 10*3/MM3 (ref 0.7–3.1)
LYMPHOCYTES NFR BLD AUTO: 27.5 % (ref 19.6–45.3)
MCH RBC QN AUTO: 32.4 PG (ref 26.6–33)
MCHC RBC AUTO-ENTMCNC: 35.6 G/DL (ref 31.5–35.7)
MCV RBC AUTO: 91 FL (ref 79–97)
MONOCYTES # BLD AUTO: 0.47 10*3/MM3 (ref 0.1–0.9)
MONOCYTES NFR BLD AUTO: 8.3 % (ref 5–12)
NEUTROPHILS NFR BLD AUTO: 3.5 10*3/MM3 (ref 1.7–7)
NEUTROPHILS NFR BLD AUTO: 61.6 % (ref 42.7–76)
NRBC BLD AUTO-RTO: 0 /100 WBC (ref 0–0.2)
PLATELET # BLD AUTO: 184 10*3/MM3 (ref 140–450)
PMV BLD AUTO: 10.8 FL (ref 6–12)
POTASSIUM SERPL-SCNC: 3.9 MMOL/L (ref 3.5–5.2)
PROT SERPL-MCNC: 6.5 G/DL (ref 6–8.5)
PSA SERPL-MCNC: 2.69 NG/ML (ref 0–4)
RBC # BLD AUTO: 4.66 10*6/MM3 (ref 4.14–5.8)
SODIUM SERPL-SCNC: 138 MMOL/L (ref 136–145)
T4 FREE SERPL-MCNC: 1.18 NG/DL (ref 0.93–1.7)
TRIGL SERPL-MCNC: 141 MG/DL (ref 0–150)
TSH SERPL DL<=0.05 MIU/L-ACNC: 2.31 UIU/ML (ref 0.27–4.2)
VLDLC SERPL-MCNC: 24 MG/DL (ref 5–40)
WBC NRBC COR # BLD: 5.67 10*3/MM3 (ref 3.4–10.8)

## 2022-11-29 PROCEDURE — G0103 PSA SCREENING: HCPCS

## 2022-11-29 PROCEDURE — 36415 COLL VENOUS BLD VENIPUNCTURE: CPT

## 2022-11-29 PROCEDURE — 83036 HEMOGLOBIN GLYCOSYLATED A1C: CPT

## 2022-11-29 PROCEDURE — 82306 VITAMIN D 25 HYDROXY: CPT

## 2022-11-29 PROCEDURE — 99396 PREV VISIT EST AGE 40-64: CPT | Performed by: NURSE PRACTITIONER

## 2022-11-29 PROCEDURE — 80050 GENERAL HEALTH PANEL: CPT

## 2022-11-29 PROCEDURE — 82043 UR ALBUMIN QUANTITATIVE: CPT

## 2022-11-29 PROCEDURE — 80061 LIPID PANEL: CPT

## 2022-11-29 PROCEDURE — 84439 ASSAY OF FREE THYROXINE: CPT

## 2022-11-29 RX ORDER — DEXLANSOPRAZOLE 60 MG/1
1 CAPSULE, DELAYED RELEASE ORAL DAILY
Qty: 90 CAPSULE | Refills: 1 | Status: SHIPPED | OUTPATIENT
Start: 2022-11-29 | End: 2023-02-28 | Stop reason: SDUPTHER

## 2022-11-29 RX ORDER — TRAZODONE HYDROCHLORIDE 100 MG/1
100 TABLET ORAL
Qty: 90 TABLET | Refills: 1 | Status: SHIPPED | OUTPATIENT
Start: 2022-11-29 | End: 2023-02-28 | Stop reason: SDUPTHER

## 2022-11-29 RX ORDER — ATORVASTATIN CALCIUM 20 MG/1
20 TABLET, FILM COATED ORAL
Qty: 90 TABLET | Refills: 1 | Status: SHIPPED | OUTPATIENT
Start: 2022-11-29 | End: 2023-02-28 | Stop reason: SDUPTHER

## 2022-11-29 RX ORDER — LOSARTAN POTASSIUM AND HYDROCHLOROTHIAZIDE 25; 100 MG/1; MG/1
1 TABLET ORAL DAILY
Qty: 90 TABLET | Refills: 1 | Status: SHIPPED | OUTPATIENT
Start: 2022-11-29 | End: 2023-02-27

## 2022-11-29 NOTE — PROGRESS NOTES
Chief Complaint  Hyperlipidemia, Vitamin D Deficiency, Diabetes, Hypertension, and Annual Exam    Subjective            Jung Burkett presents to [facility]  History of Present Illness  Pt is here to establish care from Nicola Schrader. Pt is a f/u for DM, HTN, HLD, and vitamin D deficiency. Pt states he has not been taking his vitamin d. Pt would like to wait for lab results before getting a refill on this.    Pt is due labs.    Pt is due DM foot exam.     Pt states he had vaccines but is unsure if was in office or pharmacy. Will check records.     Pt reports he sees Dr. Kamara sleep medicine for his adderall.    He reports long hx of chronic back pain and he has had MRIs and seen Dr. Canales and told not a surgery canidate  He has an open referral for pain management he may persue.        Past Medical History:   Diagnosis Date   • Acid reflux    • ADHD (attention deficit hyperactivity disorder) 1970   • Anxiety    • Anxiety disorder 05/17/2019   • Arthritis     generalized   • Benign prostatic hyperplasia 1989   • Blood disease     none   • Cervicalgia    • Chronic allergic rhinitis    • Colon polyp 2005   • Depression    • Diabetes mellitus, type 2 (HCC) 05/17/2019    DOES NOT CHECK BS   • Diabetic eye exam (HCC) 01/2019 20/20 PER PT    • Encounter for diabetic foot exam ([facility])     WITHIN FEW MO  PER PT    • Essential hypertension 05/17/2019   • GERD (gastroesophageal reflux disease)    • High blood pressure    • High cholesterol    • Hyperlipemia    • Hyperlipidemia    • Hypertension    • Low back pain 1987   • Major depressive disorder 05/17/2019   • Nicotine dependence 05/17/2019   • Prostate disorder     BPH   • Rotator cuff tear, left    • Seasonal allergies    • Urinary tract infection 1993   • Vitamin D deficiency 05/17/2019    no current issues       Allergies   Allergen Reactions   • Hydrocodone-Acetaminophen Itching   • Nsaids Arrhythmia   •  Oxycodone-Acetaminophen Shortness Of Breath   • Sulfa Antibiotics Hives   • Voltaren [Diclofenac] Palpitations        Past Surgical History:   Procedure Laterality Date   • CHOLECYSTECTOMY  1997   • COLONOSCOPY      EILSA- REPEAT IN 5 YEARS    • GALLBLADDER SURGERY     • KNEE ARTHROSCOPY W/ MENISCAL REPAIR Right    • OTHER SURGICAL HISTORY      SURGICAL CLIPS/gallbladder removed   • OTHER SURGICAL HISTORY      right knee meniscus tear repair   • SHOULDER ARTHROSCOPY W/ ROTATOR CUFF REPAIR Left 07/21/2021    Procedure: SHOULDER ARTHROSCOPY,SUBACROMINAL DECOMPRESSION, DISTAL CLAVICLE RESECTION, MINI OPEN  ROTATOR CUFF REPAIR;  Surgeon: Ulisses Kenney MD;  [facility] [facility]  Service: Orthopedics;  Laterality: Left;   • TONSILLECTOMY     • VASECTOMY  2004        Social History     Tobacco Use   • Smoking status: Some Days     Types: Cigars   • Smokeless tobacco: Never   • Tobacco comments:     1 cigar daily   Substance Use Topics   • Alcohol use: Yes     Alcohol/week: 6.0 standard drinks     Types: 6 Drinks containing 0.5 oz of alcohol per week     Comment: Drinks daily; beer. Occasionally drinks 2 drinks per day, has been drinking for 6-10 years.        Family History   Problem Relation Age of Onset   • Cancer Mother    • Diabetes Mother    • Rheum arthritis Mother         40'S   • Heart attack Mother    • Breast cancer Mother         40'S   • Arthritis Mother    • Stroke Father    • Heart disease Father    • Heart attack Maternal Grandmother    • Breast cancer Maternal Grandmother         50'S   • Prostate cancer Maternal Grandfather    • Nephrolithiasis Maternal Grandfather    • Colon cancer Paternal Grandmother    • Colon cancer Paternal Grandfather         60'S   • Breast cancer Other         40'S   • Heart attack Maternal Aunt    • Malig Hyperthermia Neg Hx         Current Outpatient Medications on File Prior to Visit   Medication Sig   • albuterol (PROVENTIL) (2.5 MG/3ML) 0.083% nebulizer solution  Take 2.5 mg by nebulization Every 4 (Four) Hours As Needed for Wheezing.   • albuterol sulfate  (90 Base) MCG/ACT inhaler Inhale 2 puffs Every 4 (Four) Hours As Needed for Wheezing.   • amphetamine-dextroamphetamine (ADDERALL) 20 MG tablet Take 1 tablet by mouth 3 (Three) Times a Day.   • busPIRone (BUSPAR) 10 MG tablet Take 1 tablet by mouth 3 (Three) Times a Day.   • CBD (cannabidiol) oral oil Take  by mouth.   • cyclobenzaprine (FLEXERIL) 10 MG tablet Take 1 tablet by mouth At Night As Needed for Muscle Spasms.   • dicyclomine (BENTYL) 20 MG tablet TAKE 1 TABLET BY MOUTH 4  TIMES DAILY   • metoprolol succinate XL (TOPROL-XL) 50 MG 24 hr tablet TAKE 1 TABLET BY MOUTH ONCE DAILY   • Mounjaro 5 MG/0.5ML solution pen-injector INJECT 0.5 MG SUBCUTANEOUSLY EVERY WEEK AS DIRECTED   • tamsulosin (FLOMAX) 0.4 MG capsule 24 hr capsule TAKE 1 CAPSULE BY MOUTH  ONCE DAILY 1/2 HOUR  FOLLOWING SAME MEAL EVERY  DAY   • traMADol (ULTRAM) 50 MG tablet TAKE 1 TABLET BY MOUTH EVERY 6 HOURS AS NEEDED FOR PAIN   • vitamin D (ERGOCALCIFEROL) 1.25 MG (17648 UT) capsule capsule Take 1 capsule by mouth 1 (One) Time Per Week.   • [DISCONTINUED] atorvastatin (LIPITOR) 20 MG tablet TAKE 1 TABLET BY MOUTH ONCE DAILY AT BEDTIME   • [DISCONTINUED] dexlansoprazole (Dexilant) 60 MG capsule Take 1 capsule by mouth Daily.   • [DISCONTINUED] losartan-hydrochlorothiazide (HYZAAR) 100-25 MG per tablet TAKE 1 TABLET BY MOUTH ONCE DAILY   • [DISCONTINUED] traZODone (DESYREL) 100 MG tablet TAKE 1 TABLET BY MOUTH ONCE DAILY AT BEDTIME   • [DISCONTINUED] DIGESTIVE ENZYMES PO Take  by mouth.   • [DISCONTINUED] Empagliflozin-metFORMIN HCl (Synjardy) 12.5-1000 MG tablet Take 1 tablet by mouth 2 (two) times a day.     No current facility-administered medications on file prior to visit.       Health Maintenance Due   Topic Date Due   • ANNUAL PHYSICAL  Never done   • DIABETIC FOOT EXAM  10/28/2021   • URINE MICROALBUMIN  07/30/2022       Objective  "    /69   Pulse 74   Ht 172.7 cm (68\")   Wt 86.6 kg (191 lb)   SpO2 96%   BMI 29.04 kg/m²       Physical Exam  Constitutional:       General: He is not in acute distress.     Appearance: Normal appearance. He is not ill-appearing.   HENT:      Head: Normocephalic and atraumatic.      Right Ear: Tympanic membrane, ear canal and external ear normal.      Left Ear: Tympanic membrane, ear canal and external ear normal.      Nose: Nose normal.   Cardiovascular:      Rate and Rhythm: Normal rate and regular rhythm.      Pulses:           Dorsalis pedis pulses are 2+ on the right side and 2+ on the left side.      Heart sounds: Normal heart sounds. No murmur heard.  Pulmonary:      Effort: Pulmonary effort is normal. No respiratory distress.      Breath sounds: Normal breath sounds.   Chest:      Chest wall: No tenderness.   Abdominal:      General: Abdomen is flat. Bowel sounds are normal. There is no distension.      Palpations: Abdomen is soft. There is no mass.      Tenderness: There is no abdominal tenderness. There is no guarding.   Musculoskeletal:         General: No swelling or tenderness. Normal range of motion.      Cervical back: Normal range of motion and neck supple.   Feet:      Right foot:      Protective Sensation: 3 sites tested. 3 sites sensed.      Skin integrity: Skin integrity normal. No ulcer or blister.      Toenail Condition: Right toenails are normal.      Left foot:      Protective Sensation: 3 sites tested. 3 sites sensed.      Skin integrity: Skin integrity normal. No ulcer or blister.      Toenail Condition: Left toenails are normal.      Comments:      Skin:     General: Skin is warm and dry.      Findings: No rash.   Neurological:      General: No focal deficit present.      Mental Status: He is alert and oriented to person, place, and time. Mental status is at baseline.      Gait: Gait normal.   Psychiatric:         Mood and Affect: Mood normal.         Behavior: Behavior normal. "         Thought Content: Thought content normal.         Judgment: Judgment normal.           Result Review :                           Assessment and Plan        Diagnoses and all orders for this visit:    1. Annual physical exam (Primary)    2. Hyperlipidemia, unspecified hyperlipidemia type  Comments:  stable on lipitor 20mg, continue  Orders:  -     atorvastatin (LIPITOR) 20 MG tablet; Take 1 tablet by mouth every night at bedtime.  Dispense: 90 tablet; Refill: 1    3. Gastroesophageal reflux disease, unspecified whether esophagitis present  Comments:  stable on dexilant 60mg, continue  Orders:  -     dexlansoprazole (Dexilant) 60 MG capsule; Take 1 capsule by mouth Daily.  Dispense: 90 capsule; Refill: 1    4. Insomnia, unspecified type  Comments:  stable on trazadone 100mg, continue  Orders:  -     traZODone (DESYREL) 100 MG tablet; Take 1 tablet by mouth every night at bedtime.  Dispense: 90 tablet; Refill: 1    5. Essential hypertension  Comments:  stable on  metoprolol 50mg and hyzaaar 100/25mg, continue  Orders:  -     losartan-hydrochlorothiazide (HYZAAR) 100-25 MG per tablet; Take 1 tablet by mouth Daily.  Dispense: 90 tablet; Refill: 1    6. Type 2 diabetes mellitus without complication, without long-term current use of insulin (HCC)  Comments:  stable on mounjaro, continue    Monofilament Test Performed          Follow Up     Return in about 3 months (around 2/28/2023).    Patient was given instructions and counseling regarding his condition or for health maintenance advice. Please see specific information pulled into the AVS if appropriate.     Jung Burkett  reports that he has been smoking cigars. He has never used smokeless tobacco.. I have educated him on the risk of diseases from using tobacco products such as cancer, COPD and heart disease.     I advised him to quit and he is not willing to quit.    I spent 3  minutes counseling the patient.

## 2022-11-30 LAB — 25(OH)D3 SERPL-MCNC: 26.2 NG/ML (ref 30–100)

## 2022-12-07 ENCOUNTER — PATIENT ROUNDING (BHMG ONLY) (OUTPATIENT)
Dept: FAMILY MEDICINE CLINIC | Facility: CLINIC | Age: 57
End: 2022-12-07

## 2022-12-07 NOTE — PROGRESS NOTES
A [employer] message has been sent to the patient for PATIENT ROUNDING with [facility].

## 2022-12-14 ENCOUNTER — PATIENT MESSAGE (OUTPATIENT)
Dept: FAMILY MEDICINE CLINIC | Facility: CLINIC | Age: 57
End: 2022-12-14

## 2022-12-14 ENCOUNTER — TELEPHONE (OUTPATIENT)
Dept: FAMILY MEDICINE CLINIC | Facility: CLINIC | Age: 57
End: 2022-12-14

## 2022-12-14 DIAGNOSIS — F41.9 ANXIETY: ICD-10-CM

## 2022-12-14 DIAGNOSIS — E11.65 UNCONTROLLED TYPE 2 DIABETES MELLITUS WITH HYPERGLYCEMIA: ICD-10-CM

## 2022-12-14 DIAGNOSIS — E11.65 TYPE 2 DIABETES MELLITUS WITH HYPERGLYCEMIA, WITHOUT LONG-TERM CURRENT USE OF INSULIN: Primary | ICD-10-CM

## 2022-12-14 DIAGNOSIS — E55.9 VITAMIN D DEFICIENCY: ICD-10-CM

## 2022-12-14 RX ORDER — ERGOCALCIFEROL 1.25 MG/1
50000 CAPSULE ORAL WEEKLY
Qty: 13 CAPSULE | Refills: 1 | Status: SHIPPED | OUTPATIENT
Start: 2022-12-14 | End: 2023-02-28 | Stop reason: SDUPTHER

## 2022-12-14 RX ORDER — TIRZEPATIDE 5 MG/.5ML
5 INJECTION, SOLUTION SUBCUTANEOUS WEEKLY
Qty: 6 ML | Refills: 1 | Status: SHIPPED | OUTPATIENT
Start: 2022-12-14 | End: 2023-02-28 | Stop reason: SDUPTHER

## 2022-12-14 NOTE — TELEPHONE ENCOUNTER
From: Jung Burkett  To: MERE Yang  Sent: 12/14/2022 11:54 AM EST  Subject: Question regarding VITAMIN D,25-HYDROXY    Can you send a prescription for vitamin D to [facility]?

## 2022-12-14 NOTE — TELEPHONE ENCOUNTER
Pharmacy Name: [facility] - [place], [place] - [address] - [phone]  - [phone]      Pharmacy representative name: KASSANDRA    Pharmacy representative phone number: [phone]    What medication are you calling in regards to:   Tirzepatide (Mounjaro) 5 MG/0.5ML solution pen-injector    What question does the pharmacy have: INSURANCE DOES NOT COVER THIS MEDICATION IN THE QUANTITY IT WAS PRESCRIBED AS, BUT IT DOES COVER FOR 90 DAY SUPPLY. THEY ARE REQUESTING THIS SWITCHED TO 90 DAY SUPPLY.     Who is the provider that prescribed the medication: SAILAJA BALBUENA

## 2022-12-15 NOTE — TELEPHONE ENCOUNTER
From: Jung Burkett  To: MERE Yang  Sent: 12/14/2022 12:05 PM EST  Subject: Question regarding HEMOGLOBIN A1C    I am taking Manjaro for my diabetes can you send a 3-month prescription to [facility] I can get 3 months for the same price as one. I also started back on my Synjardy can you send that prescription to [facility]?

## 2022-12-16 DIAGNOSIS — G47.11 IDIOPATHIC HYPERSOMNIA: ICD-10-CM

## 2022-12-16 RX ORDER — DEXTROAMPHETAMINE SACCHARATE, AMPHETAMINE ASPARTATE, DEXTROAMPHETAMINE SULFATE AND AMPHETAMINE SULFATE 5; 5; 5; 5 MG/1; MG/1; MG/1; MG/1
20 TABLET ORAL 3 TIMES DAILY
Qty: 90 TABLET | Refills: 0 | OUTPATIENT
Start: 2022-12-16

## 2022-12-16 NOTE — TELEPHONE ENCOUNTER
Pt called requests refill on Adderall 20mg sent to [facility]. Last refill was 11/7/22.

## 2022-12-19 DIAGNOSIS — G47.11 IDIOPATHIC HYPERSOMNIA: ICD-10-CM

## 2022-12-19 RX ORDER — DEXTROAMPHETAMINE SACCHARATE, AMPHETAMINE ASPARTATE, DEXTROAMPHETAMINE SULFATE AND AMPHETAMINE SULFATE 5; 5; 5; 5 MG/1; MG/1; MG/1; MG/1
20 TABLET ORAL 3 TIMES DAILY
Qty: 90 TABLET | Refills: 0 | Status: SHIPPED | OUTPATIENT
Start: 2022-12-19 | End: 2023-01-23 | Stop reason: SDUPTHER

## 2023-01-13 ENCOUNTER — TELEPHONE (OUTPATIENT)
Dept: FAMILY MEDICINE CLINIC | Facility: CLINIC | Age: 58
End: 2023-01-13
Payer: COMMERCIAL

## 2023-01-13 NOTE — TELEPHONE ENCOUNTER
Caller: Jung Burkett    Relationship: Self    Best call back number: [phone]    What is the best time to reach you: ANY    Who are you requesting to speak with (clinical staff, provider,  specific staff member): CLINICAL    What was the call regarding: PATIENT CALLED TO DISCUSS IF HIS NEW PHARMACY WOULD NEED PRIOR AUTHORIZATION FOR HIS MEDICATIONS. HE WOULD ALSO LIKE TO MAKE SURE HIS MEDICATION LIST IS UP TO DATE.      Do you require a callback: YES

## 2023-01-16 NOTE — TELEPHONE ENCOUNTER
Pt's pharmacy has been changed to [facility].     Pt will update med list and request refills through [facility].

## 2023-01-20 DIAGNOSIS — G47.11 IDIOPATHIC HYPERSOMNIA: ICD-10-CM

## 2023-01-20 RX ORDER — DEXTROAMPHETAMINE SACCHARATE, AMPHETAMINE ASPARTATE, DEXTROAMPHETAMINE SULFATE AND AMPHETAMINE SULFATE 5; 5; 5; 5 MG/1; MG/1; MG/1; MG/1
20 TABLET ORAL 3 TIMES DAILY
Qty: 90 TABLET | Refills: 0 | Status: CANCELLED | OUTPATIENT
Start: 2023-01-20

## 2023-01-23 ENCOUNTER — TELEPHONE (OUTPATIENT)
Dept: SLEEP MEDICINE | Facility: HOSPITAL | Age: 58
End: 2023-01-23

## 2023-01-23 DIAGNOSIS — G47.11 IDIOPATHIC HYPERSOMNIA: ICD-10-CM

## 2023-01-23 RX ORDER — DEXTROAMPHETAMINE SACCHARATE, AMPHETAMINE ASPARTATE, DEXTROAMPHETAMINE SULFATE AND AMPHETAMINE SULFATE 5; 5; 5; 5 MG/1; MG/1; MG/1; MG/1
20 TABLET ORAL 3 TIMES DAILY
Qty: 90 TABLET | Refills: 0 | Status: SHIPPED | OUTPATIENT
Start: 2023-01-23 | End: 2023-01-24 | Stop reason: SDUPTHER

## 2023-01-24 ENCOUNTER — TELEPHONE (OUTPATIENT)
Dept: SLEEP MEDICINE | Facility: HOSPITAL | Age: 58
End: 2023-01-24
Payer: COMMERCIAL

## 2023-01-24 DIAGNOSIS — G47.11 IDIOPATHIC HYPERSOMNIA: ICD-10-CM

## 2023-01-24 RX ORDER — DEXTROAMPHETAMINE SACCHARATE, AMPHETAMINE ASPARTATE, DEXTROAMPHETAMINE SULFATE AND AMPHETAMINE SULFATE 5; 5; 5; 5 MG/1; MG/1; MG/1; MG/1
20 TABLET ORAL 3 TIMES DAILY
Qty: 90 TABLET | Refills: 0 | Status: CANCELLED | OUTPATIENT
Start: 2023-01-24

## 2023-01-24 RX ORDER — DEXTROAMPHETAMINE SACCHARATE, AMPHETAMINE ASPARTATE, DEXTROAMPHETAMINE SULFATE AND AMPHETAMINE SULFATE 5; 5; 5; 5 MG/1; MG/1; MG/1; MG/1
20 TABLET ORAL 3 TIMES DAILY
Qty: 90 TABLET | Refills: 0 | Status: SHIPPED | OUTPATIENT
Start: 2023-01-24 | End: 2023-02-27 | Stop reason: SDUPTHER

## 2023-01-24 NOTE — TELEPHONE ENCOUNTER
Pt called that his pharmacy ([facility]) is out of his medication.  He called [facility] [email or web address] and they have it in stock so he is asking if we can send it to that pharmacy.  I will switch the pharmacy for you if you can resend.

## 2023-01-24 NOTE — TELEPHONE ENCOUNTER
Patient called for change in Pharmacy, would like his Rx sent to [facility]. Message sent to provider

## 2023-01-25 ENCOUNTER — TELEPHONE (OUTPATIENT)
Dept: SLEEP MEDICINE | Facility: HOSPITAL | Age: 58
End: 2023-01-25
Payer: COMMERCIAL

## 2023-01-27 DIAGNOSIS — E78.5 HYPERLIPIDEMIA, UNSPECIFIED HYPERLIPIDEMIA TYPE: ICD-10-CM

## 2023-01-27 DIAGNOSIS — E11.65 UNCONTROLLED TYPE 2 DIABETES MELLITUS WITH HYPERGLYCEMIA: ICD-10-CM

## 2023-01-27 DIAGNOSIS — K21.9 GASTROESOPHAGEAL REFLUX DISEASE, UNSPECIFIED WHETHER ESOPHAGITIS PRESENT: ICD-10-CM

## 2023-01-27 DIAGNOSIS — G47.00 INSOMNIA, UNSPECIFIED TYPE: ICD-10-CM

## 2023-01-27 DIAGNOSIS — E55.9 VITAMIN D DEFICIENCY: ICD-10-CM

## 2023-01-27 DIAGNOSIS — E11.65 TYPE 2 DIABETES MELLITUS WITH HYPERGLYCEMIA, WITHOUT LONG-TERM CURRENT USE OF INSULIN: ICD-10-CM

## 2023-01-27 RX ORDER — ATORVASTATIN CALCIUM 20 MG/1
20 TABLET, FILM COATED ORAL
Qty: 90 TABLET | Refills: 1 | OUTPATIENT
Start: 2023-01-27

## 2023-01-27 RX ORDER — DEXLANSOPRAZOLE 60 MG/1
1 CAPSULE, DELAYED RELEASE ORAL DAILY
Qty: 90 CAPSULE | Refills: 1 | OUTPATIENT
Start: 2023-01-27

## 2023-01-27 RX ORDER — TIRZEPATIDE 5 MG/.5ML
5 INJECTION, SOLUTION SUBCUTANEOUS WEEKLY
Qty: 6 ML | Refills: 1 | OUTPATIENT
Start: 2023-01-27

## 2023-01-27 RX ORDER — TRAZODONE HYDROCHLORIDE 100 MG/1
100 TABLET ORAL
Qty: 90 TABLET | Refills: 1 | OUTPATIENT
Start: 2023-01-27

## 2023-01-27 RX ORDER — ERGOCALCIFEROL 1.25 MG/1
50000 CAPSULE ORAL WEEKLY
Qty: 13 CAPSULE | Refills: 1 | OUTPATIENT
Start: 2023-01-27

## 2023-02-01 ENCOUNTER — TELEPHONE (OUTPATIENT)
Dept: SLEEP MEDICINE | Facility: HOSPITAL | Age: 58
End: 2023-02-01
Payer: COMMERCIAL

## 2023-02-19 DIAGNOSIS — E11.65 TYPE 2 DIABETES MELLITUS WITH HYPERGLYCEMIA, WITHOUT LONG-TERM CURRENT USE OF INSULIN: ICD-10-CM

## 2023-02-20 RX ORDER — EMPAGLIFLOZIN AND METFORMIN HYDROCHLORIDE 12.5; 1 MG/1; MG/1
TABLET ORAL
Qty: 180 TABLET | Refills: 1 | Status: SHIPPED | OUTPATIENT
Start: 2023-02-20 | End: 2023-02-28 | Stop reason: SDUPTHER

## 2023-02-27 ENCOUNTER — OFFICE VISIT (OUTPATIENT)
Dept: SLEEP MEDICINE | Facility: HOSPITAL | Age: 58
End: 2023-02-27
Payer: COMMERCIAL

## 2023-02-27 VITALS
HEIGHT: 68 IN | WEIGHT: 173.6 LBS | HEART RATE: 86 BPM | SYSTOLIC BLOOD PRESSURE: 121 MMHG | BODY MASS INDEX: 26.31 KG/M2 | DIASTOLIC BLOOD PRESSURE: 83 MMHG | OXYGEN SATURATION: 96 %

## 2023-02-27 DIAGNOSIS — G47.11 IDIOPATHIC HYPERSOMNIA: Primary | ICD-10-CM

## 2023-02-27 PROCEDURE — G0463 HOSPITAL OUTPT CLINIC VISIT: HCPCS

## 2023-02-27 PROCEDURE — 99214 OFFICE O/P EST MOD 30 MIN: CPT | Performed by: INTERNAL MEDICINE

## 2023-02-27 RX ORDER — DEXTROAMPHETAMINE SACCHARATE, AMPHETAMINE ASPARTATE, DEXTROAMPHETAMINE SULFATE AND AMPHETAMINE SULFATE 5; 5; 5; 5 MG/1; MG/1; MG/1; MG/1
20 TABLET ORAL 3 TIMES DAILY
Qty: 90 TABLET | Refills: 0 | Status: SHIPPED | OUTPATIENT
Start: 2023-02-27

## 2023-02-27 NOTE — PROGRESS NOTES
"  [address] [address]  Phone: [phone]  Fax: [phone]      SLEEP CLINIC FOLLOW UP PROGRESS NOTE.    Jung YIP Madelaine  1965  58 y.o.  male      PCP: Francisca Riggins APRN      Date of visit: 2/27/2023    Chief Complaint   Patient presents with   • Daytime Sleepiness       HPI:  This is a 58 y.o. years old patient who has a history of idiopathic hypersomnia and ADHD who is on Adderall 20 mg 3 times a day and doing well.  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  He says the medication has helped him and is able to function.  In the last 12 months patient is not able to work because of his shoulder problem.  Recently also has developed palpitation for which he is started on beta-blocker     Medications and allergies are reviewed by me and documented in the encounter.     SOCIAL ( habits pertaining to sleep medicine)  History of tobacco use:Yes smoke cigars  History of alcohol use: 6 per week  Caffeine use: 1    REVIEW OF SYSTEMS:   [place] Sleepiness Scale :    Nasal congestion:No   Dry mouth/nose:No   Post nasal drip; No   Acid reflux/Heartburn:Yes   Abd bloating:No   Morning headache:No   Anxiety:No   Depression:No     PHYSICAL EXAMINATION:  CONSTITUTIONAL:  Vitals:    02/27/23 1500   BP: 121/83   Pulse: 86   SpO2: 96%   Weight: 78.7 kg (173 lb 9.6 oz)   Height: 172.7 cm (67.99\")    Body mass index is 26.4 kg/m².   NOSE: nasal passages are clear, no nasal polyps, septum in the midline.  THROAT: throat is clear, oral airway Mallampati class 2  RESP SYSTEM: Breath sounds are normal, no wheezes or crackles  CARDIOVASULAR: Heart rate is regular without murmur. No edema      Celestine checked no problem, 2/27/2023      ASSESSMENT AND PLAN:  · Idiopathic hypersomnia.  Patient is unstable on Adderall 20 mg 3 times a day and I have renewed his medications and will " see him in 6 months for follow-up.  Celestine has been checked on PDMP and has no problems.  He gets his medications from [place] [facility]  · ADHD,    · Palpitation, on beta-blocker  · Return in about 6 months (around 8/27/2023) for Next scheduled follow-up. . Patient's questions were answered.        Jaimie Kamara MD  Sleep Medicine  Medical Director, [facility], [facility]  2/27/2023

## 2023-02-28 ENCOUNTER — OFFICE VISIT (OUTPATIENT)
Dept: FAMILY MEDICINE CLINIC | Facility: CLINIC | Age: 58
End: 2023-02-28
Payer: COMMERCIAL

## 2023-02-28 VITALS
SYSTOLIC BLOOD PRESSURE: 121 MMHG | DIASTOLIC BLOOD PRESSURE: 75 MMHG | HEIGHT: 68 IN | WEIGHT: 174 LBS | HEART RATE: 76 BPM | BODY MASS INDEX: 26.37 KG/M2 | OXYGEN SATURATION: 98 %

## 2023-02-28 DIAGNOSIS — F41.9 ANXIETY: ICD-10-CM

## 2023-02-28 DIAGNOSIS — G47.00 INSOMNIA, UNSPECIFIED TYPE: ICD-10-CM

## 2023-02-28 DIAGNOSIS — F51.01 PRIMARY INSOMNIA: ICD-10-CM

## 2023-02-28 DIAGNOSIS — N40.1 BENIGN PROSTATIC HYPERPLASIA WITH URINARY FREQUENCY: ICD-10-CM

## 2023-02-28 DIAGNOSIS — K21.9 GASTROESOPHAGEAL REFLUX DISEASE, UNSPECIFIED WHETHER ESOPHAGITIS PRESENT: ICD-10-CM

## 2023-02-28 DIAGNOSIS — K58.9 IRRITABLE BOWEL SYNDROME, UNSPECIFIED TYPE: ICD-10-CM

## 2023-02-28 DIAGNOSIS — R35.0 BENIGN PROSTATIC HYPERPLASIA WITH URINARY FREQUENCY: ICD-10-CM

## 2023-02-28 DIAGNOSIS — K21.9 GASTROESOPHAGEAL REFLUX DISEASE WITHOUT ESOPHAGITIS: ICD-10-CM

## 2023-02-28 DIAGNOSIS — Z23 NEED FOR SHINGLES VACCINE: ICD-10-CM

## 2023-02-28 DIAGNOSIS — E11.65 TYPE 2 DIABETES MELLITUS WITH HYPERGLYCEMIA, WITHOUT LONG-TERM CURRENT USE OF INSULIN: ICD-10-CM

## 2023-02-28 DIAGNOSIS — Z23 NEED FOR PNEUMOCOCCAL VACCINATION: ICD-10-CM

## 2023-02-28 DIAGNOSIS — E78.5 HYPERLIPIDEMIA, UNSPECIFIED HYPERLIPIDEMIA TYPE: Primary | ICD-10-CM

## 2023-02-28 DIAGNOSIS — I10 ESSENTIAL HYPERTENSION: ICD-10-CM

## 2023-02-28 DIAGNOSIS — E55.9 VITAMIN D DEFICIENCY: ICD-10-CM

## 2023-02-28 PROCEDURE — 90472 IMMUNIZATION ADMIN EACH ADD: CPT | Performed by: NURSE PRACTITIONER

## 2023-02-28 PROCEDURE — 99214 OFFICE O/P EST MOD 30 MIN: CPT | Performed by: NURSE PRACTITIONER

## 2023-02-28 PROCEDURE — 90471 IMMUNIZATION ADMIN: CPT | Performed by: NURSE PRACTITIONER

## 2023-02-28 PROCEDURE — 90677 PCV20 VACCINE IM: CPT | Performed by: NURSE PRACTITIONER

## 2023-02-28 PROCEDURE — 90750 HZV VACC RECOMBINANT IM: CPT | Performed by: NURSE PRACTITIONER

## 2023-02-28 RX ORDER — TIRZEPATIDE 5 MG/.5ML
5 INJECTION, SOLUTION SUBCUTANEOUS WEEKLY
Qty: 6 ML | Refills: 1 | Status: SHIPPED | OUTPATIENT
Start: 2023-02-28

## 2023-02-28 RX ORDER — DICYCLOMINE HCL 20 MG
20 TABLET ORAL 4 TIMES DAILY
Qty: 360 TABLET | Refills: 1 | Status: SHIPPED | OUTPATIENT
Start: 2023-02-28 | End: 2023-03-20 | Stop reason: SDUPTHER

## 2023-02-28 RX ORDER — TAMSULOSIN HYDROCHLORIDE 0.4 MG/1
1 CAPSULE ORAL DAILY
Qty: 90 CAPSULE | Refills: 1 | Status: SHIPPED | OUTPATIENT
Start: 2023-02-28 | End: 2023-03-20 | Stop reason: SDUPTHER

## 2023-02-28 RX ORDER — BUSPIRONE HYDROCHLORIDE 10 MG/1
10 TABLET ORAL 3 TIMES DAILY
Qty: 270 TABLET | Refills: 0 | Status: SHIPPED | OUTPATIENT
Start: 2023-02-28 | End: 2023-03-20 | Stop reason: SDUPTHER

## 2023-02-28 RX ORDER — METOPROLOL SUCCINATE 50 MG/1
50 TABLET, EXTENDED RELEASE ORAL DAILY
Qty: 90 TABLET | Refills: 1 | Status: SHIPPED | OUTPATIENT
Start: 2023-02-28 | End: 2023-03-20 | Stop reason: SDUPTHER

## 2023-02-28 RX ORDER — DEXLANSOPRAZOLE 60 MG/1
1 CAPSULE, DELAYED RELEASE ORAL DAILY
Qty: 90 CAPSULE | Refills: 1 | Status: SHIPPED | OUTPATIENT
Start: 2023-02-28 | End: 2023-03-08 | Stop reason: SDUPTHER

## 2023-02-28 RX ORDER — ERGOCALCIFEROL 1.25 MG/1
50000 CAPSULE ORAL WEEKLY
Qty: 13 CAPSULE | Refills: 1 | Status: SHIPPED | OUTPATIENT
Start: 2023-02-28 | End: 2023-03-20 | Stop reason: SDUPTHER

## 2023-02-28 RX ORDER — ATORVASTATIN CALCIUM 20 MG/1
20 TABLET, FILM COATED ORAL
Qty: 90 TABLET | Refills: 1 | Status: SHIPPED | OUTPATIENT
Start: 2023-02-28

## 2023-02-28 RX ORDER — EMPAGLIFLOZIN AND METFORMIN HYDROCHLORIDE 12.5; 1 MG/1; MG/1
1 TABLET ORAL 2 TIMES DAILY
Qty: 180 TABLET | Refills: 1 | Status: SHIPPED | OUTPATIENT
Start: 2023-02-28

## 2023-02-28 RX ORDER — TRAZODONE HYDROCHLORIDE 100 MG/1
100 TABLET ORAL
Qty: 90 TABLET | Refills: 1 | Status: SHIPPED | OUTPATIENT
Start: 2023-02-28 | End: 2023-03-20 | Stop reason: SDUPTHER

## 2023-02-28 NOTE — PROGRESS NOTES
Chief Complaint  Diabetes, Vitamin D Deficiency, Hyperlipidemia, and Insomnia (/), anxiety, GERD, HTN, IBD, BPH    Subjective            Jung Burkett presents to [facility]  History of Present Illness  Pt is a 3 mo f/u for DM, vitamin d deficiency, insomnia, HTN, gerd, IBD, BPH and HLD. No issues or concerns at this time.    Pt is due A1C and vit D labs.    Adderall is managed by Dr. Kamara     Pt will get shingles vaccine today and pneumonia vaccine.         Past Medical History:   Diagnosis Date   • Acid reflux    • ADHD (attention deficit hyperactivity disorder) 1970   • Anxiety    • Anxiety disorder 05/17/2019   • Arthritis     generalized   • Benign prostatic hyperplasia 1989   • Blood disease     none   • Cervicalgia    • Chronic allergic rhinitis    • Colon polyp 2005   • Depression    • Diabetes mellitus, type 2 ([facility]) 05/17/2019    DOES NOT CHECK BS   • Diabetic eye exam ([facility]) 01/2019 20/20 PER PT    • Encounter for diabetic foot exam ([facility])     WITHIN FEW MO  PER PT    • Essential hypertension 05/17/2019   • GERD (gastroesophageal reflux disease)    • High blood pressure    • High cholesterol    • Hyperlipemia    • Hyperlipidemia    • Hypertension    • Low back pain 1987   • Major depressive disorder 05/17/2019   • Nicotine dependence 05/17/2019   • Prostate disorder     BPH   • Rotator cuff tear, left    • Seasonal allergies    • Urinary tract infection 1993   • Vitamin D deficiency 05/17/2019    no current issues       Allergies   Allergen Reactions   • Hydrocodone-Acetaminophen Itching   • Nsaids Arrhythmia   • Oxycodone-Acetaminophen Shortness Of Breath   • Sulfa Antibiotics Hives   • Voltaren [Diclofenac] Palpitations        Past Surgical History:   Procedure Laterality Date   • CHOLECYSTECTOMY  1997   • COLONOSCOPY      ELISA- REPEAT IN 5 YEARS    • GALLBLADDER SURGERY     • KNEE ARTHROSCOPY W/ MENISCAL REPAIR Right    • OTHER SURGICAL HISTORY       SURGICAL CLIPS/gallbladder removed   • OTHER SURGICAL HISTORY      right knee meniscus tear repair   • SHOULDER ARTHROSCOPY W/ ROTATOR CUFF REPAIR Left 07/21/2021    Procedure: SHOULDER ARTHROSCOPY,SUBACROMINAL DECOMPRESSION, DISTAL CLAVICLE RESECTION, MINI OPEN  ROTATOR CUFF REPAIR;  Surgeon: Ulisses Kenney MD;  Location: [facility] [place]  Service: Orthopedics;  Laterality: Left;   • TONSILLECTOMY     • VASECTOMY  2004        Social History     Tobacco Use   • Smoking status: Some Days     Types: Cigars   • Smokeless tobacco: Never   • Tobacco comments:     1 cigar daily   Substance Use Topics   • Alcohol use: Yes     Alcohol/week: 6.0 standard drinks     Types: 6 Drinks containing 0.5 oz of alcohol per week     Comment: Drinks daily; beer. Occasionally drinks 2 drinks per day, has been drinking for 6-10 years.        Family History   Problem Relation Age of Onset   • Cancer Mother    • Diabetes Mother    • Rheum arthritis Mother         40'S   • Heart attack Mother    • Breast cancer Mother         40'S   • Arthritis Mother    • Stroke Father    • Heart disease Father    • Heart attack Maternal Grandmother    • Breast cancer Maternal Grandmother         50'S   • Prostate cancer Maternal Grandfather    • Nephrolithiasis Maternal Grandfather    • Colon cancer Paternal Grandmother    • Colon cancer Paternal Grandfather         60'S   • Breast cancer Other         40'S   • Heart attack Maternal Aunt    • Malig Hyperthermia Neg Hx         Current Outpatient Medications on File Prior to Visit   Medication Sig   • amphetamine-dextroamphetamine (ADDERALL) 20 MG tablet Take 1 tablet by mouth 3 (Three) Times a Day.   • CBD (cannabidiol) oral oil Take  by mouth.   • [DISCONTINUED] atorvastatin (LIPITOR) 20 MG tablet Take 1 tablet by mouth every night at bedtime.   • [DISCONTINUED] busPIRone (BUSPAR) 10 MG tablet Take 1 tablet by mouth 3 (Three) Times a Day.   • [DISCONTINUED] dexlansoprazole (Dexilant) 60 MG capsule Take  "1 capsule by mouth Daily.   • [DISCONTINUED] dicyclomine (BENTYL) 20 MG tablet TAKE 1 TABLET BY MOUTH 4  TIMES DAILY   • [DISCONTINUED] metoprolol succinate XL (TOPROL-XL) 50 MG 24 hr tablet TAKE 1 TABLET BY MOUTH ONCE DAILY   • [DISCONTINUED] Synjardy 12.5-1000 MG tablet TAKE 1 TABLET BY MOUTH TWICE  DAILY   • [DISCONTINUED] tamsulosin (FLOMAX) 0.4 MG capsule 24 hr capsule TAKE 1 CAPSULE BY MOUTH  ONCE DAILY 1/2 HOUR  FOLLOWING SAME MEAL EVERY  DAY   • [DISCONTINUED] Tirzepatide (Mounjaro) 5 MG/0.5ML solution pen-injector Inject 0.5 mL under the skin into the appropriate area as directed 1 (One) Time Per Week.   • [DISCONTINUED] traZODone (DESYREL) 100 MG tablet Take 1 tablet by mouth every night at bedtime.   • [DISCONTINUED] vitamin D (ERGOCALCIFEROL) 1.25 MG (79918 UT) capsule capsule Take 1 capsule by mouth 1 (One) Time Per Week.     No current facility-administered medications on file prior to visit.       Health Maintenance Due   Topic Date Due   • Hepatitis B (1 of 3 - 3-dose series) Never done       Objective     /75   Pulse 76   Ht 172.7 cm (68\")   Wt 78.9 kg (174 lb)   SpO2 98%   BMI 26.46 kg/m²       Physical Exam  Constitutional:       General: He is not in acute distress.     Appearance: Normal appearance. He is not ill-appearing.   HENT:      Head: Normocephalic and atraumatic.      Right Ear: Tympanic membrane, ear canal and external ear normal.      Left Ear: Tympanic membrane, ear canal and external ear normal.      Nose: Nose normal.   Cardiovascular:      Rate and Rhythm: Normal rate and regular rhythm.      Heart sounds: Normal heart sounds. No murmur heard.  Pulmonary:      Effort: Pulmonary effort is normal. No respiratory distress.      Breath sounds: Normal breath sounds.   Chest:      Chest wall: No tenderness.   Abdominal:      General: Abdomen is flat. Bowel sounds are normal. There is no distension.      Palpations: Abdomen is soft. There is no mass.      Tenderness: There is " no abdominal tenderness. There is no guarding.   Musculoskeletal:         General: No swelling or tenderness. Normal range of motion.      Cervical back: Normal range of motion and neck supple.   Skin:     General: Skin is warm and dry.      Findings: No rash.   Neurological:      General: No focal deficit present.      Mental Status: He is alert and oriented to person, place, and time. Mental status is at baseline.      Gait: Gait normal.   Psychiatric:         Attention and Perception: Attention normal.         Mood and Affect: Mood and affect normal.         Speech: Speech normal.         Behavior: Behavior normal. Behavior is cooperative.         Thought Content: Thought content normal. Thought content does not include suicidal ideation.         Judgment: Judgment normal.           Result Review :                           Assessment and Plan        Diagnoses and all orders for this visit:    1. Hyperlipidemia, unspecified hyperlipidemia type (Primary)  Comments:  stable on lipitor 20mg, continue  Orders:  -     atorvastatin (LIPITOR) 20 MG tablet; Take 1 tablet by mouth every night at bedtime.  Dispense: 90 tablet; Refill: 1    2. Need for shingles vaccine  -     Shingrix Vaccine    3. Gastroesophageal reflux disease without esophagitis  Comments:  stable on Dexiland 60mg, continue    4. Primary insomnia  Comments:  stable on trazadone 100mg, continue    5. Essential hypertension  Comments:  stable on metoprolol 50mg, continue  Orders:  -     metoprolol succinate XL (TOPROL-XL) 50 MG 24 hr tablet; Take 1 tablet by mouth Daily.  Dispense: 90 tablet; Refill: 1    6. Anxiety  Comments:  stable on Buspar 10mg, continue  Orders:  -     busPIRone (BUSPAR) 10 MG tablet; Take 1 tablet by mouth 3 (Three) Times a Day.  Dispense: 270 tablet; Refill: 0    7. Need for pneumococcal vaccination  -     Pneumococcal Conjugate Vaccine 20-Valent (PCV20)    8. Gastroesophageal reflux disease, unspecified whether esophagitis  present  Comments:  stable on dexilant 60mg, continue  Orders:  -     dexlansoprazole (Dexilant) 60 MG capsule; Take 1 capsule by mouth Daily.  Dispense: 90 capsule; Refill: 1    9. Type 2 diabetes mellitus with hyperglycemia, without long-term current use of insulin ([facility])  Comments:  stable on Mounjaro and synjardy, continue  Orders:  -     Hemoglobin A1c; Future  -     Empagliflozin-metFORMIN HCl (Synjardy) 12.5-1000 MG tablet; Take 1 tablet by mouth 2 (Two) Times a Day.  Dispense: 180 tablet; Refill: 1  -     Tirzepatide (Mounjaro) 5 MG/0.5ML solution pen-injector; Inject 0.5 mL under the skin into the appropriate area as directed 1 (One) Time Per Week.  Dispense: 6 mL; Refill: 1    10. Insomnia, unspecified type  Comments:  stable on trazadone 100mg, continue  Orders:  -     traZODone (DESYREL) 100 MG tablet; Take 1 tablet by mouth every night at bedtime.  Dispense: 90 tablet; Refill: 1    11. Vitamin D deficiency  Comments:  stable on vitamin D 50,000UT, continue  Orders:  -     vitamin D (ERGOCALCIFEROL) 1.25 MG (35623 UT) capsule capsule; Take 1 capsule by mouth 1 (One) Time Per Week.  Dispense: 13 capsule; Refill: 1  -     Vitamin D 25 hydroxy; Future    12. Benign prostatic hyperplasia with urinary frequency  Comments:  stable on flomax 0.4mg, continue  Orders:  -     tamsulosin (FLOMAX) 0.4 MG capsule 24 hr capsule; Take 1 capsule by mouth Daily.  Dispense: 90 capsule; Refill: 1    13. Irritable bowel syndrome, unspecified type  Comments:  stable on bentyl 40mg prn, continue  Orders:  -     dicyclomine (BENTYL) 20 MG tablet; Take 1 tablet by mouth 4 (Four) Times a Day.  Dispense: 360 tablet; Refill: 1              Follow Up     Return in about 6 months (around 8/28/2023).    Patient was given instructions and counseling regarding his condition or for health maintenance advice. Please see specific information pulled into the AVS if appropriate.     Jung Burkett  reports that he has been smoking cigars.  He has never used smokeless tobacco.. I have educated him on the risk of diseases from using tobacco products such as cancer, COPD and heart disease.     I advised him to quit and he is not willing to quit.    I spent 3  minutes counseling the patient.                Detail Level: Detailed Add 04698 Cpt? (Important Note: In 2017 The Use Of 57473 Is Being Tracked By Cms To Determine Future Global Period Reimbursement For Global Periods): no

## 2023-03-08 ENCOUNTER — TELEPHONE (OUTPATIENT)
Dept: FAMILY MEDICINE CLINIC | Facility: CLINIC | Age: 58
End: 2023-03-08
Payer: COMMERCIAL

## 2023-03-08 DIAGNOSIS — K21.9 GASTROESOPHAGEAL REFLUX DISEASE, UNSPECIFIED WHETHER ESOPHAGITIS PRESENT: ICD-10-CM

## 2023-03-08 RX ORDER — DEXLANSOPRAZOLE 60 MG/1
1 CAPSULE, DELAYED RELEASE ORAL DAILY
Qty: 90 CAPSULE | Refills: 1 | Status: SHIPPED | OUTPATIENT
Start: 2023-03-08 | End: 2023-03-15 | Stop reason: CLARIF

## 2023-03-08 NOTE — TELEPHONE ENCOUNTER
"  Caller: Jung Burkett \"Mauro\"    Relationship: Self    Best call back number: [phone]    What is the best time to reach you: ANYTIME     Who are you requesting to speak with (clinical staff, provider,  specific staff member): MERE VIEIRA OR THE NURSE         What was the call regarding:  PATIENT WOULD LIKE TO HAVE A CALL BACK REGARDING ISSUES WITH SOME MEDICATIONS.     Do you require a callback: YES           "

## 2023-03-08 NOTE — TELEPHONE ENCOUNTER
Spoke with requesting Dexilant was sent to the wrong pharmacy needs to go to [facility].      Pt also gave me all the medications he has tried for the PA for Mounjaro.    Pt has tried the following: Ozempic, Trulicity, Victoza, Bydenuren, Farxiga

## 2023-03-15 ENCOUNTER — TELEPHONE (OUTPATIENT)
Dept: FAMILY MEDICINE CLINIC | Facility: CLINIC | Age: 58
End: 2023-03-15
Payer: COMMERCIAL

## 2023-03-15 RX ORDER — PANTOPRAZOLE SODIUM 40 MG/1
40 TABLET, DELAYED RELEASE ORAL DAILY
Qty: 90 TABLET | Refills: 1 | Status: SHIPPED | OUTPATIENT
Start: 2023-03-15

## 2023-03-15 NOTE — TELEPHONE ENCOUNTER
Pt called stating that his medication, Dexilant, is no longer covered by his insurance. [facility] has told him to try something different. His phone is [phone].

## 2023-03-15 NOTE — TELEPHONE ENCOUNTER
Pt states that he has tried Nexium and it was not good. [facility] told pt (today) that they will send out a notice to our office (and to him) advising him of some alternative medications he can try.

## 2023-03-20 DIAGNOSIS — K58.9 IRRITABLE BOWEL SYNDROME, UNSPECIFIED TYPE: ICD-10-CM

## 2023-03-20 DIAGNOSIS — E55.9 VITAMIN D DEFICIENCY: ICD-10-CM

## 2023-03-20 DIAGNOSIS — G47.00 INSOMNIA, UNSPECIFIED TYPE: ICD-10-CM

## 2023-03-20 DIAGNOSIS — F41.9 ANXIETY: ICD-10-CM

## 2023-03-20 DIAGNOSIS — N40.1 BENIGN PROSTATIC HYPERPLASIA WITH URINARY FREQUENCY: ICD-10-CM

## 2023-03-20 DIAGNOSIS — R35.0 BENIGN PROSTATIC HYPERPLASIA WITH URINARY FREQUENCY: ICD-10-CM

## 2023-03-20 DIAGNOSIS — I10 ESSENTIAL HYPERTENSION: ICD-10-CM

## 2023-03-20 RX ORDER — BUSPIRONE HYDROCHLORIDE 10 MG/1
10 TABLET ORAL 3 TIMES DAILY
Qty: 270 TABLET | Refills: 1 | Status: SHIPPED | OUTPATIENT
Start: 2023-03-20

## 2023-03-20 RX ORDER — METOPROLOL SUCCINATE 50 MG/1
50 TABLET, EXTENDED RELEASE ORAL DAILY
Qty: 90 TABLET | Refills: 1 | Status: SHIPPED | OUTPATIENT
Start: 2023-03-20

## 2023-03-20 RX ORDER — TAMSULOSIN HYDROCHLORIDE 0.4 MG/1
1 CAPSULE ORAL DAILY
Qty: 90 CAPSULE | Refills: 1 | Status: SHIPPED | OUTPATIENT
Start: 2023-03-20

## 2023-03-20 RX ORDER — DICYCLOMINE HCL 20 MG
20 TABLET ORAL 4 TIMES DAILY
Qty: 360 TABLET | Refills: 1 | Status: SHIPPED | OUTPATIENT
Start: 2023-03-20

## 2023-03-20 RX ORDER — ERGOCALCIFEROL 1.25 MG/1
50000 CAPSULE ORAL WEEKLY
Qty: 13 CAPSULE | Refills: 1 | Status: SHIPPED | OUTPATIENT
Start: 2023-03-20

## 2023-03-20 RX ORDER — TRAZODONE HYDROCHLORIDE 100 MG/1
100 TABLET ORAL
Qty: 90 TABLET | Refills: 1 | Status: SHIPPED | OUTPATIENT
Start: 2023-03-20

## 2023-04-11 ENCOUNTER — TELEPHONE (OUTPATIENT)
Dept: SLEEP MEDICINE | Facility: HOSPITAL | Age: 58
End: 2023-04-11
Payer: COMMERCIAL

## 2023-04-11 NOTE — TELEPHONE ENCOUNTER
Patient would like refill on Adderall 20 mg, please.    He would like it to go to [facility] on [address].

## 2023-04-18 DIAGNOSIS — G47.11 IDIOPATHIC HYPERSOMNIA: ICD-10-CM

## 2023-04-18 RX ORDER — DEXTROAMPHETAMINE SACCHARATE, AMPHETAMINE ASPARTATE, DEXTROAMPHETAMINE SULFATE AND AMPHETAMINE SULFATE 5; 5; 5; 5 MG/1; MG/1; MG/1; MG/1
20 TABLET ORAL 3 TIMES DAILY
Qty: 90 TABLET | Refills: 0 | Status: SHIPPED | OUTPATIENT
Start: 2023-04-18 | End: 2023-04-19 | Stop reason: SDUPTHER

## 2023-04-18 RX ORDER — DEXTROAMPHETAMINE SACCHARATE, AMPHETAMINE ASPARTATE, DEXTROAMPHETAMINE SULFATE AND AMPHETAMINE SULFATE 5; 5; 5; 5 MG/1; MG/1; MG/1; MG/1
20 TABLET ORAL 3 TIMES DAILY
Qty: 90 TABLET | Refills: 0 | Status: CANCELLED | OUTPATIENT
Start: 2023-04-18

## 2023-04-19 ENCOUNTER — TELEPHONE (OUTPATIENT)
Dept: SLEEP MEDICINE | Facility: HOSPITAL | Age: 58
End: 2023-04-19
Payer: COMMERCIAL

## 2023-04-19 DIAGNOSIS — G47.11 IDIOPATHIC HYPERSOMNIA: ICD-10-CM

## 2023-04-19 RX ORDER — DEXTROAMPHETAMINE SACCHARATE, AMPHETAMINE ASPARTATE, DEXTROAMPHETAMINE SULFATE AND AMPHETAMINE SULFATE 5; 5; 5; 5 MG/1; MG/1; MG/1; MG/1
20 TABLET ORAL 3 TIMES DAILY
Qty: 90 TABLET | Refills: 0 | Status: SHIPPED | OUTPATIENT
Start: 2023-04-19

## 2023-04-19 NOTE — TELEPHONE ENCOUNTER
Jung Burkett 1965 requesting med refill on adderall 20mg #90.  Pt needs script to be re-sent to [facility] on [address], [facility]

## 2023-04-27 ENCOUNTER — PATIENT MESSAGE (OUTPATIENT)
Dept: FAMILY MEDICINE CLINIC | Facility: CLINIC | Age: 58
End: 2023-04-27
Payer: COMMERCIAL

## 2023-04-27 DIAGNOSIS — R35.0 URINARY FREQUENCY: ICD-10-CM

## 2023-04-27 DIAGNOSIS — I10 ESSENTIAL HYPERTENSION: Primary | ICD-10-CM

## 2023-05-03 NOTE — TELEPHONE ENCOUNTER
From: Sharon LOVETT  To: Jung Burkett  Sent: 4/27/2023 10:11 AM EDT  Subject: labs    Hey there you are currently due for [facility] at [facility]. Please get these done for us as soon as you can. Thank you

## 2023-05-05 ENCOUNTER — LAB (OUTPATIENT)
Dept: LAB | Facility: HOSPITAL | Age: 58
End: 2023-05-05
Payer: COMMERCIAL

## 2023-05-05 DIAGNOSIS — E11.65 TYPE 2 DIABETES MELLITUS WITH HYPERGLYCEMIA, WITHOUT LONG-TERM CURRENT USE OF INSULIN: ICD-10-CM

## 2023-05-05 DIAGNOSIS — I10 ESSENTIAL HYPERTENSION: ICD-10-CM

## 2023-05-05 DIAGNOSIS — E55.9 VITAMIN D DEFICIENCY: ICD-10-CM

## 2023-05-05 DIAGNOSIS — R35.0 URINARY FREQUENCY: ICD-10-CM

## 2023-05-05 LAB
25(OH)D3 SERPL-MCNC: 99.4 NG/ML (ref 30–100)
BASOPHILS # BLD AUTO: 0.02 10*3/MM3 (ref 0–0.2)
BASOPHILS NFR BLD AUTO: 0.3 % (ref 0–1.5)
BILIRUB UR QL STRIP: NEGATIVE
CLARITY UR: CLEAR
COLOR UR: YELLOW
DEPRECATED RDW RBC AUTO: 45.8 FL (ref 37–54)
EOSINOPHIL # BLD AUTO: 0.09 10*3/MM3 (ref 0–0.4)
EOSINOPHIL NFR BLD AUTO: 1.4 % (ref 0.3–6.2)
ERYTHROCYTE [DISTWIDTH] IN BLOOD BY AUTOMATED COUNT: 13.8 % (ref 12.3–15.4)
GLUCOSE UR STRIP-MCNC: ABNORMAL MG/DL
HBA1C MFR BLD: 6.2 % (ref 4.8–5.6)
HCT VFR BLD AUTO: 47.8 % (ref 37.5–51)
HGB BLD-MCNC: 16.4 G/DL (ref 13–17.7)
HGB UR QL STRIP.AUTO: NEGATIVE
IMM GRANULOCYTES # BLD AUTO: 0.02 10*3/MM3 (ref 0–0.05)
IMM GRANULOCYTES NFR BLD AUTO: 0.3 % (ref 0–0.5)
KETONES UR QL STRIP: NEGATIVE
LEUKOCYTE ESTERASE UR QL STRIP.AUTO: NEGATIVE
LYMPHOCYTES # BLD AUTO: 1.47 10*3/MM3 (ref 0.7–3.1)
LYMPHOCYTES NFR BLD AUTO: 23.6 % (ref 19.6–45.3)
MCH RBC QN AUTO: 30.9 PG (ref 26.6–33)
MCHC RBC AUTO-ENTMCNC: 34.3 G/DL (ref 31.5–35.7)
MCV RBC AUTO: 90.2 FL (ref 79–97)
MONOCYTES # BLD AUTO: 0.53 10*3/MM3 (ref 0.1–0.9)
MONOCYTES NFR BLD AUTO: 8.5 % (ref 5–12)
NEUTROPHILS NFR BLD AUTO: 4.1 10*3/MM3 (ref 1.7–7)
NEUTROPHILS NFR BLD AUTO: 65.9 % (ref 42.7–76)
NITRITE UR QL STRIP: NEGATIVE
NRBC BLD AUTO-RTO: 0 /100 WBC (ref 0–0.2)
PH UR STRIP.AUTO: 5.5 [PH] (ref 5–8)
PLATELET # BLD AUTO: 197 10*3/MM3 (ref 140–450)
PMV BLD AUTO: 10.3 FL (ref 6–12)
PROT UR QL STRIP: NEGATIVE
RBC # BLD AUTO: 5.3 10*6/MM3 (ref 4.14–5.8)
SP GR UR STRIP: >=1.03 (ref 1–1.03)
UROBILINOGEN UR QL STRIP: ABNORMAL
WBC NRBC COR # BLD: 6.23 10*3/MM3 (ref 3.4–10.8)

## 2023-05-05 PROCEDURE — 80061 LIPID PANEL: CPT

## 2023-05-05 PROCEDURE — 81003 URINALYSIS AUTO W/O SCOPE: CPT

## 2023-05-05 PROCEDURE — 85025 COMPLETE CBC W/AUTO DIFF WBC: CPT

## 2023-05-05 PROCEDURE — 80053 COMPREHEN METABOLIC PANEL: CPT

## 2023-05-05 PROCEDURE — 83036 HEMOGLOBIN GLYCOSYLATED A1C: CPT

## 2023-05-05 PROCEDURE — 36415 COLL VENOUS BLD VENIPUNCTURE: CPT

## 2023-05-05 PROCEDURE — 82306 VITAMIN D 25 HYDROXY: CPT

## 2023-05-06 LAB
ALBUMIN SERPL-MCNC: 4.4 G/DL (ref 3.5–5.2)
ALBUMIN/GLOB SERPL: 1.6 G/DL
ALP SERPL-CCNC: 48 U/L (ref 39–117)
ALT SERPL W P-5'-P-CCNC: 15 U/L (ref 1–41)
ANION GAP SERPL CALCULATED.3IONS-SCNC: 12.2 MMOL/L (ref 5–15)
AST SERPL-CCNC: 17 U/L (ref 1–40)
BILIRUB SERPL-MCNC: 0.4 MG/DL (ref 0–1.2)
BUN SERPL-MCNC: 14 MG/DL (ref 6–20)
BUN/CREAT SERPL: 13.3 (ref 7–25)
CALCIUM SPEC-SCNC: 9.7 MG/DL (ref 8.6–10.5)
CHLORIDE SERPL-SCNC: 102 MMOL/L (ref 98–107)
CHOLEST SERPL-MCNC: 113 MG/DL (ref 0–200)
CO2 SERPL-SCNC: 25.8 MMOL/L (ref 22–29)
CREAT SERPL-MCNC: 1.05 MG/DL (ref 0.76–1.27)
EGFRCR SERPLBLD CKD-EPI 2021: 81.8 ML/MIN/1.73
GLOBULIN UR ELPH-MCNC: 2.7 GM/DL
GLUCOSE SERPL-MCNC: 121 MG/DL (ref 65–99)
HDLC SERPL-MCNC: 49 MG/DL (ref 40–60)
LDLC SERPL CALC-MCNC: 55 MG/DL (ref 0–100)
LDLC/HDLC SERPL: 1.18 {RATIO}
POTASSIUM SERPL-SCNC: 4.6 MMOL/L (ref 3.5–5.2)
PROT SERPL-MCNC: 7.1 G/DL (ref 6–8.5)
SODIUM SERPL-SCNC: 140 MMOL/L (ref 136–145)
TRIGL SERPL-MCNC: 30 MG/DL (ref 0–150)
VLDLC SERPL-MCNC: 9 MG/DL (ref 5–40)

## 2023-05-08 DIAGNOSIS — E55.9 VITAMIN D DEFICIENCY: Primary | ICD-10-CM

## 2023-05-23 DIAGNOSIS — G47.11 IDIOPATHIC HYPERSOMNIA: ICD-10-CM

## 2023-05-23 RX ORDER — DEXTROAMPHETAMINE SACCHARATE, AMPHETAMINE ASPARTATE, DEXTROAMPHETAMINE SULFATE AND AMPHETAMINE SULFATE 5; 5; 5; 5 MG/1; MG/1; MG/1; MG/1
20 TABLET ORAL 3 TIMES DAILY
Qty: 90 TABLET | Refills: 0 | Status: CANCELLED | OUTPATIENT
Start: 2023-05-23

## 2023-05-24 DIAGNOSIS — G47.11 IDIOPATHIC HYPERSOMNIA: ICD-10-CM

## 2023-05-24 RX ORDER — DEXTROAMPHETAMINE SACCHARATE, AMPHETAMINE ASPARTATE, DEXTROAMPHETAMINE SULFATE AND AMPHETAMINE SULFATE 5; 5; 5; 5 MG/1; MG/1; MG/1; MG/1
20 TABLET ORAL 3 TIMES DAILY
Qty: 90 TABLET | Refills: 0 | Status: SHIPPED | OUTPATIENT
Start: 2023-05-24

## 2023-05-24 RX ORDER — DEXTROAMPHETAMINE SACCHARATE, AMPHETAMINE ASPARTATE, DEXTROAMPHETAMINE SULFATE AND AMPHETAMINE SULFATE 5; 5; 5; 5 MG/1; MG/1; MG/1; MG/1
20 TABLET ORAL 3 TIMES DAILY
Qty: 90 TABLET | Refills: 0 | Status: SHIPPED | OUTPATIENT
Start: 2023-05-24 | End: 2023-05-24 | Stop reason: SDUPTHER

## 2023-05-31 ENCOUNTER — OFFICE VISIT (OUTPATIENT)
Dept: FAMILY MEDICINE CLINIC | Facility: CLINIC | Age: 58
End: 2023-05-31

## 2023-05-31 VITALS
BODY MASS INDEX: 25.76 KG/M2 | HEIGHT: 68 IN | HEART RATE: 72 BPM | SYSTOLIC BLOOD PRESSURE: 131 MMHG | WEIGHT: 170 LBS | OXYGEN SATURATION: 98 % | DIASTOLIC BLOOD PRESSURE: 81 MMHG

## 2023-05-31 DIAGNOSIS — R53.83 OTHER FATIGUE: ICD-10-CM

## 2023-05-31 DIAGNOSIS — E11.65 TYPE 2 DIABETES MELLITUS WITH HYPERGLYCEMIA, WITHOUT LONG-TERM CURRENT USE OF INSULIN: ICD-10-CM

## 2023-05-31 DIAGNOSIS — E11.9 TYPE 2 DIABETES MELLITUS WITHOUT COMPLICATION, WITHOUT LONG-TERM CURRENT USE OF INSULIN: ICD-10-CM

## 2023-05-31 DIAGNOSIS — F41.9 ANXIETY: ICD-10-CM

## 2023-05-31 DIAGNOSIS — E78.5 HYPERLIPIDEMIA, UNSPECIFIED HYPERLIPIDEMIA TYPE: Primary | ICD-10-CM

## 2023-05-31 DIAGNOSIS — Z23 NEED FOR HEPATITIS B VACCINATION: ICD-10-CM

## 2023-05-31 DIAGNOSIS — R35.0 BENIGN PROSTATIC HYPERPLASIA WITH URINARY FREQUENCY: ICD-10-CM

## 2023-05-31 DIAGNOSIS — Z23 NEED FOR SHINGLES VACCINE: ICD-10-CM

## 2023-05-31 DIAGNOSIS — K21.9 GASTROESOPHAGEAL REFLUX DISEASE WITHOUT ESOPHAGITIS: ICD-10-CM

## 2023-05-31 DIAGNOSIS — N40.1 BENIGN PROSTATIC HYPERPLASIA WITH URINARY FREQUENCY: ICD-10-CM

## 2023-05-31 DIAGNOSIS — I10 ESSENTIAL HYPERTENSION: ICD-10-CM

## 2023-05-31 RX ORDER — TIRZEPATIDE 5 MG/.5ML
5 INJECTION, SOLUTION SUBCUTANEOUS WEEKLY
Qty: 6 ML | Refills: 1 | Status: SHIPPED | OUTPATIENT
Start: 2023-05-31 | End: 2023-05-31 | Stop reason: DRUGHIGH

## 2023-05-31 RX ORDER — EMPAGLIFLOZIN AND METFORMIN HYDROCHLORIDE 12.5; 1 MG/1; MG/1
1 TABLET ORAL 2 TIMES DAILY
Qty: 180 TABLET | Refills: 1 | Status: SHIPPED | OUTPATIENT
Start: 2023-05-31

## 2023-05-31 RX ORDER — BUSPIRONE HYDROCHLORIDE 10 MG/1
10 TABLET ORAL 3 TIMES DAILY
Qty: 270 TABLET | Refills: 1 | Status: SHIPPED | OUTPATIENT
Start: 2023-05-31

## 2023-05-31 RX ORDER — PANTOPRAZOLE SODIUM 40 MG/1
40 TABLET, DELAYED RELEASE ORAL DAILY
Qty: 90 TABLET | Refills: 1 | Status: SHIPPED | OUTPATIENT
Start: 2023-05-31

## 2023-05-31 RX ORDER — TAMSULOSIN HYDROCHLORIDE 0.4 MG/1
1 CAPSULE ORAL DAILY
Qty: 90 CAPSULE | Refills: 1 | Status: SHIPPED | OUTPATIENT
Start: 2023-05-31

## 2023-05-31 RX ORDER — METOPROLOL SUCCINATE 50 MG/1
50 TABLET, EXTENDED RELEASE ORAL DAILY
Qty: 90 TABLET | Refills: 1 | Status: SHIPPED | OUTPATIENT
Start: 2023-05-31

## 2023-05-31 RX ORDER — ATORVASTATIN CALCIUM 20 MG/1
20 TABLET, FILM COATED ORAL
Qty: 90 TABLET | Refills: 1 | Status: SHIPPED | OUTPATIENT
Start: 2023-05-31

## 2023-05-31 NOTE — PROGRESS NOTES
Chief Complaint  Hyperlipidemia, Anxiety, Diabetes, Hypertension, Heartburn, Benign Prostatic Hypertrophy, Insomnia (/), and Vitamin D Deficiency    Subjective            Jung Burkett presents to [facility]  History of Present Illness  Pt is a f/u for DM2, HLD, HTN, anxiety, BPH, GERD, insomnia, and vitamin d deficiency. Pt would like to discuss Mounjaro. PT states he has been on this dose for a long period of time. Pt states he was not as hungry and did not have as many cravings. Pt states he is now starting to get hungry more often and developing cravings again. Pt would like to discuss if a dose increase is needed.     Pt is due Hep B and Shingles vaccines. Pt will get these vaccines in office today.     PT is followed by Dr. Holguin for sleep.     Pt would like to have his testosterone cheked, he reports it has been low in the past.            Past Medical History:   Diagnosis Date   • Acid reflux    • ADHD (attention deficit hyperactivity disorder) 1970   • Anxiety    • Anxiety disorder 05/17/2019   • Arthritis     generalized   • Benign prostatic hyperplasia 1989   • Blood disease     none   • Cervicalgia    • Chronic allergic rhinitis    • Colon polyp 2005   • Depression    • Diabetes mellitus, type 2 05/17/2019    DOES NOT CHECK BS   • Diabetic eye exam 01/2019 20/20 PER PT    • Encounter for diabetic foot exam     WITHIN FEW MO  PER PT    • Essential hypertension 05/17/2019   • GERD (gastroesophageal reflux disease)    • High blood pressure    • High cholesterol    • Hyperlipemia    • Hyperlipidemia    • Hypertension    • Low back pain 1987   • Major depressive disorder 05/17/2019   • Nicotine dependence 05/17/2019   • Prostate disorder     BPH   • Rotator cuff tear, left    • Seasonal allergies    • Urinary tract infection 1993   • Vitamin D deficiency 05/17/2019    no current issues       Allergies   Allergen Reactions   • Hydrocodone-Acetaminophen  Itching   • Nsaids Arrhythmia   • Oxycodone-Acetaminophen Shortness Of Breath   • Sulfa Antibiotics Hives   • Voltaren [Diclofenac] Palpitations        Past Surgical History:   Procedure Laterality Date   • CHOLECYSTECTOMY  1997   • COLONOSCOPY      ELISA- REPEAT IN 5 YEARS    • GALLBLADDER SURGERY     • KNEE ARTHROSCOPY W/ MENISCAL REPAIR Right    • OTHER SURGICAL HISTORY      SURGICAL CLIPS/gallbladder removed   • OTHER SURGICAL HISTORY      right knee meniscus tear repair   • SHOULDER ARTHROSCOPY W/ ROTATOR CUFF REPAIR Left 07/21/2021    Procedure: SHOULDER ARTHROSCOPY,SUBACROMINAL DECOMPRESSION, DISTAL CLAVICLE RESECTION, MINI OPEN  ROTATOR CUFF REPAIR;  Surgeon: Ulisses Kenney MD;  [place] [facility] [place] [facility] [place]  Service: [place]opedics;  Laterality: Left;   • TONSILLECTOMY     • VASECTOMY  2004        Social History     Tobacco Use   • Smoking status: Some Days     Types: Cigars   • Smokeless tobacco: Never   • Tobacco comments:     1 cigar daily   Substance Use Topics   • Alcohol use: Yes     Alcohol/week: 6.0 standard drinks     Types: 6 Drinks containing 0.5 oz of alcohol per week     Comment: Drinks daily; beer. Occasionally drinks 2 drinks per day, has been drinking for 6-10 years.        Family History   Problem Relation Age of Onset   • Cancer Mother    • Diabetes Mother    • Rheum arthritis Mother         40'S   • Heart attack Mother    • Breast cancer Mother         40'S   • Arthritis Mother    • Stroke Father    • Heart disease Father    • Heart attack Maternal Grandmother    • Breast cancer Maternal Grandmother         50'S   • Prostate cancer Maternal Grandfather    • Nephrolithiasis Maternal Grandfather    • Colon cancer Paternal Grandmother    • Colon cancer Paternal Grandfather         60'S   • Breast cancer Other         40'S   • Heart attack Maternal Aunt    • Malig Hyperthermia Neg Hx         Current Outpatient Medications on File Prior to Visit   Medication Sig   • amphetamine-dextroamphetamine  "(ADDERALL) 20 MG tablet Take 1 tablet by mouth 3 (Three) Times a Day.   • CBD (cannabidiol) oral oil Take  by mouth.   • dicyclomine (BENTYL) 20 MG tablet Take 1 tablet by mouth 4 (Four) Times a Day.   • traZODone (DESYREL) 100 MG tablet Take 1 tablet by mouth every night at bedtime.   • [DISCONTINUED] atorvastatin (LIPITOR) 20 MG tablet Take 1 tablet by mouth every night at bedtime.   • [DISCONTINUED] busPIRone (BUSPAR) 10 MG tablet Take 1 tablet by mouth 3 (Three) Times a Day.   • [DISCONTINUED] Empagliflozin-metFORMIN HCl (Synjardy) 12.5-1000 MG tablet Take 1 tablet by mouth 2 (Two) Times a Day.   • [DISCONTINUED] metoprolol succinate XL (TOPROL-XL) 50 MG 24 hr tablet Take 1 tablet by mouth Daily.   • [DISCONTINUED] pantoprazole (Protonix) 40 MG EC tablet Take 1 tablet by mouth Daily.   • [DISCONTINUED] tamsulosin (FLOMAX) 0.4 MG capsule 24 hr capsule Take 1 capsule by mouth Daily.   • [DISCONTINUED] Tirzepatide (Mounjaro) 5 MG/0.5ML solution pen-injector Inject 0.5 mL under the skin into the appropriate area as directed 1 (One) Time Per Week.   • [DISCONTINUED] vitamin D (ERGOCALCIFEROL) 1.25 MG (23880 UT) capsule capsule Take 1 capsule by mouth 1 (One) Time Per Week. (Patient not taking: Reported on 5/31/2023)     No current facility-administered medications on file prior to visit.       There are no preventive care reminders to display for this patient.    Objective     /81   Pulse 72   Ht 172.7 cm (68\")   Wt 77.1 kg (170 lb)   SpO2 98%   BMI 25.85 kg/m²       Physical Exam      Result Review :                           Assessment and Plan        Diagnoses and all orders for this visit:    1. Hyperlipidemia, unspecified hyperlipidemia type (Primary)  Comments:  stable on lipitor 20mg, continue    Orders:  -     atorvastatin (LIPITOR) 20 MG tablet; Take 1 tablet by mouth every night at bedtime.  Dispense: 90 tablet; Refill: 1    2. Type 2 diabetes mellitus without complication, without long-term " current use of insulin  Comments:  stable on syndardy and monjaro however will increase monjaro to help with satiety    3. Anxiety  Comments:  stable on Buspar 10mg, continue  Orders:  -     busPIRone (BUSPAR) 10 MG tablet; Take 1 tablet by mouth 3 (Three) Times a Day.  Dispense: 270 tablet; Refill: 1    4. Type 2 diabetes mellitus with hyperglycemia, without long-term current use of insulin  Comments:  stable on Mounjaro and synjardy, continue  Orders:  -     Empagliflozin-metFORMIN HCl (Synjardy) 12.5-1000 MG tablet; Take 1 tablet by mouth 2 (Two) Times a Day.  Dispense: 180 tablet; Refill: 1  -     Discontinue: Tirzepatide (Mounjaro) 5 MG/0.5ML solution pen-injector; Inject 0.5 mL under the skin into the appropriate area as directed 1 (One) Time Per Week.  Dispense: 6 mL; Refill: 1  -     Discontinue: Tirzepatide 7.5 MG/0.5ML solution pen-injector; Inject 0.5 mL under the skin into the appropriate area as directed Every 7 (Seven) Days.  Dispense: 2 mL; Refill: 2  -     Tirzepatide 7.5 MG/0.5ML solution pen-injector; Inject 0.5 mL under the skin into the appropriate area as directed Every 7 (Seven) Days.  Dispense: 2 mL; Refill: 2    5. Essential hypertension  Comments:  stable on metoprolol 50mg, continue  Orders:  -     metoprolol succinate XL (TOPROL-XL) 50 MG 24 hr tablet; Take 1 tablet by mouth Daily.  Dispense: 90 tablet; Refill: 1    6. Benign prostatic hyperplasia with urinary frequency  Comments:  stable on flomax 0.4mg, continue  Orders:  -     tamsulosin (FLOMAX) 0.4 MG capsule 24 hr capsule; Take 1 capsule by mouth Daily.  Dispense: 90 capsule; Refill: 1    7. Gastroesophageal reflux disease without esophagitis  Comments:  stable on protonix 40mg, continue  Orders:  -     pantoprazole (Protonix) 40 MG EC tablet; Take 1 tablet by mouth Daily.  Dispense: 90 tablet; Refill: 1    8. Need for shingles vaccine  -     Shingrix Vaccine    9. Need for hepatitis B vaccination  -     Hepatitis B Vaccine Adult IM  (ENERGIX/RECOMBIVAX)    10. Other fatigue  -     Testosterone, Free, Total; Future      BMI is >= 25 and <30. (Overweight) The following options were offered after discussion;: exercise counseling/recommendations and nutrition counseling/recommendations           Follow Up     Return in about 3 months (around 8/31/2023).    Patient was given instructions and counseling regarding his condition or for health maintenance advice. Please see specific information pulled into the AVS if appropriate.     Jung Burkett  reports that he has been smoking cigars. He has never used smokeless tobacco.. I have educated him on the risk of diseases from using tobacco products such as cancer, COPD and heart disease.     I advised him to quit and he is not willing to quit.    I spent 3.5 minutes counseling the patient.

## 2023-06-12 ENCOUNTER — TELEPHONE (OUTPATIENT)
Dept: FAMILY MEDICINE CLINIC | Facility: CLINIC | Age: 58
End: 2023-06-12
Payer: COMMERCIAL

## 2023-06-12 NOTE — TELEPHONE ENCOUNTER
"  Caller: Jung Burkett \"Mauro\"    Relationship to patient: Self    Best call back number: [phone]    Patient is needing: PATIENT WANTING TO SEE IF OFFICE HAD RECEIVED PAPERWORK FROM [facility] REGARDING A PRIOR AUTHORIZATION FOR HIS TIRZEPATIDE 7.5MG INJECTION. PLEASE ADVISE.         "

## 2023-07-25 NOTE — ASSESSMENT & PLAN NOTE
Dr. Kenney saw and evaluated this patient today, patient interested in surgical options.  Surgical options, risk and benefits and rehabilitation timeline were discussed today.  Patient elected to undergo left shoulder arthroscopy with rotator cuff repair.  He will follow up postoperatively.   Walk in

## 2023-08-03 ENCOUNTER — TELEPHONE (OUTPATIENT)
Dept: SLEEP MEDICINE | Facility: HOSPITAL | Age: 58
End: 2023-08-03
Payer: COMMERCIAL

## 2023-08-03 DIAGNOSIS — G47.11 IDIOPATHIC HYPERSOMNIA: ICD-10-CM

## 2023-08-03 RX ORDER — DEXTROAMPHETAMINE SACCHARATE, AMPHETAMINE ASPARTATE, DEXTROAMPHETAMINE SULFATE AND AMPHETAMINE SULFATE 5; 5; 5; 5 MG/1; MG/1; MG/1; MG/1
20 TABLET ORAL 3 TIMES DAILY
Qty: 90 TABLET | Refills: 0 | Status: SHIPPED | OUTPATIENT
Start: 2023-08-03

## 2023-08-16 ENCOUNTER — OFFICE VISIT (OUTPATIENT)
Dept: FAMILY MEDICINE CLINIC | Facility: CLINIC | Age: 58
End: 2023-08-16
Payer: COMMERCIAL

## 2023-08-16 VITALS
BODY MASS INDEX: 25.01 KG/M2 | DIASTOLIC BLOOD PRESSURE: 81 MMHG | SYSTOLIC BLOOD PRESSURE: 138 MMHG | HEIGHT: 68 IN | HEART RATE: 94 BPM | WEIGHT: 165 LBS | OXYGEN SATURATION: 98 %

## 2023-08-16 DIAGNOSIS — K21.9 GASTROESOPHAGEAL REFLUX DISEASE WITHOUT ESOPHAGITIS: ICD-10-CM

## 2023-08-16 DIAGNOSIS — I10 PRIMARY HYPERTENSION: ICD-10-CM

## 2023-08-16 DIAGNOSIS — E29.1 HYPOGONADISM IN MALE: ICD-10-CM

## 2023-08-16 DIAGNOSIS — F41.9 ANXIETY: ICD-10-CM

## 2023-08-16 DIAGNOSIS — M54.2 NECK PAIN: ICD-10-CM

## 2023-08-16 DIAGNOSIS — E78.5 HYPERLIPIDEMIA, UNSPECIFIED HYPERLIPIDEMIA TYPE: ICD-10-CM

## 2023-08-16 DIAGNOSIS — Z23 NEED FOR HEPATITIS B VACCINATION: ICD-10-CM

## 2023-08-16 DIAGNOSIS — F51.01 PRIMARY INSOMNIA: ICD-10-CM

## 2023-08-16 DIAGNOSIS — G89.29 CHRONIC LOW BACK PAIN WITHOUT SCIATICA, UNSPECIFIED BACK PAIN LATERALITY: ICD-10-CM

## 2023-08-16 DIAGNOSIS — E11.9 TYPE 2 DIABETES MELLITUS WITHOUT COMPLICATION, WITHOUT LONG-TERM CURRENT USE OF INSULIN: Primary | ICD-10-CM

## 2023-08-16 DIAGNOSIS — M54.50 CHRONIC LOW BACK PAIN WITHOUT SCIATICA, UNSPECIFIED BACK PAIN LATERALITY: ICD-10-CM

## 2023-08-16 RX ORDER — DEXLANSOPRAZOLE 60 MG/1
60 CAPSULE, DELAYED RELEASE ORAL DAILY
Qty: 30 CAPSULE | Refills: 0 | Status: SHIPPED | OUTPATIENT
Start: 2023-08-16 | End: 2023-09-15

## 2023-08-16 RX ORDER — BUSPIRONE HYDROCHLORIDE 15 MG/1
15 TABLET ORAL 2 TIMES DAILY PRN
Qty: 60 TABLET | Refills: 2 | Status: SHIPPED | OUTPATIENT
Start: 2023-08-16

## 2023-08-16 NOTE — PROGRESS NOTES
Chief Complaint  Diabetes, Hyperlipidemia, Insomnia (/), Heartburn, and Hypertension    Subjective            Jung Burkett presents to [facility]  History of Present Illness  Pt is a f/u for DM2, GERD, HTN, HLD, and insomnia. Pt would like to discuss going back on Dexilant. Pt states this worked well for him. Pt reports when change insurance, insurance would no longer cover. Pt states the Protonix is not working for him it makes him sick as did Nexium. PT has been on Nexium, Pepcid, and Tums with no relief. Pt states this causes N/V due to the reflux.     PT would like to get testosterone levels checked. Pt was managed previously by Nicola Schrader. Pt would like to get levels checked to see if abnormal. PT is okay with referral.    Pt would like discuss possible pain management referral. Pt has been off work due to back and neck issues. Pt has been seen by [facility] and was told not a candidate for sx. Pt has been off work due to the pain.     Pt would also like to increase buspar to 15mg bid for 10mg TID.    PT is due A1C.    Pt is due Hep B, dose 2. Will get today.       Past Medical History:   Diagnosis Date    Acid reflux     ADHD (attention deficit hyperactivity disorder) 1970    Anxiety     Anxiety disorder 05/17/2019    Arthritis     generalized    Benign prostatic hyperplasia 1989    Blood disease     none    Cervicalgia     Chronic allergic rhinitis     Colon polyp 2005    Depression     Diabetes mellitus, type 2 05/17/2019    DOES NOT CHECK BS    Diabetic eye exam 01/2019    20/20 PER PT     Encounter for diabetic foot exam     WITHIN FEW MO  PER PT     Essential hypertension 05/17/2019    GERD (gastroesophageal reflux disease)     High blood pressure     High cholesterol     Hyperlipemia     Hyperlipidemia     Hypertension     Low back pain 1987    Major depressive disorder 05/17/2019    Nicotine dependence 05/17/2019    Prostate disorder     BPH    Rotator cuff  tear, left     Seasonal allergies     Urinary tract infection 1993    Vitamin D deficiency 05/17/2019    no current issues       Allergies   Allergen Reactions    Hydrocodone-Acetaminophen Itching    Nsaids Arrhythmia    Oxycodone-Acetaminophen Shortness Of Breath    Sulfa Antibiotics Hives    Voltaren [Diclofenac] Palpitations        Past Surgical History:   Procedure Laterality Date    CHOLECYSTECTOMY  1997    COLONOSCOPY      ELISA- REPEAT IN 5 YEARS     GALLBLADDER SURGERY      KNEE ARTHROSCOPY W/ MENISCAL REPAIR Right     OTHER SURGICAL HISTORY      SURGICAL CLIPS/gallbladder removed    OTHER SURGICAL HISTORY      right knee meniscus tear repair    SHOULDER ARTHROSCOPY W/ ROTATOR CUFF REPAIR Left 07/21/2021    Procedure: SHOULDER ARTHROSCOPY,SUBACROMINAL DECOMPRESSION, DISTAL CLAVICLE RESECTION, MINI OPEN  ROTATOR CUFF REPAIR;  Surgeon: Ulisses Kenney MD;  Location: [facility] OR [facility] [place];  Service: Orthopedics;  Laterality: Left;    TONSILLECTOMY      VASECTOMY  2004        Social History     Tobacco Use    Smoking status: Some Days     Types: Cigars    Smokeless tobacco: Never    Tobacco comments:     1 cigar daily   Substance Use Topics    Alcohol use: Yes     Alcohol/week: 6.0 standard drinks     Types: 6 Drinks containing 0.5 oz of alcohol per week     Comment: Drinks daily; beer. Occasionally drinks 2 drinks per day, has been drinking for 6-10 years.        Family History   Problem Relation Age of Onset    Cancer Mother     Diabetes Mother     Rheum arthritis Mother         40'S    Heart attack Mother     Breast cancer Mother         40'S    Arthritis Mother     Stroke Father     Heart disease Father     Heart attack Maternal Grandmother     Breast cancer Maternal Grandmother         50'S    Prostate cancer Maternal Grandfather     Nephrolithiasis Maternal Grandfather     Colon cancer Paternal Grandmother     Colon cancer Paternal Grandfather         60'S    Breast cancer Other         40'S    Heart attack  "Maternal Aunt     Mady Hyperthermia Neg Hx         Current Outpatient Medications on File Prior to Visit   Medication Sig    amphetamine-dextroamphetamine (ADDERALL) 20 MG tablet Take 1 tablet by mouth 3 (Three) Times a Day.    atorvastatin (LIPITOR) 20 MG tablet Take 1 tablet by mouth every night at bedtime.    CBD (cannabidiol) oral oil Take  by mouth.    dicyclomine (BENTYL) 20 MG tablet Take 1 tablet by mouth 4 (Four) Times a Day.    Empagliflozin-metFORMIN HCl (Synjardy) 12.5-1000 MG tablet Take 1 tablet by mouth 2 (Two) Times a Day.    metoprolol succinate XL (TOPROL-XL) 50 MG 24 hr tablet Take 1 tablet by mouth Daily.    Tirzepatide 7.5 MG/0.5ML solution pen-injector Inject 0.5 mL under the skin into the appropriate area as directed Every 7 (Seven) Days.    traZODone (DESYREL) 100 MG tablet Take 1 tablet by mouth every night at bedtime.    [DISCONTINUED] busPIRone (BUSPAR) 10 MG tablet Take 1 tablet by mouth 3 (Three) Times a Day.    [DISCONTINUED] pantoprazole (Protonix) 40 MG EC tablet Take 1 tablet by mouth Daily.    tamsulosin (FLOMAX) 0.4 MG capsule 24 hr capsule Take 1 capsule by mouth Daily.     No current facility-administered medications on file prior to visit.       There are no preventive care reminders to display for this patient.      Objective     /81   Pulse 94   Ht 172.7 cm (68\")   Wt 74.8 kg (165 lb)   SpO2 98%   BMI 25.09 kg/mý       Physical Exam  Constitutional:       General: He is not in acute distress.     Appearance: Normal appearance. He is not ill-appearing.   HENT:      Head: Normocephalic and atraumatic.      Right Ear: Tympanic membrane, ear canal and external ear normal.      Left Ear: Tympanic membrane, ear canal and external ear normal.      Nose: Nose normal.   Cardiovascular:      Rate and Rhythm: Normal rate and regular rhythm.      Heart sounds: Normal heart sounds. No murmur heard.  Pulmonary:      Effort: Pulmonary effort is normal. No respiratory distress.    "   Breath sounds: Normal breath sounds.   Chest:      Chest wall: No tenderness.   Abdominal:      General: Abdomen is flat. Bowel sounds are normal. There is no distension.      Palpations: Abdomen is soft. There is no mass.      Tenderness: There is no abdominal tenderness. There is no guarding.   Musculoskeletal:         General: No swelling or tenderness. Normal range of motion.      Cervical back: Normal range of motion and neck supple.   Skin:     General: Skin is warm and dry.      Findings: No rash.   Neurological:      General: No focal deficit present.      Mental Status: He is alert and oriented to person, place, and time. Mental status is at baseline.      Gait: Gait normal.   Psychiatric:         Attention and Perception: Attention normal.         Mood and Affect: Mood and affect normal.         Speech: Speech normal.         Behavior: Behavior normal. Behavior is cooperative.         Thought Content: Thought content normal. Thought content does not include suicidal ideation.         Judgment: Judgment normal.         Result Review :                           Assessment and Plan        Diagnoses and all orders for this visit:    1. Type 2 diabetes mellitus without complication, without long-term current use of insulin (Primary)  Comments:  stable on Synjardy 12.5/1000mg, and Mounjaro, continue  Orders:  -     Hemoglobin A1c; Future    2. Gastroesophageal reflux disease without esophagitis  Comments:  will send RX to switch to Dexilant since protonix is no longer working per pt request  Orders:  -     dexlansoprazole (Dexilant) 60 MG capsule; Take 1 capsule by mouth Daily for 30 days.  Dispense: 30 capsule; Refill: 0    3. Hyperlipidemia, unspecified hyperlipidemia type  Comments:  stable on lipitor 20mg, continue    4. Primary insomnia  Comments:  stable on trazodone 100mg, continue    5. Primary hypertension  Comments:  stable on toprol 50mg, continue    6. Need for hepatitis B vaccination  -      Hepatitis B Vaccine Adult IM (ENERGIX/RECOMBIVAX)    7. Chronic low back pain without sciatica, unspecified back pain laterality  -     Ambulatory Referral to Pain Management    8. Neck pain  -     Ambulatory Referral to Pain Management    9. Hypogonadism in male  -     Testosterone, Free, Total; Future    10. Anxiety  -     busPIRone (BUSPAR) 15 MG tablet; Take 1 tablet by mouth 2 (Two) Times a Day As Needed (anxiety).  Dispense: 60 tablet; Refill: 2              Follow Up     Return in about 6 months (around 2/16/2024).    Patient was given instructions and counseling regarding his condition or for health maintenance advice. Please see specific information pulled into the AVS if appropriate.     Jung Burkett  reports that he has been smoking cigars. He has never used smokeless tobacco..

## 2023-08-28 ENCOUNTER — OFFICE VISIT (OUTPATIENT)
Dept: SLEEP MEDICINE | Facility: HOSPITAL | Age: 58
End: 2023-08-28
Payer: COMMERCIAL

## 2023-08-28 VITALS
WEIGHT: 162.2 LBS | DIASTOLIC BLOOD PRESSURE: 84 MMHG | HEIGHT: 68 IN | BODY MASS INDEX: 24.58 KG/M2 | HEART RATE: 81 BPM | SYSTOLIC BLOOD PRESSURE: 124 MMHG | OXYGEN SATURATION: 97 %

## 2023-08-28 DIAGNOSIS — G47.11 IDIOPATHIC HYPERSOMNIA: ICD-10-CM

## 2023-08-28 PROCEDURE — G0463 HOSPITAL OUTPT CLINIC VISIT: HCPCS

## 2023-08-28 PROCEDURE — 99214 OFFICE O/P EST MOD 30 MIN: CPT | Performed by: INTERNAL MEDICINE

## 2023-08-28 RX ORDER — DEXTROAMPHETAMINE SACCHARATE, AMPHETAMINE ASPARTATE, DEXTROAMPHETAMINE SULFATE AND AMPHETAMINE SULFATE 5; 5; 5; 5 MG/1; MG/1; MG/1; MG/1
20 TABLET ORAL 3 TIMES DAILY
Qty: 90 TABLET | Refills: 0 | Status: SHIPPED | OUTPATIENT
Start: 2023-09-01

## 2023-08-28 NOTE — PROGRESS NOTES
"  [address] [address]  Phone: [phone]  Fax: [phone]      SLEEP CLINIC FOLLOW UP PROGRESS NOTE.    Jung PHI Burkett  1965  58 y.o.  male      PCP: Francisca Riggins APRN      Date of visit: 8/28/2023    Chief Complaint   Patient presents with    Daytime Sleepiness       HPI:  This is a 58 y.o. years old patient who has a history of idiopathic hypersomnia and ADHD who is on Adderall 20 mg 3 times a day and doing well.  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  He says the medication has helped him and is able to function.  In the last 12 months patient is not able to work because of his shoulder problem.  Recently also has developed palpitation for which he is started on beta-blocker     Medications and allergies are reviewed by me and documented in the encounter.     SOCIAL ( habits pertaining to sleep medicine)  History of tobacco use:Yes smoke cigars  History of alcohol use: 6 per week  Caffeine use: 1    REVIEW OF SYSTEMS:   Jackson Sleepiness Scale :Total score: 14   Nasal congestion:No   Dry mouth/nose:No   Post nasal drip; No   Acid reflux/Heartburn:Yes   Abd bloating:No   Morning headache:No   Anxiety:No   Depression:No     PHYSICAL EXAMINATION:  CONSTITUTIONAL:  Vitals:    08/28/23 1500   BP: 124/84   Pulse: 81   SpO2: 97%   Weight: 73.6 kg (162 lb 3.2 oz)   Height: 172.7 cm (67.99\")    Body mass index is 24.67 kg/mý.   NOSE: nasal passages are clear, no nasal polyps, septum in the midline.  THROAT: throat is clear, oral airway Mallampati class 2  RESP SYSTEM: Breath sounds are normal, no wheezes or crackles  CARDIOVASULAR: Heart rate is regular without murmur. No edema      Celestine checked no problem, 8/28/2023      ASSESSMENT AND PLAN:  Idiopathic hypersomnia.  Patient is unstable on Adderall 20 mg 3 times a day and I have renewed his " medications and will see him in 6 months for follow-up.  Celestine has been checked on PDMP and has no problems.  He gets his medications from [facility]  ADHD,    Palpitation, on beta-blocker  Return in about 6 months (around 2/28/2024) for Next scheduled follow-up. . Patient's questions were answered.        Jaimie Kamara MD  Sleep Medicine  Medical Director, [facility]  8/28/2023

## 2023-09-05 ENCOUNTER — TELEPHONE (OUTPATIENT)
Dept: FAMILY MEDICINE CLINIC | Facility: CLINIC | Age: 58
End: 2023-09-05
Payer: COMMERCIAL

## 2023-09-05 DIAGNOSIS — K21.9 GASTROESOPHAGEAL REFLUX DISEASE WITHOUT ESOPHAGITIS: Primary | ICD-10-CM

## 2023-09-05 DIAGNOSIS — G47.11 IDIOPATHIC HYPERSOMNIA: ICD-10-CM

## 2023-09-05 RX ORDER — DEXTROAMPHETAMINE SACCHARATE, AMPHETAMINE ASPARTATE, DEXTROAMPHETAMINE SULFATE AND AMPHETAMINE SULFATE 5; 5; 5; 5 MG/1; MG/1; MG/1; MG/1
20 TABLET ORAL 3 TIMES DAILY
Qty: 90 TABLET | Refills: 0 | Status: SHIPPED | OUTPATIENT
Start: 2023-09-05

## 2023-09-05 RX ORDER — DEXTROAMPHETAMINE SACCHARATE, AMPHETAMINE ASPARTATE, DEXTROAMPHETAMINE SULFATE AND AMPHETAMINE SULFATE 5; 5; 5; 5 MG/1; MG/1; MG/1; MG/1
20 TABLET ORAL 3 TIMES DAILY
Qty: 90 TABLET | Refills: 0 | Status: SHIPPED | OUTPATIENT
Start: 2023-09-05 | End: 2023-09-05 | Stop reason: SDUPTHER

## 2023-09-05 RX ORDER — DEXTROAMPHETAMINE SACCHARATE, AMPHETAMINE ASPARTATE, DEXTROAMPHETAMINE SULFATE AND AMPHETAMINE SULFATE 5; 5; 5; 5 MG/1; MG/1; MG/1; MG/1
20 TABLET ORAL 3 TIMES DAILY
Qty: 90 TABLET | Refills: 0 | Status: CANCELLED | OUTPATIENT
Start: 2023-09-05

## 2023-09-05 RX ORDER — DEXTROAMPHETAMINE SACCHARATE, AMPHETAMINE ASPARTATE, DEXTROAMPHETAMINE SULFATE AND AMPHETAMINE SULFATE 5; 5; 5; 5 MG/1; MG/1; MG/1; MG/1
20 TABLET ORAL 3 TIMES DAILY
Qty: 90 TABLET | Refills: 0 | Status: SHIPPED | OUTPATIENT
Start: 2023-09-05 | End: 2023-09-05

## 2023-09-05 NOTE — TELEPHONE ENCOUNTER
Jung Burkett 1965 requesting med refill on adderall 20mg #90.  This was sent to the wrong pharmacy, pt uses [facility] at [facility] bc he can use GoodRx and get it for less than $20, [facility] is over $100 bc they do not accept the coupons.

## 2023-09-05 NOTE — TELEPHONE ENCOUNTER
"  Caller: Jung Burkett \"Mauro\"    Relationship: Self    Best call back number: [phone]    What was the call regarding: PATIENT WAS INFORMED BY [employer] THAT THE dexlansoprazole (Dexilant) 60 MG capsule  IS NEEDING PRIOR AUTHORIZATION. THE PHONE NUMBER DIRECTLY TO THE PRIOR AUTHORIZATION DEPARTMENT WITH [employer] IS [phone]    "

## 2023-09-06 NOTE — TELEPHONE ENCOUNTER
Per cover my meds:    Your PA request has been closed. Request has been sent to Senior Team for review. Follow-up will be sent to your office via fax - MM, Technician 09/05/2023 03:00 PM     <<----- Click to add NO significant Past Surgical History

## 2023-09-07 DIAGNOSIS — E11.65 TYPE 2 DIABETES MELLITUS WITH HYPERGLYCEMIA, WITHOUT LONG-TERM CURRENT USE OF INSULIN: ICD-10-CM

## 2023-09-07 NOTE — TELEPHONE ENCOUNTER
"    Caller: Jung Burkett \"Mauro\"    Relationship: Self    Best call back number:[phone] (Mobile)     Requested Prescriptions:   Tirzepatide 7.5 MG/0.5ML solution pen-injector  7.5 mg, Every 7 Days          Pharmacy where request should be sent:  [facility] - [place], [place] - [address] - [phone]  - [phone]  [phone]     Last office visit with prescribing clinician: 8/16/2023   Last telemedicine visit with prescribing clinician: Visit date not found   Next office visit with prescribing clinician: 2/19/2024     Additional details provided by patient: PATIENT IS OUT    Does the patient have less than a 3 day supply:  [x] Yes  [] No    Would you like a call back once the refill request has been completed: [] Yes [] No    If the office needs to give you a call back, can they leave a voicemail: [] Yes [] No    Kristy Gunn   09/07/23 13:19 EDT         "

## 2023-09-07 NOTE — TELEPHONE ENCOUNTER
Pt requested update on dexilant. No update as of now. Last update was sent to Senior Team for review.    TCB @ 181

## 2023-09-07 NOTE — TELEPHONE ENCOUNTER
Pt calling to find out the status of his PA on dexlansoprazole (Dexilant) 60 MG capsule . He rec'd a letter saying they need more information and he just wants to know the status at this.

## 2023-09-13 ENCOUNTER — TELEPHONE (OUTPATIENT)
Dept: FAMILY MEDICINE CLINIC | Facility: CLINIC | Age: 58
End: 2023-09-13
Payer: COMMERCIAL

## 2023-09-13 RX ORDER — LANSOPRAZOLE 30 MG/1
30 CAPSULE, DELAYED RELEASE ORAL DAILY
Qty: 30 CAPSULE | Refills: 0 | Status: SHIPPED | OUTPATIENT
Start: 2023-09-13

## 2023-09-13 NOTE — TELEPHONE ENCOUNTER
Pt would like to try prevacid.     Pt is unable to tolerate nexium, protonix, and prilosec due to nausea and vomiting.     Pt is requesting on a 30 day supply sent to [facility]

## 2023-10-09 DIAGNOSIS — K21.9 GASTROESOPHAGEAL REFLUX DISEASE WITHOUT ESOPHAGITIS: ICD-10-CM

## 2023-10-09 RX ORDER — LANSOPRAZOLE 30 MG/1
30 CAPSULE, DELAYED RELEASE ORAL DAILY
Qty: 90 CAPSULE | Refills: 0 | Status: SHIPPED | OUTPATIENT
Start: 2023-10-09

## 2023-10-11 DIAGNOSIS — G47.11 IDIOPATHIC HYPERSOMNIA: ICD-10-CM

## 2023-10-11 DIAGNOSIS — G47.11 IDIOPATHIC HYPERSOMNIA: Primary | ICD-10-CM

## 2023-10-11 RX ORDER — DEXTROAMPHETAMINE SACCHARATE, AMPHETAMINE ASPARTATE, DEXTROAMPHETAMINE SULFATE AND AMPHETAMINE SULFATE 5; 5; 5; 5 MG/1; MG/1; MG/1; MG/1
20 TABLET ORAL 3 TIMES DAILY
Qty: 90 TABLET | Refills: 0 | Status: SHIPPED | OUTPATIENT
Start: 2023-10-11 | End: 2023-10-11 | Stop reason: DRUGHIGH

## 2023-10-11 RX ORDER — DEXTROAMPHETAMINE SACCHARATE, AMPHETAMINE ASPARTATE, DEXTROAMPHETAMINE SULFATE AND AMPHETAMINE SULFATE 7.5; 7.5; 7.5; 7.5 MG/1; MG/1; MG/1; MG/1
30 TABLET ORAL 2 TIMES DAILY
Qty: 60 TABLET | Refills: 0 | Status: SHIPPED | OUTPATIENT
Start: 2023-10-11

## 2023-10-11 RX ORDER — DEXTROAMPHETAMINE SACCHARATE, AMPHETAMINE ASPARTATE, DEXTROAMPHETAMINE SULFATE AND AMPHETAMINE SULFATE 5; 5; 5; 5 MG/1; MG/1; MG/1; MG/1
20 TABLET ORAL 3 TIMES DAILY
Qty: 90 TABLET | Refills: 0 | Status: CANCELLED | OUTPATIENT
Start: 2023-10-11

## 2023-10-12 ENCOUNTER — OFFICE VISIT (OUTPATIENT)
Dept: FAMILY MEDICINE CLINIC | Facility: CLINIC | Age: 58
End: 2023-10-12
Payer: COMMERCIAL

## 2023-10-12 VITALS
HEART RATE: 89 BPM | TEMPERATURE: 98 F | SYSTOLIC BLOOD PRESSURE: 134 MMHG | WEIGHT: 163 LBS | DIASTOLIC BLOOD PRESSURE: 74 MMHG | OXYGEN SATURATION: 99 % | BODY MASS INDEX: 24.71 KG/M2 | HEIGHT: 68 IN

## 2023-10-12 DIAGNOSIS — J01.00 ACUTE MAXILLARY SINUSITIS, RECURRENCE NOT SPECIFIED: Primary | ICD-10-CM

## 2023-10-12 RX ORDER — GUAIFENESIN AND DEXTROMETHORPHAN HYDROBROMIDE 600; 30 MG/1; MG/1
1 TABLET, EXTENDED RELEASE ORAL 2 TIMES DAILY PRN
Qty: 6 EACH | Refills: 0 | Status: SHIPPED | OUTPATIENT
Start: 2023-10-12 | End: 2023-10-15

## 2023-10-12 RX ORDER — AMOXICILLIN AND CLAVULANATE POTASSIUM 875; 125 MG/1; MG/1
1 TABLET, FILM COATED ORAL 2 TIMES DAILY
Qty: 20 TABLET | Refills: 0 | Status: SHIPPED | OUTPATIENT
Start: 2023-10-12 | End: 2023-10-22

## 2023-10-12 NOTE — PROGRESS NOTES
=    Subjective:       Jung Burkett is a 58 y.o. male who presents for congestion and sinus pressure.    Mr. Burkett's symptoms began approximately two weeks ago. He initially had URI symptoms (including sore throat) which then resolved but then his symptoms subsequently worsened. He developed sinus pressure, sinus pain, congestion, and cough. He has noticed that his mucus, initially clear, has become purulent.     He has been using flonase and over the counter antihistamines and decongestants with limited success.     The following portions of the patient's history were reviewed and updated as appropriate: allergies, current medications, past family history, past medical history, past social history, past surgical history, and problem list.    Past Medical Hx:  Past Medical History:   Diagnosis Date    Acid reflux     ADHD (attention deficit hyperactivity disorder) 1970    Anxiety     Anxiety disorder 05/17/2019    Arthritis     generalized    Benign prostatic hyperplasia 1989    Blood disease     none    Cervicalgia     Chronic allergic rhinitis     Colon polyp 2005    Depression     Diabetes mellitus, type 2 05/17/2019    DOES NOT CHECK BS    Diabetic eye exam 01/2019 20/20 PER PT     Encounter for diabetic foot exam     WITHIN FEW MO  PER PT     Essential hypertension 05/17/2019    GERD (gastroesophageal reflux disease)     High blood pressure     High cholesterol     Hyperlipemia     Hyperlipidemia     Hypertension     Low back pain 1987    Major depressive disorder 05/17/2019    Nicotine dependence 05/17/2019    Prostate disorder     BPH    Rotator cuff tear, left     Seasonal allergies     Urinary tract infection 1993    Vitamin D deficiency 05/17/2019    no current issues       Past Surgical Hx:  Past Surgical History:   Procedure Laterality Date    CHOLECYSTECTOMY  1997    COLONOSCOPY      ELISA- REPEAT IN 5 YEARS     GALLBLADDER SURGERY      KNEE ARTHROSCOPY W/ MENISCAL REPAIR Right      OTHER SURGICAL HISTORY      SURGICAL CLIPS/gallbladder removed    OTHER SURGICAL HISTORY      right knee meniscus tear repair    SHOULDER ARTHROSCOPY W/ ROTATOR CUFF REPAIR Left 07/21/2021    Procedure: SHOULDER ARTHROSCOPY,SUBACROMINAL DECOMPRESSION, DISTAL CLAVICLE RESECTION, MINI OPEN  ROTATOR CUFF REPAIR;  Surgeon: Ulisses Kenney MD;  Location: [facility] [place];  Service: Orthopedics;  Laterality: Left;    TONSILLECTOMY      VASECTOMY  2004       Current Meds:    Current Outpatient Medications:     amphetamine-dextroamphetamine (ADDERALL) 30 MG tablet, Take 1 tablet by mouth 2 (Two) Times a Day., Disp: 60 tablet, Rfl: 0    atorvastatin (LIPITOR) 20 MG tablet, Take 1 tablet by mouth every night at bedtime., Disp: 90 tablet, Rfl: 1    busPIRone (BUSPAR) 15 MG tablet, Take 1 tablet by mouth 2 (Two) Times a Day As Needed (anxiety)., Disp: 60 tablet, Rfl: 2    CBD (cannabidiol) oral oil, Take  by mouth., Disp: , Rfl:     dicyclomine (BENTYL) 20 MG tablet, Take 1 tablet by mouth 4 (Four) Times a Day., Disp: 360 tablet, Rfl: 1    Empagliflozin-metFORMIN HCl (Synjardy) 12.5-1000 MG tablet, Take 1 tablet by mouth 2 (Two) Times a Day., Disp: 180 tablet, Rfl: 1    lansoprazole (PREVACID) 30 MG capsule, TAKE 1 CAPSULE BY MOUTH DAILY, Disp: 90 capsule, Rfl: 0    metoprolol succinate XL (TOPROL-XL) 50 MG 24 hr tablet, Take 1 tablet by mouth Daily., Disp: 90 tablet, Rfl: 1    tamsulosin (FLOMAX) 0.4 MG capsule 24 hr capsule, Take 1 capsule by mouth Daily., Disp: 90 capsule, Rfl: 1    Tirzepatide 7.5 MG/0.5ML solution pen-injector, Inject 0.5 mL under the skin into the appropriate area as directed Every 7 (Seven) Days., Disp: 2 mL, Rfl: 2    traZODone (DESYREL) 100 MG tablet, Take 1 tablet by mouth every night at bedtime., Disp: 90 tablet, Rfl: 1    amoxicillin-clavulanate (AUGMENTIN) 875-125 MG per tablet, Take 1 tablet by mouth 2 (Two) Times a Day for 10 days., Disp: 20 tablet, Rfl: 0    guaifenesin-dextromethorphan (MUCINEX  DM)  MG tablet sustained-release 12 hour tablet, Take 1 tablet by mouth 2 (Two) Times a Day As Needed (cough and congestion) for up to 3 days., Disp: 6 each, Rfl: 0    Allergies:  Allergies   Allergen Reactions    Hydrocodone-Acetaminophen Itching    Nsaids Arrhythmia    Oxycodone-Acetaminophen Shortness Of Breath    Sulfa Antibiotics Hives    Voltaren [Diclofenac] Palpitations       Family Hx:  Family History   Problem Relation Age of Onset    Cancer Mother     Diabetes Mother     Rheum arthritis Mother         40'S    Heart attack Mother     Breast cancer Mother         40'S    Arthritis Mother     Stroke Father     Heart disease Father     Heart attack Maternal Grandmother     Breast cancer Maternal Grandmother         50'S    Prostate cancer Maternal Grandfather     Nephrolithiasis Maternal Grandfather     Colon cancer Paternal Grandmother     Colon cancer Paternal Grandfather         60'S    Breast cancer Other         40'S    Heart attack Maternal Aunt     Malig Hyperthermia Neg Hx         Social History:  Social History     Socioeconomic History    Marital status:    Tobacco Use    Smoking status: Some Days     Types: Cigars    Smokeless tobacco: Never    Tobacco comments:     1 cigar daily   Vaping Use    Vaping Use: Never used   Substance and Sexual Activity    Alcohol use: Yes     Alcohol/week: 6.0 standard drinks of alcohol     Types: 6 Drinks containing 0.5 oz of alcohol per week     Comment: Drinks daily; beer. Occasionally drinks 2 drinks per day, has been drinking for 6-10 years.     Drug use: Never    Sexual activity: Defer       Review of Systems  Review of Systems   Constitutional:  Negative for activity change, appetite change, chills, fever and unexpected weight change.   HENT:  Positive for congestion, postnasal drip, sinus pressure and sneezing. Negative for ear discharge, ear pain, facial swelling, sinus pain and sore throat (resolved).    Respiratory:  Positive for cough.   "      Objective:     /74   Pulse 89   Temp 98 øF (36.7 øC)   Ht 172.7 cm (67.99\")   Wt 73.9 kg (163 lb)   SpO2 99%   BMI 24.79 kg/mý   Physical Exam  Constitutional:       General: He is not in acute distress.     Appearance: Normal appearance. He is normal weight. He is not ill-appearing, toxic-appearing or diaphoretic.   HENT:      Head: Normocephalic.      Right Ear: Tympanic membrane, ear canal and external ear normal. There is no impacted cerumen.      Left Ear: Tympanic membrane, ear canal and external ear normal. There is no impacted cerumen.      Nose: Nose normal.      Comments: Erythematous nasal mucosa. No tenderness to palpation on exam.      Mouth/Throat:      Mouth: Mucous membranes are moist.      Pharynx: Oropharynx is clear. No oropharyngeal exudate or posterior oropharyngeal erythema.   Eyes:      Extraocular Movements: Extraocular movements intact.   Cardiovascular:      Rate and Rhythm: Normal rate and regular rhythm.   Pulmonary:      Effort: Pulmonary effort is normal. No respiratory distress.      Breath sounds: Normal breath sounds. No stridor. No wheezing, rhonchi or rales.   Chest:      Chest wall: No tenderness.   Musculoskeletal:      Cervical back: Normal range of motion.   Lymphadenopathy:      Cervical: No cervical adenopathy.   Neurological:      Mental Status: He is alert.   Psychiatric:         Mood and Affect: Mood normal.         Behavior: Behavior normal.          Assessment/Plan:     Diagnoses and all orders for this visit:    1. Acute maxillary sinusitis, recurrence not specified (Primary)    Mr. Burkett likely initially had a viral URI/sinusitis. However, the course of initial improvement followed by worsening symptoms, history of sinus pressure and pain, and purulent nasal drainage, make bacterial sinusitis more likely. With the patient's symptoms lasting over two weeks (rather than resolving after 7-10 days), I think it would be prudent to treat with recommended " Augmentin for 10 days. We discussed potential side effects, and patient did tell me he has taken Augmentin before without issue. I will also prescribe a short course of mucinex DM to help with his cough and congestion. He has nasal saline at home. Analgesic use would be with tylenol as he has NSAID allergy. Antihistamines may over-dry him and we discussed this. Although flonase might not help with sinusitis, it should be beneficial for his chronic allergies and he could continue it for this.     We discussed any worsening symptoms or complication that would necessitate repeat appointment or further evaluation.         -     guaifenesin-dextromethorphan (MUCINEX DM)  MG tablet sustained-release 12 hour tablet; Take 1 tablet by mouth 2 (Two) Times a Day As Needed (cough and congestion) for up to 3 days.  Dispense: 6 each; Refill: 0  -     amoxicillin-clavulanate (AUGMENTIN) 875-125 MG per tablet; Take 1 tablet by mouth 2 (Two) Times a Day for 10 days.  Dispense: 20 tablet; Refill: 0          Rx changes: augmentin for acute sinusitis, limited course of as-needed mucinex-dm     Follow-up:     Return if symptoms worsen or fail to improve, for Next scheduled follow up.    Preventative:  Health Maintenance   Topic Date Due    INFLUENZA VACCINE  08/01/2023    COVID-19 Vaccine (6 - 2023-24 season) 09/01/2023    DIABETIC EYE EXAM  09/01/2023    HEMOGLOBIN A1C  11/05/2023    ANNUAL PHYSICAL  11/29/2023    DIABETIC FOOT EXAM  11/29/2023    URINE MICROALBUMIN  11/29/2023    Hepatitis B (3 of 3 - 19+ 3-dose series) 11/30/2023    LIPID PANEL  05/05/2024    COLORECTAL CANCER SCREENING  05/27/2026    TDAP/TD VACCINES (2 - Td or Tdap) 05/16/2029    HEPATITIS C SCREENING  Completed    Pneumococcal Vaccine 0-64  Completed    ZOSTER VACCINE  Completed         This document has been electronically signed by Prashanth Arthur MD on October 12, 2023 10:26 EDT       Parts of this note are electronic transcriptions/translations of spoken  language to printed text using the Dragon Dictation system.

## 2023-10-25 ENCOUNTER — LAB (OUTPATIENT)
Dept: LAB | Facility: HOSPITAL | Age: 58
End: 2023-10-25
Payer: COMMERCIAL

## 2023-10-25 DIAGNOSIS — E11.9 TYPE 2 DIABETES MELLITUS WITHOUT COMPLICATION, WITHOUT LONG-TERM CURRENT USE OF INSULIN: ICD-10-CM

## 2023-10-25 DIAGNOSIS — R53.83 OTHER FATIGUE: ICD-10-CM

## 2023-10-25 DIAGNOSIS — E55.9 VITAMIN D DEFICIENCY: ICD-10-CM

## 2023-10-25 DIAGNOSIS — E29.1 HYPOGONADISM IN MALE: ICD-10-CM

## 2023-10-25 LAB — HBA1C MFR BLD: 5.9 % (ref 4.8–5.6)

## 2023-10-25 PROCEDURE — 83036 HEMOGLOBIN GLYCOSYLATED A1C: CPT

## 2023-10-25 PROCEDURE — 84403 ASSAY OF TOTAL TESTOSTERONE: CPT

## 2023-10-25 PROCEDURE — 36415 COLL VENOUS BLD VENIPUNCTURE: CPT

## 2023-10-25 PROCEDURE — 84402 ASSAY OF FREE TESTOSTERONE: CPT

## 2023-10-25 PROCEDURE — 82306 VITAMIN D 25 HYDROXY: CPT

## 2023-10-26 LAB — 25(OH)D3 SERPL-MCNC: 35.4 NG/ML (ref 30–100)

## 2023-10-31 LAB
TESTOST FREE SERPL-MCNC: 7.2 PG/ML (ref 7.2–24)
TESTOST SERPL-MCNC: 580 NG/DL (ref 264–916)

## 2023-11-10 DIAGNOSIS — G47.11 IDIOPATHIC HYPERSOMNIA: ICD-10-CM

## 2023-11-13 RX ORDER — DEXTROAMPHETAMINE SACCHARATE, AMPHETAMINE ASPARTATE, DEXTROAMPHETAMINE SULFATE AND AMPHETAMINE SULFATE 7.5; 7.5; 7.5; 7.5 MG/1; MG/1; MG/1; MG/1
30 TABLET ORAL 2 TIMES DAILY
Qty: 60 TABLET | Refills: 0 | Status: SHIPPED | OUTPATIENT
Start: 2023-11-13

## 2023-11-28 DIAGNOSIS — E11.65 TYPE 2 DIABETES MELLITUS WITH HYPERGLYCEMIA, WITHOUT LONG-TERM CURRENT USE OF INSULIN: ICD-10-CM

## 2023-12-18 DIAGNOSIS — G47.11 IDIOPATHIC HYPERSOMNIA: ICD-10-CM

## 2023-12-18 RX ORDER — DEXTROAMPHETAMINE SACCHARATE, AMPHETAMINE ASPARTATE, DEXTROAMPHETAMINE SULFATE AND AMPHETAMINE SULFATE 7.5; 7.5; 7.5; 7.5 MG/1; MG/1; MG/1; MG/1
30 TABLET ORAL 2 TIMES DAILY
Qty: 60 TABLET | Refills: 0 | Status: SHIPPED | OUTPATIENT
Start: 2023-12-18

## 2023-12-27 DIAGNOSIS — G47.00 INSOMNIA, UNSPECIFIED TYPE: ICD-10-CM

## 2023-12-27 DIAGNOSIS — K21.9 GASTROESOPHAGEAL REFLUX DISEASE WITHOUT ESOPHAGITIS: ICD-10-CM

## 2023-12-28 RX ORDER — LANSOPRAZOLE 30 MG/1
30 CAPSULE, DELAYED RELEASE ORAL DAILY
Qty: 90 CAPSULE | Refills: 0 | Status: SHIPPED | OUTPATIENT
Start: 2023-12-28

## 2023-12-28 RX ORDER — TRAZODONE HYDROCHLORIDE 100 MG/1
100 TABLET ORAL
Qty: 90 TABLET | Refills: 1 | Status: SHIPPED | OUTPATIENT
Start: 2023-12-28

## 2023-12-28 NOTE — TELEPHONE ENCOUNTER
Mr. Burkett requests refill of trazodone and Prevacid.  He was previously seen by another provider and I have only seen him for an acute sick visit.  However, it does appear that he wishes to see me going forward and does have an upcoming appointment to establish care in February.  Based on that, I reviewed his chart and it does appear this refill request is appropriate.  I will refill his medications long enough to get him through to his appointment with me.      This document has been electronically signed by Prashanth Arthur MD on December 28, 2023 07:32 EST

## 2024-01-05 ENCOUNTER — TELEPHONE (OUTPATIENT)
Dept: FAMILY MEDICINE CLINIC | Facility: CLINIC | Age: 59
End: 2024-01-05
Payer: COMMERCIAL

## 2024-01-05 NOTE — TELEPHONE ENCOUNTER
Notified by staff that patient is needing a prior authorization for medication.  I will forward this to staff and we will begin working on this today.      This document has been electronically signed by Prashanth Arthur MD on January 5, 2024 16:29 EST

## 2024-01-05 NOTE — TELEPHONE ENCOUNTER
"  Caller: Jung Burkett \"Mauro\"    Relationship to patient: Self    Best call back number: [phone]    Patient is needing: PATIENT IS NEEDING AUTHORIZATION ON MEDICATION   lansoprazole (PREVACID) 30 MG capsule   "

## 2024-01-20 ENCOUNTER — APPOINTMENT (OUTPATIENT)
Dept: CT IMAGING | Facility: HOSPITAL | Age: 59
End: 2024-01-20
Payer: COMMERCIAL

## 2024-01-20 ENCOUNTER — HOSPITAL ENCOUNTER (EMERGENCY)
Facility: HOSPITAL | Age: 59
Discharge: HOME | End: 2024-01-20
Attending: EMERGENCY MEDICINE
Payer: COMMERCIAL

## 2024-01-20 VITALS
OXYGEN SATURATION: 100 % | BODY MASS INDEX: 23.72 KG/M2 | DIASTOLIC BLOOD PRESSURE: 81 MMHG | HEART RATE: 71 BPM | WEIGHT: 156.53 LBS | SYSTOLIC BLOOD PRESSURE: 113 MMHG | HEIGHT: 68 IN | TEMPERATURE: 97.8 F | RESPIRATION RATE: 18 BRPM

## 2024-01-20 DIAGNOSIS — R10.31 RLQ ABDOMINAL PAIN: Primary | ICD-10-CM

## 2024-01-20 LAB
ALBUMIN SERPL-MCNC: 4.2 G/DL (ref 3.5–5.2)
ALBUMIN/GLOB SERPL: 1.4 G/DL
ALP SERPL-CCNC: 51 U/L (ref 39–117)
ALT SERPL W P-5'-P-CCNC: 44 U/L (ref 1–41)
ANION GAP SERPL CALCULATED.3IONS-SCNC: 11.2 MMOL/L (ref 5–15)
AST SERPL-CCNC: 40 U/L (ref 1–40)
BASOPHILS # BLD AUTO: 0.01 10*3/MM3 (ref 0–0.2)
BASOPHILS NFR BLD AUTO: 0.2 % (ref 0–1.5)
BILIRUB SERPL-MCNC: 0.4 MG/DL (ref 0–1.2)
BILIRUB UR QL STRIP: NEGATIVE
BUN SERPL-MCNC: 14 MG/DL (ref 6–20)
BUN/CREAT SERPL: 15.4 (ref 7–25)
CALCIUM SPEC-SCNC: 8.9 MG/DL (ref 8.6–10.5)
CHLORIDE SERPL-SCNC: 98 MMOL/L (ref 98–107)
CLARITY UR: CLEAR
CO2 SERPL-SCNC: 27.8 MMOL/L (ref 22–29)
COLOR UR: YELLOW
CREAT SERPL-MCNC: 0.91 MG/DL (ref 0.76–1.27)
D-LACTATE SERPL-SCNC: 1.1 MMOL/L (ref 0.5–2)
DEPRECATED RDW RBC AUTO: 40.8 FL (ref 37–54)
EGFRCR SERPLBLD CKD-EPI 2021: 97.1 ML/MIN/1.73
EOSINOPHIL # BLD AUTO: 0.14 10*3/MM3 (ref 0–0.4)
EOSINOPHIL NFR BLD AUTO: 3 % (ref 0.3–6.2)
ERYTHROCYTE [DISTWIDTH] IN BLOOD BY AUTOMATED COUNT: 12.4 % (ref 12.3–15.4)
GLOBULIN UR ELPH-MCNC: 2.9 GM/DL
GLUCOSE SERPL-MCNC: 141 MG/DL (ref 65–99)
GLUCOSE UR STRIP-MCNC: NEGATIVE MG/DL
HCT VFR BLD AUTO: 47.4 % (ref 37.5–51)
HGB BLD-MCNC: 16.6 G/DL (ref 13–17.7)
HGB UR QL STRIP.AUTO: NEGATIVE
HOLD SPECIMEN: NORMAL
HOLD SPECIMEN: NORMAL
IMM GRANULOCYTES # BLD AUTO: 0.01 10*3/MM3 (ref 0–0.05)
IMM GRANULOCYTES NFR BLD AUTO: 0.2 % (ref 0–0.5)
KETONES UR QL STRIP: NEGATIVE
LEUKOCYTE ESTERASE UR QL STRIP.AUTO: NEGATIVE
LIPASE SERPL-CCNC: 66 U/L (ref 13–60)
LYMPHOCYTES # BLD AUTO: 1.56 10*3/MM3 (ref 0.7–3.1)
LYMPHOCYTES NFR BLD AUTO: 33.2 % (ref 19.6–45.3)
MCH RBC QN AUTO: 31.1 PG (ref 26.6–33)
MCHC RBC AUTO-ENTMCNC: 35 G/DL (ref 31.5–35.7)
MCV RBC AUTO: 88.9 FL (ref 79–97)
MONOCYTES # BLD AUTO: 0.67 10*3/MM3 (ref 0.1–0.9)
MONOCYTES NFR BLD AUTO: 14.3 % (ref 5–12)
NEUTROPHILS NFR BLD AUTO: 2.31 10*3/MM3 (ref 1.7–7)
NEUTROPHILS NFR BLD AUTO: 49.1 % (ref 42.7–76)
NITRITE UR QL STRIP: NEGATIVE
NRBC BLD AUTO-RTO: 0 /100 WBC (ref 0–0.2)
PH UR STRIP.AUTO: 6 [PH] (ref 5–8)
PLATELET # BLD AUTO: 197 10*3/MM3 (ref 140–450)
PMV BLD AUTO: 9.4 FL (ref 6–12)
POTASSIUM SERPL-SCNC: 3.9 MMOL/L (ref 3.5–5.2)
PROT SERPL-MCNC: 7.1 G/DL (ref 6–8.5)
PROT UR QL STRIP: NEGATIVE
RBC # BLD AUTO: 5.33 10*6/MM3 (ref 4.14–5.8)
SODIUM SERPL-SCNC: 137 MMOL/L (ref 136–145)
SP GR UR STRIP: >1.03 (ref 1–1.03)
UROBILINOGEN UR QL STRIP: ABNORMAL
WBC NRBC COR # BLD AUTO: 4.7 10*3/MM3 (ref 3.4–10.8)
WHOLE BLOOD HOLD COAG: NORMAL
WHOLE BLOOD HOLD SPECIMEN: NORMAL

## 2024-01-20 PROCEDURE — 80053 COMPREHEN METABOLIC PANEL: CPT | Performed by: EMERGENCY MEDICINE

## 2024-01-20 PROCEDURE — 83690 ASSAY OF LIPASE: CPT | Performed by: EMERGENCY MEDICINE

## 2024-01-20 PROCEDURE — 25510000001 IOPAMIDOL PER 1 ML: Performed by: EMERGENCY MEDICINE

## 2024-01-20 PROCEDURE — 85025 COMPLETE CBC W/AUTO DIFF WBC: CPT | Performed by: EMERGENCY MEDICINE

## 2024-01-20 PROCEDURE — 25810000003 SODIUM CHLORIDE 0.9 % SOLUTION: Performed by: EMERGENCY MEDICINE

## 2024-01-20 PROCEDURE — 83605 ASSAY OF LACTIC ACID: CPT | Performed by: EMERGENCY MEDICINE

## 2024-01-20 PROCEDURE — 99285 EMERGENCY DEPT VISIT HI MDM: CPT

## 2024-01-20 PROCEDURE — 25010000002 ONDANSETRON PER 1 MG: Performed by: EMERGENCY MEDICINE

## 2024-01-20 PROCEDURE — 74177 CT ABD & PELVIS W/CONTRAST: CPT

## 2024-01-20 PROCEDURE — 81003 URINALYSIS AUTO W/O SCOPE: CPT | Performed by: EMERGENCY MEDICINE

## 2024-01-20 PROCEDURE — 96375 TX/PRO/DX INJ NEW DRUG ADDON: CPT

## 2024-01-20 PROCEDURE — 96374 THER/PROPH/DIAG INJ IV PUSH: CPT

## 2024-01-20 PROCEDURE — 25010000002 MORPHINE PER 10 MG: Performed by: EMERGENCY MEDICINE

## 2024-01-20 RX ORDER — ONDANSETRON 2 MG/ML
4 INJECTION INTRAMUSCULAR; INTRAVENOUS ONCE
Status: COMPLETED | OUTPATIENT
Start: 2024-01-20 | End: 2024-01-20

## 2024-01-20 RX ORDER — METHOCARBAMOL 750 MG/1
750 TABLET, FILM COATED ORAL 3 TIMES DAILY PRN
Qty: 25 TABLET | Refills: 0 | Status: SHIPPED | OUTPATIENT
Start: 2024-01-20

## 2024-01-20 RX ORDER — DICYCLOMINE HCL 20 MG
20 TABLET ORAL 4 TIMES DAILY PRN
Qty: 30 TABLET | Refills: 0 | Status: SHIPPED | OUTPATIENT
Start: 2024-01-20

## 2024-01-20 RX ORDER — SODIUM CHLORIDE 0.9 % (FLUSH) 0.9 %
10 SYRINGE (ML) INJECTION AS NEEDED
Status: DISCONTINUED | OUTPATIENT
Start: 2024-01-20 | End: 2024-01-20 | Stop reason: HOSPADM

## 2024-01-20 RX ADMIN — ONDANSETRON 4 MG: 2 INJECTION INTRAMUSCULAR; INTRAVENOUS at 03:06

## 2024-01-20 RX ADMIN — SODIUM CHLORIDE 500 ML: 9 INJECTION, SOLUTION INTRAVENOUS at 03:06

## 2024-01-20 RX ADMIN — MORPHINE SULFATE 4 MG: 4 INJECTION, SOLUTION INTRAMUSCULAR; INTRAVENOUS at 03:06

## 2024-01-20 RX ADMIN — IOPAMIDOL 100 ML: 755 INJECTION, SOLUTION INTRAVENOUS at 02:56

## 2024-01-20 NOTE — DISCHARGE INSTRUCTIONS
Rest, drink plenty of fluids.  Take your meds as prescribed.  You may take over-the-counter acetaminophen as needed for aches and pains and fever.  Call your primary care doctor on Monday and follow-up with them as directed.  Return to the emergency department immediately for any acutely worsening abdominal pain, any persistent vomiting, any fevers of 101 or greater or any new or worse concerns.

## 2024-01-20 NOTE — ED TRIAGE NOTES
Cough and fever for last week, this am woke up and coughed- it felt like stabbed in lower right abdomen.  Pain when touches area and cough.  States he did a jump test and it did hurt.  Patient worried about his appendix.  Denies n/v.  States he always has diarrhea and it is nothing out of his usual.

## 2024-01-22 DIAGNOSIS — G47.11 IDIOPATHIC HYPERSOMNIA: ICD-10-CM

## 2024-01-22 RX ORDER — DEXTROAMPHETAMINE SACCHARATE, AMPHETAMINE ASPARTATE, DEXTROAMPHETAMINE SULFATE AND AMPHETAMINE SULFATE 7.5; 7.5; 7.5; 7.5 MG/1; MG/1; MG/1; MG/1
30 TABLET ORAL 2 TIMES DAILY
Qty: 60 TABLET | Refills: 0 | Status: SHIPPED | OUTPATIENT
Start: 2024-01-22

## 2024-02-19 ENCOUNTER — OFFICE VISIT (OUTPATIENT)
Dept: FAMILY MEDICINE CLINIC | Facility: CLINIC | Age: 59
End: 2024-02-19
Payer: COMMERCIAL

## 2024-02-19 VITALS
OXYGEN SATURATION: 99 % | WEIGHT: 167 LBS | HEART RATE: 86 BPM | HEIGHT: 68 IN | DIASTOLIC BLOOD PRESSURE: 80 MMHG | SYSTOLIC BLOOD PRESSURE: 128 MMHG | BODY MASS INDEX: 25.31 KG/M2

## 2024-02-19 DIAGNOSIS — Z00.00 ENCOUNTER FOR MEDICAL EXAMINATION TO ESTABLISH CARE: Primary | ICD-10-CM

## 2024-02-19 DIAGNOSIS — R35.0 BENIGN PROSTATIC HYPERPLASIA WITH URINARY FREQUENCY: ICD-10-CM

## 2024-02-19 DIAGNOSIS — K21.00 GASTROESOPHAGEAL REFLUX DISEASE WITH ESOPHAGITIS WITHOUT HEMORRHAGE: ICD-10-CM

## 2024-02-19 DIAGNOSIS — J45.20 MILD INTERMITTENT ASTHMA WITHOUT COMPLICATION: ICD-10-CM

## 2024-02-19 DIAGNOSIS — Z23 NEED FOR HEPATITIS B VACCINATION: ICD-10-CM

## 2024-02-19 DIAGNOSIS — N40.1 BENIGN PROSTATIC HYPERPLASIA WITH URINARY FREQUENCY: ICD-10-CM

## 2024-02-19 PROCEDURE — 90471 IMMUNIZATION ADMIN: CPT | Performed by: STUDENT IN AN ORGANIZED HEALTH CARE EDUCATION/TRAINING PROGRAM

## 2024-02-19 PROCEDURE — 90746 HEPB VACCINE 3 DOSE ADULT IM: CPT | Performed by: STUDENT IN AN ORGANIZED HEALTH CARE EDUCATION/TRAINING PROGRAM

## 2024-02-19 PROCEDURE — 99214 OFFICE O/P EST MOD 30 MIN: CPT | Performed by: STUDENT IN AN ORGANIZED HEALTH CARE EDUCATION/TRAINING PROGRAM

## 2024-02-19 RX ORDER — FINASTERIDE 5 MG/1
5 TABLET, FILM COATED ORAL DAILY
Qty: 90 TABLET | Refills: 1 | Status: SHIPPED | OUTPATIENT
Start: 2024-02-19

## 2024-02-19 RX ORDER — ERGOCALCIFEROL 1.25 MG/1
50000 CAPSULE ORAL
COMMUNITY
Start: 2023-12-27

## 2024-02-19 RX ORDER — DEXLANSOPRAZOLE 30 MG/1
30 CAPSULE, DELAYED RELEASE ORAL DAILY
Qty: 30 CAPSULE | Refills: 0 | Status: SHIPPED | OUTPATIENT
Start: 2024-02-19 | End: 2024-03-20

## 2024-02-19 NOTE — PROGRESS NOTES
Subjective:       Jung Burkett is a 58 y.o. male with a concurrent medical history of essential hypertension, hyperlipidemia, type 2 diabetes mellitus, gastroesophageal reflux disease, anxiety and depression, mild intermittent asthma, BPH who presents to establish care.       In terms of hypertension, blood pressure for us is currently controlled.  Was 149/83.  However, was previously 113/181 last month.  Based on this value, blood pressure for us may be an aberrant value. Repeated manually with SBP of 128. He tells me he is normally controlled at home.  Current medications that affect blood pressure include metoprolol succinate 50 mg.  Normal renal function within the last year.    In terms of hyperlipidemia, he is currently prescribed medium intensity statin atorvastatin 20 mg.  LDL was less than 100 within the last year.    In terms of diabetes mellitus, current medications include Mounjaro 7.5 mg, and empagliflozin-metformin (Synjardy) combination pill.  However, he tells me today that he has been having diarrhea since starting Synjardy.  He had a negative stress test 2 years ago.    Most recent Hemoglobin A1c 5.93 months ago.  Based on this, would discontinue Synjardy altogether.          Diabetic foot exam - he is overdue for diabetic foot exam.   Diabetic eye exam - he says he went to [facility] and had this done, no change.   Urine microalbumin - he is indicated for this, will obtain with future next set of labs.     He is currently prescribed Flomax for BPH. Does not have complete relief. Was on finesteride before. Would restart today at his request.     In terms of anxiety and depression, current medications include BuSpar and trazodone.     He sees sleep medicine who prescribes Adderall. 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     He has a history of GERD and is on prevacid.  Had been on Dexilant before with superior relief.  Continues to struggle on  Prevacid.  Believe he was changed because of insurance reasons.  We will try to prescribe Dexilant again and would appeal to insurance if this is denied.    He has a diagnosis in the chart of asthma and does report intermittent dyspnea on exertion usually with seasonal changes such as in the spring.  However he is not on any inhalers including any rescue inhalers and does not have history of pulmonary function testing.  Would order pulmonary function testing to further assess.    The following portions of the patient's history were reviewed and updated as appropriate: allergies, current medications, past family history, past medical history, past social history, past surgical history, and problem list.    Past Medical Hx:  Past Medical History:   Diagnosis Date    Acid reflux     ADHD (attention deficit hyperactivity disorder) 1970    Anxiety     Anxiety disorder 05/17/2019    Arthritis     generalized    Benign prostatic hyperplasia 1989    Blood disease     none    Cervicalgia     Chronic allergic rhinitis     Colon polyp 2005    Depression     Diabetes mellitus, type 2 05/17/2019    DOES NOT CHECK BS    Diabetic eye exam 01/2019 20/20 PER PT     Encounter for diabetic foot exam     WITHIN FEW MO  PER PT     Essential hypertension 05/17/2019    GERD (gastroesophageal reflux disease)     High blood pressure     High cholesterol     Hyperlipemia     Hyperlipidemia     Hypertension     Low back pain 1987    Major depressive disorder 05/17/2019    Nicotine dependence 05/17/2019    Prostate disorder     BPH    Rotator cuff tear, left     Seasonal allergies     Urinary tract infection 1993    Vitamin D deficiency 05/17/2019    no current issues       Past Surgical Hx:  Past Surgical History:   Procedure Laterality Date    CHOLECYSTECTOMY  1997    COLONOSCOPY      ELISA- REPEAT IN 5 YEARS     GALLBLADDER SURGERY      KNEE ARTHROSCOPY W/ MENISCAL REPAIR Right     OTHER SURGICAL HISTORY      SURGICAL  CLIPS/gallbladder removed    OTHER SURGICAL HISTORY      right knee meniscus tear repair    SHOULDER ARTHROSCOPY W/ ROTATOR CUFF REPAIR Left 07/21/2021    Procedure: SHOULDER ARTHROSCOPY,SUBACROMINAL DECOMPRESSION, DISTAL CLAVICLE RESECTION, MINI OPEN  ROTATOR CUFF REPAIR;  Surgeon: Ulisses Kenney MD;  [place] [facility] [place] [facility] [place]  Service: [place]opedics;  Laterality: Left;    TONSILLECTOMY      VASECTOMY  2004       Current Meds:    Current Outpatient Medications:     vitamin D (ERGOCALCIFEROL) 1.25 MG (23486 UT) capsule capsule, Take 1 capsule by mouth Every 7 (Seven) Days., Disp: , Rfl:     amphetamine-dextroamphetamine (ADDERALL) 30 MG tablet, Take 1 tablet by mouth 2 (Two) Times a Day., Disp: 60 tablet, Rfl: 0    atorvastatin (LIPITOR) 20 MG tablet, Take 1 tablet by mouth every night at bedtime., Disp: 90 tablet, Rfl: 1    busPIRone (BUSPAR) 15 MG tablet, Take 1 tablet by mouth 2 (Two) Times a Day As Needed (anxiety)., Disp: 60 tablet, Rfl: 2    CBD (cannabidiol) oral oil, Take  by mouth., Disp: , Rfl:     dexlansoprazole (Dexilant) 30 MG capsule, Take 1 capsule by mouth Daily for 30 days., Disp: 30 capsule, Rfl: 0    finasteride (Proscar) 5 MG tablet, Take 1 tablet by mouth Daily., Disp: 90 tablet, Rfl: 1    metoprolol succinate XL (TOPROL-XL) 50 MG 24 hr tablet, Take 1 tablet by mouth Daily., Disp: 90 tablet, Rfl: 1    tamsulosin (FLOMAX) 0.4 MG capsule 24 hr capsule, Take 1 capsule by mouth Daily., Disp: 90 capsule, Rfl: 1    Tirzepatide 7.5 MG/0.5ML solution pen-injector, Inject 0.5 mL under the skin into the appropriate area as directed Every 7 (Seven) Days., Disp: 2 mL, Rfl: 2    traZODone (DESYREL) 100 MG tablet, Take 1 tablet by mouth every night at bedtime., Disp: 90 tablet, Rfl: 1    Allergies:  Allergies   Allergen Reactions    Hydrocodone-Acetaminophen Itching    Nsaids Arrhythmia    Oxycodone-Acetaminophen Shortness Of Breath    Sulfa Antibiotics Hives    Voltaren [Diclofenac] Palpitations       Family  "Hx:  Family History   Problem Relation Age of Onset    Cancer Mother     Diabetes Mother     Rheum arthritis Mother         40'S    Heart attack Mother     Breast cancer Mother         40'S    Arthritis Mother     Stroke Father     Heart disease Father     Heart attack Maternal Grandmother     Breast cancer Maternal Grandmother         50'S    Prostate cancer Maternal Grandfather     Nephrolithiasis Maternal Grandfather     Colon cancer Paternal Grandmother     Colon cancer Paternal Grandfather         60'S    Breast cancer Other         40'S    Heart attack Maternal Aunt     Malig Hyperthermia Neg Hx         Social History:  Social History     Socioeconomic History    Marital status:    Tobacco Use    Smoking status: Some Days     Types: Cigars    Smokeless tobacco: Never    Tobacco comments:     1 cigar daily   Vaping Use    Vaping Use: Never used   Substance and Sexual Activity    Alcohol use: Yes     Alcohol/week: 6.0 standard drinks of alcohol     Types: 6 Drinks containing 0.5 oz of alcohol per week     Comment: Drinks daily; beer. Occasionally drinks 2 drinks per day, has been drinking for 6-10 years.     Drug use: Never    Sexual activity: Defer       Review of Systems  Review of Systems   Constitutional:  Negative for fatigue.   Genitourinary:  Positive for difficulty urinating (prostate).       Objective:     /80   Pulse 86   Ht 172.7 cm (68\")   Wt 75.8 kg (167 lb)   SpO2 99%   BMI 25.39 kg/m²   Physical Exam  Constitutional:       General: He is not in acute distress.     Appearance: Normal appearance. He is not ill-appearing, toxic-appearing or diaphoretic.   Eyes:      Pupils: Pupils are equal, round, and reactive to light.   Cardiovascular:      Rate and Rhythm: Normal rate and regular rhythm.   Pulmonary:      Effort: Pulmonary effort is normal. No respiratory distress.      Breath sounds: Normal breath sounds.   Abdominal:      General: Bowel sounds are normal. There is no " distension.      Palpations: Abdomen is soft.   Musculoskeletal:      Cervical back: Neck supple.   Lymphadenopathy:      Cervical: No cervical adenopathy.   Neurological:      Mental Status: He is alert.   Psychiatric:         Mood and Affect: Mood normal.         Behavior: Behavior normal.          Assessment/Plan:     Diagnoses and all orders for this visit:    1. Encounter for medical examination to establish care (Primary)    2. Need for hepatitis B vaccination    Started him on his hep B vaccination series.    -     Hepatitis B Vaccine Adult IM (ENERGIX/RECOMBIVAX)    3. Benign prostatic hyperplasia with urinary frequency    He is currently prescribed Flomax for BPH. Does not have complete relief. Was on finesteride before. Would restart today at his request.     -     finasteride (Proscar) 5 MG tablet; Take 1 tablet by mouth Daily.  Dispense: 90 tablet; Refill: 1    4. Mild intermittent asthma without complication    He has a diagnosis in the chart of asthma and does report intermittent dyspnea on exertion usually with seasonal changes such as in the spring.  However he is not on any inhalers including any rescue inhalers and does not have history of pulmonary function testing.  Would order pulmonary function testing to further assess.    -     Complete PFT - Pre & Post Bronchodilator; Future    5. Gastroesophageal reflux disease with esophagitis without hemorrhage    He has a history of GERD and is on prevacid.  Had been on Dexilant before with superior relief.  Continues to struggle on Prevacid.  Believe he was changed because of insurance reasons.  We will try to prescribe Dexilant again and would appeal to insurance if this is denied.    -     dexlansoprazole (Dexilant) 30 MG capsule; Take 1 capsule by mouth Daily for 30 days.  Dispense: 30 capsule; Refill: 0          Rx changes: Discontinued Synjardy    Follow-up:     Return in about 3 months (around 5/19/2024) for Diabetes, Diabetic Foot Exam  .    Preventative:  Health Maintenance   Topic Date Due    DIABETIC EYE EXAM  09/01/2023    ANNUAL PHYSICAL  11/29/2023    DIABETIC FOOT EXAM  11/29/2023    URINE MICROALBUMIN  11/29/2023    HEMOGLOBIN A1C  04/25/2024    LIPID PANEL  05/05/2024    BMI FOLLOWUP  05/31/2024    COLORECTAL CANCER SCREENING  05/27/2026    TDAP/TD VACCINES (2 - Td or Tdap) 05/16/2029    HEPATITIS C SCREENING  Completed    Hepatitis B  Completed    COVID-19 Vaccine  Completed    Pneumococcal Vaccine 0-64  Completed    INFLUENZA VACCINE  Completed    ZOSTER VACCINE  Completed         This document has been electronically signed by Prashanth Arthur MD on February 19, 2024 14:53 EST       Parts of this note are electronic transcriptions/translations of spoken language to printed text using the Dragon Dictation system.

## 2024-02-27 DIAGNOSIS — E11.65 TYPE 2 DIABETES MELLITUS WITH HYPERGLYCEMIA, WITHOUT LONG-TERM CURRENT USE OF INSULIN: ICD-10-CM

## 2024-02-28 ENCOUNTER — OFFICE VISIT (OUTPATIENT)
Dept: SLEEP MEDICINE | Facility: HOSPITAL | Age: 59
End: 2024-02-28
Payer: COMMERCIAL

## 2024-02-28 VITALS
HEART RATE: 73 BPM | HEIGHT: 68 IN | DIASTOLIC BLOOD PRESSURE: 85 MMHG | BODY MASS INDEX: 25.7 KG/M2 | WEIGHT: 169.6 LBS | SYSTOLIC BLOOD PRESSURE: 141 MMHG | OXYGEN SATURATION: 99 %

## 2024-02-28 DIAGNOSIS — G47.11 IDIOPATHIC HYPERSOMNIA: Primary | ICD-10-CM

## 2024-02-28 PROCEDURE — G0463 HOSPITAL OUTPT CLINIC VISIT: HCPCS

## 2024-02-28 RX ORDER — DEXTROAMPHETAMINE SACCHARATE, AMPHETAMINE ASPARTATE, DEXTROAMPHETAMINE SULFATE AND AMPHETAMINE SULFATE 5; 5; 5; 5 MG/1; MG/1; MG/1; MG/1
20 TABLET ORAL 2 TIMES DAILY
Qty: 60 TABLET | Refills: 0 | Status: SHIPPED | OUTPATIENT
Start: 2024-02-28

## 2024-02-28 NOTE — PROGRESS NOTES
"  [address] [address]  [place]   [place] [address]  Phone: [phone]  Fax: [phone]      SLEEP CLINIC FOLLOW UP PROGRESS NOTE.    Jung PHI Burkett  1965  59 y.o.  male      PCP: Prashanth Arthur MD      Date of visit: 2/28/2024    Chief Complaint   Patient presents with    Daytime Sleepiness       HPI:  This is a 59 y.o. years old patient who has a history of idiopathic hypersomnia and ADHD who is on Adderall.  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  He says the medication has helped him and is able to function.  In the last 12 months patient is not able to work because of his shoulder problem.  Recently also has developed palpitation for which he is started on beta-blocker     Patient was given Adderall 30 mg twice a day with a total dose of 60 mg/day but however he has started taking hemp at nighttime.  I talked to the patient about reducing the Adderall dose to 20 mg twice a day to a total dose of 40 mg a day.  He is agreeable    Medications and allergies are reviewed by me and documented in the encounter.     SOCIAL ( habits pertaining to sleep medicine)  History of tobacco use:Yes smoke cigars  History of alcohol use: 6 per week  Caffeine use: 1    REVIEW OF SYSTEMS:   [place] Sleepiness Scale :Total score: 14   Nasal congestion:No   Dry mouth/nose:No   Post nasal drip; No   Acid reflux/Heartburn:Yes   Abd bloating:No   Morning headache:No   Anxiety:No   Depression:No     PHYSICAL EXAMINATION:  CONSTITUTIONAL:  Vitals:    02/28/24 1300   BP: 141/85   Pulse: 73   SpO2: 99%   Weight: 76.9 kg (169 lb 9.6 oz)   Height: 172.7 cm (67.99\")    Body mass index is 25.79 kg/m².   NOSE: nasal passages are clear, no nasal polyps, septum in the midline.  THROAT: throat is clear, oral airway Mallampati class 2  RESP SYSTEM: Breath sounds are normal, no wheezes or crackles  CARDIOVASULAR: " Heart rate is regular without murmur. No edema      Celestine checked no problem, 2/28/2024      ASSESSMENT AND PLAN:  Idiopathic hypersomnia.  Adderall dose has been changed to 20 mg twice a day as the patient is taking hemp product.  I have sent a prescription to his pharmacy and we will see him in 6 months for follow-up.  He will still continue to call the office every month for refills.  ADHD,    Palpitation, on beta-blocker  Return in about 6 months (around 8/28/2024) for Next scheduled follow-up. . Patient's questions were answered.        Jaimie Kamara MD  Sleep Medicine  Medical Director, [facility]  2/28/2024

## 2024-03-14 ENCOUNTER — TELEPHONE (OUTPATIENT)
Dept: SLEEP MEDICINE | Facility: HOSPITAL | Age: 59
End: 2024-03-14
Payer: COMMERCIAL

## 2024-03-14 NOTE — TELEPHONE ENCOUNTER
Call to [facility] ([phone]) after not hearing from PA started on 2/29/24. Rep named Louis did test claims for March, April, May, and June and all show a paid claim so NO PA is required at this time.

## 2024-03-15 ENCOUNTER — TELEPHONE (OUTPATIENT)
Dept: FAMILY MEDICINE CLINIC | Facility: CLINIC | Age: 59
End: 2024-03-15
Payer: COMMERCIAL

## 2024-03-15 DIAGNOSIS — R05.9 COUGH, UNSPECIFIED TYPE: Primary | ICD-10-CM

## 2024-03-15 RX ORDER — BENZONATATE 100 MG/1
100 CAPSULE ORAL 3 TIMES DAILY PRN
Qty: 12 CAPSULE | Refills: 0 | Status: SHIPPED | OUTPATIENT
Start: 2024-03-15

## 2024-03-15 NOTE — TELEPHONE ENCOUNTER
I would not send an antibiotic in for him without a physical exam.  I would prefer to have more details before prescribing medication but would send in for some cough suppressant.  If he develops any worsening respiratory symptoms over the weekend prior to his appointment with me on Monday, he is to present to urgent care.      This document has been electronically signed by Prashanth Arthur MD on March 15, 2024 16:05 EDT

## 2024-03-15 NOTE — TELEPHONE ENCOUNTER
CALLED PT ADVISED NEEDS OV PT STATES IS SCHEDULE FOR MONDAY BY IS STILL WANTING A ABX OR COUGH MED

## 2024-03-15 NOTE — TELEPHONE ENCOUNTER
"Caller: Jung Burkett \"Mauro\"    Relationship: Self    Best call back number: [phone]     What medication are you requesting: COUGH MEDICATION    What are your current symptoms: COUGH    How long have you been experiencing symptoms: 2-3 WEEKS    Have you had these symptoms before:    [x] Yes  [] No    Have you been treated for these symptoms before:   [x] Yes  [] No    If a prescription is needed, what is your preferred pharmacy and phone number[facility] [address] - [phone]  - [phone] FX       "

## 2024-03-18 ENCOUNTER — HOSPITAL ENCOUNTER (OUTPATIENT)
Dept: GENERAL RADIOLOGY | Facility: HOSPITAL | Age: 59
Discharge: HOME | End: 2024-03-18
Payer: COMMERCIAL

## 2024-03-18 ENCOUNTER — LAB (OUTPATIENT)
Dept: LAB | Facility: HOSPITAL | Age: 59
End: 2024-03-18
Payer: COMMERCIAL

## 2024-03-18 ENCOUNTER — TELEPHONE (OUTPATIENT)
Dept: FAMILY MEDICINE CLINIC | Facility: CLINIC | Age: 59
End: 2024-03-18

## 2024-03-18 ENCOUNTER — OFFICE VISIT (OUTPATIENT)
Dept: FAMILY MEDICINE CLINIC | Facility: CLINIC | Age: 59
End: 2024-03-18
Payer: COMMERCIAL

## 2024-03-18 VITALS
OXYGEN SATURATION: 100 % | HEART RATE: 89 BPM | HEIGHT: 67 IN | SYSTOLIC BLOOD PRESSURE: 152 MMHG | WEIGHT: 180 LBS | TEMPERATURE: 98.1 F | BODY MASS INDEX: 28.25 KG/M2 | DIASTOLIC BLOOD PRESSURE: 81 MMHG

## 2024-03-18 DIAGNOSIS — J45.21 MILD INTERMITTENT ASTHMA WITH EXACERBATION: ICD-10-CM

## 2024-03-18 DIAGNOSIS — R39.15 URINARY URGENCY: ICD-10-CM

## 2024-03-18 DIAGNOSIS — J84.9 INTERSTITIAL LUNG DISEASE: Primary | ICD-10-CM

## 2024-03-18 DIAGNOSIS — E11.00 TYPE 2 DIABETES MELLITUS WITH HYPEROSMOLARITY WITHOUT COMA, WITHOUT LONG-TERM CURRENT USE OF INSULIN: Primary | ICD-10-CM

## 2024-03-18 DIAGNOSIS — J45.21 MILD INTERMITTENT ASTHMA WITH EXACERBATION: Primary | ICD-10-CM

## 2024-03-18 LAB
BILIRUB UR QL STRIP: NEGATIVE
CLARITY UR: CLEAR
COLOR UR: YELLOW
GLUCOSE UR STRIP-MCNC: ABNORMAL MG/DL
HGB UR QL STRIP.AUTO: NEGATIVE
HOLD SPECIMEN: NORMAL
KETONES UR QL STRIP: NEGATIVE
LEUKOCYTE ESTERASE UR QL STRIP.AUTO: NEGATIVE
NITRITE UR QL STRIP: NEGATIVE
PH UR STRIP.AUTO: 7 [PH] (ref 5–8)
PROT UR QL STRIP: NEGATIVE
SP GR UR STRIP: 1.02 (ref 1–1.03)
UROBILINOGEN UR QL STRIP: ABNORMAL

## 2024-03-18 PROCEDURE — 71046 X-RAY EXAM CHEST 2 VIEWS: CPT

## 2024-03-18 PROCEDURE — 99214 OFFICE O/P EST MOD 30 MIN: CPT | Performed by: STUDENT IN AN ORGANIZED HEALTH CARE EDUCATION/TRAINING PROGRAM

## 2024-03-18 PROCEDURE — 81003 URINALYSIS AUTO W/O SCOPE: CPT

## 2024-03-18 RX ORDER — ALBUTEROL SULFATE 90 UG/1
2 AEROSOL, METERED RESPIRATORY (INHALATION) EVERY 4 HOURS PRN
Qty: 18 G | Refills: 1 | Status: CANCELLED | OUTPATIENT
Start: 2024-03-18

## 2024-03-18 RX ORDER — ORAL SEMAGLUTIDE 3 MG/1
3 TABLET ORAL DAILY
Qty: 30 TABLET | Refills: 1 | Status: SHIPPED | OUTPATIENT
Start: 2024-03-18 | End: 2024-03-19 | Stop reason: SDUPTHER

## 2024-03-18 RX ORDER — PREDNISONE 20 MG/1
40 TABLET ORAL DAILY
Qty: 10 TABLET | Refills: 0 | Status: SHIPPED | OUTPATIENT
Start: 2024-03-18 | End: 2024-03-25 | Stop reason: SDUPTHER

## 2024-03-18 RX ORDER — BUDESONIDE AND FORMOTEROL FUMARATE DIHYDRATE 80; 4.5 UG/1; UG/1
2 AEROSOL RESPIRATORY (INHALATION)
Qty: 10.2 G | Refills: 12 | Status: SHIPPED | OUTPATIENT
Start: 2024-03-18 | End: 2024-03-22

## 2024-03-18 RX ORDER — ALBUTEROL SULFATE 90 UG/1
2 AEROSOL, METERED RESPIRATORY (INHALATION) EVERY 4 HOURS PRN
Qty: 18 G | Refills: 0 | Status: CANCELLED | OUTPATIENT
Start: 2024-03-18

## 2024-03-18 NOTE — PROGRESS NOTES
Subjective:       Jung Burkett is a 59 y.o. male with a pertinent concurrent medical history of mild intermittent asthma and a past medical history of  who presents for cough.     I noted at our initial visit to establish care that he had reported intermittent dyspnea on exertion with seasonal changes in the past but had not been on any inhalers or rescue inhalers and had not had any history of pulmonary function testing.  I had attempted to order pulmonary function testing for him but he did not have this done. Today, he endorses nonproductive cough and intermittent dyspnea. He denies wheezing or nighttime awakening, though symptoms are worse at night.  Symptoms fail to improve with Tessalon Perles.  He is also used an older albuterol inhaler with limited relief.    Given history of asthma, exam is somewhat suggestive of asthma exacerbation.  I would tend to treat with 5 days of prednisone 40 mg and a Symbicort inhaler scheduled and as needed in accordance with SMART therapy.  However, given lack of wheezing and patient believing he had a COVID-19 infection in the past, would obtain a chest x-ray to rule out a walking pneumonia.  Would see back in 1 week to assess response to therapy.  He did have a CT of the abdomen pelvis in January that had findings suggestive of chronic interstitial fibrosis.  If he does not improve with my initial treatment, we would pursue a dedicated CT of the chest to further evaluate for this.  If he were found to have chronic interstitial fibrosis, would refer to pulmonology.  He was originally scheduled to have his pulmonary function testing next week, but we will need to postpone this until his acute symptoms are under control.    Jung also reports recent history of urinary urgency and frequency and decreased urination.  Had previously been on SGLT2 but is no longer taking this.  Would obtain urinalysis and urine culture.  He does have history of BPH currently uncontrolled  and does report some suprapubic tenderness so this may represent a prostatitis rather than a UTI.    Jung tells me that he has not been able to get the Mounjaro he has been on due to a shortage.  As BMI is less than 30, will consider switching to oral Rybelsus if he continues to not be able to get this medication going forward.        The following portions of the patient's history were reviewed and updated as appropriate: allergies, current medications, past family history, past medical history, past social history, past surgical history, and problem list.    Past Medical Hx:  Past Medical History:   Diagnosis Date    Acid reflux     ADHD (attention deficit hyperactivity disorder) 1970    Anxiety     Anxiety disorder 05/17/2019    Arthritis     generalized    Benign prostatic hyperplasia 1989    Blood disease     none    Cervicalgia     Chronic allergic rhinitis     Colon polyp 2005    Depression     Diabetes mellitus, type 2 05/17/2019    DOES NOT CHECK BS    Diabetic eye exam 01/2019 20/20 PER PT     Encounter for diabetic foot exam     WITHIN FEW MO  PER PT     Essential hypertension 05/17/2019    GERD (gastroesophageal reflux disease)     High blood pressure     High cholesterol     Hyperlipemia     Hyperlipidemia     Hypertension     Low back pain 1987    Major depressive disorder 05/17/2019    Nicotine dependence 05/17/2019    Prostate disorder     BPH    Rotator cuff tear, left     Seasonal allergies     Urinary tract infection 1993    Vitamin D deficiency 05/17/2019    no current issues       Past Surgical Hx:  Past Surgical History:   Procedure Laterality Date    CHOLECYSTECTOMY  1997    COLONOSCOPY      ELISA- REPEAT IN 5 YEARS     GALLBLADDER SURGERY      KNEE ARTHROSCOPY W/ MENISCAL REPAIR Right     OTHER SURGICAL HISTORY      SURGICAL CLIPS/gallbladder removed    OTHER SURGICAL HISTORY      right knee meniscus tear repair    SHOULDER ARTHROSCOPY W/ ROTATOR CUFF REPAIR Left 07/21/2021     Procedure: SHOULDER ARTHROSCOPY,SUBACROMINAL DECOMPRESSION, DISTAL CLAVICLE RESECTION, MINI OPEN  ROTATOR CUFF REPAIR;  Surgeon: Ulisses Kenney MD;  Location: [facility];  Service: Orthopedics;  Laterality: Left;    TONSILLECTOMY      VASECTOMY  2004       Current Meds:    Current Outpatient Medications:     amphetamine-dextroamphetamine (ADDERALL) 20 MG tablet, Take 1 tablet by mouth 2 (Two) Times a Day. Dose adjustment, Disp: 60 tablet, Rfl: 0    atorvastatin (LIPITOR) 20 MG tablet, Take 1 tablet by mouth every night at bedtime., Disp: 90 tablet, Rfl: 1    benzonatate (Tessalon Perles) 100 MG capsule, Take 1 capsule by mouth 3 (Three) Times a Day As Needed for Cough., Disp: 12 capsule, Rfl: 0    busPIRone (BUSPAR) 15 MG tablet, Take 1 tablet by mouth 2 (Two) Times a Day As Needed (anxiety)., Disp: 60 tablet, Rfl: 2    CBD (cannabidiol) oral oil, Take  by mouth., Disp: , Rfl:     dexlansoprazole (Dexilant) 30 MG capsule, Take 1 capsule by mouth Daily for 30 days., Disp: 30 capsule, Rfl: 0    finasteride (Proscar) 5 MG tablet, Take 1 tablet by mouth Daily., Disp: 90 tablet, Rfl: 1    metoprolol succinate XL (TOPROL-XL) 50 MG 24 hr tablet, Take 1 tablet by mouth Daily., Disp: 90 tablet, Rfl: 1    tamsulosin (FLOMAX) 0.4 MG capsule 24 hr capsule, Take 1 capsule by mouth Daily., Disp: 90 capsule, Rfl: 1    Tirzepatide (MOUNJARO) 7.5 MG/0.5ML solution pen-injector pen, Inject 0.5 mL under the skin into the appropriate area as directed Every 7 (Seven) Days., Disp: 2 mL, Rfl: 2    traZODone (DESYREL) 100 MG tablet, Take 1 tablet by mouth every night at bedtime., Disp: 90 tablet, Rfl: 1    vitamin D (ERGOCALCIFEROL) 1.25 MG (83439 UT) capsule capsule, Take 1 capsule by mouth Every 7 (Seven) Days., Disp: , Rfl:     budesonide-formoterol (Symbicort) 80-4.5 MCG/ACT inhaler, Inhale 2 puffs 2 (Two) Times a Day., Disp: 10.2 g, Rfl: 12    predniSONE (DELTASONE) 20 MG tablet, Take 2 tablets by mouth Daily for 5 days.,  Disp: 10 tablet, Rfl: 0    Allergies:  Allergies   Allergen Reactions    Hydrocodone-Acetaminophen Itching    Nsaids Arrhythmia    Oxycodone-Acetaminophen Shortness Of Breath    Sulfa Antibiotics Hives    Voltaren [Diclofenac] Palpitations       Family Hx:  Family History   Problem Relation Age of Onset    Cancer Mother     Diabetes Mother     Rheum arthritis Mother         40'S    Heart attack Mother     Breast cancer Mother         40'S    Arthritis Mother     Stroke Father     Heart disease Father     Heart attack Maternal Grandmother     Breast cancer Maternal Grandmother         50'S    Prostate cancer Maternal Grandfather     Nephrolithiasis Maternal Grandfather     Colon cancer Paternal Grandmother     Colon cancer Paternal Grandfather         60'S    Breast cancer Other         40'S    Heart attack Maternal Aunt     Malig Hyperthermia Neg Hx         Social History:  Social History     Socioeconomic History    Marital status:    Tobacco Use    Smoking status: Some Days     Types: Cigars    Smokeless tobacco: Never    Tobacco comments:     1 cigar daily   Vaping Use    Vaping status: Never Used   Substance and Sexual Activity    Alcohol use: Yes     Alcohol/week: 6.0 standard drinks of alcohol     Types: 6 Drinks containing 0.5 oz of alcohol per week     Comment: Drinks daily; beer. Occasionally drinks 2 drinks per day, has been drinking for 6-10 years.     Drug use: Never    Sexual activity: Defer       Review of Systems  Review of Systems   Constitutional:  Negative for chills and fever.   HENT:  Negative for ear pain, postnasal drip, rhinorrhea and sore throat.    Respiratory:  Positive for cough and shortness of breath. Negative for wheezing.    Cardiovascular:  Negative for chest pain.   Genitourinary:  Positive for difficulty urinating, dysuria and urgency. Negative for frequency and hematuria.   Musculoskeletal:  Positive for myalgias.   Skin:  Negative for rash.   Neurological:  Negative for  "headaches.       Objective:     /81   Pulse 89   Temp 98.1 °F (36.7 °C)   Ht 170.2 cm (67\")   Wt 81.6 kg (180 lb)   SpO2 100%   BMI 28.19 kg/m²   Physical Exam  Constitutional:       General: He is not in acute distress.     Appearance: Normal appearance. He is not ill-appearing, toxic-appearing or diaphoretic.   Pulmonary:      Effort: Pulmonary effort is normal. No respiratory distress.      Breath sounds: No stridor. No wheezing or rales.      Comments: Decreased breath sounds   Neurological:      Mental Status: He is alert.   Psychiatric:         Mood and Affect: Mood normal.         Behavior: Behavior normal.          Assessment/Plan:     Diagnoses and all orders for this visit:    1. Mild intermittent asthma with exacerbation (Primary)        I noted at our initial visit to establish care that he had reported intermittent dyspnea on exertion with seasonal changes in the past but had not been on any inhalers or rescue inhalers and had not had any history of pulmonary function testing.  I had attempted to order pulmonary function testing for him but he did not have this done. Today, he endorses nonproductive cough and intermittent dyspnea. He denies wheezing or nighttime awakening, though symptoms are worse at night.  Symptoms fail to improve with Tessalon Perles.  He is also used an older albuterol inhaler with limited relief.    Given history of asthma, exam is somewhat suggestive of asthma exacerbation.  I would tend to treat with 5 days of prednisone 40 mg and a Symbicort inhaler scheduled and as needed in accordance with SMART therapy.  However, given lack of wheezing and patient believing he had a COVID-19 infection in the past, would obtain a chest x-ray to rule out a walking pneumonia.  Would see back in 1 week to assess response to therapy.  He did have a CT of the abdomen pelvis in January that had findings suggestive of chronic interstitial fibrosis.  If he does not improve with my initial " treatment, we would pursue a dedicated CT of the chest to further evaluate for this.  If he were found to have chronic interstitial fibrosis, would refer to pulmonology.  He was originally scheduled to have his pulmonary function testing next week, but we will need to postpone this until his acute symptoms are under control.        -     predniSONE (DELTASONE) 20 MG tablet; Take 2 tablets by mouth Daily for 5 days.  Dispense: 10 tablet; Refill: 0  -     budesonide-formoterol (Symbicort) 80-4.5 MCG/ACT inhaler; Inhale 2 puffs 2 (Two) Times a Day.  Dispense: 10.2 g; Refill: 12  -     XR Chest 2 View; Future    2. Urinary urgency    Jung also reports recent history of urinary urgency and frequency and decreased urination.  Had previously been on SGLT2 but is no longer taking this.  Would obtain urinalysis and urine culture.  He does have history of BPH currently uncontrolled and does report some suprapubic tenderness so this may represent a prostatitis rather than a UTI.    -     Urinalysis With Culture If Indicated -; Future          Rx changes: Prednisone and Symbicort    Follow-up:     Return in about 11 days (around 3/29/2024) for Recheck.    Preventative:  Health Maintenance   Topic Date Due    DIABETIC EYE EXAM  09/01/2023    ANNUAL PHYSICAL  11/29/2023    DIABETIC FOOT EXAM  11/29/2023    URINE MICROALBUMIN  11/29/2023    RSV Vaccine - Adults (1 - 1-dose 60+ series) Never done    HEMOGLOBIN A1C  04/25/2024    LIPID PANEL  05/05/2024    BMI FOLLOWUP  05/31/2024    COLORECTAL CANCER SCREENING  05/27/2026    TDAP/TD VACCINES (2 - Td or Tdap) 05/16/2029    HEPATITIS C SCREENING  Completed    COVID-19 Vaccine  Completed    Pneumococcal Vaccine 0-64  Completed    INFLUENZA VACCINE  Completed    ZOSTER VACCINE  Completed           This document has been electronically signed by Prashanth Arthur MD on March 18, 2024 14:27 EDT       Parts of this note are electronic transcriptions/translations of spoken language to  printed text using the Dragon Dictation system.

## 2024-03-18 NOTE — TELEPHONE ENCOUNTER
Will discontinue Mounjaro and start Rybelsus, an every day pill in the same class.  I have sent this medication in.      This document has been electronically signed by Prashanth Arthur MD on March 18, 2024 14:32 EDT

## 2024-03-18 NOTE — PROGRESS NOTES
I have reviewed this patient's x-ray.  It does show signs of potential chronic interstitial lung disease.  I will order a high-definition CT of the chest to further evaluate for this and based on the results of that, would consider referral to pulmonology.  Can we call him and let him know?    Thank you,    Prashanth Arthur    ?  This document has been electronically signed by Prashanth Arthur MD on March 18, 2024 15:33 EDT

## 2024-03-19 DIAGNOSIS — E11.00 TYPE 2 DIABETES MELLITUS WITH HYPEROSMOLARITY WITHOUT COMA, WITHOUT LONG-TERM CURRENT USE OF INSULIN: ICD-10-CM

## 2024-03-19 RX ORDER — ORAL SEMAGLUTIDE 3 MG/1
3 TABLET ORAL DAILY
Qty: 30 TABLET | Refills: 1 | Status: SHIPPED | OUTPATIENT
Start: 2024-03-19

## 2024-03-19 NOTE — PROGRESS NOTES
I have reviewed checks urinalysis results.  These are negative for leukocyte esterase, nitrites, and blood.  Positive only for 2+ sugar in his urine, consistent with his history of diabetes.  Based on this, would not start antibiotic yet and would wait to see what culture shows.  If culture is negative but symptoms persist, we would repeat urinalysis and culture and check renal function panel.    Thank you,    Prashanth Arthur    ?  This document has been electronically signed by Prashanth Arthur MD on March 19, 2024 08:05 EDT

## 2024-03-22 ENCOUNTER — TELEPHONE (OUTPATIENT)
Dept: FAMILY MEDICINE CLINIC | Facility: CLINIC | Age: 59
End: 2024-03-22
Payer: COMMERCIAL

## 2024-03-22 DIAGNOSIS — J45.20 MILD INTERMITTENT ASTHMA, UNSPECIFIED WHETHER COMPLICATED: Primary | ICD-10-CM

## 2024-03-22 RX ORDER — FLUTICASONE PROPIONATE AND SALMETEROL XINAFOATE 45; 21 UG/1; UG/1
2 AEROSOL, METERED RESPIRATORY (INHALATION)
Qty: 12 G | Refills: 2 | Status: SHIPPED | OUTPATIENT
Start: 2024-03-22 | End: 2024-03-25 | Stop reason: SDUPTHER

## 2024-03-22 NOTE — TELEPHONE ENCOUNTER
In this case, I do feel comfortable to send in an Advair inhaler, which is covered by insurance according to the medication list provided by [facility].  I have sent in Advair.      This document has been electronically signed by Prashanth Arthur MD on March 22, 2024 11:05 EDT

## 2024-03-25 ENCOUNTER — OFFICE VISIT (OUTPATIENT)
Dept: FAMILY MEDICINE CLINIC | Facility: CLINIC | Age: 59
End: 2024-03-25
Payer: COMMERCIAL

## 2024-03-25 ENCOUNTER — LAB (OUTPATIENT)
Dept: LAB | Facility: HOSPITAL | Age: 59
End: 2024-03-25
Payer: COMMERCIAL

## 2024-03-25 VITALS
BODY MASS INDEX: 29.35 KG/M2 | HEART RATE: 64 BPM | SYSTOLIC BLOOD PRESSURE: 166 MMHG | DIASTOLIC BLOOD PRESSURE: 82 MMHG | HEIGHT: 67 IN | OXYGEN SATURATION: 97 % | WEIGHT: 187 LBS

## 2024-03-25 DIAGNOSIS — J45.20 MILD INTERMITTENT ASTHMA, UNSPECIFIED WHETHER COMPLICATED: ICD-10-CM

## 2024-03-25 DIAGNOSIS — J45.21 MILD INTERMITTENT ASTHMA WITH EXACERBATION: Primary | ICD-10-CM

## 2024-03-25 DIAGNOSIS — R35.0 URINARY FREQUENCY: ICD-10-CM

## 2024-03-25 DIAGNOSIS — R39.15 BENIGN PROSTATIC HYPERPLASIA (BPH) WITH URINARY URGENCY: ICD-10-CM

## 2024-03-25 DIAGNOSIS — N40.1 BENIGN PROSTATIC HYPERPLASIA (BPH) WITH URINARY URGENCY: ICD-10-CM

## 2024-03-25 LAB
BILIRUB UR QL STRIP: NEGATIVE
CLARITY UR: CLEAR
COLOR UR: YELLOW
GLUCOSE UR STRIP-MCNC: ABNORMAL MG/DL
HGB UR QL STRIP.AUTO: NEGATIVE
HOLD SPECIMEN: NORMAL
KETONES UR QL STRIP: NEGATIVE
LEUKOCYTE ESTERASE UR QL STRIP.AUTO: NEGATIVE
NITRITE UR QL STRIP: NEGATIVE
PH UR STRIP.AUTO: 6.5 [PH] (ref 5–8)
PROT UR QL STRIP: ABNORMAL
SP GR UR STRIP: 1.02 (ref 1–1.03)
UROBILINOGEN UR QL STRIP: ABNORMAL

## 2024-03-25 PROCEDURE — 81003 URINALYSIS AUTO W/O SCOPE: CPT

## 2024-03-25 PROCEDURE — 99213 OFFICE O/P EST LOW 20 MIN: CPT | Performed by: STUDENT IN AN ORGANIZED HEALTH CARE EDUCATION/TRAINING PROGRAM

## 2024-03-25 RX ORDER — FLUTICASONE PROPIONATE AND SALMETEROL XINAFOATE 45; 21 UG/1; UG/1
2 AEROSOL, METERED RESPIRATORY (INHALATION)
Qty: 12 G | Refills: 2 | Status: SHIPPED | OUTPATIENT
Start: 2024-03-25

## 2024-03-25 RX ORDER — PREDNISONE 20 MG/1
40 TABLET ORAL DAILY
Qty: 10 TABLET | Refills: 0 | Status: SHIPPED | OUTPATIENT
Start: 2024-03-25 | End: 2024-03-30

## 2024-03-25 NOTE — PROGRESS NOTES
Subjective:       Jung Burkett is a 59 y.o. male with a pertinent concurrent medical history of asthma who presents for follow up for cough.     Our last visit was 3/18/2024.  At that time, Mr. Burkett reported nonproductive cough with accompanying intermittent dyspnea.  Symptoms did not improve with use of Tessalon Perles and he had used in older albuterol inhaler with only limited relief.  I had attempted to start treatment for potential asthma exacerbation with 5 days of prednisone and Symbicort inhaler.  He was not able to get Symbicort inhaler but did complete treatment with steroids.  He had initial improvement with steroids but cough is returned after completion of this.  Would attempt 5 more days of steroids and will send in Advair inhaler since we have learned that that is approved by insurance.  However, he has upcoming pulmonary function testing that will help us more properly evaluate for obstructive pattern of disease.  Diagnosis is somewhat in doubt due to CT abdomen and pelvis and then chest x-ray findings suggestive of potential pulmonary fibrosis.  In addition to pulmonary function testing, I have also ordered a high-resolution CT.  Today, will send in Advair inhaler and 5 days of steroids for cough but if he is symptoms are due to pulmonary fibrosis, this may provide only minor relief.  Would see back as previously scheduled on 4/22/2024 and if CT shows signs of pulmonary fibrosis would referral to pulmonology for further workup and management.    He continues to have some increased urinary frequency and back pain.  He says in the past when this occurs it is possible this is related to prostate but is equally possible that is related to back.  Last urinalysis I obtained was negative but no culture was done.  Would repeat urinalysis but with culture to rule out any potential for UTI or prostatitis.          The following portions of the patient's history were reviewed and updated as  appropriate: allergies, current medications, past family history, past medical history, past social history, past surgical history, and problem list.    Past Medical Hx:  Past Medical History:   Diagnosis Date    Acid reflux     ADHD (attention deficit hyperactivity disorder) 1970    Anxiety     Anxiety disorder 05/17/2019    Arthritis     generalized    Benign prostatic hyperplasia 1989    Blood disease     none    Cervicalgia     Chronic allergic rhinitis     Colon polyp 2005    Depression     Diabetes mellitus, type 2 05/17/2019    DOES NOT CHECK BS    Diabetic eye exam 01/2019 20/20 PER PT     Encounter for diabetic foot exam     WITHIN FEW MO  PER PT     Essential hypertension 05/17/2019    GERD (gastroesophageal reflux disease)     High blood pressure     High cholesterol     Hyperlipemia     Hyperlipidemia     Hypertension     Low back pain 1987    Major depressive disorder 05/17/2019    Nicotine dependence 05/17/2019    Prostate disorder     BPH    Rotator cuff tear, left     Seasonal allergies     Urinary tract infection 1993    Vitamin D deficiency 05/17/2019    no current issues       Past Surgical Hx:  Past Surgical History:   Procedure Laterality Date    CHOLECYSTECTOMY  1997    COLONOSCOPY      ELISA- REPEAT IN 5 YEARS     GALLBLADDER SURGERY      KNEE ARTHROSCOPY W/ MENISCAL REPAIR Right     OTHER SURGICAL HISTORY      SURGICAL CLIPS/gallbladder removed    OTHER SURGICAL HISTORY      right knee meniscus tear repair    SHOULDER ARTHROSCOPY W/ ROTATOR CUFF REPAIR Left 07/21/2021    Procedure: SHOULDER ARTHROSCOPY,SUBACROMINAL DECOMPRESSION, DISTAL CLAVICLE RESECTION, MINI OPEN  ROTATOR CUFF REPAIR;  Surgeon: Ulisses Kenney MD;  [place] [facility] [place] [facility] [place]: [place]opedics;  Laterality: Left;    TONSILLECTOMY      VASECTOMY  2004       Current Meds:    Current Outpatient Medications:     amphetamine-dextroamphetamine (ADDERALL) 20 MG tablet, Take 1 tablet by mouth 2 (Two) Times a Day.  Dose adjustment, Disp: 60 tablet, Rfl: 0    atorvastatin (LIPITOR) 20 MG tablet, Take 1 tablet by mouth every night at bedtime., Disp: 90 tablet, Rfl: 1    busPIRone (BUSPAR) 15 MG tablet, Take 1 tablet by mouth 2 (Two) Times a Day As Needed (anxiety)., Disp: 60 tablet, Rfl: 2    CBD (cannabidiol) oral oil, Take  by mouth., Disp: , Rfl:     finasteride (Proscar) 5 MG tablet, Take 1 tablet by mouth Daily., Disp: 90 tablet, Rfl: 1    fluticasone-salmeterol (Advair HFA) 45-21 MCG/ACT inhaler, Inhale 2 puffs 2 (Two) Times a Day., Disp: 12 g, Rfl: 2    metoprolol succinate XL (TOPROL-XL) 50 MG 24 hr tablet, Take 1 tablet by mouth Daily., Disp: 90 tablet, Rfl: 1    Semaglutide (Rybelsus) 3 MG tablet, Take 1 tablet by mouth Daily., Disp: 30 tablet, Rfl: 1    tamsulosin (FLOMAX) 0.4 MG capsule 24 hr capsule, Take 1 capsule by mouth Daily., Disp: 90 capsule, Rfl: 1    traZODone (DESYREL) 100 MG tablet, Take 1 tablet by mouth every night at bedtime., Disp: 90 tablet, Rfl: 1    vitamin D (ERGOCALCIFEROL) 1.25 MG (89261 UT) capsule capsule, Take 1 capsule by mouth Every 7 (Seven) Days., Disp: , Rfl:     benzonatate (Tessalon Perles) 100 MG capsule, Take 1 capsule by mouth 3 (Three) Times a Day As Needed for Cough. (Patient not taking: Reported on 3/25/2024), Disp: 12 capsule, Rfl: 0    predniSONE (DELTASONE) 20 MG tablet, Take 2 tablets by mouth Daily for 5 days. (Patient not taking: Reported on 3/25/2024), Disp: 10 tablet, Rfl: 0    Allergies:  Allergies   Allergen Reactions    Hydrocodone-Acetaminophen Itching    Nsaids Arrhythmia    Oxycodone-Acetaminophen Shortness Of Breath    Sulfa Antibiotics Hives    Voltaren [Diclofenac] Palpitations       Family Hx:  Family History   Problem Relation Age of Onset    Cancer Mother     Diabetes Mother     Rheum arthritis Mother         40'S    Heart attack Mother     Breast cancer Mother         40'S    Arthritis Mother     Stroke Father     Heart disease Father     Heart attack  "Maternal Grandmother     Breast cancer Maternal Grandmother         50'S    Prostate cancer Maternal Grandfather     Nephrolithiasis Maternal Grandfather     Colon cancer Paternal Grandmother     Colon cancer Paternal Grandfather         60'S    Breast cancer Other         40'S    Heart attack Maternal Aunt     Malrenny Hyperthermia Neg Hx         Social History:  Social History     Socioeconomic History    Marital status:    Tobacco Use    Smoking status: Some Days     Types: Cigars    Smokeless tobacco: Never    Tobacco comments:     1 cigar daily   Vaping Use    Vaping status: Never Used   Substance and Sexual Activity    Alcohol use: Yes     Alcohol/week: 6.0 standard drinks of alcohol     Types: 6 Drinks containing 0.5 oz of alcohol per week     Comment: Drinks daily; beer. Occasionally drinks 2 drinks per day, has been drinking for 6-10 years.     Drug use: Never    Sexual activity: Defer       Review of Systems  Review of Systems   Respiratory:  Positive for cough and shortness of breath. Negative for wheezing.        Objective:     /82   Pulse 64   Ht 170.2 cm (67\")   Wt 84.8 kg (187 lb)   SpO2 97%   BMI 29.29 kg/m²   Physical Exam  Constitutional:       General: He is not in acute distress.     Appearance: Normal appearance. He is not ill-appearing, toxic-appearing or diaphoretic.   Pulmonary:      Effort: Pulmonary effort is normal. No respiratory distress.      Breath sounds: Normal breath sounds. No stridor. No wheezing, rhonchi or rales.   Neurological:      Mental Status: He is alert.   Psychiatric:         Mood and Affect: Mood normal.         Behavior: Behavior normal.          Assessment/Plan:     Diagnoses and all orders for this visit:    1. Mild intermittent asthma with exacerbation (Primary)    Our last visit was 3/18/2024.  At that time, Mr. Burkett reported nonproductive cough with accompanying intermittent dyspnea.  Symptoms did not improve with use of Tessalon Perles and he " had used in older albuterol inhaler with only limited relief.  I had attempted to start treatment for potential asthma exacerbation with 5 days of prednisone and Symbicort inhaler.  He was not able to get Symbicort inhaler but did complete treatment with steroids.  He had initial improvement with steroids but cough is returned after completion of this.  Would attempt 5 more days of steroids and will send in Advair inhaler since we have learned that that is approved by insurance.  However, he has upcoming pulmonary function testing that will help us more properly evaluate for obstructive pattern of disease.  Diagnosis is somewhat in doubt due to CT abdomen and pelvis and then chest x-ray findings suggestive of potential pulmonary fibrosis.  In addition to pulmonary function testing, I have also ordered a high-resolution CT.  Today, will send in Advair inhaler and 5 days of steroids for cough but if he is symptoms are due to pulmonary fibrosis, this may provide only minor relief.  Would see back as previously scheduled on 4/22/2024 and if CT shows signs of pulmonary fibrosis would referral to pulmonology for further workup and management.    -     fluticasone-salmeterol (Advair HFA) 45-21 MCG/ACT inhaler; Inhale 2 puffs 2 (Two) Times a Day.  Dispense: 12 g; Refill: 2  -     predniSONE (DELTASONE) 20 MG tablet; Take 2 tablets by mouth Daily for 5 days. (Patient not taking: Reported on 3/25/2024)  Dispense: 10 tablet; Refill: 0    2. Benign prostatic hyperplasia (BPH) with urinary urgency  3. Urinary frequency    He continues to have some increased urinary frequency and back pain.  He says in the past when this occurs it is possible this is related to prostate but is equally possible that is related to back.  Last urinalysis I obtained was negative but no culture was done.  Would repeat urinalysis but with culture to rule out any potential for UTI or prostatitis.    -     Urinalysis With Culture If Indicated -;  Future          Rx changes: Another 5-day treatment with steroids and inhaler    Follow-up:     Return if symptoms worsen or fail to improve, for Next scheduled follow up.    Preventative:  Health Maintenance   Topic Date Due    DIABETIC EYE EXAM  09/01/2023    ANNUAL PHYSICAL  11/29/2023    DIABETIC FOOT EXAM  11/29/2023    URINE MICROALBUMIN  11/29/2023    RSV Vaccine - Adults (1 - 1-dose 60+ series) Never done    HEMOGLOBIN A1C  04/25/2024    LIPID PANEL  05/05/2024    BMI FOLLOWUP  03/19/2025    COLORECTAL CANCER SCREENING  05/27/2026    TDAP/TD VACCINES (2 - Td or Tdap) 05/16/2029    HEPATITIS C SCREENING  Completed    COVID-19 Vaccine  Completed    Pneumococcal Vaccine 0-64  Completed    INFLUENZA VACCINE  Completed    ZOSTER VACCINE  Completed         This document has been electronically signed by Prashanth Arthur MD on March 25, 2024 11:33 EDT       Parts of this note are electronic transcriptions/translations of spoken language to printed text using the Dragon Dictation system.

## 2024-03-26 NOTE — PROGRESS NOTES
Urinalysis shows heavier sugar in urine than 8 days ago.  Nitrites and leukocyte Estrace remain negative.  I wonder if his diabetes is contributing to his increased urinary frequency, as he has been off GLP-1.  This is something we are working on for him.  Can we call him and let him know?    Thank you,    Prashanth Arthur    ?  This document has been electronically signed by Prashanth Arthur MD on March 26, 2024 08:02 EDT

## 2024-03-29 ENCOUNTER — HOSPITAL ENCOUNTER (OUTPATIENT)
Dept: CT IMAGING | Facility: HOSPITAL | Age: 59
Discharge: HOME | End: 2024-03-29
Admitting: STUDENT IN AN ORGANIZED HEALTH CARE EDUCATION/TRAINING PROGRAM
Payer: COMMERCIAL

## 2024-03-29 DIAGNOSIS — J84.9 INTERSTITIAL LUNG DISEASE: ICD-10-CM

## 2024-03-29 PROCEDURE — 71250 CT THORAX DX C-: CPT

## 2024-04-01 DIAGNOSIS — R91.8 ABNORMAL CT SCAN OF LUNG: Primary | ICD-10-CM

## 2024-04-01 DIAGNOSIS — J84.9 INTERSTITIAL LUNG DISEASE: Primary | ICD-10-CM

## 2024-04-01 NOTE — PROGRESS NOTES
I called my friend to check to go over the results of his CT.  CT mentions coronary artery calcification and recommends consideration of cardiovascular workup.  He has had this done with a negative stress test 2 years ago and is on statin.  He also mentions noncalcified pulmonary nodule and I have ordered a repeat CT for 6 months as suggested by radiologist.  Finally, it does mention evidence of interstitial lung disease.  Given the symptoms that he has been having, I think at this point with these findings it is appropriate to refer him to pulmonology for further workup and management.  I called him and let him know.    ?  This document has been electronically signed by Prashanth Arthur MD on April 1, 2024 08:13 EDT

## 2024-04-09 DIAGNOSIS — F41.9 ANXIETY: ICD-10-CM

## 2024-04-09 DIAGNOSIS — I10 ESSENTIAL HYPERTENSION: ICD-10-CM

## 2024-04-09 DIAGNOSIS — N40.1 BENIGN PROSTATIC HYPERPLASIA WITH URINARY FREQUENCY: ICD-10-CM

## 2024-04-09 DIAGNOSIS — G47.00 INSOMNIA, UNSPECIFIED TYPE: ICD-10-CM

## 2024-04-09 DIAGNOSIS — G47.11 IDIOPATHIC HYPERSOMNIA: ICD-10-CM

## 2024-04-09 DIAGNOSIS — E78.5 HYPERLIPIDEMIA, UNSPECIFIED HYPERLIPIDEMIA TYPE: ICD-10-CM

## 2024-04-09 DIAGNOSIS — R35.0 BENIGN PROSTATIC HYPERPLASIA WITH URINARY FREQUENCY: ICD-10-CM

## 2024-04-09 RX ORDER — TAMSULOSIN HYDROCHLORIDE 0.4 MG/1
1 CAPSULE ORAL DAILY
Qty: 90 CAPSULE | Refills: 1 | Status: SHIPPED | OUTPATIENT
Start: 2024-04-09

## 2024-04-09 RX ORDER — ATORVASTATIN CALCIUM 20 MG/1
20 TABLET, FILM COATED ORAL
Qty: 90 TABLET | Refills: 1 | Status: SHIPPED | OUTPATIENT
Start: 2024-04-09

## 2024-04-09 RX ORDER — TRAZODONE HYDROCHLORIDE 100 MG/1
100 TABLET ORAL
Qty: 90 TABLET | Refills: 1 | Status: SHIPPED | OUTPATIENT
Start: 2024-04-09

## 2024-04-09 RX ORDER — DEXTROAMPHETAMINE SACCHARATE, AMPHETAMINE ASPARTATE, DEXTROAMPHETAMINE SULFATE AND AMPHETAMINE SULFATE 5; 5; 5; 5 MG/1; MG/1; MG/1; MG/1
20 TABLET ORAL 2 TIMES DAILY
Qty: 60 TABLET | Refills: 0 | Status: SHIPPED | OUTPATIENT
Start: 2024-04-09

## 2024-04-09 RX ORDER — METOPROLOL SUCCINATE 50 MG/1
50 TABLET, EXTENDED RELEASE ORAL DAILY
Qty: 90 TABLET | Refills: 1 | Status: SHIPPED | OUTPATIENT
Start: 2024-04-09

## 2024-04-09 RX ORDER — BUSPIRONE HYDROCHLORIDE 15 MG/1
15 TABLET ORAL 2 TIMES DAILY PRN
Qty: 180 TABLET | Refills: 1 | Status: SHIPPED | OUTPATIENT
Start: 2024-04-09

## 2024-04-22 ENCOUNTER — OFFICE VISIT (OUTPATIENT)
Dept: FAMILY MEDICINE CLINIC | Facility: CLINIC | Age: 59
End: 2024-04-22
Payer: COMMERCIAL

## 2024-04-22 ENCOUNTER — HOSPITAL ENCOUNTER (OUTPATIENT)
Dept: GENERAL RADIOLOGY | Facility: HOSPITAL | Age: 59
Discharge: HOME | End: 2024-04-22
Admitting: STUDENT IN AN ORGANIZED HEALTH CARE EDUCATION/TRAINING PROGRAM
Payer: COMMERCIAL

## 2024-04-22 VITALS
OXYGEN SATURATION: 99 % | BODY MASS INDEX: 28.38 KG/M2 | HEIGHT: 67 IN | SYSTOLIC BLOOD PRESSURE: 136 MMHG | DIASTOLIC BLOOD PRESSURE: 75 MMHG | WEIGHT: 180.8 LBS | HEART RATE: 73 BPM

## 2024-04-22 DIAGNOSIS — G89.29 CHRONIC THUMB PAIN, LEFT: ICD-10-CM

## 2024-04-22 DIAGNOSIS — R61 NIGHT SWEATS: ICD-10-CM

## 2024-04-22 DIAGNOSIS — M79.645 CHRONIC THUMB PAIN, LEFT: ICD-10-CM

## 2024-04-22 DIAGNOSIS — E11.00 TYPE 2 DIABETES MELLITUS WITH HYPEROSMOLARITY WITHOUT COMA, UNSPECIFIED WHETHER LONG TERM INSULIN USE: Primary | ICD-10-CM

## 2024-04-22 LAB
EXPIRATION DATE: ABNORMAL
HBA1C MFR BLD: 7.9 % (ref 4.5–5.7)
Lab: ABNORMAL

## 2024-04-22 PROCEDURE — 73130 X-RAY EXAM OF HAND: CPT

## 2024-04-22 PROCEDURE — 83036 HEMOGLOBIN GLYCOSYLATED A1C: CPT | Performed by: STUDENT IN AN ORGANIZED HEALTH CARE EDUCATION/TRAINING PROGRAM

## 2024-04-22 PROCEDURE — 99214 OFFICE O/P EST MOD 30 MIN: CPT | Performed by: STUDENT IN AN ORGANIZED HEALTH CARE EDUCATION/TRAINING PROGRAM

## 2024-04-22 RX ORDER — ORAL SEMAGLUTIDE 7 MG/1
7 TABLET ORAL
Qty: 30 TABLET | Refills: 1 | Status: SHIPPED | OUTPATIENT
Start: 2024-04-22 | End: 2024-04-25

## 2024-04-22 NOTE — PROGRESS NOTES
Subjective:       Jung Burkett is a 59 y.o. male with a concurrent medical history of essential hypertension, hyperlipidemia, type 2 diabetes mellitus, gastroesophageal reflux disease, anxiety and depression, BPH who presents for follow-up for cough and to discuss night sweats and diabetes and thumb pain.    In terms of diabetes mellitus, Mauro is currently prescribed Rybelsus 3 mg.  He was previously on Mounjaro and tolerated medication well but was not able to get medication in stock so we switched him to Rybelsus.        In the past, Mauro was well-controlled with Mounjaro.  Off this medication, hemoglobin A1c is now no longer controlled as it has increased to 7.9.  We will increase Rybelsus to 7 mg today and see back in 3 months to reassess diabetes.  However, Mauro asks if it is also possible for me to send him in a written prescription for Mounjaro so that he can look for other pharmacies that might have it.  Although I am comfortable to do this, I told him in no uncertain terms that if he is able to get Mounjaro he must let us know immediately so that we can discontinue Rybelsus because he must not be on 2 GLP-1's at the same time.  He expressed understanding.      Mauro and I had previously discussed cough and intermittent dyspnea.  He had a prior diagnosis of asthma.  I had attempted to send in Advair inhaler but he did not note any benefit on this and has discontinued use.  I will discontinue it today.  Pulmonary function testing is still not been obtained.  However, I did order a high-resolution CT that showed evidence for interstitial lung disease.  He has follow-up with pulmonologist tomorrow to discuss.    Mauro reports recent onset night sweats that occur with fair amount of regularity.  Recent medication changes include prescribing Rybelsus and finasteride.  When I look at the literature, I do not see an association between Rybelsus and night sweats.  Neither do I see one with finasteride.   Symptoms could be related to a potential underlying etiology for his interstitial lung disease or related to his currently uncontrolled diabetes mellitus.    Mauro reports recent onset left thumb pain.  He does have pain on palpation on physical exam.  I do not appreciate stiffness of the joint or nodule as might be seen with trigger finger.  He tells me in the past he has had this treated with steroid injection and has a history of carpal tunnel syndrome but is also concerned about potential for arthritis in the joint.  I will obtain an x-ray today and refer him to orthopedic surgery for steroid injection at his request but I did let him know that if there is a potential for carpal tunnel it might be better to obtain EMG first.      The following portions of the patient's history were reviewed and updated as appropriate: allergies, current medications, past family history, past medical history, past social history, past surgical history, and problem list.    Past Medical Hx:  Past Medical History:   Diagnosis Date    Acid reflux     ADHD (attention deficit hyperactivity disorder) 1970    Anxiety     Anxiety disorder 05/17/2019    Arthritis     generalized    Benign prostatic hyperplasia 1989    Blood disease     none    Cervicalgia     Chronic allergic rhinitis     Colon polyp 2005    Depression     Diabetes mellitus, type 2 05/17/2019    DOES NOT CHECK BS    Diabetic eye exam 01/2019 20/20 PER PT     Encounter for diabetic foot exam     WITHIN FEW MO  PER PT     Essential hypertension 05/17/2019    GERD (gastroesophageal reflux disease)     High blood pressure     High cholesterol     Hyperlipemia     Hyperlipidemia     Hypertension     Low back pain 1987    Major depressive disorder 05/17/2019    Nicotine dependence 05/17/2019    Prostate disorder     BPH    Rotator cuff tear, left     Seasonal allergies     Urinary tract infection 1993    Vitamin D deficiency 05/17/2019    no current issues        Past Surgical Hx:  Past Surgical History:   Procedure Laterality Date    CHOLECYSTECTOMY  1997    COLONOSCOPY      ELISA- REPEAT IN 5 YEARS     GALLBLADDER SURGERY      KNEE ARTHROSCOPY W/ MENISCAL REPAIR Right     OTHER SURGICAL HISTORY      SURGICAL CLIPS/gallbladder removed    OTHER SURGICAL HISTORY      right knee meniscus tear repair    SHOULDER ARTHROSCOPY W/ ROTATOR CUFF REPAIR Left 07/21/2021    Procedure: SHOULDER ARTHROSCOPY,SUBACROMINAL DECOMPRESSION, DISTAL CLAVICLE RESECTION, MINI OPEN  ROTATOR CUFF REPAIR;  Surgeon: Ulisses Kenney MD;  Location: [facility] [place];  Service: Orthopedics;  Laterality: Left;    TONSILLECTOMY      VASECTOMY  2004       Current Meds:    Current Outpatient Medications:     amphetamine-dextroamphetamine (ADDERALL) 20 MG tablet, Take 1 tablet by mouth 2 (Two) Times a Day. Dose adjustment, Disp: 60 tablet, Rfl: 0    atorvastatin (LIPITOR) 20 MG tablet, Take 1 tablet by mouth every night at bedtime., Disp: 90 tablet, Rfl: 1    busPIRone (BUSPAR) 15 MG tablet, Take 1 tablet by mouth 2 (Two) Times a Day As Needed (anxiety)., Disp: 180 tablet, Rfl: 1    CBD (cannabidiol) oral oil, Take  by mouth., Disp: , Rfl:     finasteride (Proscar) 5 MG tablet, Take 1 tablet by mouth Daily., Disp: 90 tablet, Rfl: 1    metoprolol succinate XL (TOPROL-XL) 50 MG 24 hr tablet, Take 1 tablet by mouth Daily., Disp: 90 tablet, Rfl: 1    tamsulosin (FLOMAX) 0.4 MG capsule 24 hr capsule, Take 1 capsule by mouth Daily., Disp: 90 capsule, Rfl: 1    traZODone (DESYREL) 100 MG tablet, Take 1 tablet by mouth every night at bedtime., Disp: 90 tablet, Rfl: 1    vitamin D (ERGOCALCIFEROL) 1.25 MG (25177 UT) capsule capsule, Take 1 capsule by mouth Every 7 (Seven) Days., Disp: , Rfl:     Semaglutide (Rybelsus) 7 MG tablet, Take 7 mg by mouth Daily With Lunch., Disp: 30 tablet, Rfl: 1    Allergies:  Allergies   Allergen Reactions    Hydrocodone-Acetaminophen Itching    Nsaids Arrhythmia     "Oxycodone-Acetaminophen Shortness Of Breath    Sulfa Antibiotics Hives    Voltaren [Diclofenac] Palpitations       Family Hx:  Family History   Problem Relation Age of Onset    Cancer Mother     Diabetes Mother     Rheum arthritis Mother         40'S    Heart attack Mother     Breast cancer Mother         40'S    Arthritis Mother     Stroke Father     Heart disease Father     Heart attack Maternal Grandmother     Breast cancer Maternal Grandmother         50'S    Prostate cancer Maternal Grandfather     Nephrolithiasis Maternal Grandfather     Colon cancer Paternal Grandmother     Colon cancer Paternal Grandfather         60'S    Breast cancer Other         40'S    Heart attack Maternal Aunt     Malig Hyperthermia Neg Hx         Social History:  Social History     Socioeconomic History    Marital status:    Tobacco Use    Smoking status: Some Days     Types: Cigars    Smokeless tobacco: Never    Tobacco comments:     1 cigar daily   Vaping Use    Vaping status: Never Used   Substance and Sexual Activity    Alcohol use: Yes     Alcohol/week: 6.0 standard drinks of alcohol     Types: 6 Drinks containing 0.5 oz of alcohol per week     Comment: Drinks daily; beer. Occasionally drinks 2 drinks per day, has been drinking for 6-10 years.     Drug use: Never    Sexual activity: Defer       Review of Systems  Review of Systems   Constitutional:  Positive for diaphoresis.   Gastrointestinal:  Negative for abdominal pain, nausea and vomiting.   Musculoskeletal:  Positive for arthralgias (thumb pain).       Objective:     /75 (BP Location: Right arm, Patient Position: Sitting, Cuff Size: Large Adult)   Pulse 73   Ht 170.2 cm (67\")   Wt 82 kg (180 lb 12.8 oz)   SpO2 99%   BMI 28.32 kg/m²   Physical Exam  Constitutional:       General: He is not in acute distress.     Appearance: Normal appearance. He is not ill-appearing, toxic-appearing or diaphoretic.   Pulmonary:      Effort: Pulmonary effort is normal. No " respiratory distress.   Neurological:      Mental Status: He is alert.   Psychiatric:         Mood and Affect: Mood normal.         Behavior: Behavior normal.          Assessment/Plan:     Diagnoses and all orders for this visit:    1. Type 2 diabetes mellitus with hyperosmolarity without coma, unspecified whether long term insulin use (Primary)      In terms of diabetes mellitus, Mauro is currently prescribed Rybelsus 3 mg.  He was previously on Mounjaro and tolerated medication well but was not able to get medication in stock so we switched him to Rybelsus.        In the past, Mauro was well-controlled with Mounjaro.  Off this medication, hemoglobin A1c is now no longer controlled as it has increased to 7.9.  We will increase Rybelsus to 7 mg today and see back in 3 months to reassess diabetes.  However, Mauro asks if it is also possible for me to send him in a written prescription for Mounjaro so that he can look for other pharmacies that might have it.  Although I am comfortable to do this, I told him in no uncertain terms that if he is able to get Mounjaro he must let us know immediately so that we can discontinue Rybelsus because he must not be on 2 GLP-1's at the same time.  He expressed understanding.        -     POC Glycosylated Hemoglobin (Hb A1C)  -     Discontinue: Tirzepatide (MOUNJARO) 2.5 MG/0.5ML solution pen-injector pen; Inject 0.5 mL under the skin into the appropriate area as directed 1 (One) Time Per Week.  Dispense: 0.5 mL; Refill: 3  -     Semaglutide (Rybelsus) 7 MG tablet; Take 7 mg by mouth Daily With Lunch.  Dispense: 30 tablet; Refill: 1    2. Chronic thumb pain, left    Mauro reports recent onset left thumb pain.  He does have pain on palpation on physical exam.  I do not appreciate stiffness of the joint or nodule as might be seen with trigger finger.  He tells me in the past he has had this treated with steroid injection and has a history of carpal tunnel syndrome but is also  concerned about potential for arthritis in the joint.  I will obtain an x-ray today and refer him to orthopedic surgery for steroid injection at his request but I did let him know that if there is a potential for carpal tunnel it might be better to obtain EMG first.    -     XR Hand 3+ View Left; Future  -     Ambulatory Referral to Orthopedic Surgery    3. Night sweats      Mauro reports recent onset night sweats that occur with fair amount of regularity.  Recent medication changes include prescribing Rybelsus and finasteride.  When I look at the literature, I do not see an association between Rybelsus and night sweats.  Neither do I see one with finasteride.  Symptoms could be related to a potential underlying etiology for his interstitial lung disease or related to his currently uncontrolled diabetes mellitus.        Rx changes: Increasing Rybelsus to 7 mg      Follow-up:     Return in about 1 month (around 5/22/2024) for Titrate Rybelsus.    Preventative:  Health Maintenance   Topic Date Due    DIABETIC EYE EXAM  09/01/2023    ANNUAL PHYSICAL  11/29/2023    DIABETIC FOOT EXAM  11/29/2023    URINE MICROALBUMIN  11/29/2023    RSV Vaccine - Adults (1 - 1-dose 60+ series) Never done    LIPID PANEL  05/05/2024    INFLUENZA VACCINE  08/01/2024    HEMOGLOBIN A1C  10/22/2024    BMI FOLLOWUP  04/22/2025    COLORECTAL CANCER SCREENING  05/27/2026    TDAP/TD VACCINES (2 - Td or Tdap) 05/16/2029    HEPATITIS C SCREENING  Completed    COVID-19 Vaccine  Completed    Pneumococcal Vaccine 0-64  Completed    ZOSTER VACCINE  Completed         This document has been electronically signed by Prashanth Arthur MD on April 22, 2024 14:27 EDT       Parts of this note are electronic transcriptions/translations of spoken language to printed text using the Dragon Dictation system.

## 2024-04-22 NOTE — PROGRESS NOTES
XR shows Mauro does indeed have moderate to severe arthritis at first CMC joint. We have referred him to Ortho for steroid injection. Can we let him know of these results?    Thank you,    Prashanth Arthur    ?  This document has been electronically signed by Prashanth Arthur MD on April 22, 2024 18:38 EDT

## 2024-04-23 ENCOUNTER — HOSPITAL ENCOUNTER (OUTPATIENT)
Dept: RESPIRATORY THERAPY | Facility: HOSPITAL | Age: 59
Discharge: HOME | End: 2024-04-23
Payer: COMMERCIAL

## 2024-04-23 DIAGNOSIS — J45.20 MILD INTERMITTENT ASTHMA WITHOUT COMPLICATION: ICD-10-CM

## 2024-04-23 PROCEDURE — 94060 EVALUATION OF WHEEZING: CPT

## 2024-04-23 PROCEDURE — 94729 DIFFUSING CAPACITY: CPT

## 2024-04-23 PROCEDURE — 94726 PLETHYSMOGRAPHY LUNG VOLUMES: CPT

## 2024-04-23 RX ORDER — ALBUTEROL SULFATE 2.5 MG/3ML
2.5 SOLUTION RESPIRATORY (INHALATION) ONCE
Status: COMPLETED | OUTPATIENT
Start: 2024-04-23 | End: 2024-04-23

## 2024-04-23 RX ADMIN — ALBUTEROL SULFATE 2.5 MG: 2.5 SOLUTION RESPIRATORY (INHALATION) at 14:09

## 2024-04-24 ENCOUNTER — TELEPHONE (OUTPATIENT)
Dept: FAMILY MEDICINE CLINIC | Facility: CLINIC | Age: 59
End: 2024-04-24
Payer: COMMERCIAL

## 2024-04-24 NOTE — TELEPHONE ENCOUNTER
"  Caller: Jung Burkett \"Mauro\"    Relationship: Self    Best call back number:     [phone]     Who are you requesting to speak with (clinical staff, provider,  specific staff member): LYNDA OR CLINICAL STAFF    What was the call regarding: PATIENT STATES THAT HE WAS ABLE TO GET MOUNJARO AND TOOK HIS FIRST DOSE YESTERDAY. PATIENT STATES THAT HE WOULD LIKE A CALLBACK TO DISCUSS HOW LONG HE SHOULD TAKE THE STARTER DOSE.     "

## 2024-04-25 NOTE — TELEPHONE ENCOUNTER
Please schedule patient for follow-up in 1 month and tell him I will need to see him in 1 month to titrate medication upwards after he has taken 4 doses of medication.    Thank you,    Prashanth Arthur

## 2024-05-07 DIAGNOSIS — J45.30 MILD PERSISTENT ASTHMA WITHOUT COMPLICATION: Primary | ICD-10-CM

## 2024-05-07 RX ORDER — BUDESONIDE AND FORMOTEROL FUMARATE DIHYDRATE 80; 4.5 UG/1; UG/1
2 AEROSOL RESPIRATORY (INHALATION)
Qty: 6.9 G | Refills: 3 | Status: SHIPPED | OUTPATIENT
Start: 2024-05-07 | End: 2024-05-13

## 2024-05-09 ENCOUNTER — TELEPHONE (OUTPATIENT)
Dept: FAMILY MEDICINE CLINIC | Facility: CLINIC | Age: 59
End: 2024-05-09
Payer: COMMERCIAL

## 2024-05-10 ENCOUNTER — TELEPHONE (OUTPATIENT)
Dept: FAMILY MEDICINE CLINIC | Facility: CLINIC | Age: 59
End: 2024-05-10

## 2024-05-10 ENCOUNTER — OFFICE VISIT (OUTPATIENT)
Dept: FAMILY MEDICINE CLINIC | Facility: CLINIC | Age: 59
End: 2024-05-10
Payer: COMMERCIAL

## 2024-05-10 ENCOUNTER — LAB (OUTPATIENT)
Dept: LAB | Facility: HOSPITAL | Age: 59
End: 2024-05-10
Payer: COMMERCIAL

## 2024-05-10 VITALS
HEIGHT: 67 IN | DIASTOLIC BLOOD PRESSURE: 92 MMHG | BODY MASS INDEX: 28.32 KG/M2 | HEART RATE: 93 BPM | OXYGEN SATURATION: 99 % | SYSTOLIC BLOOD PRESSURE: 142 MMHG

## 2024-05-10 DIAGNOSIS — R30.0 DYSURIA: ICD-10-CM

## 2024-05-10 DIAGNOSIS — E11.00 TYPE 2 DIABETES MELLITUS WITH HYPEROSMOLARITY WITHOUT COMA, WITHOUT LONG-TERM CURRENT USE OF INSULIN: Primary | ICD-10-CM

## 2024-05-10 DIAGNOSIS — R07.81 RIB PAIN ON LEFT SIDE: ICD-10-CM

## 2024-05-10 DIAGNOSIS — E11.00 TYPE 2 DIABETES MELLITUS WITH HYPEROSMOLARITY WITHOUT COMA, WITHOUT LONG-TERM CURRENT USE OF INSULIN: ICD-10-CM

## 2024-05-10 DIAGNOSIS — R12 HEARTBURN: Primary | ICD-10-CM

## 2024-05-10 PROCEDURE — 87186 SC STD MICRODIL/AGAR DIL: CPT

## 2024-05-10 PROCEDURE — 81001 URINALYSIS AUTO W/SCOPE: CPT

## 2024-05-10 PROCEDURE — 87077 CULTURE AEROBIC IDENTIFY: CPT

## 2024-05-10 PROCEDURE — 99214 OFFICE O/P EST MOD 30 MIN: CPT | Performed by: STUDENT IN AN ORGANIZED HEALTH CARE EDUCATION/TRAINING PROGRAM

## 2024-05-10 PROCEDURE — 82043 UR ALBUMIN QUANTITATIVE: CPT

## 2024-05-10 PROCEDURE — 87086 URINE CULTURE/COLONY COUNT: CPT

## 2024-05-10 RX ORDER — MECOBALAMIN 5000 MCG
15 TABLET,DISINTEGRATING ORAL DAILY
Qty: 90 CAPSULE | Refills: 0 | Status: SHIPPED | OUTPATIENT
Start: 2024-05-10

## 2024-05-10 NOTE — TELEPHONE ENCOUNTER
PT STILL NOT ABLE TO GET DEXILANT MEDICATION AS IT IS ON BACK ORDER PT IS WANTING TO SEE ABOUT GETTING A REFILL ON PREVACID IS AS THIS IS IN STOCK AT PHARMACY. I CALLED [facility] TO CLARIFY THIS THEY ADVISED THIS IS ACCURATE AND THIS WAS THE MEDICATION THAT THEY LAST FILLED FOR PT AS WELL. PHARMACY ALSO STATES THAT MOUNJARO IS NOT THERE AS WELL AND IS ON BACK ORDER

## 2024-05-10 NOTE — TELEPHONE ENCOUNTER
Understood.  I have sent in Prevacid as requested.  On Monday, we can reach out to him and let him know that Mounjaro is not available there and ask him how he would like to proceed.      This document has been electronically signed by Prashanth Arthur MD on May 10, 2024 17:03 EDT

## 2024-05-11 LAB
ALBUMIN UR-MCNC: 1.2 MG/DL
BACTERIA UR QL AUTO: ABNORMAL /HPF
BILIRUB UR QL STRIP: NEGATIVE
CLARITY UR: CLEAR
COLOR UR: YELLOW
GLUCOSE UR STRIP-MCNC: NEGATIVE MG/DL
HGB UR QL STRIP.AUTO: NEGATIVE
HOLD SPECIMEN: NORMAL
HYALINE CASTS UR QL AUTO: ABNORMAL /LPF
KETONES UR QL STRIP: NEGATIVE
LEUKOCYTE ESTERASE UR QL STRIP.AUTO: ABNORMAL
NITRITE UR QL STRIP: NEGATIVE
PH UR STRIP.AUTO: 6.5 [PH] (ref 5–8)
PROT UR QL STRIP: NEGATIVE
RBC # UR STRIP: ABNORMAL /HPF
REF LAB TEST METHOD: ABNORMAL
SP GR UR STRIP: 1.01 (ref 1–1.03)
SQUAMOUS #/AREA URNS HPF: ABNORMAL /HPF
UROBILINOGEN UR QL STRIP: ABNORMAL
WBC # UR STRIP: ABNORMAL /HPF

## 2024-05-13 DIAGNOSIS — J45.30 MILD PERSISTENT ASTHMA WITHOUT COMPLICATION: Primary | ICD-10-CM

## 2024-05-13 DIAGNOSIS — R82.90 ABNORMAL FINDING ON URINALYSIS: Primary | ICD-10-CM

## 2024-05-13 DIAGNOSIS — J45.20 MILD INTERMITTENT ASTHMA WITHOUT COMPLICATION: Primary | ICD-10-CM

## 2024-05-13 LAB — BACTERIA SPEC AEROBE CULT: ABNORMAL

## 2024-05-13 RX ORDER — FLUTICASONE PROPIONATE AND SALMETEROL XINAFOATE 115; 21 UG/1; UG/1
2 AEROSOL, METERED RESPIRATORY (INHALATION)
Qty: 12 G | Refills: 11 | Status: SHIPPED | OUTPATIENT
Start: 2024-05-13

## 2024-05-13 RX ORDER — ALBUTEROL SULFATE 2.5 MG/3ML
2.5 SOLUTION RESPIRATORY (INHALATION) EVERY 4 HOURS PRN
Qty: 3 ML | Refills: 12 | Status: SHIPPED | OUTPATIENT
Start: 2024-05-13

## 2024-05-13 NOTE — TELEPHONE ENCOUNTER
PT MENTIONED HE WANTED TO SEE ABOUT GOING ON THE STARTER DOSE BECAUSE THIS MEDICATION MAY BE IN STOCK I DID ADVISE PT WE CAN TRY MAIL ORDER OR I CAN TRY TO REACH OUT TO  DRUG REP AS WELL TO SEE IF SHE KNOWS OF ANY PHARMACIES

## 2024-05-13 NOTE — TELEPHONE ENCOUNTER
That sounds good.  Let me know what the drug rep says.      This document has been electronically signed by Prashanth Arthur MD on May 13, 2024 16:40 EDT

## 2024-05-13 NOTE — PROGRESS NOTES
I have reviewed Mr. Burkett's urine culture results. He is growing 25,000 CFU of gram positive cocci. This is an atypical result for two reasons. Typical, a true UTI will be >100,000 forming units. Additionally, most UTIs are due to gram negative bacteria. However, I do not note the presence of squamous epithelial cells that are typically seen with contaminated specimens. Because he has had symptoms, I am leaning toward treating him. However, to spare him possible unnecessary treatment, and because a UTI in a male would be complicated UTI by definition, I would like to repeat the urinalysis and culture first. He can obtain this at the lab; orders are in.     When we notify him of this, can we check and see if there were any more medications he needed refilled?    Thank you,    Prashanth Arthur     ?  This document has been electronically signed by Prashanth Arthur MD on May 13, 2024 07:06 EDT

## 2024-05-14 ENCOUNTER — LAB (OUTPATIENT)
Dept: LAB | Facility: HOSPITAL | Age: 59
End: 2024-05-14
Payer: COMMERCIAL

## 2024-05-14 DIAGNOSIS — R82.90 ABNORMAL FINDING ON URINALYSIS: ICD-10-CM

## 2024-05-14 LAB
BACTERIA UR QL AUTO: ABNORMAL /HPF
BILIRUB UR QL STRIP: NEGATIVE
CLARITY UR: ABNORMAL
COLOR UR: ABNORMAL
GLUCOSE UR STRIP-MCNC: ABNORMAL MG/DL
HGB UR QL STRIP.AUTO: NEGATIVE
HOLD SPECIMEN: NORMAL
HYALINE CASTS UR QL AUTO: ABNORMAL /LPF
KETONES UR QL STRIP: ABNORMAL
LEUKOCYTE ESTERASE UR QL STRIP.AUTO: ABNORMAL
NITRITE UR QL STRIP: NEGATIVE
PH UR STRIP.AUTO: 6 [PH] (ref 5–8)
PROT UR QL STRIP: ABNORMAL
RBC # UR STRIP: ABNORMAL /HPF
REF LAB TEST METHOD: ABNORMAL
SP GR UR STRIP: 1.03 (ref 1–1.03)
SQUAMOUS #/AREA URNS HPF: ABNORMAL /HPF
UROBILINOGEN UR QL STRIP: ABNORMAL
WBC # UR STRIP: ABNORMAL /HPF

## 2024-05-14 PROCEDURE — 87186 SC STD MICRODIL/AGAR DIL: CPT

## 2024-05-14 PROCEDURE — 81001 URINALYSIS AUTO W/SCOPE: CPT

## 2024-05-14 PROCEDURE — 87077 CULTURE AEROBIC IDENTIFY: CPT

## 2024-05-14 PROCEDURE — 87086 URINE CULTURE/COLONY COUNT: CPT

## 2024-05-14 NOTE — TELEPHONE ENCOUNTER
CALLED KACI WITH [facility] @ [phone] STATES SHE WILL CALL AROUND TO SEE IF SHE CAN LOCATE MOUNJARO 5 MG. HOWEVER SHE DID ADVISE CAN TITRATE UP TO 7.5 MG AS PT IS TYPE 2 DM?

## 2024-05-14 NOTE — TELEPHONE ENCOUNTER
I prefer not to move him up too quickly to avoid potential side effects if he can avoid it.  We will wait and see what she finds.      This document has been electronically signed by Prashanth Arthur MD on May 14, 2024 13:23 EDT

## 2024-05-15 DIAGNOSIS — G47.11 IDIOPATHIC HYPERSOMNIA: ICD-10-CM

## 2024-05-15 LAB
BASOPHILS # BLD AUTO: 0.03 10*3/MM3 (ref 0–0.2)
BASOPHILS NFR BLD AUTO: 0.4 % (ref 0–1.5)
DEPRECATED RDW RBC AUTO: 39 FL (ref 37–54)
EOSINOPHIL # BLD AUTO: 0.09 10*3/MM3 (ref 0–0.4)
EOSINOPHIL NFR BLD AUTO: 1.3 % (ref 0.3–6.2)
ERYTHROCYTE [DISTWIDTH] IN BLOOD BY AUTOMATED COUNT: 12 % (ref 12.3–15.4)
HCT VFR BLD AUTO: 44.7 % (ref 37.5–51)
HGB BLD-MCNC: 15.8 G/DL (ref 13–17.7)
IMM GRANULOCYTES # BLD AUTO: 0.01 10*3/MM3 (ref 0–0.05)
IMM GRANULOCYTES NFR BLD AUTO: 0.1 % (ref 0–0.5)
LYMPHOCYTES # BLD AUTO: 1.99 10*3/MM3 (ref 0.7–3.1)
LYMPHOCYTES NFR BLD AUTO: 28.1 % (ref 19.6–45.3)
MCH RBC QN AUTO: 31.5 PG (ref 26.6–33)
MCHC RBC AUTO-ENTMCNC: 35.3 G/DL (ref 31.5–35.7)
MCV RBC AUTO: 89 FL (ref 79–97)
MONOCYTES # BLD AUTO: 0.54 10*3/MM3 (ref 0.1–0.9)
MONOCYTES NFR BLD AUTO: 7.6 % (ref 5–12)
NEUTROPHILS NFR BLD AUTO: 4.41 10*3/MM3 (ref 1.7–7)
NEUTROPHILS NFR BLD AUTO: 62.5 % (ref 42.7–76)
NRBC BLD AUTO-RTO: 0 /100 WBC (ref 0–0.2)
PLATELET # BLD AUTO: 228 10*3/MM3 (ref 140–450)
PMV BLD AUTO: 9.6 FL (ref 6–12)
RBC # BLD AUTO: 5.02 10*6/MM3 (ref 4.14–5.8)
WBC NRBC COR # BLD AUTO: 7.07 10*3/MM3 (ref 3.4–10.8)

## 2024-05-15 PROCEDURE — 83690 ASSAY OF LIPASE: CPT | Performed by: EMERGENCY MEDICINE

## 2024-05-15 PROCEDURE — 81001 URINALYSIS AUTO W/SCOPE: CPT

## 2024-05-15 PROCEDURE — 87086 URINE CULTURE/COLONY COUNT: CPT | Performed by: EMERGENCY MEDICINE

## 2024-05-15 PROCEDURE — 96365 THER/PROPH/DIAG IV INF INIT: CPT

## 2024-05-15 PROCEDURE — 80053 COMPREHEN METABOLIC PANEL: CPT | Performed by: EMERGENCY MEDICINE

## 2024-05-15 PROCEDURE — 36415 COLL VENOUS BLD VENIPUNCTURE: CPT

## 2024-05-15 PROCEDURE — 83605 ASSAY OF LACTIC ACID: CPT

## 2024-05-15 PROCEDURE — 99285 EMERGENCY DEPT VISIT HI MDM: CPT

## 2024-05-15 PROCEDURE — 85025 COMPLETE CBC W/AUTO DIFF WBC: CPT

## 2024-05-15 RX ORDER — DEXTROAMPHETAMINE SACCHARATE, AMPHETAMINE ASPARTATE, DEXTROAMPHETAMINE SULFATE AND AMPHETAMINE SULFATE 5; 5; 5; 5 MG/1; MG/1; MG/1; MG/1
20 TABLET ORAL 2 TIMES DAILY
Qty: 60 TABLET | Refills: 0 | Status: SHIPPED | OUTPATIENT
Start: 2024-05-15

## 2024-05-15 RX ORDER — SODIUM CHLORIDE 0.9 % (FLUSH) 0.9 %
10 SYRINGE (ML) INJECTION AS NEEDED
Status: DISCONTINUED | OUTPATIENT
Start: 2024-05-15 | End: 2024-05-16 | Stop reason: HOSPADM

## 2024-05-16 ENCOUNTER — APPOINTMENT (OUTPATIENT)
Dept: CT IMAGING | Facility: HOSPITAL | Age: 59
End: 2024-05-16
Payer: COMMERCIAL

## 2024-05-16 ENCOUNTER — TELEPHONE (OUTPATIENT)
Dept: FAMILY MEDICINE CLINIC | Facility: CLINIC | Age: 59
End: 2024-05-16
Payer: COMMERCIAL

## 2024-05-16 ENCOUNTER — HOSPITAL ENCOUNTER (EMERGENCY)
Facility: HOSPITAL | Age: 59
Discharge: HOME | End: 2024-05-16
Attending: EMERGENCY MEDICINE
Payer: COMMERCIAL

## 2024-05-16 VITALS
HEIGHT: 68 IN | TEMPERATURE: 98.4 F | WEIGHT: 176.15 LBS | SYSTOLIC BLOOD PRESSURE: 115 MMHG | BODY MASS INDEX: 26.7 KG/M2 | RESPIRATION RATE: 16 BRPM | OXYGEN SATURATION: 97 % | HEART RATE: 67 BPM | DIASTOLIC BLOOD PRESSURE: 71 MMHG

## 2024-05-16 DIAGNOSIS — N39.0 URINARY TRACT INFECTION WITHOUT HEMATURIA, SITE UNSPECIFIED: Primary | ICD-10-CM

## 2024-05-16 DIAGNOSIS — E11.00 TYPE 2 DIABETES MELLITUS WITH HYPEROSMOLARITY WITHOUT COMA, WITHOUT LONG-TERM CURRENT USE OF INSULIN: ICD-10-CM

## 2024-05-16 DIAGNOSIS — K56.7 ILEUS: ICD-10-CM

## 2024-05-16 DIAGNOSIS — E11.00 TYPE 2 DIABETES MELLITUS WITH HYPEROSMOLARITY WITHOUT COMA, WITHOUT LONG-TERM CURRENT USE OF INSULIN: Primary | ICD-10-CM

## 2024-05-16 LAB
ALBUMIN SERPL-MCNC: 4.3 G/DL (ref 3.5–5.2)
ALBUMIN/GLOB SERPL: 1.3 G/DL
ALP SERPL-CCNC: 57 U/L (ref 39–117)
ALT SERPL W P-5'-P-CCNC: 18 U/L (ref 1–41)
ANION GAP SERPL CALCULATED.3IONS-SCNC: 9.4 MMOL/L (ref 5–15)
AST SERPL-CCNC: 20 U/L (ref 1–40)
BACTERIA UR QL AUTO: ABNORMAL /HPF
BILIRUB SERPL-MCNC: 0.5 MG/DL (ref 0–1.2)
BILIRUB UR QL STRIP: NEGATIVE
BUN SERPL-MCNC: 9 MG/DL (ref 8–23)
BUN/CREAT SERPL: 8.9 (ref 7–25)
CALCIUM SPEC-SCNC: 9.5 MG/DL (ref 8.6–10.5)
CHLORIDE SERPL-SCNC: 102 MMOL/L (ref 98–107)
CLARITY UR: CLEAR
CO2 SERPL-SCNC: 27.6 MMOL/L (ref 22–29)
COLOR UR: YELLOW
CREAT SERPL-MCNC: 1.01 MG/DL (ref 0.76–1.27)
D-LACTATE SERPL-SCNC: 1.7 MMOL/L (ref 0.5–2)
EGFRCR SERPLBLD CKD-EPI 2021: 85.1 ML/MIN/1.73
FLUAV SUBTYP SPEC NAA+PROBE: NOT DETECTED
FLUBV RNA ISLT QL NAA+PROBE: NOT DETECTED
GLOBULIN UR ELPH-MCNC: 3.3 GM/DL
GLUCOSE SERPL-MCNC: 143 MG/DL (ref 65–99)
GLUCOSE UR STRIP-MCNC: NEGATIVE MG/DL
HGB UR QL STRIP.AUTO: NEGATIVE
HOLD SPECIMEN: NORMAL
HOLD SPECIMEN: NORMAL
HYALINE CASTS UR QL AUTO: ABNORMAL /LPF
KETONES UR QL STRIP: NEGATIVE
LEUKOCYTE ESTERASE UR QL STRIP.AUTO: ABNORMAL
LIPASE SERPL-CCNC: 45 U/L (ref 13–60)
NITRITE UR QL STRIP: NEGATIVE
PH UR STRIP.AUTO: 6.5 [PH] (ref 5–8)
POTASSIUM SERPL-SCNC: 4.2 MMOL/L (ref 3.5–5.2)
PROT SERPL-MCNC: 7.6 G/DL (ref 6–8.5)
PROT UR QL STRIP: NEGATIVE
RBC # UR STRIP: ABNORMAL /HPF
REF LAB TEST METHOD: ABNORMAL
RSV RNA NPH QL NAA+NON-PROBE: NOT DETECTED
SARS-COV-2 RNA RESP QL NAA+PROBE: NOT DETECTED
SODIUM SERPL-SCNC: 139 MMOL/L (ref 136–145)
SP GR UR STRIP: 1.01 (ref 1–1.03)
SQUAMOUS #/AREA URNS HPF: ABNORMAL /HPF
UROBILINOGEN UR QL STRIP: ABNORMAL
WBC # UR STRIP: ABNORMAL /HPF
WHOLE BLOOD HOLD COAG: NORMAL
WHOLE BLOOD HOLD SPECIMEN: NORMAL

## 2024-05-16 PROCEDURE — 25810000003 SODIUM CHLORIDE 0.9 % SOLUTION: Performed by: EMERGENCY MEDICINE

## 2024-05-16 PROCEDURE — 87637 SARSCOV2&INF A&B&RSV AMP PRB: CPT | Performed by: EMERGENCY MEDICINE

## 2024-05-16 PROCEDURE — 25010000002 PIPERACILLIN SOD-TAZOBACTAM PER 1 G: Performed by: EMERGENCY MEDICINE

## 2024-05-16 PROCEDURE — 96365 THER/PROPH/DIAG IV INF INIT: CPT

## 2024-05-16 PROCEDURE — 74177 CT ABD & PELVIS W/CONTRAST: CPT

## 2024-05-16 PROCEDURE — 25510000001 IOPAMIDOL PER 1 ML: Performed by: EMERGENCY MEDICINE

## 2024-05-16 PROCEDURE — 87040 BLOOD CULTURE FOR BACTERIA: CPT | Performed by: EMERGENCY MEDICINE

## 2024-05-16 RX ORDER — AMOXICILLIN AND CLAVULANATE POTASSIUM 875; 125 MG/1; MG/1
1 TABLET, FILM COATED ORAL 2 TIMES DAILY
Qty: 20 TABLET | Refills: 0 | Status: SHIPPED | OUTPATIENT
Start: 2024-05-16 | End: 2024-05-26

## 2024-05-16 RX ADMIN — SODIUM CHLORIDE 1000 ML: 9 INJECTION, SOLUTION INTRAVENOUS at 01:03

## 2024-05-16 RX ADMIN — PIPERACILLIN SODIUM AND TAZOBACTAM SODIUM 3.38 G: 3; .375 INJECTION, POWDER, LYOPHILIZED, FOR SOLUTION INTRAVENOUS at 01:02

## 2024-05-16 RX ADMIN — IOPAMIDOL 100 ML: 755 INJECTION, SOLUTION INTRAVENOUS at 01:25

## 2024-05-16 NOTE — TELEPHONE ENCOUNTER
Patient states he is unable to fill Mounjaro 5mg/0.5ml due to national back order.  Patient is requesting to have Rx sent to [facility] for another month of Mounjaro 2.5mg/0.5ml.

## 2024-05-16 NOTE — PROGRESS NOTES
I would like Mauro to be updated with the following information:    It is looking more more on his repeat urinalysis that he may have a urinary tract infection that requires treatment with an antibiotic.  The initial urinalysis we obtained 6 days ago showed elevated white count between 21 and 50 but no bacteria.  The urinalysis we obtained 2 days ago now shows too numerous to count white blood cells and 4+ bacteria.  Culture is still in progress.  Can we notify him that he likely will require antibiotics but that the bacteria he was growing at the initial urine culture was Enterococcus faecalis, which is an atypical finding and will likely require further workup.  Can we touch base with him about these results?    Thank you,    Prashanth Arthur    ?  This document has been electronically signed by Prashanth Arthur MD on May 16, 2024 09:11 EDT

## 2024-05-16 NOTE — TELEPHONE ENCOUNTER
Patient states he is unable to fill Mounjaro 5mg/0.5ml due to national back order.  Patient is requesting to have Rx sent to [facility] for another month of Mounjaro 2.5mg/0.5ml.     PCP SENT IN RX PT AWARE  (ADDRESSED ON SEPARATE ENCOUNTER)

## 2024-05-16 NOTE — DISCHARGE INSTRUCTIONS
Please follow-up with your doctor in 48 hours to review the urine culture that was performed today to determine if the proper antibiotics were prescribed    Please discuss possible need for urology referral with your primary care physician    Please return to emergency immediately for abdominal pain, fever, vomiting, unusual fatigue, near passing out, passing out or any new symptoms you are concerned

## 2024-05-16 NOTE — PROGRESS NOTES
I had obtained an earlier urine culture on this patient that only showed 25,000 colony-forming units of bacteria.  Out of concern for potential development of UTI, I did repeat urine culture.  This now does confirm presence of UTI with greater than 100,000 colony-forming units of gram-positive cocci.  This just resulted at 10:11 AM today.  Based on this, I would treat him with antibiotics.  However when I go into the chart to do this it looks like someone has already started him on antibiotics.  Can we call him and clarify?  Susceptibility results are still not back and if it shows that he is resistant to the antibiotic that is already been sent in for him we would need to adjust this regimen.  Again, can we call patient and let him know?    Thank you,    Prashanth Arthur    ?  This document has been electronically signed by Prashanth Arthur MD on May 16, 2024 10:44 EDT

## 2024-05-16 NOTE — TELEPHONE ENCOUNTER
Thank you.  I have sent this in.      This document has been electronically signed by Prashanth Arthur MD on May 16, 2024 12:41 EDT

## 2024-05-16 NOTE — ED PROVIDER NOTES
Time: 12:41 AM EDT  Date of encounter:  5/15/2024  Independent Historian/Clinical History and Information was obtained by:   Patient  Chief Complaint: Not feeling well    History is limited by: N/A    History of Present Illness:  Patient is a 59 y.o. year old male who presents to the emergency department for evaluation of not feeling well.  The patient notes that he first started having symptoms several days before May 10.  He states he woke up in the middle the night with dysuria cloudy urine.  Patient states that he followed up with his primary care physician who did a urinalysis that was initially negative.  The patient states they repeated the urine and urine demonstrated large amount of white blood cells and bacteria.  He states it eventually grew out Enterococcus.  He states he was not placed on antibiotics at that time and they were going to repeat another urine.  He also notes that he is a diabetic.  He was on Mounjaro.  They have not been able to refill that due to drug shortage.  The patient's doctor is waiting for the Mounjaro to come back in stock.  Patient states that tonight overall he just started not feeling very well.  He states he felt somewhat lightheaded chills.  Patient notes he does have some urinary dysuria and frequency.  The patient's had no fever.  The patient's had no chest pain or shortness of breath.  Patient does note he fell and hit his left ribs several days ago on his boat but that is steadily improving.  Patient denies any abdominal pain.  The patient's had no nausea or vomit.  The patient states his bowel movements are normal no hematochezia or melena.  The patient denies any testicular pain or penile discharge.    HPI    Patient Care Team  Primary Care Provider: Prashanth Arthur MD    Past Medical History:     Allergies   Allergen Reactions    Hydrocodone-Acetaminophen Itching    Nsaids Arrhythmia    Oxycodone-Acetaminophen Shortness Of Breath    Sulfa Antibiotics Hives     Voltaren [Diclofenac] Palpitations     Past Medical History:   Diagnosis Date    Acid reflux     ADHD (attention deficit hyperactivity disorder) 1970    Anxiety     Anxiety disorder 05/17/2019    Arthritis     generalized    Benign prostatic hyperplasia 1989    Blood disease     none    Cervicalgia     Chronic allergic rhinitis     Colon polyp 2005    Depression     Diabetes mellitus, type 2 05/17/2019    DOES NOT CHECK BS    Diabetic eye exam 01/2019 20/20 PER PT     Encounter for diabetic foot exam     WITHIN FEW MO  PER PT     Essential hypertension 05/17/2019    GERD (gastroesophageal reflux disease)     High blood pressure     High cholesterol     Hyperlipemia     Hyperlipidemia     Hypertension     Low back pain 1987    Major depressive disorder 05/17/2019    Nicotine dependence 05/17/2019    Prostate disorder     BPH    Rotator cuff tear, left     Seasonal allergies     Urinary tract infection 1993    Vitamin D deficiency 05/17/2019    no current issues     Past Surgical History:   Procedure Laterality Date    CHOLECYSTECTOMY  1997    COLONOSCOPY      ELISA- REPEAT IN 5 YEARS     GALLBLADDER SURGERY      KNEE ARTHROSCOPY W/ MENISCAL REPAIR Right     OTHER SURGICAL HISTORY      SURGICAL CLIPS/gallbladder removed    OTHER SURGICAL HISTORY      right knee meniscus tear repair    SHOULDER ARTHROSCOPY W/ ROTATOR CUFF REPAIR Left 07/21/2021    Procedure: SHOULDER ARTHROSCOPY,SUBACROMINAL DECOMPRESSION, DISTAL CLAVICLE RESECTION, MINI OPEN  ROTATOR CUFF REPAIR;  Surgeon: Ulisses Kenney MD;  Location: [facility] OR [facility];  Service: Orthopedics;  Laterality: Left;    TONSILLECTOMY      VASECTOMY  2004     Family History   Problem Relation Age of Onset    Cancer Mother     Diabetes Mother     Rheum arthritis Mother         40'S    Heart attack Mother     Breast cancer Mother         40'S    Arthritis Mother     Stroke Father     Heart disease Father     Heart attack Maternal Grandmother     Breast cancer  Maternal Grandmother         50'S    Prostate cancer Maternal Grandfather     Nephrolithiasis Maternal Grandfather     Colon cancer Paternal Grandmother     Colon cancer Paternal Grandfather         60'S    Breast cancer Other         40'S    Heart attack Maternal Aunt     Malrenny Hyperthermia Neg Hx        Home Medications:  Prior to Admission medications    Medication Sig Start Date End Date Taking? Authorizing Provider   albuterol (PROVENTIL) (2.5 MG/3ML) 0.083% nebulizer solution Take 2.5 mg by nebulization Every 4 (Four) Hours As Needed for Wheezing. 5/13/24   Prashanth Arthur MD   amphetamine-dextroamphetamine (ADDERALL) 20 MG tablet Take 1 tablet by mouth 2 (Two) Times a Day. Dose adjustment 5/15/24   Jaimie Kamara MD   atorvastatin (LIPITOR) 20 MG tablet Take 1 tablet by mouth every night at bedtime. 4/9/24   Prashanth Arthur MD   busPIRone (BUSPAR) 15 MG tablet Take 1 tablet by mouth 2 (Two) Times a Day As Needed (anxiety). 4/9/24   Prashanth Arthur MD   CBD (cannabidiol) oral oil Take  by mouth.    Provider, MD Lashell   finasteride (Proscar) 5 MG tablet Take 1 tablet by mouth Daily. 2/19/24   Prashanth Arthur MD   fluticasone-salmeterol (Advair HFA) 115-21 MCG/ACT inhaler Inhale 2 puffs 2 (Two) Times a Day. 5/13/24   Prashanth Arthur MD   lansoprazole (Prevacid) 15 MG capsule Take 1 capsule by mouth Daily. 5/10/24   Prashanth Arthur MD   metoprolol succinate XL (TOPROL-XL) 50 MG 24 hr tablet Take 1 tablet by mouth Daily. 4/9/24   Prashanth Arthur MD   tamsulosin (FLOMAX) 0.4 MG capsule 24 hr capsule Take 1 capsule by mouth Daily. 4/9/24   Prashanth Arthur MD   Tirzepatide (MOUNJARO) 5 MG/0.5ML solution pen-injector pen Inject 0.5 mL under the skin into the appropriate area as directed 1 (One) Time Per Week. 5/10/24   Prashanth Arthur MD   traZODone (DESYREL) 100 MG tablet Take 1 tablet by mouth every night at bedtime. 4/9/24   Prashanth Arthur MD   vitamin D (ERGOCALCIFEROL) 1.25 MG (80369 UT)  "capsule capsule Take 1 capsule by mouth Every 7 (Seven) Days. 12/27/23   Provider, Lashell, MD        Social History:   Social History     Tobacco Use    Smoking status: Some Days     Types: Cigars    Smokeless tobacco: Never    Tobacco comments:     1 cigar daily   Vaping Use    Vaping status: Never Used   Substance Use Topics    Alcohol use: Yes     Alcohol/week: 6.0 standard drinks of alcohol     Types: 6 Drinks containing 0.5 oz of alcohol per week     Comment: Drinks daily; beer. Occasionally drinks 2 drinks per day, has been drinking for 6-10 years.     Drug use: Never         Review of Systems:  Review of Systems   Constitutional:  Positive for fatigue. Negative for chills, diaphoresis and fever.   HENT:  Negative for congestion, postnasal drip, rhinorrhea and sore throat.    Eyes:  Negative for photophobia.   Respiratory:  Negative for cough, chest tightness and shortness of breath.    Cardiovascular:  Negative for chest pain, palpitations and leg swelling.   Gastrointestinal:  Negative for abdominal pain, diarrhea, nausea and vomiting.   Genitourinary:  Positive for dysuria, flank pain and frequency. Negative for difficulty urinating, hematuria and urgency.   Musculoskeletal:  Positive for back pain. Negative for neck pain and neck stiffness.   Skin:  Negative for pallor and rash.   Neurological:  Negative for dizziness, syncope, weakness, numbness and headaches.   Hematological:  Negative for adenopathy. Does not bruise/bleed easily.   Psychiatric/Behavioral: Negative.          Physical Exam:  /71   Pulse 67   Temp 98.4 °F (36.9 °C) (Oral)   Resp 16   Ht 172.7 cm (68\")   Wt 79.9 kg (176 lb 2.4 oz)   SpO2 97%   BMI 26.78 kg/m²     Physical Exam  Vitals and nursing note reviewed.   Constitutional:       General: He is not in acute distress.     Appearance: Normal appearance. He is not ill-appearing, toxic-appearing or diaphoretic.   HENT:      Head: Normocephalic and atraumatic.      " Mouth/Throat:      Mouth: Mucous membranes are moist.   Eyes:      Pupils: Pupils are equal, round, and reactive to light.   Cardiovascular:      Rate and Rhythm: Normal rate and regular rhythm.      Pulses: Normal pulses.           Carotid pulses are 2+ on the right side and 2+ on the left side.       Radial pulses are 2+ on the right side and 2+ on the left side.        Femoral pulses are 2+ on the right side and 2+ on the left side.       Popliteal pulses are 2+ on the right side and 2+ on the left side.        Dorsalis pedis pulses are 2+ on the right side and 2+ on the left side.        Posterior tibial pulses are 2+ on the right side and 2+ on the left side.      Heart sounds: Normal heart sounds. No murmur heard.  Pulmonary:      Effort: Pulmonary effort is normal. No accessory muscle usage, respiratory distress or retractions.      Breath sounds: Normal breath sounds. No wheezing, rhonchi or rales.   Abdominal:      General: Abdomen is flat. There is no distension.      Palpations: Abdomen is soft. There is no mass or pulsatile mass.      Tenderness: There is no abdominal tenderness. There is no right CVA tenderness, left CVA tenderness, guarding or rebound.      Comments: No rigidity   Musculoskeletal:         General: No swelling, tenderness or deformity.      Cervical back: Neck supple. No tenderness.      Right lower leg: No edema.      Left lower leg: No edema.   Skin:     General: Skin is warm and dry.      Capillary Refill: Capillary refill takes less than 2 seconds.      Coloration: Skin is not jaundiced or pale.      Findings: No erythema.   Neurological:      General: No focal deficit present.      Mental Status: He is alert and oriented to person, place, and time. Mental status is at baseline.      Cranial Nerves: Cranial nerves 2-12 are intact. No cranial nerve deficit.      Sensory: Sensation is intact. No sensory deficit.      Motor: Motor function is intact. No weakness or pronator drift.       Coordination: Coordination is intact. Coordination normal.   Psychiatric:         Mood and Affect: Mood normal.         Behavior: Behavior normal.                  Procedures:  Procedures      Medical Decision Making:      Comorbidities that affect care:    Anxiety, diabetes, hypertension, GERD, high cholesterol, hyperlipidemia, hypertension, BPH    External Notes reviewed:    None      The following orders were placed and all results were independently analyzed by me:  Orders Placed This Encounter   Procedures    Blood Culture - Blood,    Blood Culture - Blood,    COVID-19, FLU A/B, RSV PCR 1 HR TAT - Swab, Nasopharynx    Urine Culture - Urine,    CT Abdomen Pelvis With Contrast    Jordan Draw    Comprehensive Metabolic Panel    Lipase    Urinalysis With Microscopic If Indicated (No Culture) - Urine, Clean Catch    Lactic Acid, Plasma    CBC Auto Differential    Urinalysis, Microscopic Only - Urine, Clean Catch    CBC & Differential    Green Top (Gel)    Lavender Top    Gold Top - SST    Light Blue Top       Medications Given in the Emergency Department:  Medications   sodium chloride 0.9 % bolus 1,000 mL (0 mL Intravenous Stopped 5/16/24 0209)   piperacillin-tazobactam (ZOSYN) IVPB 3.375 g IVPB in 100 mL NS (VTB) (0 g Intravenous Stopped 5/16/24 0209)   iopamidol (ISOVUE-370) 76 % injection 100 mL (100 mL Intravenous Given 5/16/24 0125)        ED Course:         Labs:    Lab Results (last 24 hours)       ** No results found for the last 24 hours. **             Imaging:    No Radiology Exams Resulted Within Past 24 Hours      Differential Diagnosis and Discussion:    Dysuria: Differential diagnosis includes but is not limited to urethritis, cystitis, pyelonephritis, ureteral calculi, neoplasm, chemical irritant, urethral stricture, and trauma    All labs were reviewed and interpreted by me.  CT scan radiology impression was interpreted by me.    MDM  Number of Diagnoses or Management Options  Ileus  Urinary tract  infection without hematuria, site unspecified  Diagnosis management comments: 2 blood cultures were ordered.  The results are pending at the time of disposition    The patient's Covid swab was negative   The patient's Influenza swab was negative     Patient's urinalysis demonstrated 3-5 WBCs and no bacteria were seen.  Urine culture will be performed    The patient's CMP was reviewed and shows no abnormalities of critical concern.  Of note, the patient's sodium and potassium are acceptable.  The patient's liver enzymes are unremarkable.  The patient's renal function including creatinine is preserved.  The patient has a normal anion gap.    The patient's CBC was reviewed and shows no abnormalities of critical concern.  Of note, there is no anemia requiring a blood transfusion and the platelet count is acceptable    CT scan of the pelvis demonstrates small bowel ileus without obstruction.  Otherwise there is no other acute abnormalities.    I have spoken with this patient.  I have explained the patient's condition, diagnosis and treatment plan based on the information available to me at this time.  I have answered the patient's questions and addressed any concerns.  The patient has a good understanding of the patient's diagnosis condition and treatment plan as can be expected at this point.  The vital signs have been stable.  The patient's condition is stable and appropriate for discharge from the emergency department.     Patient will pursue further outpatient evaluation with the primary care physician or other designated or consulting physicians as outlined in the discharge instruction.  The patient is agreeable to this plan of care and follow-up instructions have been explained in detail.  The patient has received these instructions in written format and has expressed understanding of the discharge instructions.  The patient is aware that any significant change in condition or worsening of symptoms should prompt  immediate return to this or the closest emergency department or call 911       Amount and/or Complexity of Data Reviewed  Clinical lab tests: reviewed  Tests in the radiology section of CPT®: reviewed  Tests in the medicine section of CPT®: reviewed  Review and summarize past medical records: yes (I reviewed the patient's urine culture from 8/11/2024.  The patient grew Enterococcus.  It was susceptible to ampicillin, Levaquin, nitrofurantoin and vancomycin)         Social Determinants of Health:    Patient is independent, reliable, and has access to care.       Disposition and Care Coordination:    Discharged: The patient is suitable and stable for discharge with no need for consideration of admission.    I have explained discharge medications and the need for follow up with the patient/caretakers. This was also printed in the discharge instructions. Patient was discharged with the following medications and follow up:      Medication List        New Prescriptions      amoxicillin-clavulanate 875-125 MG per tablet  Commonly known as: AUGMENTIN  Take 1 tablet by mouth 2 (Two) Times a Day for 10 days.               Where to Get Your Medications        These medications were sent to [facility]pharm[facility] #[id number] - [place] - [address] - [phone] [facility] [phone] FX  [address] [place] [address]      Hours: 24-hours Phone: [phone]   amoxicillin-clavulanate 875-125 MG per tablet      Prashanth Arthur MD  [address]  [address]  [place] [id number] [phone]    On 5/17/2024  Urine culture follow-up       Final diagnoses:   Urinary tract infection without hematuria, site unspecified   Ileus        ED Disposition       ED Disposition   Discharge    Condition   Stable    Comment   --               This medical record created using voice recognition software.             Antwan Rodríguez DO  05/19/24 0323

## 2024-05-17 LAB
BACTERIA SPEC AEROBE CULT: ABNORMAL
BACTERIA SPEC AEROBE CULT: NO GROWTH

## 2024-05-17 NOTE — PROGRESS NOTES
Can we let Mauro know that the second urine culture did show greater than 100,000 colony-forming units now of Enterococcus faecalis and does confirm susceptibility to ampicillin.  He should continue the antibiotic prescribed in the ED.  If he were to have treatment failure, would treat with a tennille quinolone such as Cipro or Levaquin.  Because he is a male, this is by definition a complicated urinary tract infection and I would probably order a repeat urinalysis and culture to ensure eradication after he completes treatment.    Thank you,    Prashanth Arthur    ?  This document has been electronically signed by Prashanth Arthur MD on May 17, 2024 12:38 EDT

## 2024-05-21 LAB
BACTERIA SPEC AEROBE CULT: NORMAL
BACTERIA SPEC AEROBE CULT: NORMAL

## 2024-05-28 ENCOUNTER — TELEPHONE (OUTPATIENT)
Dept: FAMILY MEDICINE CLINIC | Facility: CLINIC | Age: 59
End: 2024-05-28
Payer: COMMERCIAL

## 2024-05-28 NOTE — TELEPHONE ENCOUNTER
"    Caller: Jung Burkett \"Mauro\"    Relationship: Self    Best call back number: [phone]     What orders are you requesting (i.e. lab or imaging): URINE ANALYSIS    In what timeframe would the patient need to come in: ASAP    Where will you receive your lab/imaging services: [facility]    Additional notes: CALL WHEN ACTIVE.   "

## 2024-05-29 ENCOUNTER — OFFICE VISIT (OUTPATIENT)
Dept: PULMONOLOGY | Facility: CLINIC | Age: 59
End: 2024-05-29
Payer: COMMERCIAL

## 2024-05-29 VITALS
HEART RATE: 97 BPM | OXYGEN SATURATION: 98 % | RESPIRATION RATE: 14 BRPM | BODY MASS INDEX: 27.22 KG/M2 | HEIGHT: 68 IN | DIASTOLIC BLOOD PRESSURE: 82 MMHG | SYSTOLIC BLOOD PRESSURE: 121 MMHG | WEIGHT: 179.6 LBS

## 2024-05-29 DIAGNOSIS — R91.1 LUNG NODULE SEEN ON IMAGING STUDY: ICD-10-CM

## 2024-05-29 DIAGNOSIS — J84.113 IDIOPATHIC NON-SPECIFIC INTERSTITIAL PNEUMONITIS: ICD-10-CM

## 2024-05-29 DIAGNOSIS — J45.20 MILD INTERMITTENT ASTHMA, UNSPECIFIED WHETHER COMPLICATED: Primary | ICD-10-CM

## 2024-05-29 PROCEDURE — 99203 OFFICE O/P NEW LOW 30 MIN: CPT | Performed by: INTERNAL MEDICINE

## 2024-05-29 RX ORDER — FLUTICASONE FUROATE AND VILANTEROL TRIFENATATE 100; 25 UG/1; UG/1
1 POWDER RESPIRATORY (INHALATION) DAILY
COMMUNITY
Start: 2024-03-25

## 2024-05-29 RX ORDER — FLUTICASONE PROPIONATE AND SALMETEROL 250; 50 UG/1; UG/1
1 POWDER RESPIRATORY (INHALATION)
Qty: 60 EACH | Refills: 11 | Status: SHIPPED | OUTPATIENT
Start: 2024-05-29

## 2024-05-29 NOTE — TELEPHONE ENCOUNTER
CALLED TO GET CLARIFICATION FROM PT NEEDS NEW UA CULTURE ORDERS AS HE IS SUPPOSED TO REPEAT CULTURE AFTER HE FINISHED ABX

## 2024-05-29 NOTE — PROGRESS NOTES
Pulmonary Consultation    Prashanth Arthur MD,    Thank you for asking me to see Jung Burkett for   Chief Complaint   Patient presents with    Establish Care    INTERSITIAL LUNG DISEASE    TOBACCO USE    Shortness of Breath    Cough    Asthma   .      History of Present Illness  Jung Burkett is a 59 y.o. male with a PMH significant for lung nodules and COVID-pneumonia x 2 with mild bronchial asthma presents for evaluation patient denies tobacco abuse but does have some shortness of breath especially after exposure to pollen and dust he denies any significant expectoration or wheezing at this time patient is on Breo which she does not like and wants it changed patient has had CT chest along with PFTs done      Tobacco use history:  Never smoker      Review of Systems: History obtained from chart review and the patient.  Review of Systems   Respiratory:  Positive for cough.    All other systems reviewed and are negative.    As described in the HPI. Otherwise, remainder of ROS (14 systems) were negative.    Patient Active Problem List   Diagnosis    Tear of left rotator cuff    Traumatic complete tear of left rotator cuff    Cervicalgia    Anxiety disorder    Diabetes mellitus, type 2    Esophageal reflux    Essential hypertension    Hyperlipidemia    Major depressive disorder    Nicotine dependence    Seasonal allergic rhinitis    Vitamin D deficiency    Abnormal blood level of iron    Aftercare following left shoulder arthroscopy    Idiopathic hypersomnia    Mild intermittent asthma    Pure hypercholesterolemia         Current Outpatient Medications:     albuterol (PROVENTIL) (2.5 MG/3ML) 0.083% nebulizer solution, Take 2.5 mg by nebulization Every 4 (Four) Hours As Needed for Wheezing., Disp: 3 mL, Rfl: 12    amphetamine-dextroamphetamine (ADDERALL) 20 MG tablet, Take 1 tablet by mouth 2 (Two) Times a Day. Dose adjustment, Disp: 60 tablet, Rfl: 0    atorvastatin (LIPITOR) 20 MG tablet, Take 1 tablet by  Patient Education     Viral Upper Respiratory Illness (Adult)    You have a viral upper respiratory illness (URI), which is another term for the common cold. This illness is contagious during the first few days. It is spread through the air by coughing and sneezing. It may also be spread by direct contact (touching the sick person and then touching your own eyes, nose, or mouth). Frequent handwashing will decrease risk of spread. Most viral illnesses go away within 7 to 10 days with rest and simple home remedies. Sometimes the illness may last for several weeks. Antibiotics will not kill a virus, and they are generally not prescribed for this condition.  Home care  If symptoms are severe, rest at home for the first 2 to 3 days. When you resume activity, don't let yourself get too tired.  Don't smoke. If you need help stopping, talk with your healthcare provider.  Avoid being exposed to cigarette smoke (yours or others’).  You may use acetaminophen or ibuprofen to control pain and fever, unless another medicine was prescribed. If you have chronic liver or kidney disease, have ever had a stomach ulcer or gastrointestinal bleeding, or are taking blood-thinning medicines, talk with your healthcare provider before using these medicines. Aspirin should never be given to anyone under 18 years of age who is ill with a viral infection or fever. It may cause severe liver or brain damage.  Your appetite may be poor, so a light diet is fine. Stay well hydrated by drinking 6 to 8 glasses of fluids per day (water, soft drinks, juices, tea, or soup). Extra fluids will help loosen secretions in the nose and lungs.  Over-the-counter cold medicines will not shorten the length of time you’re sick, but they may be helpful for the following symptoms: cough, sore throat, and nasal and sinus congestion. If you take prescription medicines, ask your healthcare provider or pharmacist which over-the-counter medicines are safe to use. (Note:  Don't use decongestants if you have high blood pressure.)  Follow-up care  Follow up with your healthcare provider, or as advised.  When to seek medical advice  Call your healthcare provider right away if any of these occur:  Cough with lots of colored sputum (mucus)  Severe headache; face, neck, or ear pain  Difficulty swallowing due to throat pain  Fever of 100.4°F (38°C) or higher, or as directed by your healthcare provider  Call 911  Call 911 if any of these occur:  Chest pain, shortness of breath, wheezing, or difficulty breathing  Coughing up blood  Very severe pain with swallowing, especially if it goes along with a muffled voice   [employer] last reviewed this educational content on 6/1/2018  © 1361-3152 [employer]. All rights reserved. This information is not intended as a substitute for professional medical care. Always follow your healthcare professional's instructions.              mouth every night at bedtime., Disp: 90 tablet, Rfl: 1    Breo Ellipta 100-25 MCG/ACT aerosol powder , Inhale 1 puff Daily., Disp: , Rfl:     busPIRone (BUSPAR) 15 MG tablet, Take 1 tablet by mouth 2 (Two) Times a Day As Needed (anxiety)., Disp: 180 tablet, Rfl: 1    CBD (cannabidiol) oral oil, Take  by mouth., Disp: , Rfl:     finasteride (Proscar) 5 MG tablet, Take 1 tablet by mouth Daily., Disp: 90 tablet, Rfl: 1    lansoprazole (Prevacid) 15 MG capsule, Take 1 capsule by mouth Daily., Disp: 90 capsule, Rfl: 0    metoprolol succinate XL (TOPROL-XL) 50 MG 24 hr tablet, Take 1 tablet by mouth Daily., Disp: 90 tablet, Rfl: 1    tamsulosin (FLOMAX) 0.4 MG capsule 24 hr capsule, Take 1 capsule by mouth Daily., Disp: 90 capsule, Rfl: 1    Tirzepatide (MOUNJARO) 2.5 MG/0.5ML solution pen-injector pen, Inject 0.5 mL under the skin into the appropriate area as directed 1 (One) Time Per Week., Disp: 0.5 mL, Rfl: 3    traZODone (DESYREL) 100 MG tablet, Take 1 tablet by mouth every night at bedtime., Disp: 90 tablet, Rfl: 1    fluticasone-salmeterol (Advair HFA) 115-21 MCG/ACT inhaler, Inhale 2 puffs 2 (Two) Times a Day. (Patient not taking: Reported on 5/29/2024), Disp: 12 g, Rfl: 11    Fluticasone-Salmeterol (ADVAIR/WIXELA) 250-50 MCG/ACT DISKUS, Inhale 1 puff 2 (Two) Times a Day., Disp: 60 each, Rfl: 11    vitamin D (ERGOCALCIFEROL) 1.25 MG (18024 UT) capsule capsule, Take 1 capsule by mouth Every 7 (Seven) Days. (Patient not taking: Reported on 5/29/2024), Disp: , Rfl:     Allergies   Allergen Reactions    Hydrocodone-Acetaminophen Itching    Nsaids Arrhythmia    Oxycodone-Acetaminophen Shortness Of Breath    Sulfa Antibiotics Hives    Voltaren [Diclofenac] Palpitations       Past Medical History:   Diagnosis Date    Acid reflux     ADHD (attention deficit hyperactivity disorder) 1970    Anxiety     Anxiety disorder 05/17/2019    Arthritis     generalized    Benign prostatic hyperplasia 1989    Blood disease     none     Cervicalgia     Chronic allergic rhinitis     Colon polyp 2005    Depression     Diabetes mellitus, type 2 05/17/2019    DOES NOT CHECK BS    Diabetic eye exam 01/2019 20/20 PER PT     Encounter for diabetic foot exam     WITHIN FEW MO  PER PT     Essential hypertension 05/17/2019    GERD (gastroesophageal reflux disease)     High blood pressure     High cholesterol     Hyperlipemia     Hyperlipidemia     Hypertension     Low back pain 1987    Major depressive disorder 05/17/2019    Nicotine dependence 05/17/2019    Prostate disorder     BPH    Rotator cuff tear, left     Seasonal allergies     Urinary tract infection 1993    Vitamin D deficiency 05/17/2019    no current issues     Past Surgical History:   Procedure Laterality Date    CHOLECYSTECTOMY  1997    COLONOSCOPY      ELISA- REPEAT IN 5 YEARS     GALLBLADDER SURGERY      KNEE ARTHROSCOPY W/ MENISCAL REPAIR Right     OTHER SURGICAL HISTORY      SURGICAL CLIPS/gallbladder removed    OTHER SURGICAL HISTORY      right knee meniscus tear repair    SHOULDER ARTHROSCOPY W/ ROTATOR CUFF REPAIR Left 07/21/2021    Procedure: SHOULDER ARTHROSCOPY,SUBACROMINAL DECOMPRESSION, DISTAL CLAVICLE RESECTION, MINI OPEN  ROTATOR CUFF REPAIR;  Surgeon: Ulisses Kenney MD;  Location: [facility] [place];  Service: Orthopedics;  Laterality: Left;    TONSILLECTOMY      VASECTOMY  2004     Social History     Socioeconomic History    Marital status:    Tobacco Use    Smoking status: Some Days     Types: Cigars    Smokeless tobacco: Never    Tobacco comments:     1 cigar daily   Vaping Use    Vaping status: Never Used   Substance and Sexual Activity    Alcohol use: Yes     Alcohol/week: 6.0 standard drinks of alcohol     Types: 6 Drinks containing 0.5 oz of alcohol per week     Comment: Drinks daily; beer. Occasionally drinks 2 drinks per day, has been drinking for 6-10 years.     Drug use: Never    Sexual activity: Defer     Family History   Problem Relation Age of  Onset    Cancer Mother     Diabetes Mother     Rheum arthritis Mother         40'S    Heart attack Mother     Breast cancer Mother         40'S    Arthritis Mother     Stroke Father     Heart disease Father     Heart attack Maternal Grandmother     Breast cancer Maternal Grandmother         50'S    Prostate cancer Maternal Grandfather     Nephrolithiasis Maternal Grandfather     Colon cancer Paternal Grandmother     Colon cancer Paternal Grandfather         60'S    Breast cancer Other         40'S    Heart attack Maternal Aunt     Malrenny Hyperthermia Neg Hx        CT Abdomen Pelvis With Contrast    Result Date: 5/16/2024   1. Small bowel ileus without obstruction. The GI tract is otherwise normal including the appendix. 2. Cholecystectomy without biliary obstruction. 3. Nonobstructed kidneys. 4. Fibrosis in the lung bases.   Electronically Signed By-Dr. Bruce Keating MD On:5/16/2024 1:53 AM      XR Hand 3+ View Left    Result Date: 4/22/2024  Impression: Degenerative changes throughout the hand as above, moderate to severe at the first CMC joint. No evidence of fracture.   Electronically Signed By-Philipp Abbott MD On:4/22/2024 3:55 PM      CT Chest Hi Resolution Diagnostic    Result Date: 3/29/2024  Impression: 1.  Evidence for underlying interstitial lung disease. 2.  Noncalcified pulmonary nodule right apical area as well as pulmonary nodule right middle lobe that could reflect intrapulmonary lymph node. Follow-up CT 6 months suggested to reassess this patient. 3.  Coronary artery calcification. If the patient has not had a cardiovascular work-up this should be considered.    Electronically Signed By-Pablito Khan MD On:3/29/2024 9:22 PM      XR Chest 2 View    Result Date: 3/18/2024    1. Interstitial changes within the lungs which are nonspecific but may reflect sequela to prior inflammatory infectious process or possibly some chronic interstitial lung disease.     PABLITO KHAN MD       Electronically Signed and  "Approved By: STEVEN KHAN MD on 3/18/2024 at 15:27                  Objective     Blood pressure 121/82, pulse 97, resp. rate 14, height 172.7 cm (68\"), weight 81.5 kg (179 lb 9.6 oz), SpO2 98%.  Physical Exam  Vitals and nursing note reviewed.   Constitutional:       Appearance: Normal appearance.   HENT:      Head: Normocephalic and atraumatic.      Nose: Nose normal.      Mouth/Throat:      Mouth: Mucous membranes are moist.      Pharynx: Oropharynx is clear.   Eyes:      Extraocular Movements: Extraocular movements intact.      Conjunctiva/sclera: Conjunctivae normal.      Pupils: Pupils are equal, round, and reactive to light.   Cardiovascular:      Rate and Rhythm: Normal rate and regular rhythm.      Pulses: Normal pulses.      Heart sounds: Normal heart sounds.   Pulmonary:      Effort: Pulmonary effort is normal.      Breath sounds: Normal breath sounds.   Abdominal:      General: Abdomen is flat. Bowel sounds are normal.      Palpations: Abdomen is soft.   Musculoskeletal:         General: Normal range of motion.      Cervical back: Normal range of motion and neck supple.   Skin:     General: Skin is warm.      Capillary Refill: Capillary refill takes 2 to 3 seconds.   Neurological:      General: No focal deficit present.      Mental Status: He is alert and oriented to person, place, and time.   Psychiatric:         Mood and Affect: Mood normal.         Behavior: Behavior normal.       Immunization History   Administered Date(s) Administered    COVID-19 (PFIZER) BIVALENT 12+YRS 11/03/2022    COVID-19 (PFIZER) Purple Cap Monovalent 03/21/2021, 04/11/2021, 12/03/2021    COVID-19 F23 (PFIZER) 12YRS+ (COMIRNATY) 12/08/2023    Covid-19 (Pfizer) Gray Cap Monovalent 07/20/2022    Flu Vaccine Quad PF >36MO 10/10/2019    Fluzone (or Fluarix & Flulaval for VFC) >6mos 10/29/2021, 12/08/2023    Fluzone Quad >6mos (Multi-dose) 10/01/2018, 10/27/2020    Hepatitis A 05/16/2019, 06/12/2020    Hepatitis B Adult/Adolescent " IM 05/31/2023, 08/16/2023, 02/19/2024    Influenza Injectable Mdck Pf Quad 11/03/2022    Influenza Quad Vaccine (Inpatient) 10/01/2018    Influenza, Unspecified 10/27/2020, 11/03/2022    Pneumococcal Conjugate 13-Valent (PCV13) 02/28/2022    Pneumococcal Conjugate 20-Valent (PCV20) 02/28/2023    Shingrix 02/28/2023, 05/31/2023    Tdap 05/16/2019            Assessment & Plan     Diagnoses and all orders for this visit:    1. Mild intermittent asthma, unspecified whether complicated (Primary)    2. Idiopathic non-specific interstitial pneumonitis    3. Lung nodule seen on imaging study    Other orders  -     Fluticasone-Salmeterol (ADVAIR/WIXELA) 250-50 MCG/ACT DISKUS; Inhale 1 puff 2 (Two) Times a Day.  Dispense: 60 each; Refill: 11         Result Review :       Data reviewed : Radiologic studies CT chest      Discussion/ Recommendations:   PFTs reviewed show no abnormality  CT chest reviewed shows some subpleural reticular opacities mainly in the lower lobes patient is s/p COVID-pneumonia x 2 possibly post infectious residual changes  Patient has also couple of lung nodules  Patient is scheduled for repeat CT chest in 6 months time patient has already scheduled it from primary care  Will start him on Advair and advised discontinue Breo for better insurance coverage  Discussed vaccination and recommended                Return in about 6 months (around 11/29/2024).      Thank you for allowing me to participate in the care of Jung Burkett. Please do not hesitate to contact me with any questions.         This document has been electronically signed by Yannick Espinoza MD on May 29, 2024 13:55 EDT

## 2024-05-30 DIAGNOSIS — Z87.440 HISTORY OF UTI: Primary | ICD-10-CM

## 2024-05-30 NOTE — TELEPHONE ENCOUNTER
Order placed.       This document has been electronically signed by Prashanth Arthur MD on May 30, 2024 07:30 EDT

## 2024-05-30 NOTE — TELEPHONE ENCOUNTER
Order placed. Patient can obtain when able.       This document has been electronically signed by Prashanth Arthur MD on May 30, 2024 07:30 EDT

## 2024-05-31 ENCOUNTER — LAB (OUTPATIENT)
Dept: LAB | Facility: HOSPITAL | Age: 59
End: 2024-05-31
Payer: COMMERCIAL

## 2024-05-31 DIAGNOSIS — Z87.440 HISTORY OF UTI: ICD-10-CM

## 2024-05-31 LAB
BILIRUB UR QL STRIP: NEGATIVE
CLARITY UR: CLEAR
COLOR UR: YELLOW
GLUCOSE UR STRIP-MCNC: ABNORMAL MG/DL
HGB UR QL STRIP.AUTO: NEGATIVE
HOLD SPECIMEN: NORMAL
KETONES UR QL STRIP: NEGATIVE
LEUKOCYTE ESTERASE UR QL STRIP.AUTO: NEGATIVE
NITRITE UR QL STRIP: NEGATIVE
PH UR STRIP.AUTO: 5.5 [PH] (ref 5–8)
PROT UR QL STRIP: NEGATIVE
SP GR UR STRIP: 1.02 (ref 1–1.03)
UROBILINOGEN UR QL STRIP: ABNORMAL

## 2024-05-31 PROCEDURE — 81003 URINALYSIS AUTO W/O SCOPE: CPT

## 2024-06-03 ENCOUNTER — PATIENT ROUNDING (BHMG ONLY) (OUTPATIENT)
Dept: PULMONOLOGY | Facility: CLINIC | Age: 59
End: 2024-06-03
Payer: COMMERCIAL

## 2024-06-03 NOTE — PROGRESS NOTES
Martha 3, 2024    Hello, may I speak with Jung Burkett?    My name is Janet      [place] [facility]  [id number]    [place] [address] [phone].    Before we get started may I verify your date of birth? 1965    I am calling to officially welcome you to our practice and ask about your recent visit. Is this a good time to talk?   My chart message sent for patient rounding.

## 2024-06-03 NOTE — PROGRESS NOTES
Urinalysis no longer shows presence of leukocyte esterase. If symptoms have resolved, no further workup is indicated.     ?  This document has been electronically signed by Prashanth Arthur MD on Martha 3, 2024 06:52 EDT

## 2024-06-10 ENCOUNTER — TELEPHONE (OUTPATIENT)
Dept: FAMILY MEDICINE CLINIC | Facility: CLINIC | Age: 59
End: 2024-06-10
Payer: COMMERCIAL

## 2024-06-10 DIAGNOSIS — E11.00 TYPE 2 DIABETES MELLITUS WITH HYPEROSMOLARITY WITHOUT COMA, WITHOUT LONG-TERM CURRENT USE OF INSULIN: Primary | ICD-10-CM

## 2024-06-10 NOTE — TELEPHONE ENCOUNTER
"Caller: Jung Burkett \"Mauro\"    Relationship: Self    Best call back number: [phone]    What medication are you requesting: NEXT DOSE UP OF MOUNJARO     If a prescription is needed, what is your preferred pharmacy and phone number: [id number] - [place], [place] - [address] [facility] - [phone] [facility] [phone] FX     Additional notes:  PATIENT WAS INFORMED THAT [facility] HAS THE NEXT DOSE UP FROM WHAT HE HAS BEEN TAKING. HE WOULD LIKE TO TAKE THAT IF POSSIBLE, AND HE WOULD LIKE A CALL TO LET HIM KNOW IF THIS IS POSSIBLE OR NOT.   "

## 2024-06-10 NOTE — TELEPHONE ENCOUNTER
Thank you for letting me know. I have sent the 5 mg [facility] into [facility].  Please cancel the 6/18/2024 appointment and reschedule him for 5 weeks from now.     Thank you,    Prashanth Arthur       This document has been electronically signed by Prashanth Arthur MD on Martha 10, 2024 13:06 EDT

## 2024-06-10 NOTE — TELEPHONE ENCOUNTER
CALLED ADVISED PT IS SCHEDULED TO COME IN ON 06/18/24. PT STATES AT LOV HE WAS SUPPOSED TO TITRATE UP TO THE 5MG BUT THERE WAS A SHORTAGE ON MED PT NEEDS NEW Rx SENT IN FOR THE 5MG AS [facility] NOW PLEASE ADVISE/

## 2024-06-12 ENCOUNTER — TELEPHONE (OUTPATIENT)
Dept: FAMILY MEDICINE CLINIC | Facility: CLINIC | Age: 59
End: 2024-06-12
Payer: COMMERCIAL

## 2024-06-12 NOTE — TELEPHONE ENCOUNTER
"  Caller: Jung Burkett \"Mauro\"    Relationship: Self    Best call back number: [phone]     What is the best time to reach you: ANY     Who are you requesting to speak with (clinical staff, provider,  specific staff member): CLINICAL     What was the call regarding: CALLER STATED THAT HE SPOKE WITH A CLINICAL TEAM MEMBER RECENTLY AND THE 6/18/24 APPOINTMENT WAS TO BE CANCELED DUE TO NOT STARTING  NEW MEDICATION MOUNJARO.  HE HAS 1 MONTH OF THE MEDICATION AND HE WILL START THE MEDICATION TODAY OR TOMORROW. HE BE LIVES HE IS TO HAVE A 1 MONTH FOLLOW UP AFTER STARTING MEDICATION .      Is it okay if the provider responds through MyChart: NO        "

## 2024-06-19 ENCOUNTER — TELEPHONE (OUTPATIENT)
Dept: FAMILY MEDICINE CLINIC | Facility: CLINIC | Age: 59
End: 2024-06-19
Payer: COMMERCIAL

## 2024-06-19 NOTE — TELEPHONE ENCOUNTER
PT STATES WHEN HE WENT TO GIVE HIS SELF HIS SHOT THE PEN WAS DEFECTIVE AND THE MEDICATION WAS NOT ADMINISTERED AS IT SHOT OUT ON THE FLOOR SO HE PULLED OUT A NEW PEN AND ADMINISTERED IT. PT WILL NOW BE 1 PEN SHORT BECAUSE OF THIS I ADVISED PT THAT THIS WOULD BE A QUESTION FOR PHARMACY DUE TO A DEFECTIVE PEN AND THAT I WILL NOTIFY PCP. IF PT CAN'T GET ANOTHER PEN AS HE HAS NOT HAD 4 DOSES DOES HE NEED TO MOVE APPOINTMENT?

## 2024-06-20 NOTE — TELEPHONE ENCOUNTER
It is not his fault that the pen is defective, so I would give him two options, and let him pick which one he prefers: instead of doing four week follow up, we can do a three week follow up and increase the dose early if he is tolerating present dose and has not had abnormal weight loss, or we can send him in more of his current dose and push back his follow up for another month.       This document has been electronically signed by Prashanth Arthur MD on June 19, 2024 23:08 EDT

## 2024-06-21 ENCOUNTER — TELEPHONE (OUTPATIENT)
Dept: FAMILY MEDICINE CLINIC | Facility: CLINIC | Age: 59
End: 2024-06-21
Payer: COMMERCIAL

## 2024-06-21 NOTE — TELEPHONE ENCOUNTER
PT NOTIFIED STATES HE HAD CALLED [facility] AND  AND THEY ARE GOING TO REPLACE PT PEN SO PT WILL KEEP SCHEDULED APPOINTMENT

## 2024-07-05 ENCOUNTER — OFFICE VISIT (OUTPATIENT)
Dept: FAMILY MEDICINE CLINIC | Facility: CLINIC | Age: 59
End: 2024-07-05
Payer: COMMERCIAL

## 2024-07-05 VITALS
SYSTOLIC BLOOD PRESSURE: 113 MMHG | WEIGHT: 179 LBS | OXYGEN SATURATION: 98 % | HEART RATE: 106 BPM | HEIGHT: 68 IN | DIASTOLIC BLOOD PRESSURE: 81 MMHG | BODY MASS INDEX: 27.13 KG/M2

## 2024-07-05 DIAGNOSIS — G47.11 IDIOPATHIC HYPERSOMNIA: ICD-10-CM

## 2024-07-05 DIAGNOSIS — E11.00 TYPE 2 DIABETES MELLITUS WITH HYPEROSMOLARITY WITHOUT COMA, WITHOUT LONG-TERM CURRENT USE OF INSULIN: Primary | ICD-10-CM

## 2024-07-05 DIAGNOSIS — R19.7 DIARRHEA, UNSPECIFIED TYPE: ICD-10-CM

## 2024-07-05 PROCEDURE — 99214 OFFICE O/P EST MOD 30 MIN: CPT | Performed by: STUDENT IN AN ORGANIZED HEALTH CARE EDUCATION/TRAINING PROGRAM

## 2024-07-05 RX ORDER — MONTELUKAST SODIUM 4 MG/1
1 TABLET, CHEWABLE ORAL DAILY
Qty: 30 TABLET | Refills: 0 | Status: SHIPPED | OUTPATIENT
Start: 2024-07-05

## 2024-07-05 RX ORDER — DEXTROAMPHETAMINE SACCHARATE, AMPHETAMINE ASPARTATE, DEXTROAMPHETAMINE SULFATE AND AMPHETAMINE SULFATE 5; 5; 5; 5 MG/1; MG/1; MG/1; MG/1
20 TABLET ORAL 2 TIMES DAILY
Qty: 60 TABLET | Refills: 0 | Status: SHIPPED | OUTPATIENT
Start: 2024-07-05

## 2024-07-05 NOTE — PROGRESS NOTES
Subjective:       Jung Burkett is a 59 y.o. male with a concurrent medical history of essential hypertension, hyperlipidemia, type 2 diabetes mellitus, gastroesophageal reflux disease, anxiety and depression, mild intermittent asthma and possible interstitial lung disease and benign prostate hyperplasia who presents for titration of Mounjaro.    Mauro has a history of uncontrolled diabetes mellitus.  Last hemoglobin A1c was 7.9.  We had him on Mounjaro at the lowest dose of 2.5 mg and now at 5 mg which he is tolerating without side effect.  Weight is stable at 179 and was 179 on 5/29/2024 as well.  Will increase dose to 7.5 mg today.  He still has a few doses left of the 5 mg and so we will see him back in approximately 7 weeks and repeat hemoglobin A1c at that time.      Mauro has history of chronic nonproductive cough.  This persisted even after he was started on inhalers.  Pulmonary function testing was obtained that showed normal spirometry with some response to bronchodilators.  CT was obtained that showed presence of interstitial lung disease.  Based on this, I did refer him to pulmonology.  He saw them on 5/29/2024.  Adjustments and inhalers were made.  His symptoms of still not improved.  He has follow-up with pulmonology and I did ask if he would again discuss potential for interstitial lung disease with them.        He does report chronic diarrhea after cholecystectomy.  This could be due to excess bile acid.  Would trial colestipol today and reassess at next visit.      The following portions of the patient's history were reviewed and updated as appropriate: allergies, current medications, past family history, past medical history, past social history, past surgical history, and problem list.    Past Medical Hx:  Past Medical History:   Diagnosis Date    Acid reflux     ADHD (attention deficit hyperactivity disorder) 1970    Anxiety     Anxiety disorder 05/17/2019    Arthritis     generalized     Benign prostatic hyperplasia 1989    Blood disease     none    Cervicalgia     Chronic allergic rhinitis     Colon polyp 2005    Depression     Diabetes mellitus, type 2 05/17/2019    DOES NOT CHECK BS    Diabetic eye exam 01/2019 20/20 PER PT     Encounter for diabetic foot exam     WITHIN FEW MO  PER PT     Essential hypertension 05/17/2019    GERD (gastroesophageal reflux disease)     High blood pressure     High cholesterol     Hyperlipemia     Hyperlipidemia     Hypertension     Low back pain 1987    Major depressive disorder 05/17/2019    Nicotine dependence 05/17/2019    Prostate disorder     BPH    Rotator cuff tear, left     Seasonal allergies     Urinary tract infection 1993    Vitamin D deficiency 05/17/2019    no current issues       Past Surgical Hx:  Past Surgical History:   Procedure Laterality Date    CHOLECYSTECTOMY  1997    COLONOSCOPY      ELISA- REPEAT IN 5 YEARS     GALLBLADDER SURGERY      KNEE ARTHROSCOPY W/ MENISCAL REPAIR Right     OTHER SURGICAL HISTORY      SURGICAL CLIPS/gallbladder removed    OTHER SURGICAL HISTORY      right knee meniscus tear repair    SHOULDER ARTHROSCOPY W/ ROTATOR CUFF REPAIR Left 07/21/2021    Procedure: SHOULDER ARTHROSCOPY,SUBACROMINAL DECOMPRESSION, DISTAL CLAVICLE RESECTION, MINI OPEN  ROTATOR CUFF REPAIR;  Surgeon: Ulisses Kenney MD;  Location: [facility] OR [facility];  Service: Orthopedics;  Laterality: Left;    TONSILLECTOMY      VASECTOMY  2004       Current Meds:    Current Outpatient Medications:     albuterol (PROVENTIL) (2.5 MG/3ML) 0.083% nebulizer solution, Take 2.5 mg by nebulization Every 4 (Four) Hours As Needed for Wheezing., Disp: 3 mL, Rfl: 12    amphetamine-dextroamphetamine (ADDERALL) 20 MG tablet, Take 1 tablet by mouth 2 (Two) Times a Day. Dose adjustment, Disp: 60 tablet, Rfl: 0    atorvastatin (LIPITOR) 20 MG tablet, Take 1 tablet by mouth every night at bedtime., Disp: 90 tablet, Rfl: 1    Breo Ellipta 100-25 MCG/ACT aerosol powder  , Inhale 1 puff Daily., Disp: , Rfl:     busPIRone (BUSPAR) 15 MG tablet, Take 1 tablet by mouth 2 (Two) Times a Day As Needed (anxiety)., Disp: 180 tablet, Rfl: 1    CBD (cannabidiol) oral oil, Take  by mouth., Disp: , Rfl:     colestipol (COLESTID) 1 g tablet, Take 1 tablet by mouth Daily., Disp: 30 tablet, Rfl: 0    finasteride (Proscar) 5 MG tablet, Take 1 tablet by mouth Daily., Disp: 90 tablet, Rfl: 1    fluticasone-salmeterol (Advair HFA) 115-21 MCG/ACT inhaler, Inhale 2 puffs 2 (Two) Times a Day. (Patient not taking: Reported on 5/29/2024), Disp: 12 g, Rfl: 11    Fluticasone-Salmeterol (ADVAIR/WIXELA) 250-50 MCG/ACT DISKUS, Inhale 1 puff 2 (Two) Times a Day., Disp: 60 each, Rfl: 11    lansoprazole (Prevacid) 15 MG capsule, Take 1 capsule by mouth Daily., Disp: 90 capsule, Rfl: 0    metoprolol succinate XL (TOPROL-XL) 50 MG 24 hr tablet, Take 1 tablet by mouth Daily., Disp: 90 tablet, Rfl: 1    tamsulosin (FLOMAX) 0.4 MG capsule 24 hr capsule, Take 1 capsule by mouth Daily., Disp: 90 capsule, Rfl: 1    Tirzepatide (MOUNJARO) 7.5 MG/0.5ML solution pen-injector pen, Inject 0.5 mL under the skin into the appropriate area as directed 1 (One) Time Per Week., Disp: 0.5 mL, Rfl: 3    traZODone (DESYREL) 100 MG tablet, Take 1 tablet by mouth every night at bedtime., Disp: 90 tablet, Rfl: 1    vitamin D (ERGOCALCIFEROL) 1.25 MG (83558 UT) capsule capsule, Take 1 capsule by mouth Every 7 (Seven) Days. (Patient not taking: Reported on 5/29/2024), Disp: , Rfl:     Allergies:  Allergies   Allergen Reactions    Hydrocodone-Acetaminophen Itching    Nsaids Arrhythmia    Oxycodone-Acetaminophen Shortness Of Breath    Sulfa Antibiotics Hives    Voltaren [Diclofenac] Palpitations       Family Hx:  Family History   Problem Relation Age of Onset    Cancer Mother     Diabetes Mother     Rheum arthritis Mother         40'S    Heart attack Mother     Breast cancer Mother         40'S    Arthritis Mother     Stroke Father     Heart  "disease Father     Heart attack Maternal Grandmother     Breast cancer Maternal Grandmother         50'S    Prostate cancer Maternal Grandfather     Nephrolithiasis Maternal Grandfather     Colon cancer Paternal Grandmother     Colon cancer Paternal Grandfather         60'S    Breast cancer Other         40'S    Heart attack Maternal Aunt     Malig Hyperthermia Neg Hx         Social History:  Social History     Socioeconomic History    Marital status:    Tobacco Use    Smoking status: Some Days     Types: Cigars    Smokeless tobacco: Never    Tobacco comments:     1 cigar daily   Vaping Use    Vaping status: Never Used   Substance and Sexual Activity    Alcohol use: Yes     Alcohol/week: 6.0 standard drinks of alcohol     Types: 6 Drinks containing 0.5 oz of alcohol per week     Comment: Drinks daily; beer. Occasionally drinks 2 drinks per day, has been drinking for 6-10 years.     Drug use: Never    Sexual activity: Defer       Review of Systems  Review of Systems   Constitutional:  Negative for unexpected weight change.   Respiratory:  Positive for cough.    Gastrointestinal:  Positive for diarrhea. Negative for nausea and vomiting.       Objective:     /81   Pulse 106   Ht 172.7 cm (68\")   Wt 81.2 kg (179 lb)   SpO2 98%   BMI 27.22 kg/m²   Physical Exam  Constitutional:       General: He is not in acute distress.     Appearance: Normal appearance. He is not ill-appearing, toxic-appearing or diaphoretic.   Pulmonary:      Effort: Pulmonary effort is normal. No respiratory distress.      Comments: Intermittent cough throughout exam  Neurological:      Mental Status: He is alert.   Psychiatric:         Mood and Affect: Mood normal.         Behavior: Behavior normal.          Assessment/Plan:     Diagnoses and all orders for this visit:    1. Type 2 diabetes mellitus with hyperosmolarity without coma, without long-term current use of insulin (Primary)    Mauro has a history of uncontrolled diabetes " mellitus.  Last hemoglobin A1c was 7.9.  We had him on Mounjaro at the lowest dose of 2.5 mg and now at 5 mg which he is tolerating without side effect.  Weight is stable at 179 and was 179 on 5/29/2024 as well.  Will increase dose to 7.5 mg today.  He still has a few doses left of the 5 mg and so we will see him back in approximately 7 weeks and repeat hemoglobin A1c at that time.        -     Tirzepatide (MOUNJARO) 7.5 MG/0.5ML solution pen-injector pen; Inject 0.5 mL under the skin into the appropriate area as directed 1 (One) Time Per Week.  Dispense: 0.5 mL; Refill: 3    2. Diarrhea, unspecified type      He does report chronic diarrhea after cholecystectomy.  This could be due to excess bile acid.  Would trial colestipol today and reassess at next visit.      -     colestipol (COLESTID) 1 g tablet; Take 1 tablet by mouth Daily.  Dispense: 30 tablet; Refill: 0          Rx changes: Colestipol for diarrhea postcholecystectomy, increasing dose of Mounjaro to 7.5 mg      Follow-up:     Return in about 1 month (around 8/5/2024) for Titrate Mounjaro.    Preventative:  Health Maintenance   Topic Date Due    DIABETIC EYE EXAM  09/01/2023    ANNUAL PHYSICAL  11/29/2023    DIABETIC FOOT EXAM  11/29/2023    LIPID PANEL  05/05/2024    BMI FOLLOWUP  05/31/2024    RSV Vaccine - Adults (1 - 1-dose 60+ series) 07/06/2024 (Originally 2/25/2024)    INFLUENZA VACCINE  08/01/2024    HEMOGLOBIN A1C  10/22/2024    URINE MICROALBUMIN  05/10/2025    COLORECTAL CANCER SCREENING  05/27/2026    TDAP/TD VACCINES (2 - Td or Tdap) 05/16/2029    HEPATITIS C SCREENING  Completed    COVID-19 Vaccine  Completed    Pneumococcal Vaccine 0-64  Completed    ZOSTER VACCINE  Completed         This document has been electronically signed by Prashanth Arthur MD on July 5, 2024 13:59 EDT       Parts of this note are electronic transcriptions/translations of spoken language to printed text using the Dragon Dictation system.

## 2024-07-31 DIAGNOSIS — R19.7 DIARRHEA, UNSPECIFIED TYPE: ICD-10-CM

## 2024-08-01 RX ORDER — MONTELUKAST SODIUM 4 MG/1
1 TABLET, CHEWABLE ORAL DAILY
Qty: 30 TABLET | Refills: 0 | Status: SHIPPED | OUTPATIENT
Start: 2024-08-01

## 2024-08-05 ENCOUNTER — TELEPHONE (OUTPATIENT)
Dept: SLEEP MEDICINE | Facility: HOSPITAL | Age: 59
End: 2024-08-05
Payer: COMMERCIAL

## 2024-08-05 ENCOUNTER — OFFICE VISIT (OUTPATIENT)
Dept: FAMILY MEDICINE CLINIC | Facility: CLINIC | Age: 59
End: 2024-08-05
Payer: COMMERCIAL

## 2024-08-05 VITALS
HEART RATE: 75 BPM | WEIGHT: 178 LBS | BODY MASS INDEX: 26.98 KG/M2 | DIASTOLIC BLOOD PRESSURE: 82 MMHG | HEIGHT: 68 IN | SYSTOLIC BLOOD PRESSURE: 132 MMHG | OXYGEN SATURATION: 100 %

## 2024-08-05 DIAGNOSIS — G47.11 IDIOPATHIC HYPERSOMNIA: ICD-10-CM

## 2024-08-05 DIAGNOSIS — E11.00 TYPE 2 DIABETES MELLITUS WITH HYPEROSMOLARITY WITHOUT COMA, WITHOUT LONG-TERM CURRENT USE OF INSULIN: ICD-10-CM

## 2024-08-05 DIAGNOSIS — M79.673 PAIN OF FOOT, UNSPECIFIED LATERALITY: ICD-10-CM

## 2024-08-05 DIAGNOSIS — E11.00 TYPE 2 DIABETES MELLITUS WITH HYPEROSMOLARITY WITHOUT COMA, UNSPECIFIED WHETHER LONG TERM INSULIN USE: Primary | ICD-10-CM

## 2024-08-05 LAB
EXPIRATION DATE: ABNORMAL
HBA1C MFR BLD: 6.2 % (ref 4.5–5.7)
Lab: ABNORMAL

## 2024-08-05 PROCEDURE — 99213 OFFICE O/P EST LOW 20 MIN: CPT | Performed by: STUDENT IN AN ORGANIZED HEALTH CARE EDUCATION/TRAINING PROGRAM

## 2024-08-05 PROCEDURE — 83036 HEMOGLOBIN GLYCOSYLATED A1C: CPT | Performed by: STUDENT IN AN ORGANIZED HEALTH CARE EDUCATION/TRAINING PROGRAM

## 2024-08-05 RX ORDER — DEXTROAMPHETAMINE SACCHARATE, AMPHETAMINE ASPARTATE, DEXTROAMPHETAMINE SULFATE AND AMPHETAMINE SULFATE 5; 5; 5; 5 MG/1; MG/1; MG/1; MG/1
20 TABLET ORAL 2 TIMES DAILY
Qty: 60 TABLET | Refills: 0 | Status: SHIPPED | OUTPATIENT
Start: 2024-08-05

## 2024-08-05 NOTE — PROGRESS NOTES
Subjective:       Jung Burkett is a 59 y.o. male with a concurrent medical history of essential hypertension, hyperlipidemia, type 2 diabetes mellitus, gastroesophageal reflux disease, anxiety and depression, mild intermittent asthma and potentially interstitial lung disease, and BPH who presents for follow-up for diabetes mellitus.      Mauro has a history of type 2 diabetes mellitus.  His last hemoglobin A1c was uncontrolled at 7.9 approximately 3 months ago.    Current medication includes Mounjaro 7.5 mg.  He denies side effects of nausea, vomiting, abdominal pain or unexpected weight loss on this dose.  Weight is stable at 178 pounds today and was 179 pounds on 5/29/2024.        Because hemoglobin A1c is well-controlled, I would normally see him for diabetic follow-up every 6 months.  However, because he is on Mounjaro I do think at least for the time being we did see him for a 3-month follow-up first to ensure that he does not begin to have any significant weight loss.  If his weight stays stable on this dose, we would then consider whether or not to push back to 6-month follow-ups at least in terms of his diabetes.  At his next visit we can also reassess diabetic foot exam, diabetic eye exam and other diabetes related care gaps.    Mauro also recently started check on colestipol for diarrhea status postcholecystectomy.  He reports today diarrhea has improved.        Mauro also reports that last night he slipped coming down the stairs and developed posterior heel pain.  He is icing it but has not used any medication for it.  No significant abnormalities notated on physical exam.  Heel pain is worse with plantarflexion but he is able to ambulate at the time of the injury and today in the office.  I would recommend RICE therapy with Tylenol since he cannot use any nonsteroidals and if he fails to improve by the end of the week consider imaging with x-ray.      The following portions of the patient's  history were reviewed and updated as appropriate: allergies, current medications, past family history, past medical history, past social history, past surgical history, and problem list.    Past Medical Hx:  Past Medical History:   Diagnosis Date   • Acid reflux    • ADHD (attention deficit hyperactivity disorder) 1970   • Anxiety    • Anxiety disorder 05/17/2019   • Arthritis     generalized   • Benign prostatic hyperplasia 1989   • Blood disease     none   • Cervicalgia    • Chronic allergic rhinitis    • Colon polyp 2005   • Depression    • Diabetes mellitus, type 2 05/17/2019    DOES NOT CHECK BS   • Diabetic eye exam 01/2019 20/20 PER PT    • Encounter for diabetic foot exam     WITHIN FEW MO  PER PT    • Essential hypertension 05/17/2019   • GERD (gastroesophageal reflux disease)    • High blood pressure    • High cholesterol    • Hyperlipemia    • Hyperlipidemia    • Hypertension    • Low back pain 1987   • Major depressive disorder 05/17/2019   • Nicotine dependence 05/17/2019   • Prostate disorder     BPH   • Rotator cuff tear, left    • Seasonal allergies    • Urinary tract infection 1993   • Vitamin D deficiency 05/17/2019    no current issues       Past Surgical Hx:  Past Surgical History:   Procedure Laterality Date   • CHOLECYSTECTOMY  1997   • COLONOSCOPY      ELISA- REPEAT IN 5 YEARS    • GALLBLADDER SURGERY     • KNEE ARTHROSCOPY W/ MENISCAL REPAIR Right    • OTHER SURGICAL HISTORY      SURGICAL CLIPS/gallbladder removed   • OTHER SURGICAL HISTORY      right knee meniscus tear repair   • SHOULDER ARTHROSCOPY W/ ROTATOR CUFF REPAIR Left 07/21/2021    Procedure: SHOULDER ARTHROSCOPY,SUBACROMINAL DECOMPRESSION, DISTAL CLAVICLE RESECTION, MINI OPEN  ROTATOR CUFF REPAIR;  Surgeon: Ulisses Kenney MD;  Location: [facility] OR [facility] [place];  Service: Orthopedics;  Laterality: Left;   • TONSILLECTOMY     • VASECTOMY  2004       Current Meds:    Current Outpatient Medications:   •  Tirzepatide (MOUNJARO)  7.5 MG/0.5ML solution pen-injector pen, Inject 0.5 mL under the skin into the appropriate area as directed 1 (One) Time Per Week., Disp: 0.5 mL, Rfl: 9  •  albuterol (PROVENTIL) (2.5 MG/3ML) 0.083% nebulizer solution, Take 2.5 mg by nebulization Every 4 (Four) Hours As Needed for Wheezing., Disp: 3 mL, Rfl: 12  •  amphetamine-dextroamphetamine (ADDERALL) 20 MG tablet, Take 1 tablet by mouth 2 (Two) Times a Day. Dose adjustment, Disp: 60 tablet, Rfl: 0  •  atorvastatin (LIPITOR) 20 MG tablet, Take 1 tablet by mouth every night at bedtime., Disp: 90 tablet, Rfl: 1  •  Breo Ellipta 100-25 MCG/ACT aerosol powder , Inhale 1 puff Daily., Disp: , Rfl:   •  busPIRone (BUSPAR) 15 MG tablet, Take 1 tablet by mouth 2 (Two) Times a Day As Needed (anxiety)., Disp: 180 tablet, Rfl: 1  •  CBD (cannabidiol) oral oil, Take  by mouth., Disp: , Rfl:   •  colestipol (COLESTID) 1 g tablet, TAKE 1 TABLET BY MOUTH EVERY DAY, Disp: 30 tablet, Rfl: 0  •  finasteride (Proscar) 5 MG tablet, Take 1 tablet by mouth Daily., Disp: 90 tablet, Rfl: 1  •  fluticasone-salmeterol (Advair HFA) 115-21 MCG/ACT inhaler, Inhale 2 puffs 2 (Two) Times a Day. (Patient not taking: Reported on 5/29/2024), Disp: 12 g, Rfl: 11  •  Fluticasone-Salmeterol (ADVAIR/WIXELA) 250-50 MCG/ACT DISKUS, Inhale 1 puff 2 (Two) Times a Day., Disp: 60 each, Rfl: 11  •  lansoprazole (Prevacid) 15 MG capsule, Take 1 capsule by mouth Daily., Disp: 90 capsule, Rfl: 0  •  metoprolol succinate XL (TOPROL-XL) 50 MG 24 hr tablet, Take 1 tablet by mouth Daily., Disp: 90 tablet, Rfl: 1  •  tamsulosin (FLOMAX) 0.4 MG capsule 24 hr capsule, Take 1 capsule by mouth Daily., Disp: 90 capsule, Rfl: 1  •  traZODone (DESYREL) 100 MG tablet, Take 1 tablet by mouth every night at bedtime., Disp: 90 tablet, Rfl: 1  •  vitamin D (ERGOCALCIFEROL) 1.25 MG (00468 UT) capsule capsule, Take 1 capsule by mouth Every 7 (Seven) Days. (Patient not taking: Reported on 5/29/2024), Disp: , Rfl:  "    Allergies:  Allergies   Allergen Reactions   • Hydrocodone-Acetaminophen Itching   • Nsaids Arrhythmia   • Oxycodone-Acetaminophen Shortness Of Breath   • Sulfa Antibiotics Hives   • Voltaren [Diclofenac] Palpitations       Family Hx:  Family History   Problem Relation Age of Onset   • Cancer Mother    • Diabetes Mother    • Rheum arthritis Mother         40'S   • Heart attack Mother    • Breast cancer Mother         40'S   • Arthritis Mother    • Stroke Father    • Heart disease Father    • Heart attack Maternal Grandmother    • Breast cancer Maternal Grandmother         50'S   • Prostate cancer Maternal Grandfather    • Nephrolithiasis Maternal Grandfather    • Colon cancer Paternal Grandmother    • Colon cancer Paternal Grandfather         60'S   • Breast cancer Other         40'S   • Heart attack Maternal Aunt    • Malig Hyperthermia Neg Hx         Social History:  Social History     Socioeconomic History   • Marital status:    Tobacco Use   • Smoking status: Some Days     Types: Cigars   • Smokeless tobacco: Never   • Tobacco comments:     1 cigar daily   Vaping Use   • Vaping status: Never Used   Substance and Sexual Activity   • Alcohol use: Yes     Alcohol/week: 6.0 standard drinks of alcohol     Types: 6 Drinks containing 0.5 oz of alcohol per week     Comment: Drinks daily; beer. Occasionally drinks 2 drinks per day, has been drinking for 6-10 years.    • Drug use: Never   • Sexual activity: Defer       Review of Systems  Review of Systems   Gastrointestinal:  Negative for abdominal pain, diarrhea, nausea and vomiting.       Objective:     /82 (BP Location: Left arm, Patient Position: Sitting, Cuff Size: Adult)   Pulse 75   Ht 172.7 cm (68\")   Wt 80.7 kg (178 lb)   SpO2 100%   BMI 27.06 kg/m²   Physical Exam  Constitutional:       General: He is not in acute distress.     Appearance: Normal appearance. He is not ill-appearing, toxic-appearing or diaphoretic.   Pulmonary:      Effort: " Pulmonary effort is normal. No respiratory distress.   Neurological:      Mental Status: He is alert.   Psychiatric:         Mood and Affect: Mood normal.         Behavior: Behavior normal.      Assessment/Plan:     Diagnoses and all orders for this visit:    1. Type 2 diabetes mellitus with hyperosmolarity without coma, unspecified whether long term insulin use (Primary)    Mauro has a history of type 2 diabetes mellitus.  His last hemoglobin A1c was uncontrolled at 7.9 approximately 3 months ago.    Current medication includes Mounjaro 7.5 mg.  He denies side effects of nausea, vomiting, abdominal pain or unexpected weight loss on this dose.  Weight is stable at 178 pounds today and was 179 pounds on 5/29/2024.        Because hemoglobin A1c is well-controlled, I would normally see him for diabetic follow-up every 6 months.  However, because he is on Mounjaro I do think at least for the time being we did see him for a 3-month follow-up first to ensure that he does not begin to have any significant weight loss.  If his weight stays stable on this dose, we would then consider whether or not to push back to 6-month follow-ups at least in terms of his diabetes.  At his next visit we can also reassess diabetic foot exam, diabetic eye exam and other diabetes related care gaps.        -     POC Glycosylated Hemoglobin (Hb A1C)  -     Tirzepatide (MOUNJARO) 7.5 MG/0.5ML solution pen-injector pen; Inject 0.5 mL under the skin into the appropriate area as directed 1 (One) Time Per Week.  Dispense: 0.5 mL; Refill: 9    3. Pain of foot, unspecified laterality      Mauro also reports that last night he slipped coming down the stairs and developed posterior heel pain.  He is icing it but has not used any medication for it.  No significant abnormalities notated on physical exam.  Heel pain is worse with plantarflexion but he is able to ambulate at the time of the injury and today in the office.  I would recommend RICE therapy  with Tylenol since he cannot use any nonsteroidals and if he fails to improve by the end of the week consider imaging with x-ray.        Rx changes: Will leave Mounjaro in place at current dose    Follow-up:     Return in about 3 months (around 11/5/2024) for Diabetes .    Preventative:  Health Maintenance   Topic Date Due   • DIABETIC EYE EXAM  09/01/2023   • ANNUAL PHYSICAL  11/29/2023   • DIABETIC FOOT EXAM  11/29/2023   • LIPID PANEL  05/05/2024   • BMI FOLLOWUP  05/31/2024   • INFLUENZA VACCINE  08/01/2024   • HEMOGLOBIN A1C  02/05/2025   • URINE MICROALBUMIN  05/10/2025   • COLORECTAL CANCER SCREENING  05/27/2026   • TDAP/TD VACCINES (2 - Td or Tdap) 05/16/2029   • HEPATITIS C SCREENING  Completed   • COVID-19 Vaccine  Completed   • Pneumococcal Vaccine 0-64  Completed   • ZOSTER VACCINE  Completed         This document has been electronically signed by Prashanth Arthur MD on August 5, 2024 19:16 EDT       Parts of this note are electronic transcriptions/translations of spoken language to printed text using the Dragon Dictation system.

## 2024-08-14 DIAGNOSIS — R12 HEARTBURN: ICD-10-CM

## 2024-08-15 RX ORDER — MECOBALAMIN 5000 MCG
15 TABLET,DISINTEGRATING ORAL DAILY
Qty: 90 CAPSULE | Refills: 1 | Status: SHIPPED | OUTPATIENT
Start: 2024-08-15

## 2024-08-24 DIAGNOSIS — R19.7 DIARRHEA, UNSPECIFIED TYPE: ICD-10-CM

## 2024-08-26 RX ORDER — MONTELUKAST SODIUM 4 MG/1
1 TABLET, CHEWABLE ORAL DAILY
Qty: 90 TABLET | Refills: 1 | Status: SHIPPED | OUTPATIENT
Start: 2024-08-26

## 2024-08-28 ENCOUNTER — OFFICE VISIT (OUTPATIENT)
Dept: SLEEP MEDICINE | Facility: HOSPITAL | Age: 59
End: 2024-08-28
Payer: COMMERCIAL

## 2024-08-28 VITALS
HEIGHT: 68 IN | DIASTOLIC BLOOD PRESSURE: 80 MMHG | HEART RATE: 107 BPM | SYSTOLIC BLOOD PRESSURE: 127 MMHG | BODY MASS INDEX: 26.37 KG/M2 | WEIGHT: 174 LBS | OXYGEN SATURATION: 97 %

## 2024-08-28 DIAGNOSIS — G47.11 IDIOPATHIC HYPERSOMNIA: Primary | ICD-10-CM

## 2024-08-28 PROCEDURE — 99214 OFFICE O/P EST MOD 30 MIN: CPT | Performed by: INTERNAL MEDICINE

## 2024-08-28 PROCEDURE — G0463 HOSPITAL OUTPT CLINIC VISIT: HCPCS

## 2024-08-28 RX ORDER — DEXTROAMPHETAMINE SACCHARATE, AMPHETAMINE ASPARTATE, DEXTROAMPHETAMINE SULFATE AND AMPHETAMINE SULFATE 5; 5; 5; 5 MG/1; MG/1; MG/1; MG/1
20 TABLET ORAL 2 TIMES DAILY
Qty: 60 TABLET | Refills: 0 | Status: SHIPPED | OUTPATIENT
Start: 2024-09-04

## 2024-08-28 NOTE — PROGRESS NOTES
"  [facility]  Sleep medicine  [address] [place] [address]  [place]   [place] [address]  Phone: [phone]  Fax: [phone]      SLEEP CLINIC FOLLOW UP PROGRESS NOTE.    Jung YIP Madelaine  1965  59 y.o.  male      PCP: Prashanth Arthur MD      Date of visit: 8/28/2024    Chief Complaint   Patient presents with    Daytime Sleepiness       HPI:  This is a 59 y.o. years old patient who has a history of idiopathic hypersomnia and ADHD who is on Adderall. Patient was given Adderall 20 mg twice a day with a total dose of 40 mg/day.  He is stable on the current medication    Medications and allergies are reviewed by me and documented in the encounter.     SOCIAL ( habits pertaining to sleep medicine)  History of tobacco use:Yes smoke cigars  History of alcohol use: 6 per week  Caffeine use: 1    REVIEW OF SYSTEMS:   [place] Sleepiness Scale :Total score: 12   Nasal congestion:No   Dry mouth/nose:No   Post nasal drip; No   Acid reflux/Heartburn:Yes   Abd bloating:No   Morning headache:No   Anxiety:No   Depression:No     PHYSICAL EXAMINATION:  CONSTITUTIONAL:  Vitals:    08/28/24 1300   BP: 127/80   Pulse: 107   SpO2: 97%   Weight: 78.9 kg (174 lb)   Height: 172.7 cm (67.99\")    Body mass index is 26.46 kg/m².   NOSE: nasal passages are clear, no nasal polyps, septum in the midline.  THROAT: throat is clear, oral airway Mallampati class 2  RESP SYSTEM: Breath sounds are normal, no wheezes or crackles  CARDIOVASULAR: Heart rate is regular without murmur. No edema      Celestine checked no problem, 8/28/2024      ASSESSMENT AND PLAN:  Idiopathic hypersomnia.  Adderall 20 mg twice a day.   I have sent a prescription to his pharmacy and we will see him in 6 months for follow-up.  Celestine checked  ADHD,    Return in about 6 months (around 2/28/2025) for Next scheduled follow-up. . Patient's questions were answered.        Jaimie Kamara MD  Sleep Medicine  Medical Director, [facility] " sleep centers  8/28/2024

## 2024-09-06 DIAGNOSIS — R35.0 BENIGN PROSTATIC HYPERPLASIA WITH URINARY FREQUENCY: ICD-10-CM

## 2024-09-06 DIAGNOSIS — N40.1 BENIGN PROSTATIC HYPERPLASIA WITH URINARY FREQUENCY: ICD-10-CM

## 2024-09-06 RX ORDER — FINASTERIDE 5 MG/1
5 TABLET, FILM COATED ORAL DAILY
Qty: 90 TABLET | Refills: 1 | Status: SHIPPED | OUTPATIENT
Start: 2024-09-06

## 2024-10-08 ENCOUNTER — TELEPHONE (OUTPATIENT)
Dept: SLEEP MEDICINE | Facility: HOSPITAL | Age: 59
End: 2024-10-08
Payer: COMMERCIAL

## 2024-10-09 DIAGNOSIS — G47.11 IDIOPATHIC HYPERSOMNIA: ICD-10-CM

## 2024-10-09 RX ORDER — DEXTROAMPHETAMINE SACCHARATE, AMPHETAMINE ASPARTATE, DEXTROAMPHETAMINE SULFATE AND AMPHETAMINE SULFATE 5; 5; 5; 5 MG/1; MG/1; MG/1; MG/1
20 TABLET ORAL 2 TIMES DAILY
Qty: 60 TABLET | Refills: 0 | Status: SHIPPED | OUTPATIENT
Start: 2024-10-09

## 2024-10-14 ENCOUNTER — HOSPITAL ENCOUNTER (OUTPATIENT)
Dept: CT IMAGING | Facility: HOSPITAL | Age: 59
Discharge: HOME | End: 2024-10-14
Admitting: STUDENT IN AN ORGANIZED HEALTH CARE EDUCATION/TRAINING PROGRAM
Payer: COMMERCIAL

## 2024-10-14 DIAGNOSIS — R91.8 ABNORMAL CT SCAN OF LUNG: ICD-10-CM

## 2024-10-14 LAB
CREAT BLDA-MCNC: 1 MG/DL (ref 0.6–1.3)
EGFRCR SERPLBLD CKD-EPI 2021: 86.2 ML/MIN/1.73

## 2024-10-14 PROCEDURE — 82565 ASSAY OF CREATININE: CPT

## 2024-10-14 PROCEDURE — 71260 CT THORAX DX C+: CPT

## 2024-10-14 PROCEDURE — 25510000001 IOPAMIDOL PER 1 ML: Performed by: STUDENT IN AN ORGANIZED HEALTH CARE EDUCATION/TRAINING PROGRAM

## 2024-10-14 RX ORDER — IOPAMIDOL 755 MG/ML
100 INJECTION, SOLUTION INTRAVASCULAR
Status: COMPLETED | OUTPATIENT
Start: 2024-10-14 | End: 2024-10-14

## 2024-10-14 RX ADMIN — IOPAMIDOL 100 ML: 755 INJECTION, SOLUTION INTRAVENOUS at 14:29

## 2024-10-17 NOTE — PROGRESS NOTES
I have reviewed CMP results.  Nodular density in the right upper lobe and the 0.4 cm nodule in the right minor fissure are noted to be stable.  Radiologist mentions that a follow-up chest CT could be performed.  CT continues to show chronic interstitial lung disease and pulmonary fibrosis.    Thank you,    Prashanth Arthur    ?  This document has been electronically signed by Prashanth Arthur MD on October 17, 2024 07:49 EDT

## 2024-10-22 ENCOUNTER — OFFICE VISIT (OUTPATIENT)
Dept: PULMONOLOGY | Facility: CLINIC | Age: 59
End: 2024-10-22
Payer: COMMERCIAL

## 2024-10-22 VITALS
HEIGHT: 68 IN | HEART RATE: 87 BPM | DIASTOLIC BLOOD PRESSURE: 86 MMHG | WEIGHT: 178.6 LBS | OXYGEN SATURATION: 97 % | BODY MASS INDEX: 27.07 KG/M2 | SYSTOLIC BLOOD PRESSURE: 131 MMHG | RESPIRATION RATE: 16 BRPM | TEMPERATURE: 98.7 F

## 2024-10-22 DIAGNOSIS — Z13.220 SCREENING CHOLESTEROL LEVEL: Primary | ICD-10-CM

## 2024-10-22 DIAGNOSIS — E67.8 OTHER SPECIFIED HYPERALIMENTATION: ICD-10-CM

## 2024-10-22 DIAGNOSIS — J84.113 IDIOPATHIC NON-SPECIFIC INTERSTITIAL PNEUMONITIS: ICD-10-CM

## 2024-10-22 DIAGNOSIS — R91.1 LUNG NODULE SEEN ON IMAGING STUDY: ICD-10-CM

## 2024-10-22 DIAGNOSIS — Z01.89 PATIENT REQUESTED DIAGNOSTIC TESTING: ICD-10-CM

## 2024-10-22 DIAGNOSIS — Z23 ENCOUNTER FOR IMMUNIZATION: Primary | ICD-10-CM

## 2024-10-22 DIAGNOSIS — E11.43 TYPE 2 DIABETES MELLITUS WITH AUTONOMIC NEUROPATHY, UNSPECIFIED WHETHER LONG TERM INSULIN USE: ICD-10-CM

## 2024-10-22 PROCEDURE — 90656 IIV3 VACC NO PRSV 0.5 ML IM: CPT | Performed by: INTERNAL MEDICINE

## 2024-10-22 PROCEDURE — 99214 OFFICE O/P EST MOD 30 MIN: CPT | Performed by: INTERNAL MEDICINE

## 2024-10-22 PROCEDURE — 90471 IMMUNIZATION ADMIN: CPT | Performed by: INTERNAL MEDICINE

## 2024-10-22 NOTE — PROGRESS NOTES
Pulmonary Office Follow-up    Subjective     Jung Burkett is seen today at the office for   Chief Complaint   Patient presents with    Results    Asthma     Lung nodules    Shortness of Breath    Cough         HPI  Jung Burkett is a 59 y.o. male with a PMH significant for COVID infection x 2 and severe arthritis with hypersomnia on Adderall presents for follow-up patient complains of shortness of breath on exertion and activity along with dry cough off-and-on he does not feel as if the bronchodilator inhalers help him any patient denies any chest pain fever or hemoptysis there is no history of significant expectoration patient also has a history of tick bite and [place] spotted fever      Tobacco use history:        Patient Active Problem List   Diagnosis    Tear of left rotator cuff    Traumatic complete tear of left rotator cuff    Cervicalgia    Anxiety disorder    Diabetes mellitus, type 2    Esophageal reflux    Essential hypertension    Hyperlipidemia    Major depressive disorder    Nicotine dependence    Seasonal allergic rhinitis    Vitamin D deficiency    Abnormal blood level of iron    Aftercare following left shoulder arthroscopy    Idiopathic hypersomnia    Mild intermittent asthma    Pure hypercholesterolemia       Review of Systems  Review of Systems   Respiratory:  Positive for cough and shortness of breath.    All other systems reviewed and are negative.    As described in the HPI. Otherwise, remainder of ROS (14 systems) were negative.    Medications, Allergies, Social, and Family Histories reviewed as per EMR.    Result Review :       Data reviewed : Radiologic studies        Objective     Vitals:    10/22/24 1516   BP: 131/86   Pulse: 87   Resp: 16   Temp: 98.7 °F (37.1 °C)   SpO2: 97%         10/22/24  1516   Weight: 81 kg (178 lb 9.6 oz)       Physical Exam  Vitals and nursing note reviewed.   Constitutional:       Appearance: Normal appearance.   HENT:      Head:  Normocephalic and atraumatic.      Nose: Nose normal.      Mouth/Throat:      Mouth: Mucous membranes are moist.      Pharynx: Oropharynx is clear.   Eyes:      Extraocular Movements: Extraocular movements intact.      Conjunctiva/sclera: Conjunctivae normal.      Pupils: Pupils are equal, round, and reactive to light.   Cardiovascular:      Rate and Rhythm: Normal rate and regular rhythm.      Pulses: Normal pulses.      Heart sounds: Normal heart sounds.   Pulmonary:      Effort: Pulmonary effort is normal.      Breath sounds: Normal breath sounds.   Musculoskeletal:         General: Swelling and tenderness present.      Cervical back: Normal range of motion and neck supple.   Skin:     General: Skin is warm.      Capillary Refill: Capillary refill takes 2 to 3 seconds.   Neurological:      Mental Status: He is alert and oriented to person, place, and time.   Psychiatric:         Mood and Affect: Mood normal.         Behavior: Behavior normal.         CT Chest With Contrast Diagnostic    Result Date: 10/16/2024  Impression: Stable 0.5 cm nodular density in the right upper lobe. Stable 0.4 cm nodule along the right minor fissure, possibly a perifissural lymph node. If the patient is felt to be at increased risk of neoplasm, follow-up chest CT in 1 year could be performed to further evaluate. Findings of moderate chronic interstitial lung disease/pulmonary fibrosis. Previous cholecystectomy Electronically Signed: Adam Morrissey MD  10/16/2024 9:49 AM EDT  Workstation ID: [id number]      Assessment & Plan     Diagnoses and all orders for this visit:    1. Idiopathic non-specific interstitial pneumonitis (Primary)  -     6 Minute Walk Test; Future  -     ANCA Panel; Future  -     CBC & Differential; Future  -     Comprehensive Metabolic Panel; Future  -     Cyclic Citrul Peptide Antibody, IgG / IgA; Future  -     Anti-Centromere B Antibodies; Future  -     Anti-Charmaine 1 Antibody, IgG; Future  -     Anti-scleroderma Antibody;  Future  -     Myositis Panel III Plus; Future  -     RNP Antibodies; Future  -     Scleroderma Diagnostic Profile; Future  -     Sjogren's Antibody, Anti-SS-A / -SS-B; Future  -     Sedimentation Rate; Future  -     HARRY; Future  -     C-reactive Protein; Future    2. Lung nodule seen on imaging study  -     6 Minute Walk Test; Future  -     ANCA Panel; Future  -     CBC & Differential; Future  -     Comprehensive Metabolic Panel; Future  -     Cyclic Citrul Peptide Antibody, IgG / IgA; Future  -     Anti-Centromere B Antibodies; Future  -     Anti-Charmaine 1 Antibody, IgG; Future  -     Anti-scleroderma Antibody; Future  -     Myositis Panel III Plus; Future  -     RNP Antibodies; Future  -     Scleroderma Diagnostic Profile; Future  -     Sjogren's Antibody, Anti-SS-A / -SS-B; Future  -     Sedimentation Rate; Future  -     HARRY; Future  -     C-reactive Protein; Future         Discussion/ Recommendations:   PFTs reviewed do not show any airways obstruction and are within normal limits  CT scan reviewed shows interstitial fibrosis in the lung bases moderately severe  Will order 6-minute walk test  Will order serology to rule out collagen vascular disease and ESR with CBC DIF and CMP  Have discussed possibility of starting antifibrotic's as patient has a family history of pulmonary fibrosis  Vaccinations discussed and recommended    BMI is >= 25 and <30. (Overweight) The following options were offered after discussion;: exercise counseling/recommendations        Return in about 1 month (around 11/22/2024).          This document has been electronically signed by Yannick Espinoza MD on October 22, 2024 15:31 EDT

## 2024-10-25 ENCOUNTER — LAB (OUTPATIENT)
Dept: LAB | Facility: HOSPITAL | Age: 59
End: 2024-10-25
Payer: COMMERCIAL

## 2024-10-25 DIAGNOSIS — E67.8 OTHER SPECIFIED HYPERALIMENTATION: ICD-10-CM

## 2024-10-25 DIAGNOSIS — Z01.89 PATIENT REQUESTED DIAGNOSTIC TESTING: ICD-10-CM

## 2024-10-25 DIAGNOSIS — R91.1 LUNG NODULE SEEN ON IMAGING STUDY: ICD-10-CM

## 2024-10-25 DIAGNOSIS — J84.113 IDIOPATHIC NON-SPECIFIC INTERSTITIAL PNEUMONITIS: ICD-10-CM

## 2024-10-25 DIAGNOSIS — E11.43 TYPE 2 DIABETES MELLITUS WITH AUTONOMIC NEUROPATHY, UNSPECIFIED WHETHER LONG TERM INSULIN USE: ICD-10-CM

## 2024-10-25 DIAGNOSIS — Z13.220 SCREENING CHOLESTEROL LEVEL: ICD-10-CM

## 2024-10-25 LAB
25(OH)D3 SERPL-MCNC: 32.1 NG/ML (ref 30–100)
ALBUMIN SERPL-MCNC: 4.2 G/DL (ref 3.5–5.2)
ALBUMIN/GLOB SERPL: 1.2 G/DL
ALP SERPL-CCNC: 55 U/L (ref 39–117)
ALT SERPL W P-5'-P-CCNC: 15 U/L (ref 1–41)
ANION GAP SERPL CALCULATED.3IONS-SCNC: 9.7 MMOL/L (ref 5–15)
AST SERPL-CCNC: 19 U/L (ref 1–40)
BASOPHILS # BLD AUTO: 0.02 10*3/MM3 (ref 0–0.2)
BASOPHILS NFR BLD AUTO: 0.3 % (ref 0–1.5)
BILIRUB SERPL-MCNC: 0.9 MG/DL (ref 0–1.2)
BUN SERPL-MCNC: 13 MG/DL (ref 8–23)
BUN/CREAT SERPL: 10.1 (ref 7–25)
CALCIUM SPEC-SCNC: 9.1 MG/DL (ref 8.6–10.5)
CHLORIDE SERPL-SCNC: 98 MMOL/L (ref 98–107)
CHOLEST SERPL-MCNC: 128 MG/DL (ref 0–200)
CO2 SERPL-SCNC: 27.3 MMOL/L (ref 22–29)
CREAT SERPL-MCNC: 1.29 MG/DL (ref 0.76–1.27)
CRP SERPL-MCNC: <0.3 MG/DL (ref 0–0.5)
DEPRECATED RDW RBC AUTO: 42.4 FL (ref 37–54)
EGFRCR SERPLBLD CKD-EPI 2021: 63.5 ML/MIN/1.73
EOSINOPHIL # BLD AUTO: 0.07 10*3/MM3 (ref 0–0.4)
EOSINOPHIL NFR BLD AUTO: 1.2 % (ref 0.3–6.2)
ERYTHROCYTE [DISTWIDTH] IN BLOOD BY AUTOMATED COUNT: 12.1 % (ref 12.3–15.4)
ERYTHROCYTE [SEDIMENTATION RATE] IN BLOOD: <1 MM/HR (ref 0–20)
GLOBULIN UR ELPH-MCNC: 3.5 GM/DL
GLUCOSE SERPL-MCNC: 178 MG/DL (ref 65–99)
HBA1C MFR BLD: 6.1 % (ref 4.8–5.6)
HCT VFR BLD AUTO: 47.4 % (ref 37.5–51)
HDLC SERPL-MCNC: 55 MG/DL (ref 40–60)
HGB BLD-MCNC: 17 G/DL (ref 13–17.7)
IMM GRANULOCYTES # BLD AUTO: 0.01 10*3/MM3 (ref 0–0.05)
IMM GRANULOCYTES NFR BLD AUTO: 0.2 % (ref 0–0.5)
LDLC SERPL CALC-MCNC: 60 MG/DL (ref 0–100)
LDLC/HDLC SERPL: 1.11 {RATIO}
LYMPHOCYTES # BLD AUTO: 1.25 10*3/MM3 (ref 0.7–3.1)
LYMPHOCYTES NFR BLD AUTO: 21.1 % (ref 19.6–45.3)
MCH RBC QN AUTO: 33.9 PG (ref 26.6–33)
MCHC RBC AUTO-ENTMCNC: 35.9 G/DL (ref 31.5–35.7)
MCV RBC AUTO: 94.6 FL (ref 79–97)
MONOCYTES # BLD AUTO: 0.56 10*3/MM3 (ref 0.1–0.9)
MONOCYTES NFR BLD AUTO: 9.5 % (ref 5–12)
NEUTROPHILS NFR BLD AUTO: 4.01 10*3/MM3 (ref 1.7–7)
NEUTROPHILS NFR BLD AUTO: 67.7 % (ref 42.7–76)
NRBC BLD AUTO-RTO: 0 /100 WBC (ref 0–0.2)
PLATELET # BLD AUTO: 190 10*3/MM3 (ref 140–450)
PMV BLD AUTO: 10.4 FL (ref 6–12)
POTASSIUM SERPL-SCNC: 4.2 MMOL/L (ref 3.5–5.2)
PROT SERPL-MCNC: 7.7 G/DL (ref 6–8.5)
RBC # BLD AUTO: 5.01 10*6/MM3 (ref 4.14–5.8)
SODIUM SERPL-SCNC: 135 MMOL/L (ref 136–145)
TESTOST SERPL-MCNC: 761 NG/DL (ref 193–740)
TRIGL SERPL-MCNC: 60 MG/DL (ref 0–150)
VLDLC SERPL-MCNC: 13 MG/DL (ref 5–40)
WBC NRBC COR # BLD AUTO: 5.92 10*3/MM3 (ref 3.4–10.8)

## 2024-10-25 PROCEDURE — 83516 IMMUNOASSAY NONANTIBODY: CPT

## 2024-10-25 PROCEDURE — 83036 HEMOGLOBIN GLYCOSYLATED A1C: CPT

## 2024-10-25 PROCEDURE — 86037 ANCA TITER EACH ANTIBODY: CPT

## 2024-10-25 PROCEDURE — 86235 NUCLEAR ANTIGEN ANTIBODY: CPT

## 2024-10-25 PROCEDURE — 86200 CCP ANTIBODY: CPT

## 2024-10-25 PROCEDURE — 84403 ASSAY OF TOTAL TESTOSTERONE: CPT

## 2024-10-25 PROCEDURE — 80061 LIPID PANEL: CPT

## 2024-10-25 PROCEDURE — 85025 COMPLETE CBC W/AUTO DIFF WBC: CPT

## 2024-10-25 PROCEDURE — 36415 COLL VENOUS BLD VENIPUNCTURE: CPT

## 2024-10-25 PROCEDURE — 86140 C-REACTIVE PROTEIN: CPT

## 2024-10-25 PROCEDURE — 80053 COMPREHEN METABOLIC PANEL: CPT

## 2024-10-25 PROCEDURE — 86038 ANTINUCLEAR ANTIBODIES: CPT

## 2024-10-25 PROCEDURE — 85652 RBC SED RATE AUTOMATED: CPT

## 2024-10-25 PROCEDURE — 82306 VITAMIN D 25 HYDROXY: CPT

## 2024-10-26 LAB — CCP IGA+IGG SERPL IA-ACNC: 8 UNITS (ref 0–19)

## 2024-10-28 LAB
ANA SER QL: NEGATIVE
C-ANCA TITR SER IF: NORMAL TITER
CENTROMERE B AB SER-ACNC: <0.2 AI (ref 0–0.9)
ENA RNP AB SER-ACNC: <0.2 AI (ref 0–0.9)
ENA SCL70 AB SER-ACNC: 0.2 AI (ref 0–0.9)
ENA SCL70 AB SER-ACNC: <0.2 AI (ref 0–0.9)
ENA SS-A AB SER-ACNC: <0.2 AI (ref 0–0.9)
ENA SS-B AB SER-ACNC: <0.2 AI (ref 0–0.9)
MYELOPEROXIDASE AB SER IA-ACNC: <0.2 UNITS (ref 0–0.9)
P-ANCA ATYPICAL TITR SER IF: NORMAL TITER
P-ANCA TITR SER IF: NORMAL TITER
PROTEINASE3 AB SER IA-ACNC: <0.2 UNITS (ref 0–0.9)

## 2024-10-31 NOTE — PROGRESS NOTES
Patient has upcoming appointment with me on 11/5. We can discuss his labs at that time.     ?  This document has been electronically signed by Prashanth Arthur MD on October 30, 2024 21:27 EDT

## 2024-11-05 ENCOUNTER — OFFICE VISIT (OUTPATIENT)
Dept: FAMILY MEDICINE CLINIC | Facility: CLINIC | Age: 59
End: 2024-11-05
Payer: COMMERCIAL

## 2024-11-05 VITALS
DIASTOLIC BLOOD PRESSURE: 83 MMHG | BODY MASS INDEX: 27.69 KG/M2 | OXYGEN SATURATION: 97 % | SYSTOLIC BLOOD PRESSURE: 123 MMHG | WEIGHT: 176.4 LBS | HEIGHT: 67 IN | HEART RATE: 103 BPM

## 2024-11-05 DIAGNOSIS — R10.84 GENERALIZED ABDOMINAL PAIN: ICD-10-CM

## 2024-11-05 DIAGNOSIS — R79.89 ELEVATED SERUM CREATININE: Primary | ICD-10-CM

## 2024-11-05 DIAGNOSIS — R19.7 DIARRHEA, UNSPECIFIED TYPE: ICD-10-CM

## 2024-11-05 DIAGNOSIS — R79.89 ELEVATED TESTOSTERONE LEVEL IN MALE: ICD-10-CM

## 2024-11-05 PROCEDURE — 99214 OFFICE O/P EST MOD 30 MIN: CPT | Performed by: STUDENT IN AN ORGANIZED HEALTH CARE EDUCATION/TRAINING PROGRAM

## 2024-11-05 NOTE — PROGRESS NOTES
Subjective:       Jung Burkett is a 59 y.o. male with a concurrent medical history of essential hypertension, hyperlipidemia, type 2 diabetes mellitus, gastroesophageal reflux disease, anxiety depression, pulmonary fibrosis and pulmonary nodules, and BPH who presents for follow-up to discuss lab work.    In terms of hypertension, blood pressures currently well-controlled on metoprolol succinate 50 mg.  He is not on ACE inhibitor although he has a history of hypertension and concomitant diabetes.  He previously was on a combination pill that included an SGLT2 which was likely why he was not on medication for renal protection.  Could consider adding low-dose lisinopril 2.5 mg in the future but would want to be careful to avoid precipitating hypotension.  He did have an elevated creatinine although this may be due to a recent CT he had.  Would repeat renal function panel in 1 month and continue to encourage good fluid consumption.    In terms of hyperlipidemia, we reviewed most recent LDL DL, which continues to be less than 100.  Will continue atorvastatin at the 20 mg dose.    In terms of type 2 diabetes mellitus, he is now only on Mounjaro 7.5 mg.  Hemoglobin A1c is less than 7 and is well-controlled.  Will continue current management.  He is due for diabetic foot exam and we will get him set up in approximately 4 to 6 weeks for this.    In terms of gastroesophageal reflux disease, he will continue on Prevacid.    In terms of depression, current medication includes trazodone 100 mg which also helps with sleep.  He is on BuSpar for anxiety.    In terms of chronic fatigue and history of [place] spotted fever he sees a provider who prescribes Adderall related to chronic fatigue.    In terms of pulmonary fibrosis, he is undergoing active workup with pulmonology.    Mauro has had some abdominal pain and diarrhea.  This has bothered him for quite some time and we discussed it previously as my earlier notes  will show.  I had some suspicion that this could be related to prior cholecystectomy and trialed colestipol.  He has been on Bentyl before without relief.  Although symptoms have initially improved, he tells me today he is now having diarrhea and abdominal pain again despite colestipol use.  He had a prior stool culture that was negative in 2019.  Last endoscopy was in 2019.  He tells me that there is some possibility that he has a positive p-ANCA and that can be associated with inflammatory bowel disease.  Will check stool lactoferrin, calprotectin, and stool PCR panel.  We could consider discontinuing colestipol in the future.  Given prolonged nature of symptoms, would refer to GI.    Mauro also had an elevated testosterone level.  Although he had previously been on testosterone supplementation in the past by prior provider due to hypogonadism, testosterone levels have been normal subsequently.  However given presence of elevated testosterone for us and the fact that he is not currently taking testosterone externally, I will repeat this level in 1 month.  If it is still elevated, would pursue hormonal workup including FSH and LH, hCG and testicular ultrasound as well as referral to endocrinology.      The following portions of the patient's history were reviewed and updated as appropriate: allergies, current medications, past family history, past medical history, past social history, past surgical history, and problem list.    Past Medical Hx:  Past Medical History:   Diagnosis Date    Acid reflux     ADHD (attention deficit hyperactivity disorder) 1970    Anxiety     Anxiety disorder 05/17/2019    Arthritis     generalized    Benign prostatic hyperplasia 1989    Blood disease     none    Cervicalgia     Chronic allergic rhinitis     Colon polyp 2005    Depression     Diabetes mellitus, type 2 05/17/2019    DOES NOT CHECK BS    Diabetic eye exam 01/2019 20/20 PER PT     Encounter for diabetic foot exam      WITHIN FEW MO  PER PT     Essential hypertension 05/17/2019    GERD (gastroesophageal reflux disease)     High blood pressure     High cholesterol     Hyperlipemia     Hyperlipidemia     Hypertension     Low back pain 1987    Major depressive disorder 05/17/2019    Nicotine dependence 05/17/2019    Prostate disorder     BPH    Rotator cuff tear, left     Seasonal allergies     Urinary tract infection 1993    Vitamin D deficiency 05/17/2019    no current issues       Past Surgical Hx:  Past Surgical History:   Procedure Laterality Date    CHOLECYSTECTOMY  1997    COLONOSCOPY      ELISA- REPEAT IN 5 YEARS     GALLBLADDER SURGERY      KNEE ARTHROSCOPY W/ MENISCAL REPAIR Right     OTHER SURGICAL HISTORY      SURGICAL CLIPS/gallbladder removed    OTHER SURGICAL HISTORY      right knee meniscus tear repair    SHOULDER ARTHROSCOPY W/ ROTATOR CUFF REPAIR Left 07/21/2021    Procedure: SHOULDER ARTHROSCOPY,SUBACROMINAL DECOMPRESSION, DISTAL CLAVICLE RESECTION, MINI OPEN  ROTATOR CUFF REPAIR;  Surgeon: Ulisses Kenney MD;  Location: [facility] [place];  Service: Orthopedics;  Laterality: Left;    TONSILLECTOMY      VASECTOMY  2004       Current Meds:    Current Outpatient Medications:     amphetamine-dextroamphetamine (ADDERALL) 20 MG tablet, Take 1 tablet by mouth 2 (Two) Times a Day. Dose adjustment, Disp: 60 tablet, Rfl: 0    atorvastatin (LIPITOR) 20 MG tablet, Take 1 tablet by mouth every night at bedtime., Disp: 90 tablet, Rfl: 1    busPIRone (BUSPAR) 15 MG tablet, Take 1 tablet by mouth 2 (Two) Times a Day As Needed (anxiety)., Disp: 180 tablet, Rfl: 1    CBD (cannabidiol) oral oil, Take  by mouth., Disp: , Rfl:     colestipol (COLESTID) 1 g tablet, TAKE 1 TABLET BY MOUTH EVERY DAY, Disp: 90 tablet, Rfl: 1    finasteride (PROSCAR) 5 MG tablet, TAKE 1 TABLET BY MOUTH EVERY DAY, Disp: 90 tablet, Rfl: 1    lansoprazole (PREVACID) 15 MG capsule, TAKE 1 CAPSULE BY MOUTH EVERY DAY, Disp: 90 capsule, Rfl: 1     metoprolol succinate XL (TOPROL-XL) 50 MG 24 hr tablet, Take 1 tablet by mouth Daily., Disp: 90 tablet, Rfl: 1    tamsulosin (FLOMAX) 0.4 MG capsule 24 hr capsule, Take 1 capsule by mouth Daily., Disp: 90 capsule, Rfl: 1    Tirzepatide (MOUNJARO) 7.5 MG/0.5ML solution pen-injector pen, Inject 0.5 mL under the skin into the appropriate area as directed 1 (One) Time Per Week., Disp: 0.5 mL, Rfl: 9    traZODone (DESYREL) 100 MG tablet, Take 1 tablet by mouth every night at bedtime., Disp: 90 tablet, Rfl: 1    albuterol (PROVENTIL) (2.5 MG/3ML) 0.083% nebulizer solution, Take 2.5 mg by nebulization Every 4 (Four) Hours As Needed for Wheezing. (Patient not taking: Reported on 11/5/2024), Disp: 3 mL, Rfl: 12    Breo Ellipta 100-25 MCG/ACT aerosol powder , Inhale 1 puff Daily. (Patient not taking: Reported on 11/5/2024), Disp: , Rfl:     fluticasone-salmeterol (Advair HFA) 115-21 MCG/ACT inhaler, Inhale 2 puffs 2 (Two) Times a Day. (Patient not taking: Reported on 5/29/2024), Disp: 12 g, Rfl: 11    Fluticasone-Salmeterol (ADVAIR/WIXELA) 250-50 MCG/ACT DISKUS, Inhale 1 puff 2 (Two) Times a Day. (Patient not taking: Reported on 11/5/2024), Disp: 60 each, Rfl: 11    vitamin D (ERGOCALCIFEROL) 1.25 MG (39978 UT) capsule capsule, Take 1 capsule by mouth Every 7 (Seven) Days. (Patient not taking: Reported on 5/29/2024), Disp: , Rfl:     Allergies:  Allergies   Allergen Reactions    Hydrocodone-Acetaminophen Itching    Nsaids Arrhythmia    Oxycodone-Acetaminophen Shortness Of Breath    Sulfa Antibiotics Hives    Voltaren [Diclofenac] Palpitations       Family Hx:  Family History   Problem Relation Age of Onset    Cancer Mother     Diabetes Mother     Rheum arthritis Mother         40'S    Heart attack Mother     Breast cancer Mother         40'S    Arthritis Mother     Stroke Father     Heart disease Father     Heart attack Maternal Grandmother     Breast cancer Maternal Grandmother         50'S    Prostate cancer Maternal  "Grandfather     Nephrolithiasis Maternal Grandfather     Colon cancer Paternal Grandmother     Colon cancer Paternal Grandfather         60'S    Breast cancer Other         40'S    Heart attack Maternal Aunt     Malig Hyperthermia Neg Hx         Social History:  Social History     Socioeconomic History    Marital status:    Tobacco Use    Smoking status: Some Days     Types: Cigars    Smokeless tobacco: Never    Tobacco comments:     1 cigar daily   Vaping Use    Vaping status: Never Used   Substance and Sexual Activity    Alcohol use: Yes     Alcohol/week: 6.0 standard drinks of alcohol     Types: 6 Drinks containing 0.5 oz of alcohol per week     Comment: Drinks daily; beer. Occasionally drinks 2 drinks per day, has been drinking for 6-10 years.     Drug use: Never    Sexual activity: Defer       Review of Systems  Review of Systems   Gastrointestinal:  Positive for diarrhea.       Objective:     /83 (BP Location: Right arm, Patient Position: Sitting, Cuff Size: Adult)   Pulse 103   Ht 170.2 cm (67\")   Wt 80 kg (176 lb 6.4 oz)   SpO2 97%   BMI 27.63 kg/m²   Physical Exam  Constitutional:       General: He is not in acute distress.     Appearance: Normal appearance. He is not ill-appearing, toxic-appearing or diaphoretic.   Pulmonary:      Effort: Pulmonary effort is normal. No respiratory distress.   Neurological:      Mental Status: He is alert.   Psychiatric:         Mood and Affect: Mood normal.         Behavior: Behavior normal.          Assessment/Plan:     Diagnoses and all orders for this visit:    1. Elevated serum creatinine (Primary)      In terms of hypertension, blood pressures currently well-controlled on metoprolol succinate 50 mg.  He is not on ACE inhibitor although he has a history of hypertension and concomitant diabetes.  He previously was on a combination pill that included an SGLT2 which was likely why he was not on medication for renal protection.  Could consider adding " low-dose lisinopril 2.5 mg in the future but would want to be careful to avoid precipitating hypotension.  He did have an elevated creatinine although this may be due to a recent CT he had.  Would repeat renal function panel in 1 month and continue to encourage good fluid consumption.      -     Renal Function Panel; Future    2. Elevated testosterone level in male    Mauro also had an elevated testosterone level.  Although he had previously been on testosterone supplementation in the past by prior provider due to hypogonadism, testosterone levels have been normal subsequently.  However given presence of elevated testosterone for us and the fact that he is not currently taking testosterone externally, I will repeat this level in 1 month.  If it is still elevated, would pursue hormonal workup including FSH and LH, hCG and testicular ultrasound as well as referral to endocrinology.      -     Testosterone; Future    3. Diarrhea, unspecified type  4. Generalized abdominal pain    Mauro has had some abdominal pain and diarrhea.  This has bothered him for quite some time and we discussed it previously as my earlier notes will show.  I had some suspicion that this could be related to prior cholecystectomy and trialed colestipol.  He has been on Bentyl before without relief.  Although symptoms have initially improved, he tells me today he is now having diarrhea and abdominal pain again despite colestipol use.  He had a prior stool culture that was negative in 2019.  Last endoscopy was in 2019.  He tells me that there is some possibility that he has a positive p-ANCA and that can be associated with inflammatory bowel disease.  Will check stool lactoferrin, calprotectin, and stool PCR panel.  We could consider discontinuing colestipol in the future.  Given prolonged nature of symptoms, would refer to GI.    -     Fecal Lactoferrin Qual. - Stool, Per Rectum; Future  -     Calprotectin, Fecal - Stool, Per Rectum; Future  -      Gastrointestinal Panel, PCR - Stool, Per Rectum; Future  -     Ambulatory Referral to Gastroenterology                  Follow-up:     Return in about 6 weeks (around 12/17/2024) for Diabetic Foot Exam .    Preventative:  Health Maintenance   Topic Date Due    ANNUAL PHYSICAL  11/29/2023    DIABETIC FOOT EXAM  11/29/2023    COVID-19 Vaccine (7 - 2024-25 season) 01/25/2025 (Originally 9/1/2024)    HEMOGLOBIN A1C  04/25/2025    URINE MICROALBUMIN  05/10/2025    DIABETIC EYE EXAM  08/20/2025    BMI FOLLOWUP  10/22/2025    LIPID PANEL  10/25/2025    COLORECTAL CANCER SCREENING  05/27/2026    TDAP/TD VACCINES (2 - Td or Tdap) 05/16/2029    HEPATITIS C SCREENING  Completed    Pneumococcal Vaccine 0-64  Completed    INFLUENZA VACCINE  Completed    ZOSTER VACCINE  Completed         This document has been electronically signed by Prashanth Arthur MD on November 5, 2024 16:41 EST       Parts of this note are electronic transcriptions/translations of spoken language to printed text using the Dragon Dictation system.

## 2024-11-08 DIAGNOSIS — G47.11 IDIOPATHIC HYPERSOMNIA: ICD-10-CM

## 2024-11-08 RX ORDER — DEXTROAMPHETAMINE SACCHARATE, AMPHETAMINE ASPARTATE, DEXTROAMPHETAMINE SULFATE AND AMPHETAMINE SULFATE 5; 5; 5; 5 MG/1; MG/1; MG/1; MG/1
20 TABLET ORAL 2 TIMES DAILY
Qty: 60 TABLET | Refills: 0 | Status: SHIPPED | OUTPATIENT
Start: 2024-11-08

## 2024-11-08 RX ORDER — DEXTROAMPHETAMINE SACCHARATE, AMPHETAMINE ASPARTATE, DEXTROAMPHETAMINE SULFATE AND AMPHETAMINE SULFATE 5; 5; 5; 5 MG/1; MG/1; MG/1; MG/1
20 TABLET ORAL 2 TIMES DAILY
Qty: 60 TABLET | Refills: 0 | Status: CANCELLED | OUTPATIENT
Start: 2024-11-08

## 2024-11-10 DIAGNOSIS — E78.5 HYPERLIPIDEMIA, UNSPECIFIED HYPERLIPIDEMIA TYPE: ICD-10-CM

## 2024-11-10 DIAGNOSIS — G47.00 INSOMNIA, UNSPECIFIED TYPE: ICD-10-CM

## 2024-11-10 DIAGNOSIS — N40.1 BENIGN PROSTATIC HYPERPLASIA WITH URINARY FREQUENCY: ICD-10-CM

## 2024-11-10 DIAGNOSIS — I10 ESSENTIAL HYPERTENSION: ICD-10-CM

## 2024-11-10 DIAGNOSIS — R35.0 BENIGN PROSTATIC HYPERPLASIA WITH URINARY FREQUENCY: ICD-10-CM

## 2024-11-10 DIAGNOSIS — F41.9 ANXIETY: ICD-10-CM

## 2024-11-11 RX ORDER — TAMSULOSIN HYDROCHLORIDE 0.4 MG/1
1 CAPSULE ORAL DAILY
Qty: 90 CAPSULE | Refills: 1 | Status: SHIPPED | OUTPATIENT
Start: 2024-11-11

## 2024-11-11 RX ORDER — ATORVASTATIN CALCIUM 20 MG/1
20 TABLET, FILM COATED ORAL NIGHTLY
Qty: 90 TABLET | Refills: 1 | Status: SHIPPED | OUTPATIENT
Start: 2024-11-11

## 2024-11-11 RX ORDER — TRAZODONE HYDROCHLORIDE 100 MG/1
100 TABLET ORAL NIGHTLY
Qty: 90 TABLET | Refills: 1 | Status: SHIPPED | OUTPATIENT
Start: 2024-11-11

## 2024-11-11 RX ORDER — BUSPIRONE HYDROCHLORIDE 15 MG/1
15 TABLET ORAL 2 TIMES DAILY PRN
Qty: 180 TABLET | Refills: 1 | Status: SHIPPED | OUTPATIENT
Start: 2024-11-11

## 2024-11-11 RX ORDER — METOPROLOL SUCCINATE 50 MG/1
50 TABLET, EXTENDED RELEASE ORAL DAILY
Qty: 90 TABLET | Refills: 1 | Status: SHIPPED | OUTPATIENT
Start: 2024-11-11

## 2024-11-11 NOTE — ED PROVIDER NOTES
"Subjective   History of Present Illness  The patient presents to the emergency department and states that for the past week he has had a cough and some upper respiratory congestion.  States that those symptoms are getting better but he has had several very hard coughing spells this past week.  States that he woke up this morning with a very sharp intermittent right lower quadrant pain.  He does report tenderness with palpation.  He states he is had no recent fevers.  He denies any nausea or vomiting.  He reports no urinary symptoms.  He states that the cough has gotten better and that he is not having \"coughing spells anymore.  He states has been able to eat and drink without difficulties.  He is in no respiratory distress on exam.  His breath sounds are clear.  His airway is patent.    History provided by:  Patient   used: No        Review of Systems   Constitutional:  Negative for chills and fever.   HENT:  Positive for congestion and rhinorrhea. Negative for ear pain and sore throat.    Eyes:  Negative for pain.   Respiratory:  Positive for cough. Negative for chest tightness and shortness of breath.    Cardiovascular:  Negative for chest pain.   Gastrointestinal:  Positive for abdominal pain. Negative for diarrhea, nausea and vomiting.   Genitourinary:  Negative for dysuria, flank pain, frequency, hematuria and urgency.   Musculoskeletal:  Negative for back pain, joint swelling, neck pain and neck stiffness.   Skin:  Negative for pallor and rash.   Neurological:  Negative for seizures and headaches.   All other systems reviewed and are negative.      Past Medical History:   Diagnosis Date    Acid reflux     ADHD (attention deficit hyperactivity disorder) 1970    Anxiety     Anxiety disorder 05/17/2019    Arthritis     generalized    Benign prostatic hyperplasia 1989    Blood disease     none    Cervicalgia     Chronic allergic rhinitis     Colon polyp 2005    Depression     Diabetes mellitus, " type 2 05/17/2019    DOES NOT CHECK BS    Diabetic eye exam 01/2019 20/20 PER PT     Encounter for diabetic foot exam     WITHIN FEW MO  PER PT     Essential hypertension 05/17/2019    GERD (gastroesophageal reflux disease)     High blood pressure     High cholesterol     Hyperlipemia     Hyperlipidemia     Hypertension     Low back pain 1987    Major depressive disorder 05/17/2019    Nicotine dependence 05/17/2019    Prostate disorder     BPH    Rotator cuff tear, left     Seasonal allergies     Urinary tract infection 1993    Vitamin D deficiency 05/17/2019    no current issues       Allergies   Allergen Reactions    Hydrocodone-Acetaminophen Itching    Nsaids Arrhythmia    Oxycodone-Acetaminophen Shortness Of Breath    Sulfa Antibiotics Hives    Voltaren [Diclofenac] Palpitations       Past Surgical History:   Procedure Laterality Date    CHOLECYSTECTOMY  1997    COLONOSCOPY      ELISA- REPEAT IN 5 YEARS     GALLBLADDER SURGERY      KNEE ARTHROSCOPY W/ MENISCAL REPAIR Right     OTHER SURGICAL HISTORY      SURGICAL CLIPS/gallbladder removed    OTHER SURGICAL HISTORY      right knee meniscus tear repair    SHOULDER ARTHROSCOPY W/ ROTATOR CUFF REPAIR Left 07/21/2021    Procedure: SHOULDER ARTHROSCOPY,SUBACROMINAL DECOMPRESSION, DISTAL CLAVICLE RESECTION, MINI OPEN  ROTATOR CUFF REPAIR;  Surgeon: Ulisses Kenney MD;  Location: [facility] [place];  Service: Orthopedics;  Laterality: Left;    TONSILLECTOMY      VASECTOMY  2004       Family History   Problem Relation Age of Onset    Cancer Mother     Diabetes Mother     Rheum arthritis Mother         40'S    Heart attack Mother     Breast cancer Mother         40'S    Arthritis Mother     Stroke Father     Heart disease Father     Heart attack Maternal Grandmother     Breast cancer Maternal Grandmother         50'S    Prostate cancer Maternal Grandfather     Nephrolithiasis Maternal Grandfather     Colon cancer Paternal Grandmother     Colon cancer Paternal  Grandfather         60'S    Breast cancer Other         40'S    Heart attack Maternal Aunt     Mady Hyperthermia Neg Hx        Social History     Socioeconomic History    Marital status:    Tobacco Use    Smoking status: Some Days     Types: Cigars    Smokeless tobacco: Never    Tobacco comments:     1 cigar daily   Vaping Use    Vaping Use: Never used   Substance and Sexual Activity    Alcohol use: Yes     Alcohol/week: 6.0 standard drinks of alcohol     Types: 6 Drinks containing 0.5 oz of alcohol per week     Comment: Drinks daily; beer. Occasionally drinks 2 drinks per day, has been drinking for 6-10 years.     Drug use: Never    Sexual activity: Defer           Objective   Physical Exam  Vitals and nursing note reviewed.   Constitutional:       General: He is not in acute distress.     Appearance: Normal appearance. He is well-developed. He is not ill-appearing or toxic-appearing.   HENT:      Head: Normocephalic and atraumatic.      Mouth/Throat:      Mouth: Mucous membranes are moist.   Eyes:      General: No scleral icterus.  Cardiovascular:      Rate and Rhythm: Normal rate and regular rhythm.      Pulses: Normal pulses.   Pulmonary:      Effort: Pulmonary effort is normal. No respiratory distress.      Breath sounds: Normal breath sounds. No wheezing.   Abdominal:      General: Abdomen is flat. There is no distension.      Palpations: Abdomen is soft.      Tenderness: There is no abdominal tenderness in the right lower quadrant. There is no guarding or rebound.      Hernia: There is no hernia in the right inguinal area.   Musculoskeletal:         General: Normal range of motion.      Cervical back: Normal range of motion and neck supple.   Skin:     General: Skin is warm and dry.      Capillary Refill: Capillary refill takes less than 2 seconds.      Findings: No rash.   Neurological:      General: No focal deficit present.      Mental Status: He is alert and oriented to person, place, and time.  Mental status is at baseline.   Psychiatric:         Mood and Affect: Mood normal.         Behavior: Behavior normal.         Procedures           ED Course  ED Course as of 01/20/24 0754   Sat Jan 20, 2024   0354 Creatinine: 0.91 [TC]      ED Course User Index  [TC] Laura Nieves APRN                                             Medical Decision Making  Problems Addressed:  RLQ abdominal pain: complicated acute illness or injury    Amount and/or Complexity of Data Reviewed  Labs: ordered. Decision-making details documented in ED Course.  Radiology: ordered.    Risk  Prescription drug management.        Final diagnoses:   RLQ abdominal pain       ED Disposition  ED Disposition       ED Disposition   Discharge    Condition   Stable    Comment   --               Prashanth Arthur MD  [address]  [address]  [place] [address]  [phone]    Call today  FOR FOLLOW UP         Medication List        New Prescriptions      methocarbamol 750 MG tablet  Commonly known as: ROBAXIN  Take 1 tablet by mouth 3 (Three) Times a Day As Needed for Muscle Spasms for up to 25 doses.            Changed      * dicyclomine 20 MG tablet  Commonly known as: BENTYL  Take 1 tablet by mouth 4 (Four) Times a Day.  What changed: Another medication with the same name was added. Make sure you understand how and when to take each.     * dicyclomine 20 MG tablet  Commonly known as: BENTYL  Take 1 tablet by mouth 4 (Four) Times a Day As Needed (ABD PAIN).  What changed: You were already taking a medication with the same name, and this prescription was added. Make sure you understand how and when to take each.           * This list has 2 medication(s) that are the same as other medications prescribed for you. Read the directions carefully, and ask your doctor or other care provider to review them with you.                   Where to Get Your Medications        These medications were sent to [facility] - [place], [place] - [address] - [phone]  - [phone]  [address], [place] [address]      Phone: [phone]   dicyclomine 20 MG tablet  methocarbamol 750 MG tablet            Laura Nieves, MERE  01/20/24 1951     Detail Level: Detailed

## 2024-11-16 LAB
EJ AB SER QL: NEGATIVE
ENA JO1 AB SER IA-ACNC: <20 UNITS
ENA PM/SCL AB SER-ACNC: <20 UNITS
ENA SS-A 52KD IGG SER IA-ACNC: <20 UNITS
FIBRILLARIN AB SER QL: NEGATIVE
KU AB SER QL: NEGATIVE
MDA5 AB SER LINE BLOT-ACNC: <20 UNITS
MI2 AB SER QL: NEGATIVE
MJ AB SER LINE BLOT-ACNC: <20 UNITS
OJ AB SER QL: NEGATIVE
PL12 AB SER QL: NEGATIVE
PL7 AB SER QL: NEGATIVE
SAE1 IGG SER QL LINE BLOT: <20 UNITS
SRP AB SERPL QL: NEGATIVE
TIF1-GAMMA AB SER IA-ACNC: <20 UNITS
U1 SNRNP AB SER IA-ACNC: <20 UNITS
U2 SNRNP AB SER QL: NEGATIVE

## 2024-11-21 NOTE — PROGRESS NOTES
Primary Care Provider  Prashanth Arthur MD   Referring Provider  No ref. provider found    Patient Complaint  Follow-up (1 month follow up), Asthma, Cough (Dry cough), Wheezing, and Shortness of Breath      Subjective       History of Presenting Illness  Jung Burkett is a pleasant 59 y.o. male who presents to [facility] for follow-up appointment.  Patient last saw Dr. Dr. Espinoza 10/22/2024.  Patient is here to review his 6-minute walk and lab results that was ordered by Dr. Dr. Espinoza.  Patient has CT scan from October that shows evidence of interstitial lung disease/pulmonary fibrosis.  Patient has a history of COVID infection x 2.    Results from labs ordered by Dr. Dr. Espinoza are unremarkable.    Patient continues on albuterol nebulizer as needed for shortness of air or wheeze.  Patient states sometimes he notices an improvement after use and sometimes there is no improvement.  His pulmonary function test did show slight improvement after bronchodilator.    Patient denies wheezing, headaches, chest pain, weight loss or hemoptysis. Patient denies fevers, chills and night sweats. Jung Burkett is able to perform ADLs without difficulties.     I have personally reviewed the review of systems, past family, social, medical and surgical histories; and agree with their findings.      Review of Systems   Constitutional: Negative.    HENT: Negative.     Respiratory:  Positive for cough and shortness of breath.    Cardiovascular: Negative.    Musculoskeletal: Negative.    Neurological: Negative.    Psychiatric/Behavioral: Negative.           Family History   Problem Relation Age of Onset    Cancer Mother     Diabetes Mother     Rheum arthritis Mother         40'S    Heart attack Mother     Breast cancer Mother         40'S    Arthritis Mother     Stroke Father     Heart disease Father     Heart attack Maternal Grandmother     Breast cancer Maternal  Grandmother         50'S    Prostate cancer Maternal Grandfather     Nephrolithiasis Maternal Grandfather     Colon cancer Paternal Grandmother     Colon cancer Paternal Grandfather         60'S    Breast cancer Other         40'S    Heart attack Maternal Aunt     Malig Hyperthermia Neg Hx         Social History     Socioeconomic History    Marital status:    Tobacco Use    Smoking status: Some Days     Types: Cigars     Passive exposure: Never    Smokeless tobacco: Never    Tobacco comments:     1 cigar daily   Vaping Use    Vaping status: Never Used   Substance and Sexual Activity    Alcohol use: Yes     Alcohol/week: 6.0 standard drinks of alcohol     Types: 6 Drinks containing 0.5 oz of alcohol per week     Comment: Drinks daily; beer. Occasionally drinks 2 drinks per day, has been drinking for 6-10 years.     Drug use: Never    Sexual activity: Defer        Past Medical History:   Diagnosis Date    Acid reflux     ADHD (attention deficit hyperactivity disorder) 1970    Anxiety     Anxiety disorder 05/17/2019    Arthritis     generalized    Benign prostatic hyperplasia 1989    Blood disease     none    Cervicalgia     Chronic allergic rhinitis     Colon polyp 2005    Depression     Diabetes mellitus, type 2 05/17/2019    DOES NOT CHECK BS    Diabetic eye exam 01/2019 20/20 PER PT     Encounter for diabetic foot exam     WITHIN FEW MO  PER PT     Essential hypertension 05/17/2019    GERD (gastroesophageal reflux disease)     High blood pressure     High cholesterol     Hyperlipemia     Hyperlipidemia     Hypertension     Low back pain 1987    Major depressive disorder 05/17/2019    Nicotine dependence 05/17/2019    Prostate disorder     BPH    Rotator cuff tear, left     Seasonal allergies     Urinary tract infection 1993    Vitamin D deficiency 05/17/2019    no current issues        Immunization History   Administered Date(s) Administered    COVID-19 (PFIZER) 12YRS+ (COMIRNATY) 12/08/2023,  11/11/2024    COVID-19 (PFIZER) BIVALENT 12+YRS 11/03/2022    COVID-19 (PFIZER) Purple Cap Monovalent 03/21/2021, 04/11/2021, 12/03/2021    Covid-19 (Pfizer) Gray Cap Monovalent 07/20/2022    Flu Vaccine Quad PF >36MO 10/10/2019    Fluzone  >6mos 10/01/2018, 10/22/2024    Fluzone (or Fluarix & Flulaval for VFC) >6mos 10/29/2021, 12/08/2023    Fluzone Quad >6mos (Multi-dose) 10/01/2018, 10/27/2020    Hepatitis A 05/16/2019, 06/12/2020    Hepatitis B Adult/Adolescent IM 05/31/2023, 08/16/2023, 02/19/2024    Influenza Injectable Mdck Pf Quad 11/03/2022    Influenza, Unspecified 10/27/2020, 11/03/2022    Pneumococcal Conjugate 13-Valent (PCV13) 02/28/2022    Pneumococcal Conjugate 20-Valent (PCV20) 02/28/2023    Shingrix 02/28/2023, 05/31/2023    Tdap 05/16/2019       Allergies   Allergen Reactions    Hydrocodone-Acetaminophen Itching    Nsaids Arrhythmia    Oxycodone-Acetaminophen Shortness Of Breath    Sulfa Antibiotics Hives    Voltaren [Diclofenac] Palpitations          Current Outpatient Medications:     albuterol (PROVENTIL) (2.5 MG/3ML) 0.083% nebulizer solution, Take 2.5 mg by nebulization Every 4 (Four) Hours As Needed for Wheezing., Disp: 3 mL, Rfl: 12    amphetamine-dextroamphetamine (ADDERALL) 20 MG tablet, Take 1 tablet by mouth 2 (Two) Times a Day. Dose adjustment, Disp: 60 tablet, Rfl: 0    atorvastatin (LIPITOR) 20 MG tablet, TAKE 1 TABLET NIGHTLY AT   BEDTIME, Disp: 90 tablet, Rfl: 1    busPIRone (BUSPAR) 15 MG tablet, TAKE 1 TABLET TWICE A DAY  AS NEEDED FOR ANXIETY, Disp: 180 tablet, Rfl: 1    CBD (cannabidiol) oral oil, Take  by mouth., Disp: , Rfl:     finasteride (PROSCAR) 5 MG tablet, TAKE 1 TABLET BY MOUTH EVERY DAY, Disp: 90 tablet, Rfl: 1    lansoprazole (PREVACID) 15 MG capsule, TAKE 1 CAPSULE BY MOUTH EVERY DAY, Disp: 90 capsule, Rfl: 1    metoprolol succinate XL (TOPROL-XL) 50 MG 24 hr tablet, TAKE 1 TABLET DAILY, Disp: 90 tablet, Rfl: 1    tamsulosin (FLOMAX) 0.4 MG capsule 24 hr capsule,  "TAKE 1 CAPSULE DAILY, Disp: 90 capsule, Rfl: 1    Tirzepatide (MOUNJARO) 7.5 MG/0.5ML solution pen-injector pen, Inject 0.5 mL under the skin into the appropriate area as directed 1 (One) Time Per Week., Disp: 0.5 mL, Rfl: 9    traZODone (DESYREL) 100 MG tablet, TAKE 1 TABLET NIGHTLY AT   BEDTIME, Disp: 90 tablet, Rfl: 1    vitamin D (ERGOCALCIFEROL) 1.25 MG (99175 UT) capsule capsule, Take 1 capsule by mouth Every 7 (Seven) Days., Disp: , Rfl:     colestipol (COLESTID) 1 g tablet, TAKE 1 TABLET BY MOUTH EVERY DAY (Patient not taking: Reported on 11/22/2024), Disp: 90 tablet, Rfl: 1         Vital Signs   /82 (BP Location: Left arm, Patient Position: Sitting, Cuff Size: Adult)   Pulse 80   Temp 97.5 °F (36.4 °C) (Temporal)   Resp 16   Ht 170.2 cm (67\")   Wt 78.5 kg (173 lb)   SpO2 95% Comment: room air  BMI 27.10 kg/m²       Objective     Physical Exam  Vitals reviewed.   Constitutional:       General: He is not in acute distress.     Appearance: Normal appearance. He is not ill-appearing.   HENT:      Head: Normocephalic and atraumatic.      Nose: Nose normal.      Mouth/Throat:      Mouth: Mucous membranes are moist.      Pharynx: Oropharynx is clear.   Eyes:      Extraocular Movements: Extraocular movements intact.      Conjunctiva/sclera: Conjunctivae normal.      Pupils: Pupils are equal, round, and reactive to light.   Cardiovascular:      Rate and Rhythm: Normal rate and regular rhythm.      Pulses: Normal pulses.      Heart sounds: Normal heart sounds.   Pulmonary:      Effort: Pulmonary effort is normal. No respiratory distress.      Breath sounds: Normal breath sounds. No stridor. No wheezing, rhonchi or rales.   Abdominal:      General: Bowel sounds are normal.   Musculoskeletal:         General: Normal range of motion.      Cervical back: Normal range of motion and neck supple.   Skin:     General: Skin is warm and dry.   Neurological:      Mental Status: He is alert and oriented to person, " place, and time.   Psychiatric:         Behavior: Behavior normal.         Results Review  I have personally reviewed the prior office notes, hospital records, labs, and diagnostics.  CT Chest With Contrast Diagnostic [[id number]] (Order [id number])  Order  Status: Final result     Study Notes     Kasandra Harris on 10/14/2024  2:29 PM EDT   PULMONARY LYMPH NODE.  COMPLAINS OF SOME DYSPNEA.  STATES THINKS HE HAS ASTHMA  SMOKE 1 CIGAR DAILY X 20 YEARS  PREVIOUS ON LINE     Appointment Information    PACS Images     Radiology Images  Study Result    Narrative & Impression   CT CHEST W CONTRAST DIAGNOSTIC     Date of Exam: 10/14/2024 2:24 PM EDT     Indication: Pulmonary lymph node.     Comparison: CT chest high-resolution dated 3/29/2024     Technique: Axial CT images were obtained of the chest after the uneventful intravenous administration of iodinated contrast.  Reconstructed coronal and sagittal images were also obtained. Automated exposure control and iterative construction methods were   used.        Findings:  No adenopathy or effusion is identified. Heart size is normal severe coronary artery atherosclerotic calcifications noted.      In the right upper lobe on image 49 is a pleural-based nodular density measuring 0.5 cm, similar to the previous exam. Along the right minor fissure on image 67 is a 0.4 cm nodule, unchanged. There is moderate septal thickening noted in the lung fields   bilaterally consistent with chronic interstitial lung disease/pulmonary fibrosis. There are other irregular pleural-based densities favored represent fibrosis given the other findings.     A cholecystectomy has been performed. The upper abdomen otherwise appears unremarkable. No focal osseous lesion is seen.     IMPRESSION:  Impression:  Stable 0.5 cm nodular density in the right upper lobe.  Stable 0.4 cm nodule along the right minor fissure, possibly a perifissural lymph node. If the patient is felt to be at increased risk of  neoplasm, follow-up chest CT in 1 year could be performed to further evaluate.  Findings of moderate chronic interstitial lung disease/pulmonary fibrosis.  Previous cholecystectomy           Electronically Signed: Adam Morrissey MD    10/16/2024 9:49 AM EDT    Workstation ID: [id number]     Complete PFT - Pre & Post Bronchodilator: Patient Communication     Add Comments   Seen     Results  Complete PFT - Pre & Post Bronchodilator (Order [id number])  Order-Level Documents:    Scan on 4/24/2024 1132 by Prashanth Arthur MD: PULMONARY FUNCTION TEST, [facility] [place], 04/23/2024         Author: -- Service: -- Author Type: --   Filed: Date of Service: Creation Time:   Status: (Other)   Pulmonary Function Test Interpretation  Jung Campbeller  [id number]     05/06/24  13:52 EDT     Spirometry  PFT shows a forced vital capacity of 83% of predicted FEV1 is 82% of predicted FEV1 to FVC ratio is 77 postbronchodilator the FEV1 was 90% of predicted  Total lung capacity 76% of predicted  Diffusion capacity is 82% of predicted  Assessment  Normal spirometry  Good response to bronchodilators  Mild reduction in lung volumes  Normal diffusion capacity  Consistent with asthma        Electronically signed by Yannick Espinoza MD, 05/06/24, 1:52 PM EDT.        Assessment         Patient Active Problem List   Diagnosis    Tear of left rotator cuff    Traumatic complete tear of left rotator cuff    Cervicalgia    Anxiety disorder    Diabetes mellitus, type 2    Esophageal reflux    Essential hypertension    Hyperlipidemia    Major depressive disorder    Nicotine dependence    Seasonal allergic rhinitis    Vitamin D deficiency    Abnormal blood level of iron    Aftercare following left shoulder arthroscopy    Idiopathic hypersomnia    Mild intermittent asthma    Pure hypercholesterolemia        Plan     Diagnoses and all orders for this visit:    1. Interstitial lung disease (Primary)    2. Lung nodule seen on imaging study    3. Other tobacco product  nicotine dependence, uncomplicated       4.  Reviewed with patient today lab results, CT scan of chest, pulmonary function test and 6-minute walk.  Patient states Dr. Dr. Espinoza wanted to start him on some fibrotic medication.  At this time I will have patient follow-up with Dr. Espinoza.        Smoking status:  reports that he has been smoking cigars. He has never been exposed to tobacco smoke. He has never used smokeless tobacco.    Vaccination status: Reviewed  Immunization History   Administered Date(s) Administered    COVID-19 (PFIZER) 12YRS+ (COMIRNATY) 12/08/2023, 11/11/2024    COVID-19 (PFIZER) BIVALENT 12+YRS 11/03/2022    COVID-19 (PFIZER) Purple Cap Monovalent 03/21/2021, 04/11/2021, 12/03/2021    Covid-19 (Pfizer) Gray Cap Monovalent 07/20/2022    Flu Vaccine Quad PF >36MO 10/10/2019    Fluzone  >6mos 10/01/2018, 10/22/2024    Fluzone (or Fluarix & Flulaval for VFC) >6mos 10/29/2021, 12/08/2023    Fluzone Quad >6mos (Multi-dose) 10/01/2018, 10/27/2020    Hepatitis A 05/16/2019, 06/12/2020    Hepatitis B Adult/Adolescent IM 05/31/2023, 08/16/2023, 02/19/2024    Influenza Injectable Mdck Pf Quad 11/03/2022    Influenza, Unspecified 10/27/2020, 11/03/2022    Pneumococcal Conjugate 13-Valent (PCV13) 02/28/2022    Pneumococcal Conjugate 20-Valent (PCV20) 02/28/2023    Shingrix 02/28/2023, 05/31/2023    Tdap 05/16/2019        Medications personally reviewed    Follow Up  Return in about 4 weeks (around 12/20/2024) for with Dr. Espinoza.    Patient was given instructions and counseling regarding his condition or for health maintenance advice. Please see specific information pulled into the AVS if appropriate.     I spent 30 minutes caring for Jung Burkett on this date of service. This time includes time spent by me in the following activities:preparing for the visit, reviewing tests, obtaining and/or reviewing a separately obtained history, performing a medically appropriate examination and/or evaluation,  counseling and educating the patient/family/caregiver, ordering medications, tests, or procedures, documenting information in the medical record, independently interpreting results and communicating that information with the patient/family/caregiver and answered questions family members, discuss medications.

## 2024-11-22 ENCOUNTER — OFFICE VISIT (OUTPATIENT)
Dept: PULMONOLOGY | Facility: CLINIC | Age: 59
End: 2024-11-22
Payer: COMMERCIAL

## 2024-11-22 ENCOUNTER — HOSPITAL ENCOUNTER (OUTPATIENT)
Dept: RESPIRATORY THERAPY | Facility: HOSPITAL | Age: 59
Discharge: HOME | End: 2024-11-22
Payer: COMMERCIAL

## 2024-11-22 VITALS
RESPIRATION RATE: 16 BRPM | SYSTOLIC BLOOD PRESSURE: 128 MMHG | BODY MASS INDEX: 27.15 KG/M2 | TEMPERATURE: 97.5 F | HEIGHT: 67 IN | OXYGEN SATURATION: 95 % | WEIGHT: 173 LBS | HEART RATE: 80 BPM | DIASTOLIC BLOOD PRESSURE: 82 MMHG

## 2024-11-22 DIAGNOSIS — R91.1 LUNG NODULE SEEN ON IMAGING STUDY: ICD-10-CM

## 2024-11-22 DIAGNOSIS — J84.113 IDIOPATHIC NON-SPECIFIC INTERSTITIAL PNEUMONITIS: ICD-10-CM

## 2024-11-22 DIAGNOSIS — J84.9 INTERSTITIAL LUNG DISEASE: Primary | ICD-10-CM

## 2024-11-22 DIAGNOSIS — F17.290 OTHER TOBACCO PRODUCT NICOTINE DEPENDENCE, UNCOMPLICATED: ICD-10-CM

## 2024-11-22 PROCEDURE — 99214 OFFICE O/P EST MOD 30 MIN: CPT | Performed by: NURSE PRACTITIONER

## 2024-11-22 PROCEDURE — 94618 PULMONARY STRESS TESTING: CPT

## 2024-11-22 NOTE — PROGRESS NOTES
Exercise Oximetry    Patient Name:Jung Burkett   MRN: [id number]   Date: 11/22/24             ROOM AIR BASELINE   SpO2% 96   Heart Rate 93        EXERCISE ON ROOM AIR SpO2% EXERCISE ON O2 @  LPM SpO2%   1 MINUTE 96 1 MINUTE    2 MINUTES 94 2 MINUTES    3 MINUTES 93 3 MINUTES    4 MINUTES 95 4 MINUTES    5 MINUTES 95 5 MINUTES    6 MINUTES 96 6 MINUTES               Distance Walked  1,440 feet Distance Walked   Dyspnea (Michelle Scale)  1 Dyspnea (Michelle Scale)   Fatigue (Michelle Scale)  0 Fatigue (Michelle Scale)   SpO2% Post Exercise  96% SpO2% Post Exercise   HR Post Exercise  92 HR Post Exercise   Time to Recovery  0 Time to Recovery     Comments:

## 2024-11-27 ENCOUNTER — LAB (OUTPATIENT)
Dept: LAB | Facility: HOSPITAL | Age: 59
End: 2024-11-27
Payer: COMMERCIAL

## 2024-11-27 DIAGNOSIS — R79.89 ELEVATED SERUM CREATININE: ICD-10-CM

## 2024-11-27 DIAGNOSIS — R79.89 ELEVATED TESTOSTERONE LEVEL IN MALE: ICD-10-CM

## 2024-11-27 LAB
ALBUMIN SERPL-MCNC: 3.7 G/DL (ref 3.5–5.2)
ANION GAP SERPL CALCULATED.3IONS-SCNC: 6.6 MMOL/L (ref 5–15)
BUN SERPL-MCNC: 14 MG/DL (ref 8–23)
BUN/CREAT SERPL: 11.9 (ref 7–25)
CALCIUM SPEC-SCNC: 8.9 MG/DL (ref 8.6–10.5)
CHLORIDE SERPL-SCNC: 103 MMOL/L (ref 98–107)
CO2 SERPL-SCNC: 28.4 MMOL/L (ref 22–29)
CREAT SERPL-MCNC: 1.18 MG/DL (ref 0.76–1.27)
EGFRCR SERPLBLD CKD-EPI 2021: 70.6 ML/MIN/1.73
GLUCOSE SERPL-MCNC: 155 MG/DL (ref 65–99)
PHOSPHATE SERPL-MCNC: 3.6 MG/DL (ref 2.5–4.5)
POTASSIUM SERPL-SCNC: 4.3 MMOL/L (ref 3.5–5.2)
SODIUM SERPL-SCNC: 138 MMOL/L (ref 136–145)
TESTOST SERPL-MCNC: 637 NG/DL (ref 193–740)

## 2024-11-27 PROCEDURE — 36415 COLL VENOUS BLD VENIPUNCTURE: CPT

## 2024-11-27 PROCEDURE — 84403 ASSAY OF TOTAL TESTOSTERONE: CPT

## 2024-11-27 PROCEDURE — 80069 RENAL FUNCTION PANEL: CPT

## 2024-12-01 NOTE — PROGRESS NOTES
I have reviewed lab results. Fortunately, creatinine has normalized on CMP, and testosterone has normalized. Would forgo further workup at this time.     Thank you,    Prashanth Arthur    ?  This document has been electronically signed by Prashanth Arthur MD on December 1, 2024 18:54 EST

## 2024-12-09 ENCOUNTER — TELEPHONE (OUTPATIENT)
Dept: SLEEP MEDICINE | Facility: HOSPITAL | Age: 59
End: 2024-12-09
Payer: COMMERCIAL

## 2024-12-09 DIAGNOSIS — G47.11 IDIOPATHIC HYPERSOMNIA: ICD-10-CM

## 2024-12-09 RX ORDER — DEXTROAMPHETAMINE SACCHARATE, AMPHETAMINE ASPARTATE, DEXTROAMPHETAMINE SULFATE AND AMPHETAMINE SULFATE 5; 5; 5; 5 MG/1; MG/1; MG/1; MG/1
20 TABLET ORAL 2 TIMES DAILY
Qty: 60 TABLET | Refills: 0 | Status: SHIPPED | OUTPATIENT
Start: 2024-12-09

## 2024-12-23 ENCOUNTER — OFFICE VISIT (OUTPATIENT)
Dept: PULMONOLOGY | Facility: CLINIC | Age: 59
End: 2024-12-23
Payer: COMMERCIAL

## 2024-12-23 VITALS
HEART RATE: 75 BPM | BODY MASS INDEX: 27.15 KG/M2 | OXYGEN SATURATION: 94 % | WEIGHT: 173 LBS | DIASTOLIC BLOOD PRESSURE: 74 MMHG | TEMPERATURE: 97.6 F | SYSTOLIC BLOOD PRESSURE: 118 MMHG | HEIGHT: 67 IN | RESPIRATION RATE: 16 BRPM

## 2024-12-23 DIAGNOSIS — J84.9 INTERSTITIAL LUNG DISEASE: Primary | ICD-10-CM

## 2024-12-23 PROCEDURE — 99214 OFFICE O/P EST MOD 30 MIN: CPT | Performed by: INTERNAL MEDICINE

## 2024-12-23 NOTE — PROGRESS NOTES
Pulmonary Office Follow-up    Subjective     Jung Burkett is seen today at the office for   Chief Complaint   Patient presents with    Follow-up    Results     Blood work, 6 minute walk     Med Management         HPI  Jung Burkett is a 59 y.o. male with a PMH significant for lung nodules and mild interstitial lung disease presents for follow-up patient has been doing well and seems to not have any symptoms of shortness of breath cough wheeze or expectoration he denies any joint pains or swelling or skin rash there is no history of swallowing difficulty patient only smokes cigar occasionally      Tobacco use history:        Patient Active Problem List   Diagnosis    Tear of left rotator cuff    Traumatic complete tear of left rotator cuff    Cervicalgia    Anxiety disorder    Diabetes mellitus, type 2    Esophageal reflux    Essential hypertension    Hyperlipidemia    Major depressive disorder    Nicotine dependence    Seasonal allergic rhinitis    Vitamin D deficiency    Abnormal blood level of iron    Aftercare following left shoulder arthroscopy    Idiopathic hypersomnia    Mild intermittent asthma    Pure hypercholesterolemia       Review of Systems  Review of Systems   All other systems reviewed and are negative.    As described in the HPI. Otherwise, remainder of ROS (14 systems) were negative.    Medications, Allergies, Social, and Family Histories reviewed as per EMR.    Result Review :            Objective     Vitals:    12/23/24 1017   BP: 118/74   Pulse: 75   Resp: 16   Temp: 97.6 °F (36.4 °C)   SpO2: 94%         12/23/24  1017   Weight: 78.5 kg (173 lb)       Physical Exam  Vitals and nursing note reviewed.   Constitutional:       Appearance: Normal appearance.   HENT:      Head: Normocephalic and atraumatic.      Nose: Nose normal.      Mouth/Throat:      Pharynx: Oropharynx is clear.   Eyes:      Extraocular Movements: Extraocular movements intact.      Conjunctiva/sclera: Conjunctivae  normal.      Pupils: Pupils are equal, round, and reactive to light.   Cardiovascular:      Rate and Rhythm: Normal rate and regular rhythm.      Pulses: Normal pulses.      Heart sounds: Normal heart sounds.   Pulmonary:      Effort: Pulmonary effort is normal.      Breath sounds: Normal breath sounds.   Musculoskeletal:         General: Normal range of motion.      Cervical back: Normal range of motion and neck supple.   Skin:     General: Skin is warm.      Capillary Refill: Capillary refill takes 2 to 3 seconds.   Neurological:      Mental Status: He is alert and oriented to person, place, and time.   Psychiatric:         Mood and Affect: Mood normal.         Behavior: Behavior normal.         CT Chest With Contrast Diagnostic    Result Date: 10/16/2024  Impression: Stable 0.5 cm nodular density in the right upper lobe. Stable 0.4 cm nodule along the right minor fissure, possibly a perifissural lymph node. If the patient is felt to be at increased risk of neoplasm, follow-up chest CT in 1 year could be performed to further evaluate. Findings of moderate chronic interstitial lung disease/pulmonary fibrosis. Previous cholecystectomy Electronically Signed: Adam Morrissey MD  10/16/2024 9:49 AM EDT  Workstation ID: [id number]      Assessment & Plan     Diagnoses and all orders for this visit:    1. Interstitial lung disease (Primary)         Discussion/ Recommendations:   Patient labs were reviewed which were negative  Patient does not have any evidence of collagen vascular disease  PFTs showed normal diffusion capacity and normal spirometry  Patient was advised of the test results  Patient was advised to stop smoking and avoid dust and fumes  Patient is presently asymptomatic and will therefore follow-up in 6 months time  Patient is advised to return earlier if he develops any symptoms  CT scan high-resolution reviewed shows evidence of mild interstitial fibrosis at the lung bases subpleural  Vaccinations discussed  and recommended             Return in about 6 months (around 6/23/2025).          This document has been electronically signed by Yannick Espinoza MD on December 23, 2024 10:26 EST

## 2024-12-24 DIAGNOSIS — E11.00 TYPE 2 DIABETES MELLITUS WITH HYPEROSMOLARITY WITHOUT COMA, WITHOUT LONG-TERM CURRENT USE OF INSULIN: ICD-10-CM

## 2024-12-26 RX ORDER — TIRZEPATIDE 7.5 MG/.5ML
INJECTION, SOLUTION SUBCUTANEOUS
Qty: 0.5 ML | Refills: 2 | Status: SHIPPED | OUTPATIENT
Start: 2024-12-26 | End: 2024-12-27 | Stop reason: SDUPTHER

## 2024-12-27 ENCOUNTER — OFFICE VISIT (OUTPATIENT)
Dept: FAMILY MEDICINE CLINIC | Facility: CLINIC | Age: 59
End: 2024-12-27
Payer: COMMERCIAL

## 2024-12-27 VITALS
HEART RATE: 76 BPM | OXYGEN SATURATION: 98 % | WEIGHT: 181.6 LBS | DIASTOLIC BLOOD PRESSURE: 76 MMHG | HEIGHT: 67 IN | BODY MASS INDEX: 28.5 KG/M2 | SYSTOLIC BLOOD PRESSURE: 133 MMHG

## 2024-12-27 DIAGNOSIS — E11.00 TYPE 2 DIABETES MELLITUS WITH HYPEROSMOLARITY WITHOUT COMA, WITHOUT LONG-TERM CURRENT USE OF INSULIN: ICD-10-CM

## 2024-12-27 DIAGNOSIS — E11.9 ENCOUNTER FOR DIABETIC FOOT EXAM: Primary | ICD-10-CM

## 2024-12-27 PROCEDURE — 99213 OFFICE O/P EST LOW 20 MIN: CPT | Performed by: STUDENT IN AN ORGANIZED HEALTH CARE EDUCATION/TRAINING PROGRAM

## 2024-12-27 RX ORDER — TIRZEPATIDE 7.5 MG/.5ML
7.5 INJECTION, SOLUTION SUBCUTANEOUS WEEKLY
Qty: 0.5 ML | Refills: 6 | Status: SHIPPED | OUTPATIENT
Start: 2024-12-27

## 2024-12-27 NOTE — PROGRESS NOTES
Subjective:       Jung Burkett is a 59 y.o. male who presents for diabetic foot exam.     Diabetic foot exam performed. No skin breakdown or ulcerations bilaterally. Intact pedal pulses. Intact proprioception. Diminished sensation on monofiliment specifically along the toes.     Mauro endorses some numbness though no burning or tingling. Given some diminished sensation on exam, we discussed potential Qtenza treatments. He will think about this and let me know if he wishes to proceed.       Past Medical Hx:  Past Medical History:   Diagnosis Date    Acid reflux     ADHD (attention deficit hyperactivity disorder) 1970    Anxiety     Anxiety disorder 05/17/2019    Arthritis     generalized    Benign prostatic hyperplasia 1989    Blood disease     none    Cervicalgia     Chronic allergic rhinitis     Colon polyp 2005    Depression     Diabetes mellitus, type 2 05/17/2019    DOES NOT CHECK BS    Diabetic eye exam 01/2019 20/20 PER PT     Encounter for diabetic foot exam     WITHIN FEW MO  PER PT     Essential hypertension 05/17/2019    GERD (gastroesophageal reflux disease)     High blood pressure     High cholesterol     Hyperlipemia     Hyperlipidemia     Hypertension     Low back pain 1987    Major depressive disorder 05/17/2019    Nicotine dependence 05/17/2019    Prostate disorder     BPH    Rotator cuff tear, left     Seasonal allergies     Urinary tract infection 1993    Vitamin D deficiency 05/17/2019    no current issues       Past Surgical Hx:  Past Surgical History:   Procedure Laterality Date    CHOLECYSTECTOMY  1997    COLONOSCOPY      ELISA- REPEAT IN 5 YEARS     GALLBLADDER SURGERY      KNEE ARTHROSCOPY W/ MENISCAL REPAIR Right     OTHER SURGICAL HISTORY      SURGICAL CLIPS/gallbladder removed    OTHER SURGICAL HISTORY      right knee meniscus tear repair    SHOULDER ARTHROSCOPY W/ ROTATOR CUFF REPAIR Left 07/21/2021    Procedure: SHOULDER ARTHROSCOPY,SUBACROMINAL DECOMPRESSION,  DISTAL CLAVICLE RESECTION, MINI OPEN  ROTATOR CUFF REPAIR;  Surgeon: Ulisses Kenney MD;  [place] [facility] [place] [facility] [place]  Service: [place]opedics;  Laterality: Left;    TONSILLECTOMY      VASECTOMY  2004       Current Meds:    Current Outpatient Medications:     albuterol (PROVENTIL) (2.5 MG/3ML) 0.083% nebulizer solution, Take 2.5 mg by nebulization Every 4 (Four) Hours As Needed for Wheezing., Disp: 3 mL, Rfl: 12    amphetamine-dextroamphetamine (ADDERALL) 20 MG tablet, Take 1 tablet by mouth 2 (Two) Times a Day. Dose adjustment, Disp: 60 tablet, Rfl: 0    atorvastatin (LIPITOR) 20 MG tablet, TAKE 1 TABLET NIGHTLY AT   BEDTIME, Disp: 90 tablet, Rfl: 1    busPIRone (BUSPAR) 15 MG tablet, TAKE 1 TABLET TWICE A DAY  AS NEEDED FOR ANXIETY, Disp: 180 tablet, Rfl: 1    CBD (cannabidiol) oral oil, Take  by mouth., Disp: , Rfl:     finasteride (PROSCAR) 5 MG tablet, TAKE 1 TABLET BY MOUTH EVERY DAY, Disp: 90 tablet, Rfl: 1    lansoprazole (PREVACID) 15 MG capsule, TAKE 1 CAPSULE BY MOUTH EVERY DAY, Disp: 90 capsule, Rfl: 1    metoprolol succinate XL (TOPROL-XL) 50 MG 24 hr tablet, TAKE 1 TABLET DAILY, Disp: 90 tablet, Rfl: 1    tamsulosin (FLOMAX) 0.4 MG capsule 24 hr capsule, TAKE 1 CAPSULE DAILY, Disp: 90 capsule, Rfl: 1    Tirzepatide (Mounjaro) 7.5 MG/0.5ML solution auto-injector, Inject 7.5 mg under the skin into the appropriate area as directed 1 (One) Time Per Week., Disp: 0.5 mL, Rfl: 6    traZODone (DESYREL) 100 MG tablet, TAKE 1 TABLET NIGHTLY AT   BEDTIME, Disp: 90 tablet, Rfl: 1    vitamin D (ERGOCALCIFEROL) 1.25 MG (29883 UT) capsule capsule, Take 1 capsule by mouth Every 7 (Seven) Days., Disp: , Rfl:     Allergies:  Allergies   Allergen Reactions    Hydrocodone-Acetaminophen Itching    Nsaids Arrhythmia    Oxycodone-Acetaminophen Shortness Of Breath    Sulfa Antibiotics Hives    Voltaren [Diclofenac] Palpitations       Family Hx:  Family History   Problem Relation Age of Onset    Cancer Mother     Diabetes Mother   "   Rheum arthritis Mother         40'S    Heart attack Mother     Breast cancer Mother         40'S    Arthritis Mother     Stroke Father     Heart disease Father     Heart attack Maternal Grandmother     Breast cancer Maternal Grandmother         50'S    Prostate cancer Maternal Grandfather     Nephrolithiasis Maternal Grandfather     Colon cancer Paternal Grandmother     Colon cancer Paternal Grandfather         60'S    Breast cancer Other         40'S    Heart attack Maternal Aunt     Malig Hyperthermia Neg Hx         Social History:  Social History     Socioeconomic History    Marital status:    Tobacco Use    Smoking status: Some Days     Types: Cigars     Passive exposure: Never    Smokeless tobacco: Never    Tobacco comments:     1 cigar daily   Vaping Use    Vaping status: Never Used   Substance and Sexual Activity    Alcohol use: Yes     Alcohol/week: 6.0 standard drinks of alcohol     Types: 6 Drinks containing 0.5 oz of alcohol per week     Comment: Drinks daily; beer. Occasionally drinks 2 drinks per day, has been drinking for 6-10 years.     Drug use: Never    Sexual activity: Defer       Review of Systems  Review of Systems   Neurological:         +some numbness in feet       Objective:     /76 (BP Location: Left arm, Patient Position: Sitting, Cuff Size: Large Adult)   Pulse 76   Ht 170.2 cm (67\")   Wt 82.4 kg (181 lb 9.6 oz)   SpO2 98%   BMI 28.44 kg/m²   Physical Exam  Constitutional:       General: He is not in acute distress.     Appearance: Normal appearance. He is not ill-appearing, toxic-appearing or diaphoretic.   Skin:     Comments: Diabetic foot exam performed. No skin breakdown or ulcerations bilaterally. Intact pedal pulses. Intact proprioception. Diminished sensation on monofiliment specifically along the toes.    Neurological:      Mental Status: He is alert.          Assessment/Plan:     Diagnoses and all orders for this visit:    1. Encounter for diabetic foot exam " (Primary)    Diabetic foot exam performed. No skin breakdown or ulcerations bilaterally. Intact pedal pulses. Intact proprioception. Diminished sensation on monofiliment specifically along the toes.     Mauro endorses some numbness though no burning or tingling. Given some diminished sensation on exam, we discussed potential Qtenza treatments. He will think about this and let me know if he wishes to proceed.      2. Type 2 diabetes mellitus with hyperosmolarity without coma, without long-term current use of insulin  -     Tirzepatide (Mounjaro) 7.5 MG/0.5ML solution auto-injector; Inject 7.5 mg under the skin into the appropriate area as directed 1 (One) Time Per Week.  Dispense: 0.5 mL; Refill: 6          Follow-up:     Return in about 5 months (around 5/27/2025) for Diabetes .    Preventative:  Health Maintenance   Topic Date Due    ANNUAL PHYSICAL  11/29/2023    HEMOGLOBIN A1C  04/25/2025    URINE MICROALBUMIN  05/10/2025    DIABETIC EYE EXAM  08/20/2025    BMI FOLLOWUP  10/22/2025    LIPID PANEL  10/25/2025    DIABETIC FOOT EXAM  12/27/2025    COLORECTAL CANCER SCREENING  05/27/2026    TDAP/TD VACCINES (2 - Td or Tdap) 05/16/2029    HEPATITIS C SCREENING  Completed    COVID-19 Vaccine  Completed    Pneumococcal Vaccine 0-64  Completed    INFLUENZA VACCINE  Completed    ZOSTER VACCINE  Completed       This document has been electronically signed by Prashanth Arthur MD on December 27, 2024 19:38 EST       Parts of this note are electronic transcriptions/translations of spoken language to printed text using the Dragon Dictation system.

## 2025-01-16 ENCOUNTER — TELEPHONE (OUTPATIENT)
Dept: SLEEP MEDICINE | Facility: HOSPITAL | Age: 60
End: 2025-01-16
Payer: COMMERCIAL

## 2025-01-16 DIAGNOSIS — G47.11 IDIOPATHIC HYPERSOMNIA: ICD-10-CM

## 2025-01-16 RX ORDER — DEXTROAMPHETAMINE SACCHARATE, AMPHETAMINE ASPARTATE, DEXTROAMPHETAMINE SULFATE AND AMPHETAMINE SULFATE 5; 5; 5; 5 MG/1; MG/1; MG/1; MG/1
20 TABLET ORAL 2 TIMES DAILY
Qty: 60 TABLET | Refills: 0 | Status: SHIPPED | OUTPATIENT
Start: 2025-01-16

## 2025-01-23 ENCOUNTER — TELEPHONE (OUTPATIENT)
Dept: GASTROENTEROLOGY | Facility: CLINIC | Age: 60
End: 2025-01-23
Payer: COMMERCIAL

## 2025-01-23 ENCOUNTER — OFFICE VISIT (OUTPATIENT)
Dept: GASTROENTEROLOGY | Facility: CLINIC | Age: 60
End: 2025-01-23
Payer: COMMERCIAL

## 2025-01-23 VITALS
SYSTOLIC BLOOD PRESSURE: 116 MMHG | BODY MASS INDEX: 27.69 KG/M2 | OXYGEN SATURATION: 99 % | HEART RATE: 95 BPM | DIASTOLIC BLOOD PRESSURE: 81 MMHG | WEIGHT: 176.8 LBS

## 2025-01-23 DIAGNOSIS — Z86.0100 HISTORY OF COLONIC POLYPS: ICD-10-CM

## 2025-01-23 DIAGNOSIS — R19.5 LOOSE STOOLS: Primary | ICD-10-CM

## 2025-01-23 DIAGNOSIS — R10.9 ABDOMINAL CRAMPING: ICD-10-CM

## 2025-01-23 DIAGNOSIS — K62.5 RECTAL BLEEDING: ICD-10-CM

## 2025-01-23 DIAGNOSIS — Z80.0 FAMILY HISTORY OF COLON CANCER: ICD-10-CM

## 2025-01-23 DIAGNOSIS — R10.84 GENERALIZED ABDOMINAL PAIN: ICD-10-CM

## 2025-01-23 PROCEDURE — 99214 OFFICE O/P EST MOD 30 MIN: CPT

## 2025-01-23 RX ORDER — HYOSCYAMINE SULFATE 0.125 MG
0.12 TABLET ORAL
Qty: 120 TABLET | Refills: 5 | Status: SHIPPED | OUTPATIENT
Start: 2025-01-23

## 2025-01-23 RX ORDER — SODIUM PICOSULFATE, MAGNESIUM OXIDE, AND ANHYDROUS CITRIC ACID 10; 3.5; 12 MG/160ML; G/160ML; G/160ML
175 LIQUID ORAL TAKE AS DIRECTED
Qty: 350 ML | Refills: 0 | Status: SHIPPED | OUTPATIENT
Start: 2025-01-23

## 2025-01-23 NOTE — PROGRESS NOTES
Chief Complaint     Diarrhea (Pt states some days he will go to the bathroom more than 4 times a day/He states sometimes he doesn't feel like he is having a complete BM.), Colonoscopy (Pt would like to discuss a colonoscopy ), Gas (Pt states he has a lot of gas/He states OTC gas-x hasn't really been helping ), Abdominal Pain (Pt states the lower and middle abd is sore to the touch), and Medication (Pt would like to discuss bentyl due to stomach cramping )    History of Present Illness     Jung Burkett is a 59 y.o. male who presents to [facility] on referral from Prashanth Arthur MD for a gastroenterology evaluation of generalized abdominal pain.      HPI:  1/23/2025 Initial Office Visit: Patient presents to the GI office of Dr. Almeida for evaluation of  generalized abdominal pain, gas, and diarrhea. He complains of postprandial diarrhea. He reports typical bowel regimen consists of 2 BM day, stool described as liquid and dark in color. Admits to having hemorrhoids. He has reports some rectal bleeding he believes is from the hemorrhoids. No reports of melena. He reports generalized abdominal for years since the start of the diarrhea after the colonoscopy. He reports taking Bentyl for abdominal pain. He was started on the Colestipol by Dr. Arthur but he has since discontinued use.  No reports of nausea, vomiting, heartburn, hematemesis, dysphagia, or unintentional weight loss. Patient has a positive family history of colon cancer, and in paternal grandparents.Patient's last EGD/Colonoscopy 8/29/2019 performed by Dr. Lay-normal esophagus, normal duodenum, normal mucosa in the antrum.  Internal hemorrhoids.  Follow-up colonoscopy in 5 years.        History      Past Medical History:   Diagnosis Date    Acid reflux     ADHD (attention deficit hyperactivity disorder) 1970    Anxiety     Anxiety disorder 05/17/2019    Arthritis     generalized    Benign prostatic hyperplasia  1989    Blood disease     none    Cervicalgia     Chronic allergic rhinitis     Colon polyp 2005    Depression     Diabetes mellitus, type 2 05/17/2019    DOES NOT CHECK BS    Diabetic eye exam 01/2019 20/20 PER PT     Encounter for diabetic foot exam     WITHIN FEW MO  PER PT     Essential hypertension 05/17/2019    GERD (gastroesophageal reflux disease)     High blood pressure     High cholesterol     Hyperlipemia     Hyperlipidemia     Hypertension     Low back pain 1987    Major depressive disorder 05/17/2019    Nicotine dependence 05/17/2019    Prostate disorder     BPH    Rotator cuff tear, left     Seasonal allergies     Urinary tract infection 1993    Vitamin D deficiency 05/17/2019    no current issues       Past Surgical History:   Procedure Laterality Date    CHOLECYSTECTOMY  1997    COLONOSCOPY      ELISA- REPEAT IN 5 YEARS     GALLBLADDER SURGERY      KNEE ARTHROSCOPY W/ MENISCAL REPAIR Right     OTHER SURGICAL HISTORY      SURGICAL CLIPS/gallbladder removed    OTHER SURGICAL HISTORY      right knee meniscus tear repair    SHOULDER ARTHROSCOPY W/ ROTATOR CUFF REPAIR Left 07/21/2021    Procedure: SHOULDER ARTHROSCOPY,SUBACROMINAL DECOMPRESSION, DISTAL CLAVICLE RESECTION, MINI OPEN  ROTATOR CUFF REPAIR;  Surgeon: Ulisses Kenney MD;  [place] [facility] [place] [facility] [place];  Laterality: Left;    TONSILLECTOMY      VASECTOMY  2004       Family History   Problem Relation Age of Onset    Cancer Mother     Diabetes Mother     Rheum arthritis Mother         40'S    Heart attack Mother     Breast cancer Mother         40'S    Arthritis Mother     Stroke Father     Heart disease Father     Heart attack Maternal Grandmother     Breast cancer Maternal Grandmother         50'S    Prostate cancer Maternal Grandfather     Nephrolithiasis Maternal Grandfather     Colon cancer Paternal Grandmother     Colon cancer Paternal Grandfather         60'S    Breast cancer Other         40'S    Heart  attack Maternal Aunt     Mady Hyperthermia Neg Hx         Current Medications        Current Outpatient Medications:     amphetamine-dextroamphetamine (ADDERALL) 20 MG tablet, Take 1 tablet by mouth 2 (Two) Times a Day. Dose adjustment, Disp: 60 tablet, Rfl: 0    atorvastatin (LIPITOR) 20 MG tablet, TAKE 1 TABLET NIGHTLY AT   BEDTIME, Disp: 90 tablet, Rfl: 1    busPIRone (BUSPAR) 15 MG tablet, TAKE 1 TABLET TWICE A DAY  AS NEEDED FOR ANXIETY, Disp: 180 tablet, Rfl: 1    CBD (cannabidiol) oral oil, Take  by mouth., Disp: , Rfl:     finasteride (PROSCAR) 5 MG tablet, TAKE 1 TABLET BY MOUTH EVERY DAY, Disp: 90 tablet, Rfl: 1    lansoprazole (PREVACID) 15 MG capsule, TAKE 1 CAPSULE BY MOUTH EVERY DAY, Disp: 90 capsule, Rfl: 1    metoprolol succinate XL (TOPROL-XL) 50 MG 24 hr tablet, TAKE 1 TABLET DAILY, Disp: 90 tablet, Rfl: 1    tamsulosin (FLOMAX) 0.4 MG capsule 24 hr capsule, TAKE 1 CAPSULE DAILY, Disp: 90 capsule, Rfl: 1    Tirzepatide (Mounjaro) 7.5 MG/0.5ML solution auto-injector, Inject 7.5 mg under the skin into the appropriate area as directed 1 (One) Time Per Week., Disp: 0.5 mL, Rfl: 6    traZODone (DESYREL) 100 MG tablet, TAKE 1 TABLET NIGHTLY AT   BEDTIME, Disp: 90 tablet, Rfl: 1    vitamin D (ERGOCALCIFEROL) 1.25 MG (03155 UT) capsule capsule, Take 1 capsule by mouth Every 7 (Seven) Days., Disp: , Rfl:     albuterol (PROVENTIL) (2.5 MG/3ML) 0.083% nebulizer solution, Take 2.5 mg by nebulization Every 4 (Four) Hours As Needed for Wheezing. (Patient not taking: Reported on 1/23/2025), Disp: 3 mL, Rfl: 12    hyoscyamine (ANASPAZ,LEVSIN) 0.125 MG tablet, Take 1 tablet by mouth 4 (Four) Times a Day With Meals & at Bedtime., Disp: 120 tablet, Rfl: 5    Sod Picosulfate-Mag Ox-Cit Acd (Clenpiq) 10-3.5-12 MG-GM -GM/160ML solution, Take 175 mL by mouth Take As Directed. As directed by office., Disp: 350 mL, Rfl: 0     Allergies     Allergies   Allergen Reactions    Hydrocodone-Acetaminophen Itching    Nsaids  Arrhythmia    Oxycodone-Acetaminophen Shortness Of Breath    Sulfa Antibiotics Hives    Voltaren [Diclofenac] Palpitations       Social History       Social History     Social History Narrative    Not on file       Immunizations     Immunization:  Immunization History   Administered Date(s) Administered    COVID-19 (PFIZER) 12YRS+ (COMIRNATY) 12/08/2023, 11/11/2024    COVID-19 (PFIZER) BIVALENT 12+YRS 11/03/2022    COVID-19 (PFIZER) Purple Cap Monovalent 03/21/2021, 04/11/2021, 12/03/2021    Covid-19 (Pfizer) Gray Cap Monovalent 07/20/2022    Flu Vaccine Quad PF >36MO 10/10/2019    Fluzone  >6mos 10/01/2018, 10/22/2024    Fluzone (or Fluarix & Flulaval for VFC) >6mos 10/29/2021, 12/08/2023    Fluzone Quad >6mos (Multi-dose) 10/01/2018, 10/27/2020    Hepatitis A 05/16/2019, 06/12/2020    Hepatitis B Adult/Adolescent IM 05/31/2023, 08/16/2023, 02/19/2024    Influenza Injectable Mdck Pf Quad 11/03/2022    Influenza, Unspecified 10/27/2020, 11/03/2022    Pneumococcal Conjugate 13-Valent (PCV13) 02/28/2022    Pneumococcal Conjugate 20-Valent (PCV20) 02/28/2023    Shingrix 02/28/2023, 05/31/2023    Tdap 05/16/2019          Objective     Objective     Vital Signs:   /81 (BP Location: Left arm, Patient Position: Sitting, Cuff Size: Adult)   Pulse 95   Wt 80.2 kg (176 lb 12.8 oz)   SpO2 99%   BMI 27.69 kg/m²       Physical Exam  HENT:      Head: Normocephalic.   Cardiovascular:      Rate and Rhythm: Normal rate.   Musculoskeletal:         General: Normal range of motion.   Neurological:      General: No focal deficit present.      Mental Status: He is alert.   Psychiatric:         Mood and Affect: Mood normal.         Results      Result Review :   The following data was reviewed by: MERE Gomez on 01/23/2025:    Lab Results - Last 18 Months   Lab Units 10/25/24  1000 05/15/24  2348 01/20/24  0205   WBC 10*3/mm3 5.92 7.07 4.70   HEMOGLOBIN g/dL 17.0 15.8 16.6   MCV fL 94.6 89.0 88.9   PLATELETS  "10*3/mm3 190 228 197         Lab Results - Last 18 Months   Lab Units 11/27/24  0916 10/25/24  1000 10/14/24  1410 05/15/24  2348   BUN mg/dL 14 13  --  9   CREATININE mg/dL 1.18 1.29* 1.00 1.01   SODIUM mmol/L 138 135*  --  139   POTASSIUM mmol/L 4.3 4.2  --  4.2   CHLORIDE mmol/L 103 98  --  102   CO2 mmol/L 28.4 27.3  --  27.6   GLUCOSE mg/dL 155* 178*  --  143*      No results for input(s): \"PROTIME\", \"INR\", \"PTT\" in the last 59187 hours.  Lab Results - Last 18 Months   Lab Units 11/27/24  0916 10/25/24  1000 05/15/24  2348 01/20/24  0205   AST (SGOT) U/L  --  19 20 40   ALT (SGPT) U/L  --  15 18 44*   ALK PHOS U/L  --  55 57 51   BILIRUBIN mg/dL  --  0.9 0.5 0.4   TOTAL PROTEIN g/dL  --  7.7 7.6 7.1   ALBUMIN g/dL 3.7 4.2 4.3 4.2      No results for input(s): \"IRON\", \"TRANSFERRIN\", \"TIBC\", \"FERRITIN\", \"YOURFNWZ67\", \"FOLATE\" in the last 68929 hours.  No results for input(s): \"HAV\", \"HEPAIGM\", \"HEPBIGM\", \"HEPBCAB\", \"HBEAG\", \"HEPCAB\" in the last 05095 hours.    CT Chest With Contrast Diagnostic    Result Date: 10/16/2024  Impression: Stable 0.5 cm nodular density in the right upper lobe. Stable 0.4 cm nodule along the right minor fissure, possibly a perifissural lymph node. If the patient is felt to be at increased risk of neoplasm, follow-up chest CT in 1 year could be performed to further evaluate. Findings of moderate chronic interstitial lung disease/pulmonary fibrosis. Previous cholecystectomy Electronically Signed: Adam Morrissey MD  10/16/2024 9:49 AM EDT  Workstation ID: [id number]         Assessment and Plan        Assessment and Plan    Diagnoses and all orders for this visit:    1. Loose stools (Primary)  -     Enteric Bacterial Panel - Stool, Per Rectum; Future  -     Enteric Parasite Panel - Stool, Per Rectum; Future  -     Fecal Lactoferrin Qual. - Stool, Per Rectum; Future  -     Clostridioides difficile Toxin - Stool, Per Rectum; Future  -     Occult Blood, Fecal By Immunoassay - Stool, Per Rectum; " Future  -     Calprotectin, Fecal - Stool, Per Rectum; Future  -     Pancreatic Elastase, Fecal - Stool, Per Rectum; Future  -     Alpha-Gal IgE Panel; Future    2. Generalized abdominal pain    3. Abdominal cramping  -     hyoscyamine (ANASPAZ,LEVSIN) 0.125 MG tablet; Take 1 tablet by mouth 4 (Four) Times a Day With Meals & at Bedtime.  Dispense: 120 tablet; Refill: 5    4. Rectal bleeding  -     Case Request; Standing  -     Case Request  -     Sod Picosulfate-Mag Ox-Cit Acd (Clenpiq) 10-3.5-12 MG-GM -GM/160ML solution; Take 175 mL by mouth Take As Directed. As directed by office.  Dispense: 350 mL; Refill: 0    5. Family history of colon cancer  Comments:  found in maternal and paternal grandfather  Orders:  -     Case Request; Standing  -     Case Request  -     Sod Picosulfate-Mag Ox-Cit Acd (Clenpiq) 10-3.5-12 MG-GM -GM/160ML solution; Take 175 mL by mouth Take As Directed. As directed by office.  Dispense: 350 mL; Refill: 0    6. History of colonic polyps  -     Case Request; Standing  -     Case Request  -     Sod Picosulfate-Mag Ox-Cit Acd (Clenpiq) 10-3.5-12 MG-GM -GM/160ML solution; Take 175 mL by mouth Take As Directed. As directed by office.  Dispense: 350 mL; Refill: 0    Other orders  -     Follow Anesthesia Guidelines / Protocol; Standing  -     Follow Anesthesia Guidelines / Protocol; Future  -     Verify NPO; Standing        COLONOSCOPY, WITH POSSIBLE BIOPSY (N/A)      Follow Up        Follow Up   Return for Follow up after Colonoscopy.    I have recommended that the patient undergo further evaluation with a colonoscopy due to rectal bleeding, family history of colon cancer, and personal history of colonic polyp.  I have discussed this procedure in detail with the patient.  I have discussed the risk, benefits and alternatives.  I have discussed the risk of anesthesia, bleeding and perforation.  Patient understands these risks, benefits and alternatives and wishes to proceed.  I will schedule him at  his earliest convenience.  Patient agreeable to this plan and will call with any questions or concerns. Clenpiq bowel prep.    Continue Prevacid.  Stop Bentyl  Start Levsin, 30 to 60 minutes prior to meals up to 4 times daily for abdominal cramping.    Stool studies ordered to rule out infectious or inflammatory causes of the diarrhea.  Pancreatic elastase, alpha gal panel ordered to rule out other possible causes of diarrhea.    Patient was given instructions and counseling regarding his condition or for health maintenance advice. Please see specific information pulled into the AVS if appropriate.     see ambulance record

## 2025-01-23 NOTE — TELEPHONE ENCOUNTER
2025    Dear Dr Espinoza,      Patient Name: Jung Burkett  : 1965      This patient is waiting to have a Colonoscopy and/or Esophagogastroduodenoscopy which I will perform at [facility] on 3.11.25. Please respond to this request noting your recommendations regarding clearance from a Pulmonary  standpoint.  You may contact our office at [phone] Option 2 with any questions. I appreciate your prompt response in this matter. Please return this form to our office as soon as possible to [phone].    ____ I approve my patient from a Pulmonary  standpoint    ____ I do NOT approve my patient from a Pulmonary  standpoint at this time    Please inform our office if the patient requires additional follow-up from your office prior to scheduled procedure date.      Please specify clearance expiration date:____________________________________      Approving physician name (please print): _____________________________________________      Approving physician signature: ________________________________ Date:________________  Sincerely,  [facility] - Gastroenterology   Dr. Almeida          Please fax approval or denial to our office as soon as possible.

## 2025-01-30 ENCOUNTER — TRANSCRIBE ORDERS (OUTPATIENT)
Dept: LAB | Facility: HOSPITAL | Age: 60
End: 2025-01-30
Payer: COMMERCIAL

## 2025-01-30 ENCOUNTER — LAB (OUTPATIENT)
Dept: LAB | Facility: HOSPITAL | Age: 60
End: 2025-01-30
Payer: COMMERCIAL

## 2025-01-30 DIAGNOSIS — R19.5 LOOSE STOOLS: ICD-10-CM

## 2025-01-30 LAB
027 TOXIN: NORMAL
C DIFF TOX GENS STL QL NAA+PROBE: NEGATIVE
HEMOCCULT STL QL IA: NEGATIVE
LACTOFERRIN STL QL LA: NEGATIVE

## 2025-01-30 PROCEDURE — 82274 ASSAY TEST FOR BLOOD FECAL: CPT

## 2025-01-30 PROCEDURE — 83630 LACTOFERRIN FECAL (QUAL): CPT

## 2025-01-30 PROCEDURE — 86003 ALLG SPEC IGE CRUDE XTRC EA: CPT

## 2025-01-30 PROCEDURE — 82653 EL-1 FECAL QUANTITATIVE: CPT

## 2025-01-30 PROCEDURE — 87505 NFCT AGENT DETECTION GI: CPT

## 2025-01-30 PROCEDURE — 36415 COLL VENOUS BLD VENIPUNCTURE: CPT

## 2025-01-30 PROCEDURE — 83993 ASSAY FOR CALPROTECTIN FECAL: CPT

## 2025-01-30 PROCEDURE — 87493 C DIFF AMPLIFIED PROBE: CPT

## 2025-01-30 PROCEDURE — 82785 ASSAY OF IGE: CPT

## 2025-01-30 PROCEDURE — 87506 IADNA-DNA/RNA PROBE TQ 6-11: CPT

## 2025-01-30 PROCEDURE — 86008 ALLG SPEC IGE RECOMB EA: CPT

## 2025-01-31 LAB
C COLI+JEJ+UPSA DNA STL QL NAA+NON-PROBE: NOT DETECTED
EC STX1+STX2 GENES STL QL NAA+NON-PROBE: NOT DETECTED
S ENT+BONG DNA STL QL NAA+NON-PROBE: NOT DETECTED
SHIGELLA SP+EIEC IPAH ST NAA+NON-PROBE: NOT DETECTED

## 2025-02-02 LAB
ALPHA-GAL IGE QN: <0.1 KU/L
BEEF IGE QN: <0.1 KU/L
CONV CLASS DESCRIPTION: NORMAL
IGE SERPL-ACNC: 255 IU/ML (ref 6–495)
LAMB IGE QN: <0.1 KU/L
PORK IGE QN: <0.1 KU/L

## 2025-02-03 LAB
CALPROTECTIN STL-MCNT: 67 UG/G (ref 0–120)
CRYPTOSP DNA STL QL NAA+NON-PROBE: NOT DETECTED
E HISTOLYT DNA STL QL NAA+NON-PROBE: NOT DETECTED
ELASTASE PANC STL-MCNT: >800 UG ELAST./G
G LAMBLIA DNA STL QL NAA+NON-PROBE: NOT DETECTED

## 2025-02-17 DIAGNOSIS — R12 HEARTBURN: ICD-10-CM

## 2025-02-17 RX ORDER — MECOBALAMIN 5000 MCG
15 TABLET,DISINTEGRATING ORAL DAILY
Qty: 30 CAPSULE | Refills: 0 | Status: SHIPPED | OUTPATIENT
Start: 2025-02-17

## 2025-02-18 ENCOUNTER — TELEPHONE (OUTPATIENT)
Dept: SLEEP MEDICINE | Facility: HOSPITAL | Age: 60
End: 2025-02-18
Payer: COMMERCIAL

## 2025-02-19 DIAGNOSIS — G47.11 IDIOPATHIC HYPERSOMNIA: ICD-10-CM

## 2025-02-19 RX ORDER — DEXTROAMPHETAMINE SACCHARATE, AMPHETAMINE ASPARTATE, DEXTROAMPHETAMINE SULFATE AND AMPHETAMINE SULFATE 5; 5; 5; 5 MG/1; MG/1; MG/1; MG/1
20 TABLET ORAL 2 TIMES DAILY
Qty: 60 TABLET | Refills: 0 | Status: SHIPPED | OUTPATIENT
Start: 2025-02-19

## 2025-03-03 ENCOUNTER — HOSPITAL ENCOUNTER (OUTPATIENT)
Dept: GENERAL RADIOLOGY | Facility: HOSPITAL | Age: 60
Discharge: HOME | End: 2025-03-03
Admitting: STUDENT IN AN ORGANIZED HEALTH CARE EDUCATION/TRAINING PROGRAM
Payer: COMMERCIAL

## 2025-03-03 ENCOUNTER — TELEPHONE (OUTPATIENT)
Dept: FAMILY MEDICINE CLINIC | Facility: CLINIC | Age: 60
End: 2025-03-03
Payer: COMMERCIAL

## 2025-03-03 ENCOUNTER — OFFICE VISIT (OUTPATIENT)
Dept: FAMILY MEDICINE CLINIC | Facility: CLINIC | Age: 60
End: 2025-03-03
Payer: COMMERCIAL

## 2025-03-03 VITALS
DIASTOLIC BLOOD PRESSURE: 81 MMHG | BODY MASS INDEX: 27.25 KG/M2 | HEART RATE: 91 BPM | HEIGHT: 67 IN | SYSTOLIC BLOOD PRESSURE: 136 MMHG | OXYGEN SATURATION: 100 % | WEIGHT: 173.6 LBS

## 2025-03-03 DIAGNOSIS — M54.50 LUMBAR PAIN: ICD-10-CM

## 2025-03-03 DIAGNOSIS — R12 HEARTBURN: ICD-10-CM

## 2025-03-03 DIAGNOSIS — M54.50 LUMBAR PAIN: Primary | ICD-10-CM

## 2025-03-03 PROCEDURE — 72100 X-RAY EXAM L-S SPINE 2/3 VWS: CPT

## 2025-03-03 PROCEDURE — 99213 OFFICE O/P EST LOW 20 MIN: CPT | Performed by: STUDENT IN AN ORGANIZED HEALTH CARE EDUCATION/TRAINING PROGRAM

## 2025-03-03 RX ORDER — TIZANIDINE 2 MG/1
2 TABLET ORAL EVERY 8 HOURS PRN
Qty: 21 TABLET | Refills: 0 | Status: SHIPPED | OUTPATIENT
Start: 2025-03-03 | End: 2025-03-07

## 2025-03-03 RX ORDER — METHYLPREDNISOLONE 4 MG/1
TABLET ORAL
Qty: 21 TABLET | Refills: 0 | Status: SHIPPED | OUTPATIENT
Start: 2025-03-03

## 2025-03-03 RX ORDER — LIDOCAINE 50 MG/G
1 PATCH TOPICAL EVERY 24 HOURS
Qty: 7 PATCH | Refills: 0 | Status: SHIPPED | OUTPATIENT
Start: 2025-03-03

## 2025-03-03 RX ORDER — MECOBALAMIN 5000 MCG
30 TABLET,DISINTEGRATING ORAL DAILY
Qty: 90 CAPSULE | Refills: 0 | Status: SHIPPED | OUTPATIENT
Start: 2025-03-03 | End: 2025-03-05

## 2025-03-03 NOTE — TELEPHONE ENCOUNTER
Spoke with patient and informed him he would need to see Dr. Arthur first. Advised him to go to urgent care tonight if it gets too bad. Patient is scheduled with Dr. Arthur tomorrow 03/04/2025.

## 2025-03-03 NOTE — TELEPHONE ENCOUNTER
"    Caller: Jung Burkett \"Mauro\"    Relationship: Self    Best call back number: [phone]     What medication are you requesting: MUSCLE RELAXER    What are your current symptoms: MUSCLE SPASMS IN BACK    How long have you been experiencing symptoms: 1 MONTH      If a prescription is needed, what is your preferred pharmacy and phone number: [facility] - [place], [place] - [address] - [phone] [facility] [phone]      Additional notes:PATIENT MADE AN APPOINTMENT FOR 3.4.25 BUT WANTS TO KNOW IF MUSCLE RELAXER CAN BE CALLED IN SO HE CAN GET THROUGH THE DAY        "

## 2025-03-03 NOTE — PROGRESS NOTES
Subjective:       Jung Burkett is a 60 y.o. male who presents for back pain.     Reports back pain, worsening.     Patient has allergy to NSAIDs in chart. Will use zanaflex muscle relaxer and steroid burst.    Has already tried PT.     Will obtain lumbar XR.    Follow up 6-8 weeks.     Past Medical Hx:  Past Medical History:   Diagnosis Date    Acid reflux     ADHD (attention deficit hyperactivity disorder) 1970    Anxiety     Anxiety disorder 05/17/2019    Arthritis     generalized    Benign prostatic hyperplasia 1989    Blood disease     none    Cervicalgia     Chronic allergic rhinitis     Colon polyp 2005    Depression     Diabetes mellitus, type 2 05/17/2019    DOES NOT CHECK BS    Diabetic eye exam 01/2019 20/20 PER PT     Encounter for diabetic foot exam     WITHIN FEW MO  PER PT     Essential hypertension 05/17/2019    GERD (gastroesophageal reflux disease)     High blood pressure     High cholesterol     Hyperlipemia     Hyperlipidemia     Hypertension     Low back pain 1987    Major depressive disorder 05/17/2019    Nicotine dependence 05/17/2019    Prostate disorder     BPH    Rotator cuff tear, left     Seasonal allergies     Urinary tract infection 1993    Vitamin D deficiency 05/17/2019    no current issues       Past Surgical Hx:  Past Surgical History:   Procedure Laterality Date    CHOLECYSTECTOMY  1997    COLONOSCOPY      ELISA- REPEAT IN 5 YEARS     GALLBLADDER SURGERY      KNEE ARTHROSCOPY W/ MENISCAL REPAIR Right     OTHER SURGICAL HISTORY      SURGICAL CLIPS/gallbladder removed    OTHER SURGICAL HISTORY      right knee meniscus tear repair    SHOULDER ARTHROSCOPY W/ ROTATOR CUFF REPAIR Left 07/21/2021    Procedure: SHOULDER ARTHROSCOPY,SUBACROMINAL DECOMPRESSION, DISTAL CLAVICLE RESECTION, MINI OPEN  ROTATOR CUFF REPAIR;  Surgeon: Ulisses Kenney MD;  Location: [facility] [place] [facility];  Service: Orthopedics;  Laterality: Left;    TONSILLECTOMY      VASECTOMY  2004       Current  Meds:    Current Outpatient Medications:     amphetamine-dextroamphetamine (ADDERALL) 20 MG tablet, Take 1 tablet by mouth 2 (Two) Times a Day. Dose adjustment, Disp: 60 tablet, Rfl: 0    atorvastatin (LIPITOR) 20 MG tablet, TAKE 1 TABLET NIGHTLY AT   BEDTIME, Disp: 90 tablet, Rfl: 1    busPIRone (BUSPAR) 15 MG tablet, TAKE 1 TABLET TWICE A DAY  AS NEEDED FOR ANXIETY, Disp: 180 tablet, Rfl: 1    CBD (cannabidiol) oral oil, Take  by mouth., Disp: , Rfl:     finasteride (PROSCAR) 5 MG tablet, TAKE 1 TABLET BY MOUTH EVERY DAY, Disp: 90 tablet, Rfl: 1    hyoscyamine (ANASPAZ,LEVSIN) 0.125 MG tablet, Take 1 tablet by mouth 4 (Four) Times a Day With Meals & at Bedtime., Disp: 120 tablet, Rfl: 5    lansoprazole (PREVACID) 15 MG capsule, Take 2 capsules by mouth Daily., Disp: 90 capsule, Rfl: 0    metoprolol succinate XL (TOPROL-XL) 50 MG 24 hr tablet, TAKE 1 TABLET DAILY, Disp: 90 tablet, Rfl: 1    Sod Picosulfate-Mag Ox-Cit Acd (Clenpiq) 10-3.5-12 MG-GM -GM/160ML solution, Take 175 mL by mouth Take As Directed. As directed by office., Disp: 350 mL, Rfl: 0    tamsulosin (FLOMAX) 0.4 MG capsule 24 hr capsule, TAKE 1 CAPSULE DAILY, Disp: 90 capsule, Rfl: 1    Tirzepatide (Mounjaro) 7.5 MG/0.5ML solution auto-injector, Inject 7.5 mg under the skin into the appropriate area as directed 1 (One) Time Per Week., Disp: 0.5 mL, Rfl: 6    traZODone (DESYREL) 100 MG tablet, TAKE 1 TABLET NIGHTLY AT   BEDTIME, Disp: 90 tablet, Rfl: 1    vitamin D (ERGOCALCIFEROL) 1.25 MG (03445 UT) capsule capsule, Take 1 capsule by mouth Every 7 (Seven) Days., Disp: , Rfl:     albuterol (PROVENTIL) (2.5 MG/3ML) 0.083% nebulizer solution, Take 2.5 mg by nebulization Every 4 (Four) Hours As Needed for Wheezing. (Patient not taking: Reported on 3/3/2025), Disp: 3 mL, Rfl: 12    methylPREDNISolone (MEDROL) 4 MG dose pack, Take as directed on package instructions., Disp: 21 tablet, Rfl: 0    tiZANidine (ZANAFLEX) 2 MG tablet, Take 1 tablet by mouth Every  "8 (Eight) Hours As Needed for Muscle Spasms., Disp: 21 tablet, Rfl: 0    Allergies:  Allergies   Allergen Reactions    Hydrocodone-Acetaminophen Itching    Nsaids Arrhythmia    Oxycodone-Acetaminophen Shortness Of Breath    Sulfa Antibiotics Hives    Voltaren [Diclofenac] Palpitations       Family Hx:  Family History   Problem Relation Age of Onset    Cancer Mother     Diabetes Mother     Rheum arthritis Mother         40'S    Heart attack Mother     Breast cancer Mother         40'S    Arthritis Mother     Stroke Father     Heart disease Father     Heart attack Maternal Grandmother     Breast cancer Maternal Grandmother         50'S    Prostate cancer Maternal Grandfather     Nephrolithiasis Maternal Grandfather     Colon cancer Paternal Grandmother     Colon cancer Paternal Grandfather         60'S    Breast cancer Other         40'S    Heart attack Maternal Aunt     Malig Hyperthermia Neg Hx         Social History:  Social History     Socioeconomic History    Marital status:    Tobacco Use    Smoking status: Some Days     Types: Cigars     Passive exposure: Never    Smokeless tobacco: Never    Tobacco comments:     1 cigar daily   Vaping Use    Vaping status: Never Used   Substance and Sexual Activity    Alcohol use: Yes     Alcohol/week: 6.0 standard drinks of alcohol     Types: 6 Drinks containing 0.5 oz of alcohol per week     Comment: Drinks daily; beer. Occasionally drinks 2 drinks per day, has been drinking for 6-10 years.     Drug use: Never    Sexual activity: Defer       Review of Systems  Review of Systems   Musculoskeletal:  Positive for back pain.       Objective:     /81 (BP Location: Left arm, Patient Position: Sitting, Cuff Size: Large Adult)   Pulse 91   Ht 170.2 cm (67\")   Wt 78.7 kg (173 lb 9.6 oz)   SpO2 100%   BMI 27.19 kg/m²   Physical Exam  Constitutional:       General: He is not in acute distress.     Appearance: Normal appearance. He is not ill-appearing, toxic-appearing " or diaphoretic.   Pulmonary:      Effort: Pulmonary effort is normal. No respiratory distress.   Neurological:      Mental Status: He is alert.   Psychiatric:         Mood and Affect: Mood normal.         Behavior: Behavior normal.          Assessment/Plan:     Diagnoses and all orders for this visit:    1. Lumbar pain (Primary)    Reports back pain, worsening.     Patient has allergy to NSAIDs in chart. Will use zanaflex muscle relaxer and steroid burst.    Has already tried PT.     Will obtain lumbar XR.    Follow up 6-8 weeks.       -     XR Spine Lumbar 2 or 3 View; Future  -     tiZANidine (ZANAFLEX) 2 MG tablet; Take 1 tablet by mouth Every 8 (Eight) Hours As Needed for Muscle Spasms.  Dispense: 21 tablet; Refill: 0  -     methylPREDNISolone (MEDROL) 4 MG dose pack; Take as directed on package instructions.  Dispense: 21 tablet; Refill: 0    2. Heartburn  -     lansoprazole (PREVACID) 15 MG capsule; Take 2 capsules by mouth Daily.  Dispense: 90 capsule; Refill: 0                    Follow-up:     No follow-ups on file.    Preventative:  Health Maintenance   Topic Date Due    URINE MICROALBUMIN-CREATININE RATIO (uACR)  10/21/2021    ANNUAL PHYSICAL  11/29/2023    HEMOGLOBIN A1C  04/25/2025    DIABETIC EYE EXAM  08/20/2025    BMI FOLLOWUP  10/22/2025    LIPID PANEL  10/25/2025    DIABETIC FOOT EXAM  12/27/2025    COLORECTAL CANCER SCREENING  05/27/2026    TDAP/TD VACCINES (2 - Td or Tdap) 05/16/2029    HEPATITIS C SCREENING  Completed    COVID-19 Vaccine  Completed    INFLUENZA VACCINE  Completed    Pneumococcal Vaccine 50+  Completed    ZOSTER VACCINE  Completed       This document has been electronically signed by Prashanth Arthur MD on March 3, 2025 15:58 EST       Parts of this note are electronic transcriptions/translations of spoken language to printed text using the Dragon Dictation system.

## 2025-03-04 ENCOUNTER — OFFICE VISIT (OUTPATIENT)
Dept: SLEEP MEDICINE | Facility: HOSPITAL | Age: 60
End: 2025-03-04
Payer: COMMERCIAL

## 2025-03-04 VITALS
WEIGHT: 175.2 LBS | SYSTOLIC BLOOD PRESSURE: 139 MMHG | OXYGEN SATURATION: 96 % | HEART RATE: 92 BPM | DIASTOLIC BLOOD PRESSURE: 85 MMHG | BODY MASS INDEX: 26.55 KG/M2 | HEIGHT: 68 IN

## 2025-03-04 DIAGNOSIS — G47.11 IDIOPATHIC HYPERSOMNIA: Primary | ICD-10-CM

## 2025-03-04 NOTE — PROGRESS NOTES
[facility]   [address]   [place] [address]  [phone]       Sleep Clinic Follow-up Note        PCP: Prashanth Arthur MD  Date of visit: 3/4/2025    Patient or patient representative verbalized consent for the use of Ambient Listening during the visit with  Ab Snyder DO for chart documentation. 3/4/2025  17:00 EST    HISTORY OF PRESENT ILLNESS  Jung Burkett is a 60 y.o. male following up today, on 3/4/2025 at [facility] for idiopathic hypersomnia.    History of Present Illness  He has history of idiopathic hypersomnia and ADHD and he has been following with our clinic since at least 2019.    Per chart review He has undergone polysomnography which did not show sleep apnea.  He also had a multiple sleep latency test    which showed a sleep latency of only 6 minutes and 10 seconds.  He is currently on Adderall 20 mg twice a day for idiopathic hypersomnia. He was last seen in our clinic on 8/28/2024 with dr ross.   The adderall is helping with sleepiness and helping with his ADHD. He takes 20mg twice daily.  He does have a history of intermittent palpitations and he was started on Toprol by cardiologist Dr. Vinson and his last visit with cardiology was in 2022.   He also has history of diastolic dysfunction on echo and also had previous dyspnea on exertion and he underwent treadmill stress testing in 2022 which was unremarkable.  His sleep schedule typically involves going to bed around 11:30 PM and waking up at 8:30 AM, with no reported awakenings during the night. He reports no palpitations, chest pain, shortness of breath, vision changes, headaches, syncope, suicidal ideation, or aggressive behavior, hallucinations, psychosis.  He does report significant weight changes with previously weighing 245 pounds  He has a history of ILD, for which he underwent a walk test to assess his oxygen levels and He is under the  "care of a pulmonologist for this condition.     Supplemental Information  He is currently unemployed due to back and neck issues.  He is on trazodone 100 mg at night for sleep, tamsulosin, metoprolol 50 mg, buspirone 15 mg, atorvastatin 20 mg, finasteride 5 mg, albuterol as needed, vitamin D, and CBD oil.    [place] Sleepiness Scale :Total score: 12     REVIEW OF SYSTEMS:   Positive for dry mouth, acid reflux.    Result Review   The following data was reviewed by: Ab Snyder DO on 03/04/2025:  [x]  Allergies reviewed  [x]  Medications reviewed    SOCIAL (habits pertaining to sleep medicine)  History tobacco use: He currently smokes cigars  History of alcohol use: 8 drinks per week  Caffeine use: 2 cups of coffee per day         Objective   Vital Signs:   Vitals:    03/04/25 1300   BP: 139/85   Pulse: 92   SpO2: 96%   Weight: 79.5 kg (175 lb 3.2 oz)   Height: 172.7 cm (67.99\")     Body mass index is 26.65 kg/m².    PHYSICAL EXAMINATION:  CONSTITUTIONAL: Well appearing, in no overt pain or respiratory distress   RESP SYSTEM:  No overt respiratory distress, speaks in clear sentences without dyspnea, no accessory muscle use, no wheezes or rales   CARDIOVASULAR: Regular rate and rhythm, no murmurs    ASSESSMENT/PLAN  Diagnoses and all orders for this visit:    1. Idiopathic hypersomnia (Primary)      Assessment & Plan  Idiopathic hypersomnia.  He has a history of idiopathic hypersomnia and has been on Adderall 20 mg twice a day for several years. He is advised to discontinue the medication and seek immediate medical attention if he experiences chest pain.   He is advised to monitor for any adverse effects and report them to us including palpitations, chest pain, shortness of breath, vision changes, headaches, syncope, suicidal ideation, or aggressive behavior.   I do want him to see his cardiologist again to see if he needs to undergo any further cardiac evaluation.  I have discussed the risk of adderall including " heart attack, stroke, sudden death and elevated blood pressure.  PDMP reviewed. He is not due for a refill yet but he will call for refills.   Avoid driving or operating machinery if sleepy.     Elevated blood pressure/ history of PAC's and diastolic dysfunction  Blood pressure today is 139/85.  He is currently only on Toprol and not on any other blood pressure medication.  His blood pressure readings have been slightly elevated, with a reading of 136 yesterday and 139 today. The target blood pressure is less than 130. He is advised to monitor his blood pressure at home regularly. He is also advised to follow up with his cardiologist.      FOLLOW UP  Return in about 3 months (around 6/4/2025).  Patient was given instructions and counseling regarding his condition or for health maintenance advice. Please see specific information pulled into the AVS if appropriate.     I have spent 30 minutes today on this encounter before, during and after the visit interviewing the patient and formulating my assessment and plan.      Patient's questions were answered.  Dictated Utilizing Dragon Dictation. Please note that portions of this note were completed with a voice recognition program. Part of this note may be an electronic transcription/translation of spoken language to printed text using the Dragon Dictation System.      [facility]   Ab Snyder DO  03/04/25  16:57 EST

## 2025-03-05 ENCOUNTER — TELEPHONE (OUTPATIENT)
Dept: FAMILY MEDICINE CLINIC | Facility: CLINIC | Age: 60
End: 2025-03-05
Payer: COMMERCIAL

## 2025-03-05 DIAGNOSIS — K21.9 GASTROESOPHAGEAL REFLUX DISEASE, UNSPECIFIED WHETHER ESOPHAGITIS PRESENT: Primary | ICD-10-CM

## 2025-03-05 RX ORDER — LANSOPRAZOLE 30 MG/1
30 CAPSULE, DELAYED RELEASE ORAL DAILY
Qty: 90 CAPSULE | Refills: 0 | Status: SHIPPED | OUTPATIENT
Start: 2025-03-05

## 2025-03-06 ENCOUNTER — PATIENT MESSAGE (OUTPATIENT)
Dept: FAMILY MEDICINE CLINIC | Facility: CLINIC | Age: 60
End: 2025-03-06
Payer: COMMERCIAL

## 2025-03-06 ENCOUNTER — TELEPHONE (OUTPATIENT)
Dept: FAMILY MEDICINE CLINIC | Facility: CLINIC | Age: 60
End: 2025-03-06
Payer: COMMERCIAL

## 2025-03-06 NOTE — PRE-PROCEDURE INSTRUCTIONS
"Instructed on date and arrival time of 0830. Instructed that arrival time is not their procedure time but allows time to prepare for procedure.  Come to entrance \"C\". Must have  over age 18 to drive home.  May have two visitors; however, children under 12 must stay in waiting room.  Discussed clear liquid diet (no red or purple), bowel prep, and NPO.  May take medications as usual except for blood thinners, diabetic medications, and weight loss medications.  Verbalized understanding of instructions given.  Instructed to call for questions or concerns.  Hold Mounjaro for one week prior to procedure.  Pulmonary clearance noted in chart.  "

## 2025-03-06 NOTE — TELEPHONE ENCOUNTER
"  Caller: Jung Burkett \"Mauro\"    Relationship: Self    Best call back number: [phone]     What is the best time to reach you: ANYTIME    Who are you requesting to speak with (clinical staff, provider,  specific staff member): CLINICAL    What was the call regarding: PATIENT STATES THAT THEY WOULD LIKE A CALL BACK TO DISCUSS SOME QUESTIONS REGARDING THEIR MEDICATIONS AND TEST RESULTS        "
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
## 2025-03-06 NOTE — PROGRESS NOTES
XR shows several related findings. Mauro does have presence of degenerative changes in his lumbar spine consistent with arthritis. Most likely as a result of the degenerative changes in his spine, L1 has slipped onto L2 - this is known as retrolisthesis. 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     If symptoms improve with treatment, would recommend physical therapy to attempt to strengthen his core. If that fails, would obtain MRI and the opinion of a neurosurgeon.     Thank you,    Prashanth Arthur     ?  This document has been electronically signed by Prashanth Arthur MD on March 6, 2025 15:01 EST

## 2025-03-07 ENCOUNTER — TELEPHONE (OUTPATIENT)
Dept: FAMILY MEDICINE CLINIC | Facility: CLINIC | Age: 60
End: 2025-03-07
Payer: COMMERCIAL

## 2025-03-07 DIAGNOSIS — M62.838 MUSCLE SPASM: Primary | ICD-10-CM

## 2025-03-07 RX ORDER — METHOCARBAMOL 750 MG/1
750 TABLET, FILM COATED ORAL 4 TIMES DAILY PRN
Qty: 90 TABLET | Refills: 0 | Status: SHIPPED | OUTPATIENT
Start: 2025-03-07

## 2025-03-07 NOTE — TELEPHONE ENCOUNTER
Patient called in stating the tiZANidine (ZANAFLEX) 2 MG tablet was not helping and would like to go back on methocarbamol 750mg        Advised patient the office was closed today and would most likely be Monday before the office would get back to him.     Patient was upset because he is out of medication.

## 2025-03-07 NOTE — TELEPHONE ENCOUNTER
Please correct the misconception - the clinic is closed to a gas leak (which is beyond my ability to control) but I am still able to send in Medication without him having to wait until Monday. I have discontinued Zanaflex and sent in the medication he requested.     Thank you,    Prashanth Arthur       This document has been electronically signed by Prashanth Arthur MD on March 7, 2025 12:46 EST

## 2025-03-10 ENCOUNTER — ANESTHESIA EVENT (OUTPATIENT)
Dept: GASTROENTEROLOGY | Facility: HOSPITAL | Age: 60
End: 2025-03-10
Payer: COMMERCIAL

## 2025-03-10 ENCOUNTER — TELEPHONE (OUTPATIENT)
Dept: GASTROENTEROLOGY | Facility: CLINIC | Age: 60
End: 2025-03-10
Payer: COMMERCIAL

## 2025-03-10 NOTE — TELEPHONE ENCOUNTER
S/w patient he is agreeable to doing 4L prep, patient is aware it has been sent to [facility] and will pick it up ASAP.     Went over instructions with patient, and sent my chart message with [employer] brochure.

## 2025-03-10 NOTE — TELEPHONE ENCOUNTER
S/w patient, he's been having spine issues recently and his PCP put him on a muscle relaxer for the past 3 days and he has noticed some constipation issues. He started Methocarbamol 750MG Friday morning and has been taking it up to 4x a day as needed but has only had one small bm. He stated he feels very bloated and full, and passing a lot of gas. Patient is wanting to know if he should still start his clenpiq tonight at 6pm for his colonoscopy tomorrow or if he needs to take something else with it to help. Patient was having normal bms before starting Methocarbamol. Please advise.

## 2025-03-10 NOTE — ANESTHESIA PREPROCEDURE EVALUATION
Anesthesia Evaluation     Patient summary reviewed and Nursing notes reviewed   NPO Solid Status: > 8 hours  NPO Liquid Status: > 2 hours           Airway   Mallampati: I  TM distance: >3 FB  Neck ROM: limited  No difficulty expected  Dental - normal exam     Pulmonary - normal exam    breath sounds clear to auscultation  (+) a smoker (occasional cigar use) Current, cigars, asthma (no routine inhaler use),  Cardiovascular - normal exam    ECG reviewed  Patient on routine beta blocker  Rhythm: regular  Rate: normal    (+) hypertension well controlled, hyperlipidemia      Neuro/Psych  (+) psychiatric history Anxiety, Depression and ADHD  GI/Hepatic/Renal/Endo    (+) GERD well controlled, GI bleeding lower , diabetes mellitus type 2 well controlled    Musculoskeletal     (+) neck pain (Cervicalgia)  Abdominal  - normal exam    Abdomen: soft.  Bowel sounds: normal.   Substance History      OB/GYN          Other   arthritis, blood dyscrasia,     ROS/Med Hx Other: ECHO on 10-29-21:  · Left ventricular ejection fraction appears to be 56 - 60%. Left ventricular systolic function is normal.  · Left ventricular diastolic function is consistent with (grade I) impaired relaxation.  · Normal right ventricular cavity size and systolic function noted.  · There is no evidence of pericardial effusion    EKG 1-11-22:  Sinus rhythm  PACs    Mounjaro last dose per patient: 3-3-25                Anesthesia Plan    ASA 3     general   total IV anesthesia  (Total IV Anesthesia    Patient understands anesthesia not responsible for dental damage.    Discussed risks with pt including aspiration, allergic reactions, apnea, advanced airway placement. Pt verbalized understanding. All questions answered.  )  intravenous induction     Anesthetic plan, risks, benefits, and alternatives have been provided, discussed and informed consent has been obtained with: patient.  Pre-procedure education provided  Plan discussed with CRNA.      CODE STATUS:

## 2025-03-10 NOTE — TELEPHONE ENCOUNTER
I think the best thing to do is to give 4L prep, please see if pt agreeable, I will go ahead and send in

## 2025-03-10 NOTE — TELEPHONE ENCOUNTER
"Hub staff attempted to follow warm transfer process and was unsuccessful     Caller: Jung Burkett \"Mauro\"    Relationship to patient: Self    Best call back number: [phone]    Patient is needing: PT IS CALLING BECAUSE HE HAS SOME QUESTIONS ABOUT HIS PROCEDURE THAT IS SCHEDULED FOR TOMORROW 03/11. PLEASE GIVE PT A CALL BACK ASAP. IT IS ABOUT HIS PREP. IF NOT ABLE TO REACH PT. IT IS OKAY TO LVM.   "

## 2025-03-11 ENCOUNTER — HOSPITAL ENCOUNTER (OUTPATIENT)
Facility: HOSPITAL | Age: 60
Setting detail: HOSPITAL OUTPATIENT SURGERY
Discharge: HOME | End: 2025-03-11
Attending: INTERNAL MEDICINE | Admitting: INTERNAL MEDICINE
Payer: COMMERCIAL

## 2025-03-11 ENCOUNTER — ANESTHESIA (OUTPATIENT)
Dept: GASTROENTEROLOGY | Facility: HOSPITAL | Age: 60
End: 2025-03-11
Payer: COMMERCIAL

## 2025-03-11 VITALS
WEIGHT: 166.67 LBS | DIASTOLIC BLOOD PRESSURE: 71 MMHG | TEMPERATURE: 97.5 F | OXYGEN SATURATION: 95 % | RESPIRATION RATE: 14 BRPM | HEART RATE: 65 BPM | BODY MASS INDEX: 25.35 KG/M2 | SYSTOLIC BLOOD PRESSURE: 132 MMHG

## 2025-03-11 DIAGNOSIS — K62.5 RECTAL BLEEDING: ICD-10-CM

## 2025-03-11 LAB — GLUCOSE BLDC GLUCOMTR-MCNC: 131 MG/DL (ref 70–99)

## 2025-03-11 PROCEDURE — 25810000003 LACTATED RINGERS PER 1000 ML

## 2025-03-11 PROCEDURE — 82948 REAGENT STRIP/BLOOD GLUCOSE: CPT

## 2025-03-11 PROCEDURE — 88305 TISSUE EXAM BY PATHOLOGIST: CPT | Performed by: INTERNAL MEDICINE

## 2025-03-11 PROCEDURE — 25010000002 PROPOFOL 10 MG/ML EMULSION

## 2025-03-11 PROCEDURE — 25010000002 LIDOCAINE PF 2% 2 % SOLUTION

## 2025-03-11 RX ORDER — PROPOFOL 10 MG/ML
VIAL (ML) INTRAVENOUS AS NEEDED
Status: DISCONTINUED | OUTPATIENT
Start: 2025-03-11 | End: 2025-03-11 | Stop reason: SURG

## 2025-03-11 RX ORDER — SODIUM CHLORIDE, SODIUM LACTATE, POTASSIUM CHLORIDE, CALCIUM CHLORIDE 600; 310; 30; 20 MG/100ML; MG/100ML; MG/100ML; MG/100ML
30 INJECTION, SOLUTION INTRAVENOUS CONTINUOUS
Status: DISCONTINUED | OUTPATIENT
Start: 2025-03-11 | End: 2025-03-11 | Stop reason: HOSPADM

## 2025-03-11 RX ORDER — LIDOCAINE HYDROCHLORIDE 20 MG/ML
INJECTION, SOLUTION EPIDURAL; INFILTRATION; INTRACAUDAL; PERINEURAL AS NEEDED
Status: DISCONTINUED | OUTPATIENT
Start: 2025-03-11 | End: 2025-03-11 | Stop reason: SURG

## 2025-03-11 RX ORDER — PHENYLEPHRINE HCL IN 0.9% NACL 1 MG/10 ML
SYRINGE (ML) INTRAVENOUS AS NEEDED
Status: DISCONTINUED | OUTPATIENT
Start: 2025-03-11 | End: 2025-03-11 | Stop reason: SURG

## 2025-03-11 RX ADMIN — PROPOFOL 40 MG: 10 INJECTION, EMULSION INTRAVENOUS at 10:35

## 2025-03-11 RX ADMIN — Medication 150 MCG: at 11:12

## 2025-03-11 RX ADMIN — Medication 150 MCG: at 11:05

## 2025-03-11 RX ADMIN — Medication 100 MCG: at 10:57

## 2025-03-11 RX ADMIN — PROPOFOL 100 MG: 10 INJECTION, EMULSION INTRAVENOUS at 10:34

## 2025-03-11 RX ADMIN — Medication 200 MCG: at 11:28

## 2025-03-11 RX ADMIN — Medication 100 MCG: at 11:20

## 2025-03-11 RX ADMIN — LIDOCAINE HYDROCHLORIDE 40 MG: 20 INJECTION, SOLUTION EPIDURAL; INFILTRATION; INTRACAUDAL; PERINEURAL at 10:34

## 2025-03-11 RX ADMIN — PROPOFOL 250 MCG/KG/MIN: 10 INJECTION, EMULSION INTRAVENOUS at 10:34

## 2025-03-11 RX ADMIN — SODIUM CHLORIDE, SODIUM LACTATE, POTASSIUM CHLORIDE, CALCIUM CHLORIDE 30 ML/HR: 20; 30; 600; 310 INJECTION, SOLUTION INTRAVENOUS at 09:23

## 2025-03-11 RX ADMIN — Medication 100 MCG: at 10:50

## 2025-03-11 RX ADMIN — SODIUM CHLORIDE, SODIUM LACTATE, POTASSIUM CHLORIDE, CALCIUM CHLORIDE: 20; 30; 600; 310 INJECTION, SOLUTION INTRAVENOUS at 11:14

## 2025-03-11 NOTE — H&P
Pre Procedure History & Physical    Chief Complaint:   rectal bleeding    Subjective     HPI:   Rectal bleeding    Past Medical History:   Past Medical History:   Diagnosis Date    Acid reflux     ADHD (attention deficit hyperactivity disorder) 1970    Anxiety     Anxiety disorder 05/17/2019    Arthritis     generalized    Benign prostatic hyperplasia 1989    Blood disease     none    Cervicalgia     Chronic allergic rhinitis     Colon polyp 2005    Depression     Diabetes mellitus, type 2 05/17/2019    DOES NOT CHECK BS    Diabetic eye exam 01/2019 20/20 PER PT     Encounter for diabetic foot exam     WITHIN FEW MO  PER PT     Essential hypertension 05/17/2019    GERD (gastroesophageal reflux disease)     High blood pressure     High cholesterol     Hyperlipemia     Hyperlipidemia     Hypertension     Low back pain 1987    Major depressive disorder 05/17/2019    Nicotine dependence 05/17/2019    Prostate disorder     BPH    Rotator cuff tear, left     Seasonal allergies     Urinary tract infection 1993    Vitamin D deficiency 05/17/2019    no current issues       Past Surgical History:  Past Surgical History:   Procedure Laterality Date    CHOLECYSTECTOMY  1997    COLONOSCOPY      ELISA- REPEAT IN 5 YEARS     GALLBLADDER SURGERY      KNEE ARTHROSCOPY W/ MENISCAL REPAIR Right     OTHER SURGICAL HISTORY      SURGICAL CLIPS/gallbladder removed    OTHER SURGICAL HISTORY      right knee meniscus tear repair    SHOULDER ARTHROSCOPY W/ ROTATOR CUFF REPAIR Left 07/21/2021    Procedure: SHOULDER ARTHROSCOPY,SUBACROMINAL DECOMPRESSION, DISTAL CLAVICLE RESECTION, MINI OPEN  ROTATOR CUFF REPAIR;  Surgeon: Ulisses Kenney MD;  Location: [facility] OR [facility] [place];  Service: Orthopedics;  Laterality: Left;    TONSILLECTOMY      VASECTOMY  2004       Family History:  Family History   Problem Relation Age of Onset    Cancer Mother     Diabetes Mother     Rheum arthritis Mother         40'S    Heart attack Mother     Breast  cancer Mother         40'S    Arthritis Mother     Stroke Father     Heart disease Father     Heart attack Maternal Grandmother     Breast cancer Maternal Grandmother         50'S    Prostate cancer Maternal Grandfather     Nephrolithiasis Maternal Grandfather     Colon cancer Paternal Grandmother     Colon cancer Paternal Grandfather         60'S    Breast cancer Other         40'S    Heart attack Maternal Aunt     Malrenny Hyperthermia Neg Hx        Social History:   reports that he has been smoking cigars. He has never been exposed to tobacco smoke. He has never used smokeless tobacco. He reports current alcohol use of about 6.0 standard drinks of alcohol per week. He reports that he does not use drugs.    Medications:   Medications Prior to Admission   Medication Sig Dispense Refill Last Dose/Taking    amphetamine-dextroamphetamine (ADDERALL) 20 MG tablet Take 1 tablet by mouth 2 (Two) Times a Day. Dose adjustment 60 tablet 0 Past Week    metoprolol succinate XL (TOPROL-XL) 50 MG 24 hr tablet TAKE 1 TABLET DAILY 90 tablet 1 3/11/2025 Morning    vitamin D (ERGOCALCIFEROL) 1.25 MG (12719 UT) capsule capsule Take 1 capsule by mouth Every 7 (Seven) Days.   3/11/2025 Morning    albuterol (PROVENTIL) (2.5 MG/3ML) 0.083% nebulizer solution Take 2.5 mg by nebulization Every 4 (Four) Hours As Needed for Wheezing. (Patient not taking: Reported on 1/23/2025) 3 mL 12     atorvastatin (LIPITOR) 20 MG tablet TAKE 1 TABLET NIGHTLY AT   BEDTIME 90 tablet 1     busPIRone (BUSPAR) 15 MG tablet TAKE 1 TABLET TWICE A DAY  AS NEEDED FOR ANXIETY 180 tablet 1     CBD (cannabidiol) oral oil Take  by mouth.       finasteride (PROSCAR) 5 MG tablet TAKE 1 TABLET BY MOUTH EVERY DAY 90 tablet 1     hyoscyamine (ANASPAZ,LEVSIN) 0.125 MG tablet Take 1 tablet by mouth 4 (Four) Times a Day With Meals & at Bedtime. 120 tablet 5     lansoprazole (Prevacid) 30 MG capsule Take 1 capsule by mouth Daily. 90 capsule 0     lidocaine (LIDODERM) 5 % Place 1  patch on the skin as directed by provider Daily. Remove & Discard patch within 12 hours or as directed by MD 7 patch 0     methocarbamol (ROBAXIN) 750 MG tablet Take 1 tablet by mouth 4 (Four) Times a Day As Needed for Muscle Spasms. 90 tablet 0     methylPREDNISolone (MEDROL) 4 MG dose pack Take as directed on package instructions. 21 tablet 0     tamsulosin (FLOMAX) 0.4 MG capsule 24 hr capsule TAKE 1 CAPSULE DAILY 90 capsule 1     Tirzepatide (Mounjaro) 7.5 MG/0.5ML solution auto-injector Inject 7.5 mg under the skin into the appropriate area as directed 1 (One) Time Per Week. 0.5 mL 6     traZODone (DESYREL) 100 MG tablet TAKE 1 TABLET NIGHTLY AT   BEDTIME 90 tablet 1        Allergies:  Hydrocodone-acetaminophen, Nsaids, Oxycodone-acetaminophen, Sulfa antibiotics, and Voltaren [diclofenac]        Objective     Blood pressure 127/81, pulse 79, temperature 98.7 °F (37.1 °C), temperature source Temporal, resp. rate 14, weight 75.6 kg (166 lb 10.7 oz), SpO2 96%.    Physical Exam   Constitutional: Pt is oriented to person, place, and time and well-developed, well-nourished, and in no distress.   Mouth/Throat: Oropharynx is clear and moist.   Neck: Normal range of motion.   Cardiovascular: Normal rate, regular rhythm and normal heart sounds.    Pulmonary/Chest: Effort normal and breath sounds normal.   Abdominal: Soft. Nontender  Skin: Skin is warm and dry.   Psychiatric: Mood, memory, affect and judgment normal.     Assessment & Plan     Diagnosis:  Rectal bleeding    Anticipated Surgical Procedure:  Colonoscopy    The risks, benefits, and alternatives of this procedure have been discussed with the patient or the responsible party- the patient understands and agrees to proceed.

## 2025-03-11 NOTE — ANESTHESIA POSTPROCEDURE EVALUATION
Patient: Jung Burkett    Procedure Summary       Date: 03/11/25 Room / Location: [facility] ENDOSCOPY 4 / [facility] ENDOSCOPY    Anesthesia Start: 1031 Anesthesia Stop: 1131    Procedure: COLONOSCOPY WITH POLYPECTOMIES HOT AND COLD SNARE, BIOPSIES, INJECTION SPOT INK, INJECTION ELEVIEW Diagnosis:       Rectal bleeding      (Rectal bleeding [K62.5])    Surgeons: Reufgio Almeida MD Provider: Hardik Robertson CRNA    Anesthesia Type: general ASA Status: 3            Anesthesia Type: general    Vitals  Vitals Value Taken Time   /71 03/11/25 11:45   Temp 36.4 °C (97.5 °F) 03/11/25 11:45   Pulse 65 03/11/25 11:47   Resp 14 03/11/25 11:45   SpO2 99 % 03/11/25 11:47   Vitals shown include unfiled device data.        Post Anesthesia Care and Evaluation    Post-procedure mental status: acceptable.  Pain management: satisfactory to patient    Airway patency: patent  Anesthetic complications: No anesthetic complications    Cardiovascular status: acceptable  Respiratory status: acceptable    Comments: Per chart review

## 2025-03-12 DIAGNOSIS — E11.00 TYPE 2 DIABETES MELLITUS WITH HYPEROSMOLARITY WITHOUT COMA, WITHOUT LONG-TERM CURRENT USE OF INSULIN: ICD-10-CM

## 2025-03-13 ENCOUNTER — RESULTS FOLLOW-UP (OUTPATIENT)
Dept: GASTROENTEROLOGY | Facility: HOSPITAL | Age: 60
End: 2025-03-13
Payer: COMMERCIAL

## 2025-03-13 DIAGNOSIS — D12.6 ADENOMATOUS POLYP OF COLON, UNSPECIFIED PART OF COLON: Primary | ICD-10-CM

## 2025-03-13 LAB
CYTO UR: NORMAL
LAB AP CASE REPORT: NORMAL
LAB AP CLINICAL INFORMATION: NORMAL
PATH REPORT.FINAL DX SPEC: NORMAL
PATH REPORT.GROSS SPEC: NORMAL

## 2025-03-13 RX ORDER — TIRZEPATIDE 7.5 MG/.5ML
7.5 INJECTION, SOLUTION SUBCUTANEOUS WEEKLY
Qty: 0.5 ML | Refills: 6 | Status: SHIPPED | OUTPATIENT
Start: 2025-03-13

## 2025-03-14 NOTE — TELEPHONE ENCOUNTER
Spoke to patient and informed of MERE Moody result note and recommendations. Verified patient understanding.    Patient is agreeable to referral and understands that after Dr. Moore's procedure we will follow up with surgical referral.  Patient aware to expect a call from Dr. Moore's office for scheduling.    Confirmed follow up with MERE Bernal on 04.11.25 at 1045.    See referral communication.

## 2025-03-19 NOTE — TELEPHONE ENCOUNTER
Patient confirmed appointment with Dr. Moore on 04.21.25.  Patient states he may want to reschedule that appointment as it is the day after Easter.  Advised patient that Dr. Almeida recommends that the scope be done within a month, so it would really need to be moved early and not moved later. Patient verbalized understanding.     Patient states that he would like to move appointment with MERE Moody until after his scope with Dr. Moore.  Rescheduled to Friday, 05.23.25 at 1045.  MyChart reminder sent.

## 2025-03-24 DIAGNOSIS — G47.11 IDIOPATHIC HYPERSOMNIA: ICD-10-CM

## 2025-03-25 DIAGNOSIS — N40.1 BENIGN PROSTATIC HYPERPLASIA WITH URINARY FREQUENCY: ICD-10-CM

## 2025-03-25 DIAGNOSIS — R35.0 BENIGN PROSTATIC HYPERPLASIA WITH URINARY FREQUENCY: ICD-10-CM

## 2025-03-25 RX ORDER — DEXTROAMPHETAMINE SACCHARATE, AMPHETAMINE ASPARTATE, DEXTROAMPHETAMINE SULFATE AND AMPHETAMINE SULFATE 5; 5; 5; 5 MG/1; MG/1; MG/1; MG/1
20 TABLET ORAL 2 TIMES DAILY
Qty: 60 TABLET | Refills: 0 | Status: SHIPPED | OUTPATIENT
Start: 2025-03-25

## 2025-03-27 DIAGNOSIS — N40.1 BENIGN PROSTATIC HYPERPLASIA WITH URINARY FREQUENCY: ICD-10-CM

## 2025-03-27 DIAGNOSIS — R35.0 BENIGN PROSTATIC HYPERPLASIA WITH URINARY FREQUENCY: ICD-10-CM

## 2025-03-27 RX ORDER — FINASTERIDE 5 MG/1
5 TABLET, FILM COATED ORAL DAILY
Qty: 30 TABLET | Refills: 1 | Status: SHIPPED | OUTPATIENT
Start: 2025-03-27 | End: 2025-03-27 | Stop reason: SDUPTHER

## 2025-03-27 RX ORDER — FINASTERIDE 5 MG/1
5 TABLET, FILM COATED ORAL DAILY
Qty: 90 TABLET | Refills: 1 | Status: SHIPPED | OUTPATIENT
Start: 2025-03-27

## 2025-04-07 ENCOUNTER — TELEPHONE (OUTPATIENT)
Dept: GASTROENTEROLOGY | Facility: CLINIC | Age: 60
End: 2025-04-07
Payer: COMMERCIAL

## 2025-04-07 NOTE — TELEPHONE ENCOUNTER
Patient came into office stating that Dr. Almeida had sent him to Dr. Moore in [place] to remove some large polyps. Dr. Moore is covered by his ins but the place where Dr. Moore does his procedures is not. Patient wants Dr. Almeida's opinion on who would perfer patient to go to besides Dr. Moore. I spoke to April, MA to Dr. Almeida and she stated that if patient wanted to speak with his Ins and gave us a list of who his Ins would cover that we would let Dr. Almeida look at it and give his opinion.

## 2025-04-15 DIAGNOSIS — K21.9 GASTROESOPHAGEAL REFLUX DISEASE, UNSPECIFIED WHETHER ESOPHAGITIS PRESENT: ICD-10-CM

## 2025-04-15 DIAGNOSIS — E11.00 TYPE 2 DIABETES MELLITUS WITH HYPEROSMOLARITY WITHOUT COMA, WITHOUT LONG-TERM CURRENT USE OF INSULIN: ICD-10-CM

## 2025-04-15 RX ORDER — LANSOPRAZOLE 30 MG/1
30 CAPSULE, DELAYED RELEASE ORAL DAILY
Qty: 90 CAPSULE | Refills: 1 | Status: SHIPPED | OUTPATIENT
Start: 2025-04-15

## 2025-04-15 RX ORDER — TIRZEPATIDE 7.5 MG/.5ML
7.5 INJECTION, SOLUTION SUBCUTANEOUS WEEKLY
Qty: 0.5 ML | Refills: 6 | Status: SHIPPED | OUTPATIENT
Start: 2025-04-15

## 2025-04-28 DIAGNOSIS — G47.11 IDIOPATHIC HYPERSOMNIA: ICD-10-CM

## 2025-04-28 RX ORDER — DEXTROAMPHETAMINE SACCHARATE, AMPHETAMINE ASPARTATE, DEXTROAMPHETAMINE SULFATE AND AMPHETAMINE SULFATE 5; 5; 5; 5 MG/1; MG/1; MG/1; MG/1
20 TABLET ORAL 2 TIMES DAILY
Qty: 60 TABLET | Refills: 0 | Status: SHIPPED | OUTPATIENT
Start: 2025-04-28

## 2025-04-30 DIAGNOSIS — E78.5 HYPERLIPIDEMIA, UNSPECIFIED HYPERLIPIDEMIA TYPE: ICD-10-CM

## 2025-04-30 DIAGNOSIS — G47.00 INSOMNIA, UNSPECIFIED TYPE: ICD-10-CM

## 2025-04-30 DIAGNOSIS — N40.1 BENIGN PROSTATIC HYPERPLASIA WITH URINARY FREQUENCY: ICD-10-CM

## 2025-04-30 DIAGNOSIS — F41.9 ANXIETY: ICD-10-CM

## 2025-04-30 DIAGNOSIS — I10 ESSENTIAL HYPERTENSION: ICD-10-CM

## 2025-04-30 DIAGNOSIS — R35.0 BENIGN PROSTATIC HYPERPLASIA WITH URINARY FREQUENCY: ICD-10-CM

## 2025-04-30 DIAGNOSIS — K21.9 GASTROESOPHAGEAL REFLUX DISEASE, UNSPECIFIED WHETHER ESOPHAGITIS PRESENT: ICD-10-CM

## 2025-05-01 DIAGNOSIS — I10 ESSENTIAL HYPERTENSION: ICD-10-CM

## 2025-05-01 RX ORDER — ATORVASTATIN CALCIUM 20 MG/1
20 TABLET, FILM COATED ORAL NIGHTLY
Qty: 90 TABLET | Refills: 1 | Status: SHIPPED | OUTPATIENT
Start: 2025-05-01

## 2025-05-01 RX ORDER — BUSPIRONE HYDROCHLORIDE 15 MG/1
15 TABLET ORAL 2 TIMES DAILY
Qty: 180 TABLET | Refills: 1 | Status: SHIPPED | OUTPATIENT
Start: 2025-05-01

## 2025-05-01 RX ORDER — METOPROLOL SUCCINATE 50 MG/1
50 TABLET, EXTENDED RELEASE ORAL DAILY
Qty: 90 TABLET | Refills: 1 | Status: SHIPPED | OUTPATIENT
Start: 2025-05-01

## 2025-05-01 RX ORDER — TAMSULOSIN HYDROCHLORIDE 0.4 MG/1
1 CAPSULE ORAL DAILY
Qty: 90 CAPSULE | Refills: 1 | Status: SHIPPED | OUTPATIENT
Start: 2025-05-01

## 2025-05-01 RX ORDER — LANSOPRAZOLE 30 MG/1
30 CAPSULE, DELAYED RELEASE ORAL DAILY
Qty: 90 CAPSULE | Refills: 1 | Status: SHIPPED | OUTPATIENT
Start: 2025-05-01

## 2025-05-01 RX ORDER — TRAZODONE HYDROCHLORIDE 100 MG/1
100 TABLET ORAL NIGHTLY
Qty: 90 TABLET | Refills: 1 | Status: SHIPPED | OUTPATIENT
Start: 2025-05-01

## 2025-05-04 RX ORDER — METOPROLOL SUCCINATE 50 MG/1
50 TABLET, EXTENDED RELEASE ORAL DAILY
Qty: 90 TABLET | Refills: 1 | OUTPATIENT
Start: 2025-05-04

## 2025-05-21 ENCOUNTER — ANESTHESIA EVENT (OUTPATIENT)
Dept: PERIOP | Facility: HOSPITAL | Age: 60
End: 2025-05-21
Payer: COMMERCIAL

## 2025-05-23 ENCOUNTER — HOSPITAL ENCOUNTER (OUTPATIENT)
Facility: HOSPITAL | Age: 60
Setting detail: HOSPITAL OUTPATIENT SURGERY
Discharge: HOME | End: 2025-05-23
Attending: INTERNAL MEDICINE | Admitting: INTERNAL MEDICINE
Payer: COMMERCIAL

## 2025-05-23 ENCOUNTER — TELEPHONE (OUTPATIENT)
Dept: SURGERY | Facility: CLINIC | Age: 60
End: 2025-05-23
Payer: COMMERCIAL

## 2025-05-23 ENCOUNTER — ANESTHESIA (OUTPATIENT)
Dept: PERIOP | Facility: HOSPITAL | Age: 60
End: 2025-05-23
Payer: COMMERCIAL

## 2025-05-23 VITALS
OXYGEN SATURATION: 100 % | HEART RATE: 69 BPM | DIASTOLIC BLOOD PRESSURE: 87 MMHG | HEIGHT: 68 IN | WEIGHT: 170.19 LBS | TEMPERATURE: 97.7 F | BODY MASS INDEX: 25.79 KG/M2 | SYSTOLIC BLOOD PRESSURE: 146 MMHG | RESPIRATION RATE: 16 BRPM

## 2025-05-23 DIAGNOSIS — K63.5 COLON POLYP: ICD-10-CM

## 2025-05-23 LAB
ANION GAP SERPL CALCULATED.3IONS-SCNC: 11 MMOL/L (ref 5–15)
BUN SERPL-MCNC: 8 MG/DL (ref 8–23)
BUN/CREAT SERPL: 7.8 (ref 7–25)
CALCIUM SPEC-SCNC: 9 MG/DL (ref 8.6–10.5)
CHLORIDE SERPL-SCNC: 100 MMOL/L (ref 98–107)
CO2 SERPL-SCNC: 25 MMOL/L (ref 22–29)
CREAT SERPL-MCNC: 1.03 MG/DL (ref 0.76–1.27)
DEPRECATED RDW RBC AUTO: 43.5 FL (ref 37–54)
EGFRCR SERPLBLD CKD-EPI 2021: 82.6 ML/MIN/1.73
ERYTHROCYTE [DISTWIDTH] IN BLOOD BY AUTOMATED COUNT: 12.6 % (ref 12.3–15.4)
GLUCOSE BLDC GLUCOMTR-MCNC: 127 MG/DL (ref 70–130)
GLUCOSE SERPL-MCNC: 132 MG/DL (ref 65–99)
HCT VFR BLD AUTO: 47.2 % (ref 37.5–51)
HGB BLD-MCNC: 16.8 G/DL (ref 13–17.7)
MCH RBC QN AUTO: 33.3 PG (ref 26.6–33)
MCHC RBC AUTO-ENTMCNC: 35.6 G/DL (ref 31.5–35.7)
MCV RBC AUTO: 93.5 FL (ref 79–97)
PLATELET # BLD AUTO: 185 10*3/MM3 (ref 140–450)
PMV BLD AUTO: 9.6 FL (ref 6–12)
POTASSIUM SERPL-SCNC: 3.7 MMOL/L (ref 3.5–5.2)
RBC # BLD AUTO: 5.05 10*6/MM3 (ref 4.14–5.8)
SODIUM SERPL-SCNC: 136 MMOL/L (ref 136–145)
WBC NRBC COR # BLD AUTO: 8.22 10*3/MM3 (ref 3.4–10.8)

## 2025-05-23 PROCEDURE — 25010000002 LIDOCAINE 2% SOLUTION

## 2025-05-23 PROCEDURE — 45390 COLONOSCOPY W/RESECTION: CPT | Performed by: INTERNAL MEDICINE

## 2025-05-23 PROCEDURE — 25810000003 LACTATED RINGERS PER 1000 ML: Performed by: ANESTHESIOLOGY

## 2025-05-23 PROCEDURE — 85027 COMPLETE CBC AUTOMATED: CPT | Performed by: INTERNAL MEDICINE

## 2025-05-23 PROCEDURE — 25010000002 PROPOFOL 10 MG/ML EMULSION

## 2025-05-23 PROCEDURE — 88305 TISSUE EXAM BY PATHOLOGIST: CPT | Performed by: INTERNAL MEDICINE

## 2025-05-23 PROCEDURE — 82948 REAGENT STRIP/BLOOD GLUCOSE: CPT

## 2025-05-23 PROCEDURE — 25010000002 DEXAMETHASONE PER 1 MG

## 2025-05-23 PROCEDURE — 80048 BASIC METABOLIC PNL TOTAL CA: CPT | Performed by: INTERNAL MEDICINE

## 2025-05-23 PROCEDURE — 25010000002 ONDANSETRON PER 1 MG

## 2025-05-23 PROCEDURE — 25010000002 FAMOTIDINE 10 MG/ML SOLUTION: Performed by: ANESTHESIOLOGY

## 2025-05-23 PROCEDURE — 45385 COLONOSCOPY W/LESION REMOVAL: CPT | Performed by: INTERNAL MEDICINE

## 2025-05-23 PROCEDURE — 25010000002 SUGAMMADEX 200 MG/2ML SOLUTION

## 2025-05-23 PROCEDURE — 45381 COLONOSCOPY SUBMUCOUS NJX: CPT | Performed by: INTERNAL MEDICINE

## 2025-05-23 DEVICE — REPLAY HEMOSTASIS CLIP, 16MM SPAN
Type: IMPLANTABLE DEVICE | Site: COLON | Status: FUNCTIONAL
Brand: REPLAY

## 2025-05-23 DEVICE — WORKING LENGTH 235CM, WORKING CHANNEL 2.8MM
Type: IMPLANTABLE DEVICE | Site: COLON | Status: FUNCTIONAL
Brand: RESOLUTION 360 CLIP

## 2025-05-23 RX ORDER — FLUMAZENIL 0.1 MG/ML
0.2 INJECTION INTRAVENOUS AS NEEDED
Status: DISCONTINUED | OUTPATIENT
Start: 2025-05-23 | End: 2025-05-23 | Stop reason: HOSPADM

## 2025-05-23 RX ORDER — HYDROMORPHONE HYDROCHLORIDE 1 MG/ML
0.5 INJECTION, SOLUTION INTRAMUSCULAR; INTRAVENOUS; SUBCUTANEOUS
Status: DISCONTINUED | OUTPATIENT
Start: 2025-05-23 | End: 2025-05-23 | Stop reason: HOSPADM

## 2025-05-23 RX ORDER — DIPHENHYDRAMINE HYDROCHLORIDE 50 MG/ML
12.5 INJECTION, SOLUTION INTRAMUSCULAR; INTRAVENOUS
Status: DISCONTINUED | OUTPATIENT
Start: 2025-05-23 | End: 2025-05-23 | Stop reason: HOSPADM

## 2025-05-23 RX ORDER — DEXAMETHASONE SODIUM PHOSPHATE 4 MG/ML
INJECTION, SOLUTION INTRA-ARTICULAR; INTRALESIONAL; INTRAMUSCULAR; INTRAVENOUS; SOFT TISSUE AS NEEDED
Status: DISCONTINUED | OUTPATIENT
Start: 2025-05-23 | End: 2025-05-23 | Stop reason: SURG

## 2025-05-23 RX ORDER — BUPIVACAINE HCL/0.9 % NACL/PF 0.125 %
PLASTIC BAG, INJECTION (ML) EPIDURAL AS NEEDED
Status: DISCONTINUED | OUTPATIENT
Start: 2025-05-23 | End: 2025-05-23 | Stop reason: SURG

## 2025-05-23 RX ORDER — LABETALOL HYDROCHLORIDE 5 MG/ML
5 INJECTION, SOLUTION INTRAVENOUS
Status: DISCONTINUED | OUTPATIENT
Start: 2025-05-23 | End: 2025-05-23 | Stop reason: HOSPADM

## 2025-05-23 RX ORDER — MIDAZOLAM HYDROCHLORIDE 1 MG/ML
1 INJECTION, SOLUTION INTRAMUSCULAR; INTRAVENOUS
Status: DISCONTINUED | OUTPATIENT
Start: 2025-05-23 | End: 2025-05-23 | Stop reason: HOSPADM

## 2025-05-23 RX ORDER — ONDANSETRON 2 MG/ML
4 INJECTION INTRAMUSCULAR; INTRAVENOUS ONCE AS NEEDED
Status: DISCONTINUED | OUTPATIENT
Start: 2025-05-23 | End: 2025-05-23 | Stop reason: HOSPADM

## 2025-05-23 RX ORDER — FENTANYL CITRATE 50 UG/ML
50 INJECTION, SOLUTION INTRAMUSCULAR; INTRAVENOUS ONCE AS NEEDED
Status: DISCONTINUED | OUTPATIENT
Start: 2025-05-23 | End: 2025-05-23 | Stop reason: HOSPADM

## 2025-05-23 RX ORDER — FAMOTIDINE 10 MG/ML
20 INJECTION, SOLUTION INTRAVENOUS ONCE
Status: COMPLETED | OUTPATIENT
Start: 2025-05-23 | End: 2025-05-23

## 2025-05-23 RX ORDER — LIDOCAINE HYDROCHLORIDE 10 MG/ML
0.5 INJECTION, SOLUTION INFILTRATION; PERINEURAL ONCE AS NEEDED
Status: DISCONTINUED | OUTPATIENT
Start: 2025-05-23 | End: 2025-05-23 | Stop reason: HOSPADM

## 2025-05-23 RX ORDER — NALOXONE HCL 0.4 MG/ML
0.2 VIAL (ML) INJECTION AS NEEDED
Status: DISCONTINUED | OUTPATIENT
Start: 2025-05-23 | End: 2025-05-23 | Stop reason: HOSPADM

## 2025-05-23 RX ORDER — FENTANYL CITRATE 50 UG/ML
50 INJECTION, SOLUTION INTRAMUSCULAR; INTRAVENOUS
Status: DISCONTINUED | OUTPATIENT
Start: 2025-05-23 | End: 2025-05-23 | Stop reason: HOSPADM

## 2025-05-23 RX ORDER — PROPOFOL 10 MG/ML
VIAL (ML) INTRAVENOUS AS NEEDED
Status: DISCONTINUED | OUTPATIENT
Start: 2025-05-23 | End: 2025-05-23 | Stop reason: SURG

## 2025-05-23 RX ORDER — ONDANSETRON 2 MG/ML
INJECTION INTRAMUSCULAR; INTRAVENOUS AS NEEDED
Status: DISCONTINUED | OUTPATIENT
Start: 2025-05-23 | End: 2025-05-23 | Stop reason: SURG

## 2025-05-23 RX ORDER — HYDROCODONE BITARTRATE AND ACETAMINOPHEN 5; 325 MG/1; MG/1
1 TABLET ORAL ONCE AS NEEDED
Status: DISCONTINUED | OUTPATIENT
Start: 2025-05-23 | End: 2025-05-23 | Stop reason: HOSPADM

## 2025-05-23 RX ORDER — DROPERIDOL 2.5 MG/ML
0.62 INJECTION, SOLUTION INTRAMUSCULAR; INTRAVENOUS
Status: DISCONTINUED | OUTPATIENT
Start: 2025-05-23 | End: 2025-05-23 | Stop reason: HOSPADM

## 2025-05-23 RX ORDER — ROCURONIUM BROMIDE 10 MG/ML
INJECTION, SOLUTION INTRAVENOUS AS NEEDED
Status: DISCONTINUED | OUTPATIENT
Start: 2025-05-23 | End: 2025-05-23 | Stop reason: SURG

## 2025-05-23 RX ORDER — OXYCODONE AND ACETAMINOPHEN 7.5; 325 MG/1; MG/1
1 TABLET ORAL EVERY 4 HOURS PRN
Status: DISCONTINUED | OUTPATIENT
Start: 2025-05-23 | End: 2025-05-23 | Stop reason: HOSPADM

## 2025-05-23 RX ORDER — EPHEDRINE SULFATE 50 MG/ML
5 INJECTION, SOLUTION INTRAVENOUS ONCE AS NEEDED
Status: DISCONTINUED | OUTPATIENT
Start: 2025-05-23 | End: 2025-05-23 | Stop reason: HOSPADM

## 2025-05-23 RX ORDER — PROMETHAZINE HYDROCHLORIDE 25 MG/1
25 SUPPOSITORY RECTAL ONCE AS NEEDED
Status: DISCONTINUED | OUTPATIENT
Start: 2025-05-23 | End: 2025-05-23 | Stop reason: HOSPADM

## 2025-05-23 RX ORDER — LIDOCAINE HYDROCHLORIDE 20 MG/ML
INJECTION, SOLUTION INFILTRATION; PERINEURAL AS NEEDED
Status: DISCONTINUED | OUTPATIENT
Start: 2025-05-23 | End: 2025-05-23 | Stop reason: SURG

## 2025-05-23 RX ORDER — ATROPINE SULFATE 0.4 MG/ML
0.4 INJECTION, SOLUTION INTRAMUSCULAR; INTRAVENOUS; SUBCUTANEOUS ONCE AS NEEDED
Status: DISCONTINUED | OUTPATIENT
Start: 2025-05-23 | End: 2025-05-23 | Stop reason: HOSPADM

## 2025-05-23 RX ORDER — SODIUM CHLORIDE 0.9 % (FLUSH) 0.9 %
3-10 SYRINGE (ML) INJECTION AS NEEDED
Status: DISCONTINUED | OUTPATIENT
Start: 2025-05-23 | End: 2025-05-23 | Stop reason: HOSPADM

## 2025-05-23 RX ORDER — HYDRALAZINE HYDROCHLORIDE 20 MG/ML
5 INJECTION INTRAMUSCULAR; INTRAVENOUS
Status: DISCONTINUED | OUTPATIENT
Start: 2025-05-23 | End: 2025-05-23 | Stop reason: HOSPADM

## 2025-05-23 RX ORDER — SODIUM CHLORIDE 0.9 % (FLUSH) 0.9 %
3 SYRINGE (ML) INJECTION EVERY 12 HOURS SCHEDULED
Status: DISCONTINUED | OUTPATIENT
Start: 2025-05-23 | End: 2025-05-23 | Stop reason: HOSPADM

## 2025-05-23 RX ORDER — IPRATROPIUM BROMIDE AND ALBUTEROL SULFATE 2.5; .5 MG/3ML; MG/3ML
3 SOLUTION RESPIRATORY (INHALATION) ONCE AS NEEDED
Status: DISCONTINUED | OUTPATIENT
Start: 2025-05-23 | End: 2025-05-23 | Stop reason: HOSPADM

## 2025-05-23 RX ORDER — PROMETHAZINE HYDROCHLORIDE 25 MG/1
25 TABLET ORAL ONCE AS NEEDED
Status: DISCONTINUED | OUTPATIENT
Start: 2025-05-23 | End: 2025-05-23 | Stop reason: HOSPADM

## 2025-05-23 RX ORDER — SODIUM CHLORIDE, SODIUM LACTATE, POTASSIUM CHLORIDE, CALCIUM CHLORIDE 600; 310; 30; 20 MG/100ML; MG/100ML; MG/100ML; MG/100ML
9 INJECTION, SOLUTION INTRAVENOUS CONTINUOUS
Status: DISCONTINUED | OUTPATIENT
Start: 2025-05-23 | End: 2025-05-23 | Stop reason: HOSPADM

## 2025-05-23 RX ADMIN — FAMOTIDINE 20 MG: 10 INJECTION INTRAVENOUS at 07:25

## 2025-05-23 RX ADMIN — Medication 100 MCG: at 09:23

## 2025-05-23 RX ADMIN — ROCURONIUM BROMIDE 50 MG: 10 INJECTION, SOLUTION INTRAVENOUS at 08:19

## 2025-05-23 RX ADMIN — Medication 100 MCG: at 08:57

## 2025-05-23 RX ADMIN — LIDOCAINE HYDROCHLORIDE 100 MG: 20 INJECTION, SOLUTION INFILTRATION; PERINEURAL at 08:19

## 2025-05-23 RX ADMIN — Medication 100 MCG: at 09:10

## 2025-05-23 RX ADMIN — PROPOFOL 100 MCG/KG/MIN: 10 INJECTION, EMULSION INTRAVENOUS at 08:25

## 2025-05-23 RX ADMIN — Medication 100 MCG: at 08:59

## 2025-05-23 RX ADMIN — SUGAMMADEX 200 MG: 100 INJECTION, SOLUTION INTRAVENOUS at 09:27

## 2025-05-23 RX ADMIN — Medication 100 MCG: at 09:11

## 2025-05-23 RX ADMIN — PROPOFOL 30 MG: 10 INJECTION, EMULSION INTRAVENOUS at 09:14

## 2025-05-23 RX ADMIN — SODIUM CHLORIDE, POTASSIUM CHLORIDE, SODIUM LACTATE AND CALCIUM CHLORIDE 9 ML/HR: 600; 310; 30; 20 INJECTION, SOLUTION INTRAVENOUS at 07:25

## 2025-05-23 RX ADMIN — DEXAMETHASONE SODIUM PHOSPHATE 4 MG: 4 INJECTION, SOLUTION INTRA-ARTICULAR; INTRALESIONAL; INTRAMUSCULAR; INTRAVENOUS; SOFT TISSUE at 08:33

## 2025-05-23 RX ADMIN — PROPOFOL 200 MG: 10 INJECTION, EMULSION INTRAVENOUS at 08:19

## 2025-05-23 RX ADMIN — ONDANSETRON 4 MG: 2 INJECTION, SOLUTION INTRAMUSCULAR; INTRAVENOUS at 09:15

## 2025-05-23 NOTE — H&P
[facility]   HISTORY AND PHYSICAL    Patient Name: Jung Burkett Jr.  : 1965  MRN: [id number]  Primary Care Physician:  Prashanth Arthur MD  Date of admission: 2025    Subjective   Subjective     Chief Complaint: Colon polyp,  needs Colonoscopy w/ EMR, referral from Dr. Almeida    History of Present Illness    Review of Systems     Personal History     Past Medical History:   Diagnosis Date    Acid reflux     ADHD (attention deficit hyperactivity disorder) 1970    Anxiety     Anxiety disorder 2019    Arthritis     generalized    Benign prostatic hyperplasia 1989    Blood disease     none    Cervicalgia     Chronic allergic rhinitis     Colon polyp     Depression     Diabetes mellitus, type 2 2019    DOES NOT CHECK BS    Diabetic eye exam 2019 PER PT     Encounter for diabetic foot exam     WITHIN FEW MO  PER PT     Essential hypertension 2019    GERD (gastroesophageal reflux disease)     High blood pressure     High cholesterol     Hyperlipemia     Hyperlipidemia     Hypertension     Low back pain 1987    Major depressive disorder 2019    Nicotine dependence 2019    Prostate disorder     BPH    Rotator cuff tear, left     Seasonal allergies     Urinary tract infection 1993    Vitamin D deficiency 2019    no current issues       Past Surgical History:   Procedure Laterality Date    CHOLECYSTECTOMY      COLONOSCOPY      ELISA- REPEAT IN 5 YEARS     COLONOSCOPY N/A 3/11/2025    Procedure: COLONOSCOPY WITH POLYPECTOMIES HOT AND COLD SNARE, BIOPSIES, INJECTION SPOT INK, INJECTION ELEVIEW;  Surgeon: Refugio Almeida MD;  Location: [facility] ENDOSCOPY;  Service: Gastroenterology;  Laterality: N/A;  COLON POLYPS, hemorrhoids    GALLBLADDER SURGERY      KNEE ARTHROSCOPY W/ MENISCAL REPAIR Right     OTHER SURGICAL HISTORY      SURGICAL CLIPS/gallbladder removed    OTHER SURGICAL HISTORY      right knee meniscus tear repair    SHOULDER  ARTHROSCOPY W/ ROTATOR CUFF REPAIR Left 07/21/2021    Procedure: SHOULDER ARTHROSCOPY,SUBACROMINAL DECOMPRESSION, DISTAL CLAVICLE RESECTION, MINI OPEN  ROTATOR CUFF REPAIR;  Surgeon: Ulisses Kenney MD;  Location: [facility] OR [facility] [place];  Service: Orthopedics;  Laterality: Left;    TONSILLECTOMY      VASECTOMY  2004       Family History: family history includes Arthritis in his mother; Breast cancer in his maternal grandmother, mother, and another family member; Cancer in his mother; Colon cancer in his paternal grandfather and paternal grandmother; Diabetes in his mother; Heart attack in his maternal aunt, maternal grandmother, and mother; Heart disease in his father; Nephrolithiasis in his maternal grandfather; Prostate cancer in his maternal grandfather; Rheum arthritis in his mother; Stroke in his father. Otherwise pertinent FHx was reviewed and not pertinent to current issue.    Social History:  reports that he has been smoking cigars. He has never been exposed to tobacco smoke. He has never used smokeless tobacco. He reports current alcohol use of about 14.0 standard drinks of alcohol per week. He reports current drug use. Drug: Marijuana.    Home Medications:  CBD, Tirzepatide, Vitamin D, albuterol, amphetamine-dextroamphetamine, atorvastatin, busPIRone, finasteride, hyoscyamine, lansoprazole, lidocaine, methocarbamol, methylPREDNISolone, metoprolol succinate XL, tamsulosin, and traZODone    Allergies:  Allergies   Allergen Reactions    Hydrocodone-Acetaminophen Itching    Nsaids Arrhythmia    Oxycodone-Acetaminophen Shortness Of Breath    Sulfa Antibiotics Hives    Voltaren [Diclofenac] Palpitations       Objective    Objective     Vitals:   Temp:  [97.9 °F (36.6 °C)] 97.9 °F (36.6 °C)  Heart Rate:  [75] 75  Resp:  [14] 14  BP: (137)/(90) 137/90    Physical Exam    Result Review    Result Review:  I have personally reviewed the results from the time of this admission to 5/23/2025 07:12 EDT and agree with these  findings:  []  Laboratory list / accordion  []  Microbiology  []  Radiology  []  EKG/Telemetry   []  Cardiology/Vascular   []  Pathology  []  Old records  []  Other:  Most notable findings include:       Assessment & Plan   Assessment / Plan     Brief Patient Summary:  Jung Burkett Jr. is a 60 y.o. male who Colonoscpoy w/ EMR    Active Hospital Problems:  There are no active hospital problems to display for this patient.    Plan:       VTE Prophylaxis:  No VTE prophylaxis order currently exists.        CODE STATUS:       Admission Status:  I believe this patient meets  status.    Nikolai Moore MD

## 2025-05-23 NOTE — ANESTHESIA PROCEDURE NOTES
Airway  Reason: elective    Date/Time: 5/23/2025 8:23 AM  Airway not difficult    General Information and Staff    Patient location during procedure: OR  Anesthesiologist: Kalie Olmstead MD  CRNA/CAA: Brittnee Riggins CRNA    Indications and Patient Condition  Indications for airway management: airway protection    Preoxygenated: yes    Mask difficulty assessment: 2 - vent by mask + OA or adjuvant +/- NMBA    Final Airway Details    Final airway type: endotracheal airway      Successful airway: ETT  Cuffed: yes   Successful intubation technique: direct laryngoscopy  Adjuncts used in placement: intubating stylet  Endotracheal tube insertion site: oral  Blade: Gregorio  Blade size: 3  ETT size (mm): 7.5  Cormack-Lehane Classification: grade IIa - partial view of glottis  Placement verified by: chest auscultation and capnometry   Measured from: lips  ETT/EBT  to lips (cm): 23  Number of attempts at approach: 1  Assessment: lips, teeth, and gum same as pre-op and atraumatic intubation

## 2025-05-23 NOTE — ANESTHESIA POSTPROCEDURE EVALUATION
"Patient: Jung Burkett Jr.    Procedure Summary       Date: 05/23/25 Room / Location: [facility] OR  / [facility] MAIN OR    Anesthesia Start: 0811 Anesthesia Stop: 0938    Procedure: COLONOSCOPY W/ EMR, clip placement, and tattoo Diagnosis:     Surgeons: Nikolai Moore MD Provider: Kalie Olmstead MD    Anesthesia Type: general ASA Status: 3            Anesthesia Type: general    Vitals  Vitals Value Taken Time   /86 05/23/25 10:15   Temp 36.5 °C (97.7 °F) 05/23/25 09:37   Pulse 72 05/23/25 10:17   Resp 16 05/23/25 10:15   SpO2 100 % 05/23/25 10:17   Vitals shown include unfiled device data.        Post Anesthesia Care and Evaluation    Level of consciousness: awake  Pain management: adequate  Anesthetic complications: No anesthetic complications    Cardiovascular status: acceptable  Respiratory status: acceptable    Comments: /87   Pulse 69   Temp 36.5 °C (97.7 °F) (Oral)   Resp 16   Ht 172.7 cm (68\")   Wt 77.2 kg (170 lb 3.1 oz)   SpO2 100%   BMI 25.88 kg/m²     "

## 2025-05-23 NOTE — ANESTHESIA PREPROCEDURE EVALUATION
Anesthesia Evaluation     Patient summary reviewed and Nursing notes reviewed   NPO Solid Status: > 8 hours  NPO Liquid Status: > 4 hours           Airway   Mallampati: II  TM distance: >3 FB  Neck ROM: full  No difficulty expected  Dental - normal exam     Pulmonary - normal exam   (+) asthma,  Cardiovascular     Rhythm: regular  Rate: normal    (+) hypertension, hyperlipidemia      Neuro/Psych  GI/Hepatic/Renal/Endo    (+) GERD well controlled, GI bleeding , diabetes mellitus    Musculoskeletal     (+) neck pain  Abdominal    Substance History      OB/GYN          Other   arthritis,                 Anesthesia Plan    ASA 3     general     intravenous induction     Anesthetic plan, risks, benefits, and alternatives have been provided, discussed and informed consent has been obtained with: patient and spouse/significant other.  Pre-procedure education provided  Plan discussed with CRNA.    CODE STATUS:

## 2025-05-27 ENCOUNTER — PREP FOR SURGERY (OUTPATIENT)
Dept: OTHER | Facility: HOSPITAL | Age: 60
End: 2025-05-27
Payer: COMMERCIAL

## 2025-05-27 ENCOUNTER — OFFICE VISIT (OUTPATIENT)
Dept: SURGERY | Facility: CLINIC | Age: 60
End: 2025-05-27
Payer: COMMERCIAL

## 2025-05-27 VITALS — BODY MASS INDEX: 25.88 KG/M2 | HEIGHT: 68 IN

## 2025-05-27 DIAGNOSIS — K63.5 CECAL POLYP: Primary | ICD-10-CM

## 2025-05-27 PROCEDURE — 99203 OFFICE O/P NEW LOW 30 MIN: CPT | Performed by: SURGERY

## 2025-05-27 RX ORDER — METRONIDAZOLE 500 MG/100ML
500 INJECTION, SOLUTION INTRAVENOUS ONCE
Status: CANCELLED | OUTPATIENT
Start: 2025-05-27 | End: 2025-05-27

## 2025-05-27 RX ORDER — SODIUM CHLORIDE 0.9 % (FLUSH) 0.9 %
10 SYRINGE (ML) INJECTION AS NEEDED
Status: CANCELLED | OUTPATIENT
Start: 2025-05-27

## 2025-05-27 RX ORDER — SODIUM CHLORIDE 9 MG/ML
40 INJECTION, SOLUTION INTRAVENOUS AS NEEDED
Status: CANCELLED | OUTPATIENT
Start: 2025-05-27

## 2025-05-27 RX ORDER — SODIUM CHLORIDE, SODIUM LACTATE, POTASSIUM CHLORIDE, CALCIUM CHLORIDE 600; 310; 30; 20 MG/100ML; MG/100ML; MG/100ML; MG/100ML
50 INJECTION, SOLUTION INTRAVENOUS CONTINUOUS
Status: CANCELLED | OUTPATIENT
Start: 2025-05-27 | End: 2025-05-28

## 2025-05-27 RX ORDER — SODIUM CHLORIDE 0.9 % (FLUSH) 0.9 %
10 SYRINGE (ML) INJECTION EVERY 12 HOURS SCHEDULED
Status: CANCELLED | OUTPATIENT
Start: 2025-05-27

## 2025-05-27 NOTE — LETTER
May 27, 2025     Prashanth Arthur MD  [address] [facility]  [address]  [place] [address]    Patient: Jung Burkett Jr.   YOB: 1965   Date of Visit: 5/27/2025     Dear Prashanth Arthur MD:       Thank you for referring Jung Burkett to me for evaluation. Below are the relevant portions of my assessment and plan of care.    If you have questions, please do not hesitate to call me. I look forward to following Jung along with you.         Sincerely,        Ab Zayas MD        CC: MD Refugio Randhawa MD Bailey, Matthew Bruce, MD  05/27/25 1054  Sign when Signing Visit  General Surgery/Colorectal Surgery Note    Patient Name:  Jung Burkett Jr.  YOB: 1965  [id number]    Referring Provider: No ref. provider found      Patient Care Team:  Prashanth Arthur MD as PCP - General (Family Medicine)  Titi Guzman MD as Consulting Physician (Hematology and Oncology)  Yannick Espinoza MD as Consulting Physician (Pulmonary Disease)    Chief complaint discuss surgery    Subjective.     History of present illness:    Status post colonoscopy by Dr. Almeida 3/11/2025 with appendiceal orifice polyp.  Pathology with tubulovillous adenoma.  Sent to Dr. Moore for further endoscopic removal.  Unable to verify complete removal and concern possibly tracks into the appendiceal opening per Dr. Moore on recent colonoscopy 5/23/2025.  Pathology pending.  He comes in to discuss appendectomy with partial removal of the cecum to verify complete removal.  No previous abdominal surgery.  No blood thinner use.  No chest pain.  Grandmother with colorectal cancer.  History of Mounjaro use.      History:  Past Medical History:   Diagnosis Date   • Acid reflux    • ADHD (attention deficit hyperactivity disorder) 1970   • Anxiety    • Anxiety disorder 05/17/2019   • Arthritis     generalized   • Asthma    • Benign prostatic hyperplasia 1989   • Blood disease     none   • Cervicalgia    •  Chronic allergic rhinitis    • Colon polyp 2005   • Depression    • Diabetes mellitus, type 2 05/17/2019    DOES NOT CHECK BS   • Diabetic eye exam 01/2019 20/20 PER PT    • Encounter for diabetic foot exam     WITHIN FEW MO  PER PT    • Essential hypertension 05/17/2019   • GERD (gastroesophageal reflux disease)    • High blood pressure    • High cholesterol    • Hyperlipemia    • Hyperlipidemia    • Hypertension    • Low back pain 1987   • Major depressive disorder 05/17/2019   • Nicotine dependence 05/17/2019   • Prostate disorder     BPH   • Rotator cuff tear, left    • Seasonal allergies    • Urinary tract infection 1993   • Vitamin D deficiency 05/17/2019    no current issues       Past Surgical History:   Procedure Laterality Date   • CHOLECYSTECTOMY  1997   • COLONOSCOPY      ELISA- REPEAT IN 5 YEARS    • COLONOSCOPY N/A 3/11/2025    Procedure: COLONOSCOPY WITH POLYPECTOMIES HOT AND COLD SNARE, BIOPSIES, INJECTION SPOT INK, INJECTION ELEVIEW;  Surgeon: Refugio Almeida MD;  Location: [facility] ENDOSCOPY;  Service: Gastroenterology;  Laterality: N/A;  COLON POLYPS, hemorrhoids   • GALLBLADDER SURGERY     • KNEE ARTHROSCOPY W/ MENISCAL REPAIR Right    • OTHER SURGICAL HISTORY      SURGICAL CLIPS/gallbladder removed   • OTHER SURGICAL HISTORY      right knee meniscus tear repair   • SHOULDER ARTHROSCOPY W/ ROTATOR CUFF REPAIR Left 07/21/2021    Procedure: SHOULDER ARTHROSCOPY,SUBACROMINAL DECOMPRESSION, DISTAL CLAVICLE RESECTION, MINI OPEN  ROTATOR CUFF REPAIR;  Surgeon: Ulisses Kenney MD;  Location: [facility] OR [facility];  Service: Orthopedics;  Laterality: Left;   • TONSILLECTOMY     • VASECTOMY  2004       Family History   Problem Relation Age of Onset   • Cancer Mother    • Diabetes Mother    • Rheum arthritis Mother         40'S   • Heart attack Mother    • Breast cancer Mother         40'S   • Arthritis Mother    • Stroke Father    • Heart disease Father    • Heart attack Maternal Grandmother     • Breast cancer Maternal Grandmother         50'S   • Prostate cancer Maternal Grandfather    • Nephrolithiasis Maternal Grandfather    • Colon cancer Paternal Grandmother    • Colon cancer Paternal Grandfather         60'S   • Breast cancer Other         40'S   • Heart attack Maternal Aunt    • Malig Hyperthermia Neg Hx        Social History     Tobacco Use   • Smoking status: Some Days     Types: Cigars     Passive exposure: Never   • Smokeless tobacco: Never   • Tobacco comments:     2-3 CIGARS WEEKLY   Vaping Use   • Vaping status: Never Used   Substance Use Topics   • Alcohol use: Yes     Alcohol/week: 6.0 standard drinks of alcohol     Types: 6 Drinks containing 0.5 oz of alcohol per week     Comment: Drinks daily; bourbon   • Drug use: Yes     Types: Marijuana     Comment: none in last 48 hours, just tried to see if helped with pain       Review of Systems  All systems were reviewed and negative except for:   Review of Systems   Constitutional: Negative for chills, fever and unexpected weight loss.   HENT: Negative for congestion, nosebleeds and voice change.    Eyes: Negative for blurred vision, double vision and discharge.   Respiratory: Negative for apnea, chest tightness and shortness of breath.    Cardiovascular: Negative for chest pain and leg swelling.   Gastrointestinal:        See HPI   Endocrine: Negative for cold intolerance and heat intolerance.   Genitourinary: Negative for dysuria, hematuria and urgency.   Musculoskeletal: Negative for back pain, joint swelling and neck pain.   Skin: Negative for color change and dry skin.   Neurological: Negative for dizziness and confusion.   Hematological: Negative for adenopathy.   Psychiatric/Behavioral: Negative for agitation and behavioral problems.     MEDS:  Prior to Admission medications    Medication Sig Start Date End Date Taking? Authorizing Provider   albuterol (PROVENTIL) (2.5 MG/3ML) 0.083% nebulizer solution Take 2.5 mg by nebulization Every 4  (Four) Hours As Needed for Wheezing. 5/13/24  Yes Prashanth Arthur MD   amphetamine-dextroamphetamine (ADDERALL) 20 MG tablet Take 1 tablet by mouth 2 (Two) Times a Day. Dose adjustment 4/28/25  Yes Ab Snyder DO   atorvastatin (LIPITOR) 20 MG tablet Take 1 tablet by mouth Every Night. 5/1/25  Yes Prashanth Arthur MD   busPIRone (BUSPAR) 15 MG tablet Take 1 tablet by mouth 2 (Two) Times a Day. 5/1/25  Yes Prashanth Arthur MD   CBD (cannabidiol) oral oil Take 3 drops by mouth Daily.   Yes ProviderLashell MD   finasteride (PROSCAR) 5 MG tablet Take 1 tablet by mouth Daily. 3/27/25  Yes Prashanth Arthur MD   hyoscyamine (ANASPAZ,LEVSIN) 0.125 MG tablet Take 1 tablet by mouth 4 (Four) Times a Day With Meals & at Bedtime. 1/23/25  Yes Lena Schultz APRN   lansoprazole (Prevacid) 30 MG capsule Take 1 capsule by mouth Daily. 5/1/25  Yes Prashanth Arthur MD   methocarbamol (ROBAXIN) 750 MG tablet Take 1 tablet by mouth 4 (Four) Times a Day As Needed for Muscle Spasms. 3/7/25  Yes Prashanth Arthur MD   metoprolol succinate XL (TOPROL-XL) 50 MG 24 hr tablet Take 1 tablet by mouth Daily. 5/1/25  Yes Prashanth Arthur MD   tamsulosin (FLOMAX) 0.4 MG capsule 24 hr capsule Take 1 capsule by mouth Daily. 5/1/25  Yes Prashanth Arthur MD   Tirzepatide (Mounjaro) 7.5 MG/0.5ML solution auto-injector Inject 7.5 mg under the skin into the appropriate area as directed 1 (One) Time Per Week. 4/15/25  Yes Prashanth Arthur MD   traZODone (DESYREL) 100 MG tablet Take 1 tablet by mouth Every Night. 5/1/25  Yes Prashanth Arthur MD   VITAMIN D PO Take 1 capsule by mouth Daily.   Yes Provider, MD Lashell        Allergies:  Hydrocodone-acetaminophen, Nsaids, Oxycodone-acetaminophen, Sulfa antibiotics, and Voltaren [diclofenac]    Objective    Vital Signs        Physical Exam:     General Appearance:    Alert, cooperative, in no acute distress   Head:    Normocephalic, without obvious abnormality, atraumatic   Eyes:      "     Conjunctivae and sclerae normal, no icterus,     Ears:    Ears appear intact with no abnormalities noted   Throat:   No oral lesions, no thrush, oral mucosa moist   Neck:   No adenopathy, supple, trachea midline, no thyromegaly   Back:     No kyphosis present, no scoliosis present, no skin lesions,      erythema or scars, no tenderness to percussion or                   palpation,   range of motion normal   Lungs:     Clear to auscultation,respirations regular, even and                  unlabored    Heart:    Regular rhythm and normal rate, normal S1 and S2, no            murmur, no gallop, no rub, no click   Chest Wall:    No abnormalities observed   Abdomen:     Normal bowel sounds, no masses, no organomegaly, soft        non-tender, non-distended, no guarding, no rebound                tenderness   Rectal:        Extremities:   Moves all extremities well, no edema, no cyanosis, no             redness   Pulses:   Pulses palpable and equal bilaterally   Skin:   No bleeding, bruising or rash   Lymph nodes:   No palpable adenopathy   Neurologic:   A/o x 4 with no deficits       Results Review:   {Results Review:50560::\"I reviewed the patient's new clinical results.\"    LABS/IMAGING:  Results for orders placed or performed during the hospital encounter of 05/23/25   Basic Metabolic Panel    Collection Time: 05/23/25  6:22 AM    Specimen: Arm, Right; Blood   Result Value Ref Range    Glucose 132 (H) 65 - 99 mg/dL    BUN 8 8 - 23 mg/dL    Creatinine 1.03 0.76 - 1.27 mg/dL    Sodium 136 136 - 145 mmol/L    Potassium 3.7 3.5 - 5.2 mmol/L    Chloride 100 98 - 107 mmol/L    CO2 25.0 22.0 - 29.0 mmol/L    Calcium 9.0 8.6 - 10.5 mg/dL    BUN/Creatinine Ratio 7.8 7.0 - 25.0    Anion Gap 11.0 5.0 - 15.0 mmol/L    eGFR 82.6 >60.0 mL/min/1.73   CBC (No Diff)    Collection Time: 05/23/25  6:22 AM    Specimen: Arm, Right; Blood   Result Value Ref Range    WBC 8.22 3.40 - 10.80 10*3/mm3    RBC 5.05 4.14 - 5.80 10*6/mm3    " Hemoglobin 16.8 13.0 - 17.7 g/dL    Hematocrit 47.2 37.5 - 51.0 %    MCV 93.5 79.0 - 97.0 fL    MCH 33.3 (H) 26.6 - 33.0 pg    MCHC 35.6 31.5 - 35.7 g/dL    RDW 12.6 12.3 - 15.4 %    RDW-SD 43.5 37.0 - 54.0 fl    MPV 9.6 6.0 - 12.0 fL    Platelets 185 140 - 450 10*3/mm3   POC Glucose Once    Collection Time: 05/23/25 10:00 AM    Specimen: Blood   Result Value Ref Range    Glucose 127 70 - 130 mg/dL        Result Review: Labs  Result Review  Imaging  Med Tab  Media     Assessment & Plan    Status post colonoscopy by Dr. Almeida 3/11/2025 with appendiceal orifice polyp.  Pathology with tubulovillous adenoma.  Sent to Dr. Moore for further endoscopic removal.  Unable to verify complete removal and concern possibly tracks into the appendiceal opening per Dr. Moore on recent colonoscopy 5/23/2025.  Pathology pending.     Discussion with patient.  Previous colonoscopy and pathology reviewed.  I recommended laparoscopic appendectomy with partial removal of the cecum to verify complete removal of his pathology.  Procedure and recovery discussed.  Benefits and alternatives discussed.  Risk procedure including risk of anesthesia, bleeding, infection, conversion open, damage to surrounding structures, heart attack, stroke, blood clot, pneumonia, hernia discussed.  All questions answered.  He agrees with the plan.  I asked him to contact me if his pathology demonstrates cancer and we will change the plan.  He was instructed to use chlorhexidine the night before surgery.  Orders placed.  No bowel prep.  Thank you for the consult              This document has been electronically signed by Ab Zayas MD  May 27, 2025 10:51 EDT

## 2025-05-27 NOTE — PROGRESS NOTES
General Surgery/Colorectal Surgery Note    Patient Name:  Jung Burkett Jr.  YOB: 1965  [id number]    Referring Provider: No ref. provider found      Patient Care Team:  Prashanth Arthur MD as PCP - General (Family Medicine)  Titi Guzman MD as Consulting Physician (Hematology and Oncology)  Yannick Espinoza MD as Consulting Physician (Pulmonary Disease)    Chief complaint discuss surgery    Subjective .     History of present illness:    Status post colonoscopy by Dr. Almeida 3/11/2025 with appendiceal orifice polyp.  Pathology with tubulovillous adenoma.  Sent to Dr. Moroe for further endoscopic removal.  Unable to verify complete removal and concern possibly tracks into the appendiceal opening per Dr. Moore on recent colonoscopy 5/23/2025.  Pathology pending.  He comes in to discuss appendectomy with partial removal of the cecum to verify complete removal.  No previous abdominal surgery.  No blood thinner use.  No chest pain.  Grandmother with colorectal cancer.  History of Mounjaro use.      History:  Past Medical History:   Diagnosis Date    Acid reflux     ADHD (attention deficit hyperactivity disorder) 1970    Anxiety     Anxiety disorder 05/17/2019    Arthritis     generalized    Asthma     Benign prostatic hyperplasia 1989    Blood disease     none    Cervicalgia     Chronic allergic rhinitis     Colon polyp 2005    Depression     Diabetes mellitus, type 2 05/17/2019    DOES NOT CHECK BS    Diabetic eye exam 01/2019 20/20 PER PT     Encounter for diabetic foot exam     WITHIN FEW MO  PER PT     Essential hypertension 05/17/2019    GERD (gastroesophageal reflux disease)     High blood pressure     High cholesterol     Hyperlipemia     Hyperlipidemia     Hypertension     Low back pain 1987    Major depressive disorder 05/17/2019    Nicotine dependence 05/17/2019    Prostate disorder     BPH    Rotator cuff tear, left     Seasonal allergies     Urinary tract infection 1993     Vitamin D deficiency 05/17/2019    no current issues       Past Surgical History:   Procedure Laterality Date    CHOLECYSTECTOMY  1997    COLONOSCOPY      ELISA- REPEAT IN 5 YEARS     COLONOSCOPY N/A 3/11/2025    Procedure: COLONOSCOPY WITH POLYPECTOMIES HOT AND COLD SNARE, BIOPSIES, INJECTION SPOT INK, INJECTION ELEVIEW;  Surgeon: Refugio Almeida MD;  Location: [facility] ENDOSCOPY;  Service: Gastroenterology;  Laterality: N/A;  COLON POLYPS, hemorrhoids    GALLBLADDER SURGERY      KNEE ARTHROSCOPY W/ MENISCAL REPAIR Right     OTHER SURGICAL HISTORY      SURGICAL CLIPS/gallbladder removed    OTHER SURGICAL HISTORY      right knee meniscus tear repair    SHOULDER ARTHROSCOPY W/ ROTATOR CUFF REPAIR Left 07/21/2021    Procedure: SHOULDER ARTHROSCOPY,SUBACROMINAL DECOMPRESSION, DISTAL CLAVICLE RESECTION, MINI OPEN  ROTATOR CUFF REPAIR;  Surgeon: Ulisses Kenney MD;  Location: [facility];  Service: Orthopedics;  Laterality: Left;    TONSILLECTOMY      VASECTOMY  2004       Family History   Problem Relation Age of Onset    Cancer Mother     Diabetes Mother     Rheum arthritis Mother         40'S    Heart attack Mother     Breast cancer Mother         40'S    Arthritis Mother     Stroke Father     Heart disease Father     Heart attack Maternal Grandmother     Breast cancer Maternal Grandmother         50'S    Prostate cancer Maternal Grandfather     Nephrolithiasis Maternal Grandfather     Colon cancer Paternal Grandmother     Colon cancer Paternal Grandfather         60'S    Breast cancer Other         40'S    Heart attack Maternal Aunt     Malig Hyperthermia Neg Hx        Social History     Tobacco Use    Smoking status: Some Days     Types: Cigars     Passive exposure: Never    Smokeless tobacco: Never    Tobacco comments:     2-3 CIGARS WEEKLY   Vaping Use    Vaping status: Never Used   Substance Use Topics    Alcohol use: Yes     Alcohol/week: 6.0 standard drinks of alcohol     Types: 6 Drinks containing 0.5  oz of alcohol per week     Comment: Drinks daily; bourbon    Drug use: Yes     Types: Marijuana     Comment: none in last 48 hours, just tried to see if helped with pain       Review of Systems  All systems were reviewed and negative except for:   Review of Systems   Constitutional: Negative for chills, fever and unexpected weight loss.   HENT: Negative for congestion, nosebleeds and voice change.    Eyes: Negative for blurred vision, double vision and discharge.   Respiratory: Negative for apnea, chest tightness and shortness of breath.    Cardiovascular: Negative for chest pain and leg swelling.   Gastrointestinal:        See HPI   Endocrine: Negative for cold intolerance and heat intolerance.   Genitourinary: Negative for dysuria, hematuria and urgency.   Musculoskeletal: Negative for back pain, joint swelling and neck pain.   Skin: Negative for color change and dry skin.   Neurological: Negative for dizziness and confusion.   Hematological: Negative for adenopathy.   Psychiatric/Behavioral: Negative for agitation and behavioral problems.     MEDS:  Prior to Admission medications    Medication Sig Start Date End Date Taking? Authorizing Provider   albuterol (PROVENTIL) (2.5 MG/3ML) 0.083% nebulizer solution Take 2.5 mg by nebulization Every 4 (Four) Hours As Needed for Wheezing. 5/13/24  Yes Prashanth Arthur MD   amphetamine-dextroamphetamine (ADDERALL) 20 MG tablet Take 1 tablet by mouth 2 (Two) Times a Day. Dose adjustment 4/28/25  Yes Ab Snyder DO   atorvastatin (LIPITOR) 20 MG tablet Take 1 tablet by mouth Every Night. 5/1/25  Yes Prashanth Arthur MD   busPIRone (BUSPAR) 15 MG tablet Take 1 tablet by mouth 2 (Two) Times a Day. 5/1/25  Yes Prashanth Arthur MD   CBD (cannabidiol) oral oil Take 3 drops by mouth Daily.   Yes ProviderLashell MD   finasteride (PROSCAR) 5 MG tablet Take 1 tablet by mouth Daily. 3/27/25  Yes Prashanth Arthur MD   hyoscyamine (ANASPAZ,LEVSIN) 0.125 MG tablet Take 1  tablet by mouth 4 (Four) Times a Day With Meals & at Bedtime. 1/23/25  Yes Lena Schultz APRN   lansoprazole (Prevacid) 30 MG capsule Take 1 capsule by mouth Daily. 5/1/25  Yes Prashanth Arthur MD   methocarbamol (ROBAXIN) 750 MG tablet Take 1 tablet by mouth 4 (Four) Times a Day As Needed for Muscle Spasms. 3/7/25  Yes Prashanth Arthur MD   metoprolol succinate XL (TOPROL-XL) 50 MG 24 hr tablet Take 1 tablet by mouth Daily. 5/1/25  Yes Prashanth Arthur MD   tamsulosin (FLOMAX) 0.4 MG capsule 24 hr capsule Take 1 capsule by mouth Daily. 5/1/25  Yes Prashanth Arthur MD   Tirzepatide (Mounjaro) 7.5 MG/0.5ML solution auto-injector Inject 7.5 mg under the skin into the appropriate area as directed 1 (One) Time Per Week. 4/15/25  Yes Prashanth Arthur MD   traZODone (DESYREL) 100 MG tablet Take 1 tablet by mouth Every Night. 5/1/25  Yes Prashanth Arthur MD   VITAMIN D PO Take 1 capsule by mouth Daily.   Yes Provider, MD Lashell        Allergies:  Hydrocodone-acetaminophen, Nsaids, Oxycodone-acetaminophen, Sulfa antibiotics, and Voltaren [diclofenac]    Objective     Vital Signs        Physical Exam:     General Appearance:    Alert, cooperative, in no acute distress   Head:    Normocephalic, without obvious abnormality, atraumatic   Eyes:          Conjunctivae and sclerae normal, no icterus,     Ears:    Ears appear intact with no abnormalities noted   Throat:   No oral lesions, no thrush, oral mucosa moist   Neck:   No adenopathy, supple, trachea midline, no thyromegaly   Back:     No kyphosis present, no scoliosis present, no skin lesions,      erythema or scars, no tenderness to percussion or                   palpation,   range of motion normal   Lungs:     Clear to auscultation,respirations regular, even and                  unlabored    Heart:    Regular rhythm and normal rate, normal S1 and S2, no            murmur, no gallop, no rub, no click   Chest Wall:    No abnormalities observed   Abdomen:     " Normal bowel sounds, no masses, no organomegaly, soft        non-tender, non-distended, no guarding, no rebound                tenderness   Rectal:        Extremities:   Moves all extremities well, no edema, no cyanosis, no             redness   Pulses:   Pulses palpable and equal bilaterally   Skin:   No bleeding, bruising or rash   Lymph nodes:   No palpable adenopathy   Neurologic:   A/o x 4 with no deficits       Results Review:   {Results Review:44657::\"I reviewed the patient's new clinical results.\"    LABS/IMAGING:  Results for orders placed or performed during the hospital encounter of 05/23/25   Basic Metabolic Panel    Collection Time: 05/23/25  6:22 AM    Specimen: Arm, Right; Blood   Result Value Ref Range    Glucose 132 (H) 65 - 99 mg/dL    BUN 8 8 - 23 mg/dL    Creatinine 1.03 0.76 - 1.27 mg/dL    Sodium 136 136 - 145 mmol/L    Potassium 3.7 3.5 - 5.2 mmol/L    Chloride 100 98 - 107 mmol/L    CO2 25.0 22.0 - 29.0 mmol/L    Calcium 9.0 8.6 - 10.5 mg/dL    BUN/Creatinine Ratio 7.8 7.0 - 25.0    Anion Gap 11.0 5.0 - 15.0 mmol/L    eGFR 82.6 >60.0 mL/min/1.73   CBC (No Diff)    Collection Time: 05/23/25  6:22 AM    Specimen: Arm, Right; Blood   Result Value Ref Range    WBC 8.22 3.40 - 10.80 10*3/mm3    RBC 5.05 4.14 - 5.80 10*6/mm3    Hemoglobin 16.8 13.0 - 17.7 g/dL    Hematocrit 47.2 37.5 - 51.0 %    MCV 93.5 79.0 - 97.0 fL    MCH 33.3 (H) 26.6 - 33.0 pg    MCHC 35.6 31.5 - 35.7 g/dL    RDW 12.6 12.3 - 15.4 %    RDW-SD 43.5 37.0 - 54.0 fl    MPV 9.6 6.0 - 12.0 fL    Platelets 185 140 - 450 10*3/mm3   POC Glucose Once    Collection Time: 05/23/25 10:00 AM    Specimen: Blood   Result Value Ref Range    Glucose 127 70 - 130 mg/dL        Result Review : Labs  Result Review  Imaging  Med Tab  Media     Assessment & Plan     Status post colonoscopy by Dr. Almeida 3/11/2025 with appendiceal orifice polyp.  Pathology with tubulovillous adenoma.  Sent to Dr. Moore for further endoscopic removal.  Unable to " verify complete removal and concern possibly tracks into the appendiceal opening per Dr. Moore on recent colonoscopy 5/23/2025.  Pathology pending.     Discussion with patient.  Previous colonoscopy and pathology reviewed.  I recommended laparoscopic appendectomy with partial removal of the cecum to verify complete removal of his pathology.  Procedure and recovery discussed.  Benefits and alternatives discussed.  Risk procedure including risk of anesthesia, bleeding, infection, conversion open, damage to surrounding structures, heart attack, stroke, blood clot, pneumonia, hernia discussed.  All questions answered.  He agrees with the plan.  I asked him to contact me if his pathology demonstrates cancer and we will change the plan.  He was instructed to use chlorhexidine the night before surgery.  Orders placed.  No bowel prep.  Thank you for the consult              This document has been electronically signed by Ab Zayas MD  May 27, 2025 10:51 EDT

## 2025-05-27 NOTE — H&P (VIEW-ONLY)
General Surgery/Colorectal Surgery Note    Patient Name:  Jung Burkett Jr.  YOB: 1965  [id number]    Referring Provider: No ref. provider found      Patient Care Team:  Prashanth Arthur MD as PCP - General (Family Medicine)  Titi Guzman MD as Consulting Physician (Hematology and Oncology)  Yannick Espinoza MD as Consulting Physician (Pulmonary Disease)    Chief complaint discuss surgery    Subjective .     History of present illness:    Status post colonoscopy by Dr. Almeida 3/11/2025 with appendiceal orifice polyp.  Pathology with tubulovillous adenoma.  Sent to Dr. Moore for further endoscopic removal.  Unable to verify complete removal and concern possibly tracks into the appendiceal opening per Dr. Moore on recent colonoscopy 5/23/2025.  Pathology pending.  He comes in to discuss appendectomy with partial removal of the cecum to verify complete removal.  No previous abdominal surgery.  No blood thinner use.  No chest pain.  Grandmother with colorectal cancer.  History of Mounjaro use.      History:  Past Medical History:   Diagnosis Date    Acid reflux     ADHD (attention deficit hyperactivity disorder) 1970    Anxiety     Anxiety disorder 05/17/2019    Arthritis     generalized    Asthma     Benign prostatic hyperplasia 1989    Blood disease     none    Cervicalgia     Chronic allergic rhinitis     Colon polyp 2005    Depression     Diabetes mellitus, type 2 05/17/2019    DOES NOT CHECK BS    Diabetic eye exam 01/2019 20/20 PER PT     Encounter for diabetic foot exam     WITHIN FEW MO  PER PT     Essential hypertension 05/17/2019    GERD (gastroesophageal reflux disease)     High blood pressure     High cholesterol     Hyperlipemia     Hyperlipidemia     Hypertension     Low back pain 1987    Major depressive disorder 05/17/2019    Nicotine dependence 05/17/2019    Prostate disorder     BPH    Rotator cuff tear, left     Seasonal allergies     Urinary tract infection 1993     Vitamin D deficiency 05/17/2019    no current issues       Past Surgical History:   Procedure Laterality Date    CHOLECYSTECTOMY  1997    COLONOSCOPY      ELISA- REPEAT IN 5 YEARS     COLONOSCOPY N/A 3/11/2025    Procedure: COLONOSCOPY WITH POLYPECTOMIES HOT AND COLD SNARE, BIOPSIES, INJECTION SPOT INK, INJECTION ELEVIEW;  Surgeon: Refugio Almeida MD;  Location: [facility] ENDOSCOPY;  Service: Gastroenterology;  Laterality: N/A;  COLON POLYPS, hemorrhoids    GALLBLADDER SURGERY      KNEE ARTHROSCOPY W/ MENISCAL REPAIR Right     OTHER SURGICAL HISTORY      SURGICAL CLIPS/gallbladder removed    OTHER SURGICAL HISTORY      right knee meniscus tear repair    SHOULDER ARTHROSCOPY W/ ROTATOR CUFF REPAIR Left 07/21/2021    Procedure: SHOULDER ARTHROSCOPY,SUBACROMINAL DECOMPRESSION, DISTAL CLAVICLE RESECTION, MINI OPEN  ROTATOR CUFF REPAIR;  Surgeon: Ulisses Kenney MD;  [place] [facility] [place] [facility] [place]  Service: [place]opedics;  Laterality: Left;    TONSILLECTOMY      VASECTOMY  2004       Family History   Problem Relation Age of Onset    Cancer Mother     Diabetes Mother     Rheum arthritis Mother         40'S    Heart attack Mother     Breast cancer Mother         40'S    Arthritis Mother     Stroke Father     Heart disease Father     Heart attack Maternal Grandmother     Breast cancer Maternal Grandmother         50'S    Prostate cancer Maternal Grandfather     Nephrolithiasis Maternal Grandfather     Colon cancer Paternal Grandmother     Colon cancer Paternal Grandfather         60'S    Breast cancer Other         40'S    Heart attack Maternal Aunt     Malig Hyperthermia Neg Hx        Social History     Tobacco Use    Smoking status: Some Days     Types: Cigars     Passive exposure: Never    Smokeless tobacco: Never    Tobacco comments:     2-3 CIGARS WEEKLY   Vaping Use    Vaping status: Never Used   Substance Use Topics    Alcohol use: Yes     Alcohol/week: 6.0 standard drinks of alcohol     Types: 6 Drinks containing 0.5  oz of alcohol per week     Comment: Drinks daily; bourbon    Drug use: Yes     Types: Marijuana     Comment: none in last 48 hours, just tried to see if helped with pain       Review of Systems  All systems were reviewed and negative except for:   Review of Systems   Constitutional: Negative for chills, fever and unexpected weight loss.   HENT: Negative for congestion, nosebleeds and voice change.    Eyes: Negative for blurred vision, double vision and discharge.   Respiratory: Negative for apnea, chest tightness and shortness of breath.    Cardiovascular: Negative for chest pain and leg swelling.   Gastrointestinal:        See HPI   Endocrine: Negative for cold intolerance and heat intolerance.   Genitourinary: Negative for dysuria, hematuria and urgency.   Musculoskeletal: Negative for back pain, joint swelling and neck pain.   Skin: Negative for color change and dry skin.   Neurological: Negative for dizziness and confusion.   Hematological: Negative for adenopathy.   Psychiatric/Behavioral: Negative for agitation and behavioral problems.     MEDS:  Prior to Admission medications    Medication Sig Start Date End Date Taking? Authorizing Provider   albuterol (PROVENTIL) (2.5 MG/3ML) 0.083% nebulizer solution Take 2.5 mg by nebulization Every 4 (Four) Hours As Needed for Wheezing. 5/13/24  Yes Prashanth Arthur MD   amphetamine-dextroamphetamine (ADDERALL) 20 MG tablet Take 1 tablet by mouth 2 (Two) Times a Day. Dose adjustment 4/28/25  Yes Ab Snyder DO   atorvastatin (LIPITOR) 20 MG tablet Take 1 tablet by mouth Every Night. 5/1/25  Yes Prashanth Arthur MD   busPIRone (BUSPAR) 15 MG tablet Take 1 tablet by mouth 2 (Two) Times a Day. 5/1/25  Yes Prashanth Arthur MD   CBD (cannabidiol) oral oil Take 3 drops by mouth Daily.   Yes ProviderLashell MD   finasteride (PROSCAR) 5 MG tablet Take 1 tablet by mouth Daily. 3/27/25  Yes Prashanth Arthur MD   hyoscyamine (ANASPAZ,LEVSIN) 0.125 MG tablet Take 1  tablet by mouth 4 (Four) Times a Day With Meals & at Bedtime. 1/23/25  Yes Lena Schultz APRN   lansoprazole (Prevacid) 30 MG capsule Take 1 capsule by mouth Daily. 5/1/25  Yes Prashanth Arthur MD   methocarbamol (ROBAXIN) 750 MG tablet Take 1 tablet by mouth 4 (Four) Times a Day As Needed for Muscle Spasms. 3/7/25  Yes Prashanth Arthur MD   metoprolol succinate XL (TOPROL-XL) 50 MG 24 hr tablet Take 1 tablet by mouth Daily. 5/1/25  Yes Prashanth Arthur MD   tamsulosin (FLOMAX) 0.4 MG capsule 24 hr capsule Take 1 capsule by mouth Daily. 5/1/25  Yes Prashanth Arthur MD   Tirzepatide (Mounjaro) 7.5 MG/0.5ML solution auto-injector Inject 7.5 mg under the skin into the appropriate area as directed 1 (One) Time Per Week. 4/15/25  Yes Prashanth Arthur MD   traZODone (DESYREL) 100 MG tablet Take 1 tablet by mouth Every Night. 5/1/25  Yes Prashanth Arthur MD   VITAMIN D PO Take 1 capsule by mouth Daily.   Yes Provider, MD Lashell        Allergies:  Hydrocodone-acetaminophen, Nsaids, Oxycodone-acetaminophen, Sulfa antibiotics, and Voltaren [diclofenac]    Objective     Vital Signs        Physical Exam:     General Appearance:    Alert, cooperative, in no acute distress   Head:    Normocephalic, without obvious abnormality, atraumatic   Eyes:          Conjunctivae and sclerae normal, no icterus,     Ears:    Ears appear intact with no abnormalities noted   Throat:   No oral lesions, no thrush, oral mucosa moist   Neck:   No adenopathy, supple, trachea midline, no thyromegaly   Back:     No kyphosis present, no scoliosis present, no skin lesions,      erythema or scars, no tenderness to percussion or                   palpation,   range of motion normal   Lungs:     Clear to auscultation,respirations regular, even and                  unlabored    Heart:    Regular rhythm and normal rate, normal S1 and S2, no            murmur, no gallop, no rub, no click   Chest Wall:    No abnormalities observed   Abdomen:     " Normal bowel sounds, no masses, no organomegaly, soft        non-tender, non-distended, no guarding, no rebound                tenderness   Rectal:        Extremities:   Moves all extremities well, no edema, no cyanosis, no             redness   Pulses:   Pulses palpable and equal bilaterally   Skin:   No bleeding, bruising or rash   Lymph nodes:   No palpable adenopathy   Neurologic:   A/o x 4 with no deficits       Results Review:   {Results Review:[id number]::\"I reviewed the patient's new clinical results.\"    LABS/IMAGING:  Results for orders placed or performed during the hospital encounter of 05/23/25   Basic Metabolic Panel    Collection Time: 05/23/25  6:22 AM    Specimen: Arm, Right; Blood   Result Value Ref Range    Glucose 132 (H) 65 - 99 mg/dL    BUN 8 8 - 23 mg/dL    Creatinine 1.03 0.76 - 1.27 mg/dL    Sodium 136 136 - 145 mmol/L    Potassium 3.7 3.5 - 5.2 mmol/L    Chloride 100 98 - 107 mmol/L    CO2 25.0 22.0 - 29.0 mmol/L    Calcium 9.0 8.6 - 10.5 mg/dL    BUN/Creatinine Ratio 7.8 7.0 - 25.0    Anion Gap 11.0 5.0 - 15.0 mmol/L    eGFR 82.6 >60.0 mL/min/1.73   CBC (No Diff)    Collection Time: 05/23/25  6:22 AM    Specimen: Arm, Right; Blood   Result Value Ref Range    WBC 8.22 3.40 - 10.80 10*3/mm3    RBC 5.05 4.14 - 5.80 10*6/mm3    Hemoglobin 16.8 13.0 - 17.7 g/dL    Hematocrit 47.2 37.5 - 51.0 %    MCV 93.5 79.0 - 97.0 fL    MCH 33.3 (H) 26.6 - 33.0 pg    MCHC 35.6 31.5 - 35.7 g/dL    RDW 12.6 12.3 - 15.4 %    RDW-SD 43.5 37.0 - 54.0 fl    MPV 9.6 6.0 - 12.0 fL    Platelets 185 140 - 450 10*3/mm3   POC Glucose Once    Collection Time: 05/23/25 10:00 AM    Specimen: Blood   Result Value Ref Range    Glucose 127 70 - 130 mg/dL        Result Review : Labs  Result Review  Imaging  Med Tab  Media     Assessment & Plan     Status post colonoscopy by Dr. Almeida 3/11/2025 with appendiceal orifice polyp.  Pathology with tubulovillous adenoma.  Sent to Dr. Moore for further endoscopic removal.  Unable to " verify complete removal and concern possibly tracks into the appendiceal opening per Dr. Moore on recent colonoscopy 5/23/2025.  Pathology pending.     Discussion with patient.  Previous colonoscopy and pathology reviewed.  I recommended laparoscopic appendectomy with partial removal of the cecum to verify complete removal of his pathology.  Procedure and recovery discussed.  Benefits and alternatives discussed.  Risk procedure including risk of anesthesia, bleeding, infection, conversion open, damage to surrounding structures, heart attack, stroke, blood clot, pneumonia, hernia discussed.  All questions answered.  He agrees with the plan.  I asked him to contact me if his pathology demonstrates cancer and we will change the plan.  He was instructed to use chlorhexidine the night before surgery.  Orders placed.  No bowel prep.  Thank you for the consult              This document has been electronically signed by Ab Zayas MD  May 27, 2025 10:51 EDT

## 2025-05-28 LAB
CYTO UR: NORMAL
LAB AP CASE REPORT: NORMAL
PATH REPORT.FINAL DX SPEC: NORMAL
PATH REPORT.GROSS SPEC: NORMAL

## 2025-05-29 ENCOUNTER — TELEPHONE (OUTPATIENT)
Dept: SURGERY | Facility: CLINIC | Age: 60
End: 2025-05-29
Payer: COMMERCIAL

## 2025-05-29 NOTE — PRE-PROCEDURE INSTRUCTIONS
PATIENT INSTRUCTED TO BE:    - NOTHING TO EAT, DRINK OR CHEW AFTER MIDNIGHT      - TO HOLD ALL VITAMINS, SUPPLEMENTS, NSAIDS FOR ONE WEEK PRIOR TO THEIR SURGICAL PROCEDURE    - DO NOT TAKE _MOUNJARO__ 7 DAYS PRIOR TO PROCEDURE PER ANESTHESIA RECOMMENDATIONS/INSTRUCTIONS     - INSTRUCTED PT TO USE SURGICAL SOAP 1 TIME THE NIGHT PRIOR TO SURGERY ___6/1________ OR THE AM OF SURGERY __6/2___________   USE THE SOAP FROM NECK TO TOES, AVOID THEIR FACE, HAIR, AND PRIVATE PARTS. IF USE THE SOAP THE NIGHT PRIOR TO SURGERY, CHANGE BED LINENS AND NO PETS IN THE BED.     INSTRUCTED NO LOTIONS, JEWELRY, PIERCINGS,  NAIL POLISH, OR DEODORANT DAY OF SURGERY    - IF DIABETIC, CHECK BLOOD GLUCOSE IF LESS THAN 70 OR HAVING SYMPTOMS CALL THE PREOP AREA FOR INSTRUCTIONS ON AM OF SURGERY ([phone])    -INSTRUCTED TO TAKE THE FOLLOWING MEDICATIONS THE DAY OF SURGERY WITH SIPS OF WATER: BUSPAR, NEBULIZER TREATMENT, METOPROLOL(INSTRUCTED TO BRING AM OF PROCEDURE), PROSCAR, FLOMAX, ROBAXIN IF NEEDED, PREVACID    - BRING METOPROLOL WITH YOU TO THE HOSPITAL THE DAY OF SURGERY    - BRING CPAP OR BIPAP TO THE HOSPITAL ONLY IF YOU ARE SPENDING THE NIGHT    - DO NOT SMOKE OR VAPE 24 HOURS PRIOR TO PROCEDURE PER ANESTHESIA REQUEST     -MAKE SURE YOU HAVE A RIDE HOME OR SOMEONE TO STAY WITH YOU THE DAY OF THE PROCEDURE AFTER YOU GO HOME     - FOLLOW ANY OTHER INSTRUCTIONS GIVEN TO YOU BY YOUR SURGEON'S OFFICE.     - DAY OF SURGERY ___6/2___, MAIN ENTRANCE [facility]. TAKE ELEVATOR TO FIRST FLOOR, TURN LEFT AND CHECK IN WITH REGISTRATION    - YOU WILL RECEIVE A PHONE CALL THE DAY PRIOR TO SURGERY BETWEEN 1PM AND 4 PM WITH ARRIVAL TIME, IF YOUR SURGERY IS ON A MONDAY YOU WILL RECEIVE A CALL THE FRIDAY PRIOR TO SURGERY DATE    - BRING CASH OR CREDIT CARD FOR COPAYMENT OF MEDICATIONS AFTER SURGERY IF YOU USE THE HOSPITAL PHARMACY (MEDS TO BED)    - PREADMISSION TESTING NURSE LETI MARTINEZ [phone] IF HAVE ANY QUESTIONS     -PATIENT  PROVIDED THE NUMBER FOR PREOP SURGICAL DEPT IF HAD QUESTIONS AFTER HOURS PRIOR TO SURGERY ([phone]).  INFORMED PT IF NO ANSWER, LEAVE A MESSAGE AND SOMEONE WILL RETURN THEIR CALL       PATIENT VERBALIZED UNDERSTANDING

## 2025-05-29 NOTE — TELEPHONE ENCOUNTER
PATIENT CALLED AND SAID HE IS SCHEDULED FOR AN APPENDECTOMY ON 06/02/25.  HE ASKED IF IT WAS AUTHORIZED.    I TOLD HIM IT IS NOTED, PER [employer], NO AUTH NEEDED FOR APPENDECTOMY.    IS THIS CORRECT?    #[phone]

## 2025-05-30 ENCOUNTER — TELEPHONE (OUTPATIENT)
Dept: SURGERY | Facility: CLINIC | Age: 60
End: 2025-05-30
Payer: COMMERCIAL

## 2025-05-30 NOTE — TELEPHONE ENCOUNTER
PATIENT CALLED AND HE IS SCHEDULED FOR AN APPENDECTOMY ON 06/02/25.  HE SAID THAT DR. DICKEY NEEDED TO KNOW THE RESULTS FROM HIS COLONOSCOPY FROM DR. ARNOLD.  HE TOOK A POLYP FROM HIS COLON.  IF THE POLYP WAS CANCEROUS, THE PROCEDURE MIGHT HAVE TO BE CHANGED.    PATIENT GOT CALL FROM DR. ARNOLD.  HE SAID THE POLYP WAS NON-CANCEROUS, AND THAT HE STILL NEEDS THE APPENDECTOMY.    #[phone]

## 2025-06-02 ENCOUNTER — ANESTHESIA EVENT (OUTPATIENT)
Dept: PERIOP | Facility: HOSPITAL | Age: 60
End: 2025-06-02
Payer: COMMERCIAL

## 2025-06-02 ENCOUNTER — ANESTHESIA (OUTPATIENT)
Dept: PERIOP | Facility: HOSPITAL | Age: 60
End: 2025-06-02
Payer: COMMERCIAL

## 2025-06-02 ENCOUNTER — HOSPITAL ENCOUNTER (OUTPATIENT)
Facility: HOSPITAL | Age: 60
Setting detail: HOSPITAL OUTPATIENT SURGERY
Discharge: HOME | End: 2025-06-02
Attending: SURGERY | Admitting: SURGERY
Payer: COMMERCIAL

## 2025-06-02 VITALS
DIASTOLIC BLOOD PRESSURE: 89 MMHG | TEMPERATURE: 97.2 F | RESPIRATION RATE: 18 BRPM | SYSTOLIC BLOOD PRESSURE: 128 MMHG | OXYGEN SATURATION: 97 % | HEART RATE: 70 BPM | WEIGHT: 175.49 LBS | BODY MASS INDEX: 26.6 KG/M2 | HEIGHT: 68 IN

## 2025-06-02 DIAGNOSIS — G47.11 IDIOPATHIC HYPERSOMNIA: ICD-10-CM

## 2025-06-02 DIAGNOSIS — K63.5 CECAL POLYP: ICD-10-CM

## 2025-06-02 LAB
ANION GAP SERPL CALCULATED.3IONS-SCNC: 8.3 MMOL/L (ref 5–15)
BUN SERPL-MCNC: 10.5 MG/DL (ref 8–23)
BUN/CREAT SERPL: 10.5 (ref 7–25)
CALCIUM SPEC-SCNC: 9.2 MG/DL (ref 8.6–10.5)
CHLORIDE SERPL-SCNC: 99 MMOL/L (ref 98–107)
CO2 SERPL-SCNC: 26.7 MMOL/L (ref 22–29)
CREAT SERPL-MCNC: 1 MG/DL (ref 0.76–1.27)
EGFRCR SERPLBLD CKD-EPI 2021: 85.6 ML/MIN/1.73
GLUCOSE BLDC GLUCOMTR-MCNC: 138 MG/DL (ref 70–99)
GLUCOSE SERPL-MCNC: 171 MG/DL (ref 65–99)
POTASSIUM SERPL-SCNC: 4.5 MMOL/L (ref 3.5–5.2)
QT INTERVAL: 355 MS
QTC INTERVAL: 407 MS
SODIUM SERPL-SCNC: 134 MMOL/L (ref 136–145)

## 2025-06-02 PROCEDURE — 82948 REAGENT STRIP/BLOOD GLUCOSE: CPT

## 2025-06-02 PROCEDURE — 25010000002 DEXAMETHASONE PER 1 MG

## 2025-06-02 PROCEDURE — 25010000002 LIDOCAINE PF 2% 2 % SOLUTION

## 2025-06-02 PROCEDURE — 25010000002 PROPOFOL 10 MG/ML EMULSION

## 2025-06-02 PROCEDURE — 88304 TISSUE EXAM BY PATHOLOGIST: CPT | Performed by: SURGERY

## 2025-06-02 PROCEDURE — 25010000002 HYDROMORPHONE 1 MG/ML SOLUTION

## 2025-06-02 PROCEDURE — 25010000002 DEXAMETHASONE SODIUM PHOSPHATE 100 MG/10ML SOLUTION: Performed by: SURGERY

## 2025-06-02 PROCEDURE — 93005 ELECTROCARDIOGRAM TRACING: CPT | Performed by: ANESTHESIOLOGY

## 2025-06-02 PROCEDURE — 25010000002 BUPRENORPHINE PER 0.1 MG: Performed by: SURGERY

## 2025-06-02 PROCEDURE — 44970 LAPAROSCOPY APPENDECTOMY: CPT | Performed by: SURGERY

## 2025-06-02 PROCEDURE — 25010000002 METRONIDAZOLE 500 MG/100ML SOLUTION: Performed by: SURGERY

## 2025-06-02 PROCEDURE — 25010000002 SUGAMMADEX 200 MG/2ML SOLUTION

## 2025-06-02 PROCEDURE — 25010000002 ONDANSETRON PER 1 MG

## 2025-06-02 PROCEDURE — 44970 LAPAROSCOPY APPENDECTOMY: CPT

## 2025-06-02 PROCEDURE — 93010 ELECTROCARDIOGRAM REPORT: CPT | Performed by: INTERNAL MEDICINE

## 2025-06-02 PROCEDURE — 25010000002 BUPIVACAINE (PF) 0.25 % SOLUTION: Performed by: SURGERY

## 2025-06-02 PROCEDURE — 25010000002 ESMOLOL 100 MG/10ML SOLUTION

## 2025-06-02 PROCEDURE — 80048 BASIC METABOLIC PNL TOTAL CA: CPT | Performed by: ANESTHESIOLOGY

## 2025-06-02 PROCEDURE — 25010000002 MIDAZOLAM PER 1MG: Performed by: ANESTHESIOLOGY

## 2025-06-02 PROCEDURE — 25810000003 LACTATED RINGERS PER 1000 ML: Performed by: ANESTHESIOLOGY

## 2025-06-02 PROCEDURE — 25010000002 CEFAZOLIN PER 500 MG: Performed by: SURGERY

## 2025-06-02 DEVICE — ENDOPATH ECHELON ENDOSCOPIC LINEAR CUTTER RELOADS, BLUE, 60MM
Type: IMPLANTABLE DEVICE | Site: ABDOMEN | Status: FUNCTIONAL
Brand: ECHELON ENDOPATH

## 2025-06-02 RX ORDER — POLYETHYLENE GLYCOL 3350 17 G/17G
17 POWDER, FOR SOLUTION ORAL DAILY
Qty: 5 PACKET | Refills: 0 | Status: SHIPPED | OUTPATIENT
Start: 2025-06-02 | End: 2025-06-20

## 2025-06-02 RX ORDER — DIPHENHYDRAMINE HYDROCHLORIDE 50 MG/ML
12.5 INJECTION, SOLUTION INTRAMUSCULAR; INTRAVENOUS ONCE AS NEEDED
Status: DISCONTINUED | OUTPATIENT
Start: 2025-06-02 | End: 2025-06-02 | Stop reason: HOSPADM

## 2025-06-02 RX ORDER — KETAMINE HYDROCHLORIDE 10 MG/ML
INJECTION, SOLUTION INTRAMUSCULAR; INTRAVENOUS AS NEEDED
Status: DISCONTINUED | OUTPATIENT
Start: 2025-06-02 | End: 2025-06-02 | Stop reason: SURG

## 2025-06-02 RX ORDER — SODIUM CHLORIDE 0.9 % (FLUSH) 0.9 %
10 SYRINGE (ML) INJECTION AS NEEDED
Status: DISCONTINUED | OUTPATIENT
Start: 2025-06-02 | End: 2025-06-02 | Stop reason: HOSPADM

## 2025-06-02 RX ORDER — PROMETHAZINE HYDROCHLORIDE 25 MG/1
25 TABLET ORAL ONCE AS NEEDED
Status: DISCONTINUED | OUTPATIENT
Start: 2025-06-02 | End: 2025-06-02 | Stop reason: HOSPADM

## 2025-06-02 RX ORDER — SODIUM CHLORIDE 9 MG/ML
40 INJECTION, SOLUTION INTRAVENOUS AS NEEDED
Status: DISCONTINUED | OUTPATIENT
Start: 2025-06-02 | End: 2025-06-02 | Stop reason: HOSPADM

## 2025-06-02 RX ORDER — OXYCODONE HYDROCHLORIDE 5 MG/1
5 TABLET ORAL
Status: DISCONTINUED | OUTPATIENT
Start: 2025-06-02 | End: 2025-06-02 | Stop reason: HOSPADM

## 2025-06-02 RX ORDER — MIDAZOLAM HYDROCHLORIDE 2 MG/2ML
2 INJECTION, SOLUTION INTRAMUSCULAR; INTRAVENOUS ONCE
Status: COMPLETED | OUTPATIENT
Start: 2025-06-02 | End: 2025-06-02

## 2025-06-02 RX ORDER — SODIUM CHLORIDE, SODIUM LACTATE, POTASSIUM CHLORIDE, CALCIUM CHLORIDE 600; 310; 30; 20 MG/100ML; MG/100ML; MG/100ML; MG/100ML
9 INJECTION, SOLUTION INTRAVENOUS CONTINUOUS PRN
Status: DISCONTINUED | OUTPATIENT
Start: 2025-06-02 | End: 2025-06-02 | Stop reason: HOSPADM

## 2025-06-02 RX ORDER — DEXAMETHASONE SODIUM PHOSPHATE 4 MG/ML
INJECTION, SOLUTION INTRA-ARTICULAR; INTRALESIONAL; INTRAMUSCULAR; INTRAVENOUS; SOFT TISSUE AS NEEDED
Status: DISCONTINUED | OUTPATIENT
Start: 2025-06-02 | End: 2025-06-02 | Stop reason: SURG

## 2025-06-02 RX ORDER — ACETAMINOPHEN AND CODEINE PHOSPHATE 300; 30 MG/1; MG/1
1-2 TABLET ORAL EVERY 6 HOURS PRN
Qty: 8 TABLET | Refills: 0 | Status: SHIPPED | OUTPATIENT
Start: 2025-06-02 | End: 2026-06-02

## 2025-06-02 RX ORDER — SODIUM CHLORIDE 0.9 % (FLUSH) 0.9 %
10 SYRINGE (ML) INJECTION EVERY 12 HOURS SCHEDULED
Status: DISCONTINUED | OUTPATIENT
Start: 2025-06-02 | End: 2025-06-02 | Stop reason: HOSPADM

## 2025-06-02 RX ORDER — PROMETHAZINE HYDROCHLORIDE 25 MG/1
25 SUPPOSITORY RECTAL ONCE AS NEEDED
Status: DISCONTINUED | OUTPATIENT
Start: 2025-06-02 | End: 2025-06-02 | Stop reason: HOSPADM

## 2025-06-02 RX ORDER — ONDANSETRON 2 MG/ML
4 INJECTION INTRAMUSCULAR; INTRAVENOUS ONCE AS NEEDED
Status: DISCONTINUED | OUTPATIENT
Start: 2025-06-02 | End: 2025-06-02 | Stop reason: HOSPADM

## 2025-06-02 RX ORDER — METRONIDAZOLE 500 MG/100ML
500 INJECTION, SOLUTION INTRAVENOUS ONCE
Status: COMPLETED | OUTPATIENT
Start: 2025-06-02 | End: 2025-06-02

## 2025-06-02 RX ORDER — LIDOCAINE HYDROCHLORIDE 20 MG/ML
INJECTION, SOLUTION EPIDURAL; INFILTRATION; INTRACAUDAL; PERINEURAL AS NEEDED
Status: DISCONTINUED | OUTPATIENT
Start: 2025-06-02 | End: 2025-06-02 | Stop reason: SURG

## 2025-06-02 RX ORDER — SODIUM CHLORIDE, SODIUM LACTATE, POTASSIUM CHLORIDE, CALCIUM CHLORIDE 600; 310; 30; 20 MG/100ML; MG/100ML; MG/100ML; MG/100ML
50 INJECTION, SOLUTION INTRAVENOUS CONTINUOUS
Status: DISCONTINUED | OUTPATIENT
Start: 2025-06-02 | End: 2025-06-02 | Stop reason: HOSPADM

## 2025-06-02 RX ORDER — ROCURONIUM BROMIDE 10 MG/ML
INJECTION, SOLUTION INTRAVENOUS AS NEEDED
Status: DISCONTINUED | OUTPATIENT
Start: 2025-06-02 | End: 2025-06-02 | Stop reason: SURG

## 2025-06-02 RX ORDER — PROPOFOL 10 MG/ML
VIAL (ML) INTRAVENOUS AS NEEDED
Status: DISCONTINUED | OUTPATIENT
Start: 2025-06-02 | End: 2025-06-02 | Stop reason: SURG

## 2025-06-02 RX ORDER — ESMOLOL HYDROCHLORIDE 10 MG/ML
INJECTION INTRAVENOUS AS NEEDED
Status: DISCONTINUED | OUTPATIENT
Start: 2025-06-02 | End: 2025-06-02 | Stop reason: SURG

## 2025-06-02 RX ORDER — DEXMEDETOMIDINE HYDROCHLORIDE 100 UG/ML
INJECTION, SOLUTION INTRAVENOUS AS NEEDED
Status: DISCONTINUED | OUTPATIENT
Start: 2025-06-02 | End: 2025-06-02 | Stop reason: SURG

## 2025-06-02 RX ORDER — ONDANSETRON 2 MG/ML
INJECTION INTRAMUSCULAR; INTRAVENOUS AS NEEDED
Status: DISCONTINUED | OUTPATIENT
Start: 2025-06-02 | End: 2025-06-02 | Stop reason: SURG

## 2025-06-02 RX ADMIN — MIDAZOLAM HYDROCHLORIDE 2 MG: 1 INJECTION, SOLUTION INTRAMUSCULAR; INTRAVENOUS at 12:37

## 2025-06-02 RX ADMIN — ROCURONIUM BROMIDE 50 MG: 10 INJECTION, SOLUTION INTRAVENOUS at 12:52

## 2025-06-02 RX ADMIN — HYDROMORPHONE HYDROCHLORIDE 0.5 MG: 1 INJECTION, SOLUTION INTRAMUSCULAR; INTRAVENOUS; SUBCUTANEOUS at 14:01

## 2025-06-02 RX ADMIN — KETAMINE HYDROCHLORIDE 40 MG: 10 INJECTION INTRAMUSCULAR; INTRAVENOUS at 12:52

## 2025-06-02 RX ADMIN — PROPOFOL 170 MG: 10 INJECTION, EMULSION INTRAVENOUS at 12:52

## 2025-06-02 RX ADMIN — ESMOLOL HYDROCHLORIDE 10 MG: 100 INJECTION, SOLUTION INTRAVENOUS at 12:57

## 2025-06-02 RX ADMIN — DEXMEDETOMIDINE 4 MCG: 100 INJECTION, SOLUTION, CONCENTRATE INTRAVENOUS at 13:11

## 2025-06-02 RX ADMIN — METRONIDAZOLE 500 MG: 500 INJECTION, SOLUTION INTRAVENOUS at 13:04

## 2025-06-02 RX ADMIN — SUGAMMADEX 200 MG: 100 INJECTION, SOLUTION INTRAVENOUS at 13:26

## 2025-06-02 RX ADMIN — DEXMEDETOMIDINE 12 MCG: 100 INJECTION, SOLUTION, CONCENTRATE INTRAVENOUS at 12:49

## 2025-06-02 RX ADMIN — LIDOCAINE HYDROCHLORIDE 80 MG: 20 INJECTION, SOLUTION EPIDURAL; INFILTRATION; INTRACAUDAL; PERINEURAL at 12:52

## 2025-06-02 RX ADMIN — ONDANSETRON 4 MG: 2 INJECTION INTRAMUSCULAR; INTRAVENOUS at 13:25

## 2025-06-02 RX ADMIN — HYDROMORPHONE HYDROCHLORIDE 0.5 MG: 1 INJECTION, SOLUTION INTRAMUSCULAR; INTRAVENOUS; SUBCUTANEOUS at 13:36

## 2025-06-02 RX ADMIN — HYDROMORPHONE HYDROCHLORIDE 0.25 MG: 1 INJECTION, SOLUTION INTRAMUSCULAR; INTRAVENOUS; SUBCUTANEOUS at 14:16

## 2025-06-02 RX ADMIN — PROPOFOL 150 MCG/KG/MIN: 10 INJECTION, EMULSION INTRAVENOUS at 13:08

## 2025-06-02 RX ADMIN — SODIUM CHLORIDE, POTASSIUM CHLORIDE, SODIUM LACTATE AND CALCIUM CHLORIDE 9 ML/HR: 600; 310; 30; 20 INJECTION, SOLUTION INTRAVENOUS at 12:33

## 2025-06-02 RX ADMIN — SODIUM CHLORIDE 2000 MG: 9 INJECTION, SOLUTION INTRAVENOUS at 12:34

## 2025-06-02 RX ADMIN — ESMOLOL HYDROCHLORIDE 20 MG: 100 INJECTION, SOLUTION INTRAVENOUS at 12:52

## 2025-06-02 RX ADMIN — SODIUM CHLORIDE 2000 MG: 9 INJECTION, SOLUTION INTRAVENOUS at 13:01

## 2025-06-02 RX ADMIN — HYDROMORPHONE HYDROCHLORIDE 0.5 MG: 1 INJECTION, SOLUTION INTRAMUSCULAR; INTRAVENOUS; SUBCUTANEOUS at 14:08

## 2025-06-02 RX ADMIN — DEXAMETHASONE SODIUM PHOSPHATE 4 MG: 4 INJECTION, SOLUTION INTRAMUSCULAR; INTRAVENOUS at 13:05

## 2025-06-02 NOTE — ANESTHESIA POSTPROCEDURE EVALUATION
Patient: Jung Burkett Jr.    Procedure Summary       Date: 06/02/25 Room / Location: [facility] OR  / [facility] MAIN OR    Anesthesia Start: 1247 Anesthesia Stop: 1345    Procedure: APPENDECTOMY LAPAROSCOPIC POSSIBLE OPEN (Abdomen) Diagnosis:       Cecal polyp      (Cecal polyp [K63.5])    Surgeons: Ab Zayas MD Provider: Álvaro Leonard MD    Anesthesia Type: general ASA Status: 2            Anesthesia Type: general    Vitals  Vitals Value Taken Time   /73 06/02/25 14:45   Temp 37.1 °C (98.7 °F) 06/02/25 14:30   Pulse 72 06/02/25 14:50   Resp 18 06/02/25 14:45   SpO2 94 % 06/02/25 14:50   Vitals shown include unfiled device data.        Post Anesthesia Care and Evaluation    Patient location during evaluation: bedside  Patient participation: complete - patient participated  Level of consciousness: awake  Pain management: adequate    Airway patency: patent  PONV Status: none  Cardiovascular status: acceptable and stable  Respiratory status: acceptable  Hydration status: acceptable

## 2025-06-02 NOTE — OP NOTE
OP NOTE  APPENDECTOMY LAPAROSCOPIC  Procedure Report    Patient Name:  Jung Burkett Jr.  YOB: 1965  [id number]    Date of Surgery:  6/2/2025     Indications: See last clinic note for indications, discussion of risk benefits and alternatives    Pre-op Diagnosis:   Cecal polyp [K63.5]       Post-Op Diagnosis Codes:     * Cecal polyp [K63.5]    Procedure:  Laparoscopic appendectomy    Staff:  Surgeon(s):  Ab Zayas MD         Anesthesia: General, Local    Estimated Blood Loss: 5 mL    Implants:    Implant Name Type Inv. Item Serial No.  Lot No. LRB No. Used Action   RELOAD ECHELON FLEX GST REG 3.6MM STEFF - [id number] Implant RELOAD ECHELON FLEX GST REG 3.6MM STEFF  [employer]  DIV OF J [employer] J [id number] N/A 1 Implanted       Specimen:          Specimens       ID Source Type Tests Collected By Collected At Frozen?    A Large Intestine, Appendix Tissue TISSUE PATHOLOGY EXAM   Ab Zayas MD 6/2/25 1315               Findings: Laparoscopic appendectomy with removal of distal portion of cecum.  No evidence of perforation or abscess or inflammation of the appendix    Complications: None    Description of Procedure:   After all questions were answered, consent was verified.  Patient brought to the operating room per stretcher placed in supine position arms out all extremities padded.  Bilateral lower extremity SCDs placed.  Anesthesia induced.  Preoperative IV antibiotics administered.  Left upper extremity padded and tucked.  Patient's abdominal hair removed with clippers.  Patient's abdomen prepped with ChloraPrep.  Waited 3 minutes.  Draped in usual sterile fashion.  Ioban applied.  Critical timeout taken.  Began the procedure with midline incision above the umbilicus.  I entered the abdomen sharply under direct vision without injury to viscera below.  I placed a 12 balloon trocar.  I obtain pneumoperitoneum with CO2 insufflation.  I placed the patient in  Trendelenburg and rotated to the left.  I placed a 5 trocar in the lower midline and left lower quadrant under direct vision.  I then identified the cecum in the right lower quadrant.  No evidence of inflammation, perforation, or abscess involving the appendix.  Normal terminal ileum.  I then mobilized the cecum lateral to medial with the LigaSure device.  I then divided the mesoappendix under the lumen of the appendix with the LigaSure device working my way to the cecum.  I then divided the distal portion of the cecum and the appendix with laparoscopic MARCI stapler blue load.  This did not compromise the ileocecal valve.  Division hemostatic.  It was placed in a laparoscopic retrieval device.  I infiltrated with local anesthesia bilateral upper quadrants in a tap block fashion.  I verified hemostasis.  We desufflated the abdomen remove the trocars removed the specimen bag.  It was sent to pathology for permanent.  I closed the umbilical fascia with Vicryl.  We closed the incisions with Monocryl and skin glue.  At the end of the procedure all counts were correct.  I was present for the procedure.    Sharri Bethea was responsible for performing the following activities: Retraction, Suturing, Closing, Placing Dressing, and thinking and their skilled assistance was necessary for the success of this case.    Ab Zayas MD     Date: 6/2/2025  Time: 13:33 EDT

## 2025-06-02 NOTE — ANESTHESIA PREPROCEDURE EVALUATION
Anesthesia Evaluation     Patient summary reviewed and Nursing notes reviewed   history of anesthetic complications (Awareness and hypotension):   NPO Solid Status: > 8 hours  NPO Liquid Status: > 2 hours           Airway   Mallampati: I  TM distance: >3 FB  Neck ROM: full  Dental - normal exam     Pulmonary - normal exam   (+) a smoker Current, cigars, asthma,  Cardiovascular - normal exam  Exercise tolerance: good (4-7 METS)    (+) hypertension, hyperlipidemia      Neuro/Psych  (+) psychiatric history Anxiety, Depression and ADHD  GI/Hepatic/Renal/Endo    (+) GERD, diabetes mellitus    Musculoskeletal     (+) neck pain  Abdominal  - normal exam   Substance History - negative use     OB/GYN negative ob/gyn ROS         Other   arthritis,     ROS/Med Hx Other: PAT Nursing Notes unavailable.               Anesthesia Plan    ASA 2     general     intravenous induction     Anesthetic plan, risks, benefits, and alternatives have been provided, discussed and informed consent has been obtained with: patient.    Plan discussed with CRNA.    CODE STATUS:

## 2025-06-02 NOTE — DISCHARGE INSTRUCTIONS
DISCHARGE INSTRUCTIONS LAPAROSCOPIC CHOLECYSTECTOMY  (GALL BLADDER)      For your surgery you had:  General anesthesia (you may have a sore throat for the first 24 hours)    You may experience dizziness, drowsiness, or light-headedness for several hours following surgery.  Do not stay alone tonight.  Limit your activity for 24 hours.  Resume your diet slowly.  Follow whatever special dietary instructions you may have been given by your doctor.  You should not drive, operate machinery, drink alcohol, or sign legally binding documents for 24 hours or while you are taking pain medication.    NOTIFY YOUR DOCTOR IF YOU EXPERIENCE ANY OF THE FOLLOWING:  Temperature greater than 101 degrees Fahrenheit  Shaking Chills  Redness or excessive drainage from incision  Nausea, vomiting and/or pain that is not controlled by prescribed medications  Increase in bleeding or bleeding that is excessive  Unable to urinate in 6 hours after surgery  If unable to reach your doctor, please go to the closest Emergency Room Skin adhesive flakes off in 10-14 days.  You may shower tomorrow, no tub bathing, swimming or submerging of incisions for 2 weeks.  Apply an ice pack 24-48 hours.  You may experience gas discomfort 24-48 hours after discharge, especially in chest and shoulders.  Changing position frequently may alleviate this discomfort.  No lifting greater than 15 pounds or strenuous activity for 2 weeks.   If you have excessive pain, swelling, redness, drainage or other problems, notify your physician.  If unable to urinate in 6 to 8 hours after surgery or urinating frequently in small amounts, notify your doctor or go to the nearest Emergency Room.  Medications per physician instructions as indicated on After Visit Mike.    Last dose of pain medication was given at:    .    SPECIAL INSTRUCTIONS:

## 2025-06-03 ENCOUNTER — OFFICE VISIT (OUTPATIENT)
Dept: FAMILY MEDICINE CLINIC | Facility: CLINIC | Age: 60
End: 2025-06-03
Payer: COMMERCIAL

## 2025-06-03 VITALS
HEIGHT: 68 IN | SYSTOLIC BLOOD PRESSURE: 128 MMHG | DIASTOLIC BLOOD PRESSURE: 76 MMHG | HEART RATE: 71 BPM | OXYGEN SATURATION: 96 % | BODY MASS INDEX: 26.86 KG/M2 | WEIGHT: 177.2 LBS

## 2025-06-03 DIAGNOSIS — E11.00 TYPE 2 DIABETES MELLITUS WITH HYPEROSMOLARITY WITHOUT COMA, WITHOUT LONG-TERM CURRENT USE OF INSULIN: ICD-10-CM

## 2025-06-03 DIAGNOSIS — E11.65 TYPE 2 DIABETES MELLITUS WITH HYPERGLYCEMIA, UNSPECIFIED WHETHER LONG TERM INSULIN USE: ICD-10-CM

## 2025-06-03 DIAGNOSIS — I10 PRIMARY HYPERTENSION: Primary | ICD-10-CM

## 2025-06-03 RX ORDER — TIRZEPATIDE 7.5 MG/.5ML
7.5 INJECTION, SOLUTION SUBCUTANEOUS WEEKLY
Qty: 0.5 ML | Refills: 6 | Status: SHIPPED | OUTPATIENT
Start: 2025-06-03

## 2025-06-03 RX ORDER — DEXTROAMPHETAMINE SACCHARATE, AMPHETAMINE ASPARTATE, DEXTROAMPHETAMINE SULFATE AND AMPHETAMINE SULFATE 5; 5; 5; 5 MG/1; MG/1; MG/1; MG/1
20 TABLET ORAL 2 TIMES DAILY
Qty: 60 TABLET | Refills: 0 | Status: SHIPPED | OUTPATIENT
Start: 2025-06-03

## 2025-06-03 NOTE — TELEPHONE ENCOUNTER
Refill requested for adderall  PDMP reviewed.  He has clinic appointment in 2 days.  RX ordered.   Ab Snyder DO

## 2025-06-03 NOTE — PROGRESS NOTES
Subjective:       Jung Burkett Jr. is a 60 y.o. male with a concurrent medical history of essential hypertension, hyperlipidemia, type 2 diabetes, gastroesophageal reflux disease, anxiety and depression, pulmonary fibrosis and pulmonary nodules, chronic fatigue and BPH who presents for hypertension and diabetes.    In terms of hypertension, blood pressure today is 128/76.  Current medications include metoprolol 50 mg.  Renal function which previously decreases returned normal.  If blood pressure ever elevated we would consider adding on an ACE inhibitor.      Follow-up in six months.     In terms of type 2 diabetes mellitus, medication includes Mounjaro 7.5 mg.  Unfortunately, hemoglobin A1c care is not available in the clinic at this time and so we will order this as a lab.    Will also obtain urine microalbumin creatinine ratio as he is not on ACE or ARB.    Will manage based on results of A1c.        Past Medical Hx:  Past Medical History:   Diagnosis Date    Acid reflux     ADHD (attention deficit hyperactivity disorder) 1970    Anesthesia     pt states b/p dropped postop x1    Anxiety     Anxiety disorder 05/17/2019    Arthritis     generalized    Asthma     Benign prostatic hyperplasia 1989    Blood disease     none    Cervicalgia     Chronic allergic rhinitis     Colon polyp 2005    Depression     Diabetes mellitus, type 2 05/17/2019    DOES NOT CHECK BS    Diabetic eye exam 01/2019 20/20 PER PT     Encounter for diabetic foot exam     WITHIN FEW MO  PER PT     Essential hypertension 05/17/2019    GERD (gastroesophageal reflux disease)     High blood pressure     High cholesterol     Hyperlipemia     Hyperlipidemia     Hypertension     Low back pain 1987    Major depressive disorder 05/17/2019    Nicotine dependence 05/17/2019    Prostate disorder     BPH    Rotator cuff tear, left     Seasonal allergies     Urinary tract infection 1993    Vitamin D deficiency 05/17/2019    no current  issues       Past Surgical Hx:  Past Surgical History:   Procedure Laterality Date    APPENDECTOMY N/A 6/2/2025    Procedure: APPENDECTOMY LAPAROSCOPIC;  Surgeon: Ab Zayas MD;  Location: [facility] MAIN OR;  Service: General;  Laterality: N/A;    CHOLECYSTECTOMY  1997    COLONOSCOPY      ELISA- REPEAT IN 5 YEARS     COLONOSCOPY N/A 3/11/2025    Procedure: COLONOSCOPY WITH POLYPECTOMIES HOT AND COLD SNARE, BIOPSIES, INJECTION SPOT INK, INJECTION ELEVIEW;  Surgeon: Refugio Almeida MD;  Location: [facility] ENDOSCOPY;  Service: Gastroenterology;  Laterality: N/A;  COLON POLYPS, hemorrhoids    COLONOSCOPY N/A 5/23/2025    Procedure: COLONOSCOPY W/ EMR, clip placement, and tattoo;  Surgeon: Nikolai Moore MD;  Location: [facility] OR;  Service: Gastroenterology;  Laterality: N/A;    GALLBLADDER SURGERY      KNEE ARTHROSCOPY W/ MENISCAL REPAIR Right     OTHER SURGICAL HISTORY      SURGICAL CLIPS/gallbladder removed    OTHER SURGICAL HISTORY      right knee meniscus tear repair    SHOULDER ARTHROSCOPY W/ ROTATOR CUFF REPAIR Left 07/21/2021    Procedure: SHOULDER ARTHROSCOPY,SUBACROMINAL DECOMPRESSION, DISTAL CLAVICLE RESECTION, MINI OPEN  ROTATOR CUFF REPAIR;  Surgeon: Ulisses Kenney MD;  Location: [facility];  Service: Orthopedics;  Laterality: Left;    TONSILLECTOMY      VASECTOMY  2004       Current Meds:    Current Outpatient Medications:     acetaminophen-codeine (TYLENOL with CODEINE #3) 300-30 MG per tablet, Take 1-2 tablets by mouth Every 6 (Six) Hours As Needed for Mild Pain., Disp: 8 tablet, Rfl: 0    albuterol (PROVENTIL) (2.5 MG/3ML) 0.083% nebulizer solution, Take 2.5 mg by nebulization Every 4 (Four) Hours As Needed for Wheezing., Disp: 3 mL, Rfl: 12    amphetamine-dextroamphetamine (ADDERALL) 20 MG tablet, Take 1 tablet by mouth 2 (Two) Times a Day. Dose adjustment, Disp: 60 tablet, Rfl: 0    atorvastatin (LIPITOR) 20 MG tablet, Take 1 tablet by mouth Every Night., Disp: 90 tablet, Rfl:  1    busPIRone (BUSPAR) 15 MG tablet, Take 1 tablet by mouth 2 (Two) Times a Day., Disp: 180 tablet, Rfl: 1    CBD (cannabidiol) oral oil, Take 3 drops by mouth Daily., Disp: , Rfl:     finasteride (PROSCAR) 5 MG tablet, Take 1 tablet by mouth Daily., Disp: 90 tablet, Rfl: 1    hyoscyamine (ANASPAZ,LEVSIN) 0.125 MG tablet, Take 1 tablet by mouth 4 (Four) Times a Day With Meals & at Bedtime. (Patient taking differently: Take 1 tablet by mouth Every 8 (Eight) Hours As Needed.), Disp: 120 tablet, Rfl: 5    lansoprazole (Prevacid) 30 MG capsule, Take 1 capsule by mouth Daily., Disp: 90 capsule, Rfl: 1    methocarbamol (ROBAXIN) 750 MG tablet, Take 1 tablet by mouth 4 (Four) Times a Day As Needed for Muscle Spasms., Disp: 90 tablet, Rfl: 0    metoprolol succinate XL (TOPROL-XL) 50 MG 24 hr tablet, Take 1 tablet by mouth Daily., Disp: 90 tablet, Rfl: 1    polyethylene glycol (MIRALAX) 17 g packet, Take 17 g by mouth Daily., Disp: 5 packet, Rfl: 0    Simethicone (GAS-X PO), Take  by mouth., Disp: , Rfl:     tamsulosin (FLOMAX) 0.4 MG capsule 24 hr capsule, Take 1 capsule by mouth Daily., Disp: 90 capsule, Rfl: 1    Tirzepatide (Mounjaro) 7.5 MG/0.5ML solution auto-injector, Inject 7.5 mg under the skin into the appropriate area as directed 1 (One) Time Per Week., Disp: 0.5 mL, Rfl: 6    traZODone (DESYREL) 100 MG tablet, Take 1 tablet by mouth Every Night., Disp: 90 tablet, Rfl: 1    VITAMIN D PO, Take 1 capsule by mouth Daily., Disp: , Rfl:   No current facility-administered medications for this visit.    Allergies:  Allergies   Allergen Reactions    Hydrocodone-Acetaminophen Itching    Nsaids Arrhythmia    Oxycodone-Acetaminophen Shortness Of Breath    Sulfa Antibiotics Hives    Voltaren [Diclofenac] Palpitations       Family Hx:  Family History   Problem Relation Age of Onset    Cancer Mother     Diabetes Mother     Rheum arthritis Mother         40'S    Heart attack Mother     Breast cancer Mother         40'S     "Arthritis Mother     Stroke Father     Heart disease Father     Heart attack Maternal Grandmother     Breast cancer Maternal Grandmother         50'S    Prostate cancer Maternal Grandfather     Nephrolithiasis Maternal Grandfather     Colon cancer Paternal Grandmother     Colon cancer Paternal Grandfather         60'S    Breast cancer Other         40'S    Heart attack Maternal Aunt     Malig Hyperthermia Neg Hx         Social History:  Social History     Socioeconomic History    Marital status:    Tobacco Use    Smoking status: Some Days     Types: Cigars     Passive exposure: Never    Smokeless tobacco: Never    Tobacco comments:     2-3 CIGARS WEEKLY   Vaping Use    Vaping status: Never Used   Substance and Sexual Activity    Alcohol use: Yes     Alcohol/week: 6.0 standard drinks of alcohol     Types: 6 Drinks containing 0.5 oz of alcohol per week     Comment: Drinks daily; bourbon/couple mixed drinks daily    Drug use: Not Currently     Types: Marijuana     Comment: has tried in the past for back pain, noncurrently    Sexual activity: Defer       Review of Systems  Review of Systems   Constitutional:  Negative for unexpected weight change.       Objective:     /76 (BP Location: Right arm, Patient Position: Sitting, Cuff Size: Adult)   Pulse 71   Ht 172.7 cm (68\")   Wt 80.4 kg (177 lb 3.2 oz)   SpO2 96%   BMI 26.94 kg/m²   Physical Exam  Constitutional:       General: He is not in acute distress.     Appearance: Normal appearance. He is not ill-appearing, toxic-appearing or diaphoretic.   Pulmonary:      Effort: Pulmonary effort is normal. No respiratory distress.   Neurological:      Mental Status: He is alert.   Psychiatric:         Mood and Affect: Mood normal.         Behavior: Behavior normal.          Assessment/Plan:     Diagnoses and all orders for this visit:    1. Primary hypertension (Primary)    In terms of hypertension, blood pressure today is 128/76.  Current medications include " metoprolol 50 mg.  Renal function which previously decreases returned normal.  If blood pressure ever elevated we would consider adding on an ACE inhibitor.      Follow-up in six months.         2. Type 2 diabetes mellitus with hyperglycemia, unspecified whether long term insulin use      In terms of type 2 diabetes mellitus, medication includes Mounjaro 7.5 mg.  Unfortunately, hemoglobin A1c care is not available in the clinic at this time and so we will order this as a lab.    Will also obtain urine microalbumin creatinine ratio as he is not on ACE or ARB.    Will manage based on results of A1c.    -     Microalbumin / Creatinine Urine Ratio - Urine, Clean Catch; Future  -     Hemoglobin A1c; Future    3. Type 2 diabetes mellitus with hyperosmolarity without coma, without long-term current use of insulin  -     Tirzepatide (Mounjaro) 7.5 MG/0.5ML solution auto-injector; Inject 7.5 mg under the skin into the appropriate area as directed 1 (One) Time Per Week.  Dispense: 0.5 mL; Refill: 6                Follow-up:     Return in about 3 months (around 9/3/2025) for Annual physical.    Preventative:  Health Maintenance   Topic Date Due    URINE MICROALBUMIN-CREATININE RATIO (uACR)  10/21/2021    ANNUAL PHYSICAL  11/29/2023    HEMOGLOBIN A1C  04/25/2025    INFLUENZA VACCINE  07/01/2025    DIABETIC EYE EXAM  08/20/2025    LIPID PANEL  10/25/2025    DIABETIC FOOT EXAM  12/27/2025    TDAP/TD VACCINES (2 - Td or Tdap) 05/16/2029    COLORECTAL CANCER SCREENING  05/23/2035    HEPATITIS C SCREENING  Completed    COVID-19 Vaccine  Completed    Pneumococcal Vaccine 50+  Completed    ZOSTER VACCINE  Completed           This document has been electronically signed by Prashanth Arthur MD on Martha 3, 2025 16:53 EDT       Parts of this note are electronic transcriptions/translations of spoken language to printed text using the Dragon Dictation system.

## 2025-06-09 ENCOUNTER — PREP FOR SURGERY (OUTPATIENT)
Dept: OTHER | Facility: HOSPITAL | Age: 60
End: 2025-06-09
Payer: COMMERCIAL

## 2025-06-09 ENCOUNTER — TELEPHONE (OUTPATIENT)
Dept: GASTROENTEROLOGY | Facility: CLINIC | Age: 60
End: 2025-06-09
Payer: COMMERCIAL

## 2025-06-09 DIAGNOSIS — Z86.0100 ENCOUNTER FOR COLONOSCOPY FOLLOWING COLON POLYP REMOVAL: Primary | ICD-10-CM

## 2025-06-09 DIAGNOSIS — Z12.11 ENCOUNTER FOR COLONOSCOPY FOLLOWING COLON POLYP REMOVAL: Primary | ICD-10-CM

## 2025-06-10 ENCOUNTER — TELEPHONE (OUTPATIENT)
Dept: FAMILY MEDICINE CLINIC | Facility: CLINIC | Age: 60
End: 2025-06-10
Payer: COMMERCIAL

## 2025-06-10 ENCOUNTER — TELEPHONE (OUTPATIENT)
Dept: FAMILY MEDICINE CLINIC | Facility: CLINIC | Age: 60
End: 2025-06-10

## 2025-06-10 DIAGNOSIS — Z12.5 ENCOUNTER FOR SCREENING FOR MALIGNANT NEOPLASM OF PROSTATE: ICD-10-CM

## 2025-06-10 DIAGNOSIS — Z01.89 PATIENT REQUESTED DIAGNOSTIC TESTING: Primary | ICD-10-CM

## 2025-06-10 DIAGNOSIS — E55.9 VITAMIN D DEFICIENCY, UNSPECIFIED: ICD-10-CM

## 2025-06-10 PROBLEM — Z12.11 ENCOUNTER FOR COLONOSCOPY FOLLOWING COLON POLYP REMOVAL: Status: ACTIVE | Noted: 2025-06-09

## 2025-06-10 PROBLEM — Z86.0100 ENCOUNTER FOR COLONOSCOPY FOLLOWING COLON POLYP REMOVAL: Status: ACTIVE | Noted: 2025-06-09

## 2025-06-10 NOTE — TELEPHONE ENCOUNTER
Spoke with patient. He is concerned because he saw on imaging that he has an umbilical hernia. He had his appendix taken out Monday, last week and the surgeon made an incision above and below his belly button. Pt states he is in more pain in that area than any other surgical incision. Encouraged patient to call the surgeon that did the procedure.

## 2025-06-10 NOTE — TELEPHONE ENCOUNTER
"  Caller: Jung Burkett Jr. \"Mauro\"    Relationship: Self    Best call back number:  Telephone Information:   Mobile [phone]       What is the best time to reach you: ANYTIME    Who are you requesting to speak with (clinical staff, provider,  specific staff member): NURSE    What was the call regarding: HERNIA CONCERNS    "

## 2025-06-10 NOTE — TELEPHONE ENCOUNTER
I have ordered the PSA and vitamin D level.  I would generally not recommend obtaining testosterone unless he is having symptoms of low testosterone.  Last time we checked testosterone it appeared artificially high which could have potentially caused an unnecessary workup had not subsequently normalized.  If he is truly desirous of this we can check it as an a.m. lab but again, I probably would not recommend at this point in the absence of symptoms.    Thank you,    Prashanth Arthur      This document has been electronically signed by Prashanth Arthur MD on Martha 10, 2025 14:59 EDT

## 2025-06-10 NOTE — TELEPHONE ENCOUNTER
"Caller: Jung Burkett Jr. \"Mauro\"    Relationship: Self    Best call back number: [phone]      What orders are you requesting (i.e. lab or imaging): BLOOD WORK:    TESTOSTERONE  PSA  VITAMIN D    In what timeframe would the patient need to come in:   AT EARLIEST CONVENIENCE.    Where will you receive your lab/imaging services:     Additional notes:   PATIENT WANTED TO GET THE LABS DONE TODAY BUT WOULD LIKE ADDITIONAL LABS ORDERED BEFOREHAND.        "

## 2025-06-11 LAB
QT INTERVAL: 355 MS
QTC INTERVAL: 407 MS

## 2025-06-11 NOTE — TELEPHONE ENCOUNTER
Spoke with patient and scheduled colonoscopy with Dr. Almeida on Monday, 11.24.25 with arrival time of 0900.  Advised patient to  prep from pharmacy now.  Reviewed prep instructions and need for a  post-procedure.  Reminded that Mounjaro will need to be held for one week prior to procedure.  Patient verbalized understanding.     MyChart reminder sent.    Pulmonary clearance will be needed from Dr. Moss.  Routing to Clau REIS MA.

## 2025-06-13 ENCOUNTER — LAB (OUTPATIENT)
Dept: LAB | Facility: HOSPITAL | Age: 60
End: 2025-06-13
Payer: COMMERCIAL

## 2025-06-13 DIAGNOSIS — Z01.89 PATIENT REQUESTED DIAGNOSTIC TESTING: ICD-10-CM

## 2025-06-13 DIAGNOSIS — E55.9 VITAMIN D DEFICIENCY, UNSPECIFIED: ICD-10-CM

## 2025-06-13 DIAGNOSIS — Z12.5 ENCOUNTER FOR SCREENING FOR MALIGNANT NEOPLASM OF PROSTATE: ICD-10-CM

## 2025-06-13 DIAGNOSIS — E11.65 TYPE 2 DIABETES MELLITUS WITH HYPERGLYCEMIA, UNSPECIFIED WHETHER LONG TERM INSULIN USE: ICD-10-CM

## 2025-06-13 LAB
25(OH)D3 SERPL-MCNC: 40.9 NG/ML (ref 30–100)
ALBUMIN UR-MCNC: <1.2 MG/DL
CREAT UR-MCNC: 208.6 MG/DL
HBA1C MFR BLD: 6.1 % (ref 4.8–5.6)
MICROALBUMIN/CREAT UR: NORMAL MG/G{CREAT}
PSA SERPL-MCNC: 3.09 NG/ML (ref 0–4)

## 2025-06-13 PROCEDURE — 82570 ASSAY OF URINE CREATININE: CPT

## 2025-06-13 PROCEDURE — 82043 UR ALBUMIN QUANTITATIVE: CPT

## 2025-06-13 PROCEDURE — G0103 PSA SCREENING: HCPCS

## 2025-06-13 PROCEDURE — 82306 VITAMIN D 25 HYDROXY: CPT

## 2025-06-13 PROCEDURE — 83036 HEMOGLOBIN GLYCOSYLATED A1C: CPT

## 2025-06-13 PROCEDURE — 36415 COLL VENOUS BLD VENIPUNCTURE: CPT

## 2025-06-20 ENCOUNTER — OFFICE VISIT (OUTPATIENT)
Dept: SLEEP MEDICINE | Facility: HOSPITAL | Age: 60
End: 2025-06-20
Payer: COMMERCIAL

## 2025-06-20 VITALS
BODY MASS INDEX: 25.76 KG/M2 | DIASTOLIC BLOOD PRESSURE: 83 MMHG | WEIGHT: 170 LBS | SYSTOLIC BLOOD PRESSURE: 128 MMHG | HEART RATE: 83 BPM | HEIGHT: 68 IN | OXYGEN SATURATION: 99 %

## 2025-06-20 DIAGNOSIS — G47.11 IDIOPATHIC HYPERSOMNIA: Primary | ICD-10-CM

## 2025-06-20 NOTE — PROGRESS NOTES
[facility]   [address]   [place] [address]  [phone]       Sleep Clinic Follow-up Note        PCP: Prashanth Arthur MD  Date of visit: 6/20/2025    Patient or patient representative verbalized consent for the use of Ambient Listening during the visit with  Ab Snyder DO for chart documentation. 6/20/2025  16:25 EDT    HISTORY OF PRESENT ILLNESS  Jung Burkett Jr. is a 60 y.o. male following up today, on 6/20/2025 at [facility] for idiopathic hypersomnia  History of Present Illness  He has idiopathic hypersomnia and history of ADHD.  He has been on Adderall prescribed by our clinic.   He reports no new cardiac symptoms, including chest pain or palpitations or worsening shortness of breath.  He is still on metoprolol.  Since last visit he has had some polyps removed and had an appendectomy.  He does not report any loud snoring or episodes of waking up gasping for air. He typically sleeps on his stomach or side, as lying on his back tends to induce snoring. He has noticed an improvement in his snoring since losing weight. His sleep schedule involves going to bed around 11:30 PM and waking up between 8:00 AM and 9:00 AM. He continues to take trazodone but is considering discontinuing it to assess its necessity.  He typically does not have a problem falling asleep and wakes up 0 times during the night.  He is currently on Adderall, taken twice daily, which he finds beneficial. He takes the first dose at 8:00 or 9:00 AM and the second dose around 12:00 or 1:00 PM.   He reports no suicidal ideation, or aggressive behavior, hallucinations, psychosis.    Medical history history  He does have a history of intermittent palpitations and he was started on Toprol by cardiologist Dr. Vinson and his last visit with cardiology was in 2022.   He also has history of diastolic dysfunction on echo and also had previous dyspnea on  "exertion and he underwent treadmill stress testing in 2022 which was unremarkable.  He does report significant weight changes with previously weighing 245 pounds  He has a history of ILD, for which he underwent a walk test to assess his oxygen levels and He is under the care of a pulmonologist for this condition.        Beaver Sleepiness Scale :Total score: 14     REVIEW OF SYSTEMS:   Positive for acid reflux, abdominal bloating    Result Review   The following data was reviewed by: Ab Snyder DO on 06/20/2025:  [x]  Allergies reviewed  [x]  Medications reviewed    SOCIAL (habits pertaining to sleep medicine)  History tobacco use: Yes currently smokes cigars  History of alcohol use: 3-4 drinks per week  Caffeine use: 4 drinks per day         Objective   Vital Signs:   Vitals:    06/20/25 1500   BP: 128/83   BP Location: Left arm   Patient Position: Sitting   Pulse: 83   SpO2: 99%   Weight: 77.1 kg (170 lb)   Height: 172.7 cm (67.99\")     Body mass index is 25.85 kg/m².    PHYSICAL EXAMINATION:  CONSTITUTIONAL: Well appearing, in no overt pain or respiratory distress   RESP SYSTEM:  No overt respiratory distress, speaks in clear sentences without dyspnea, no accessory muscle use  CARDIOVASULAR: Regular rate     ASSESSMENT/PLAN  Diagnoses and all orders for this visit:    1. Idiopathic hypersomnia (Primary)    Discussed modafinil as potential other generally safer option for treating idiopathic hypersomnia and he is considering this.  He has been on Adderall for years for this.  Discussed ED precautions.  His blood pressure is controlled at today's visit.  He is not having symptoms suggestive of obstructive sleep apnea.  PDMP reviewed. Continue adderall 20mg BID at this time. His ESS is still elevated some at 14.   Discussed trazodone could be contributing to sleepiness some.  Follow up in 6 months.       FOLLOW UP  Return in about 6 months (around 12/20/2025).  Patient was given instructions and counseling " regarding his condition or for health maintenance advice. Please see specific information pulled into the AVS if appropriate.         Patient's questions were answered.  Dictated Utilizing Dragon Dictation. Please note that portions of this note were completed with a voice recognition program. Part of this note may be an electronic transcription/translation of spoken language to printed text using the Dragon Dictation System.      [facility]   Ab Snyder DO  06/20/25  16:36 EDT

## 2025-06-24 ENCOUNTER — OFFICE VISIT (OUTPATIENT)
Dept: SURGERY | Facility: CLINIC | Age: 60
End: 2025-06-24
Payer: COMMERCIAL

## 2025-06-24 VITALS — HEIGHT: 67 IN | BODY MASS INDEX: 27.44 KG/M2 | WEIGHT: 174.8 LBS

## 2025-06-24 DIAGNOSIS — Z90.49 S/P LAPAROSCOPIC APPENDECTOMY: Primary | ICD-10-CM

## 2025-06-24 PROCEDURE — 99024 POSTOP FOLLOW-UP VISIT: CPT | Performed by: SURGERY

## 2025-06-24 NOTE — PROGRESS NOTES
General Surgery/Colorectal Surgery   Post -op Follow up Note    Patient Name:  Jung Burkett Jr.  YOB: 1965  [id number]    Referring Provider: No ref. provider found    Patient Care Team:  Prashanth Arthur MD as PCP - General (Family Medicine)  Titi Guzman MD as Consulting Physician (Hematology and Oncology)  Yannick Espinoza MD as Consulting Physician (Pulmonary Disease)    Chief complaint follow up    Subjective .     History of present illness:    Status post laparoscopic appendectomy 6/2/2025.  Pathology serrated polyp, margins negative, negative for tubulovillous adenoma    He comes in for follow-up.  No fever, redness.  He is pleased with her surgery    History:  Past Medical History:   Diagnosis Date    Acid reflux     ADHD (attention deficit hyperactivity disorder) 1970    Anesthesia     pt states b/p dropped postop x1    Anxiety     Anxiety disorder 05/17/2019    Arthritis     generalized    Asthma     Benign prostatic hyperplasia 1989    Blood disease     none    Cervicalgia     Chronic allergic rhinitis     Colon polyp 2005    Depression     Diabetes mellitus, type 2 05/17/2019    DOES NOT CHECK BS    Diabetic eye exam 01/2019 20/20 PER PT     Encounter for diabetic foot exam     WITHIN FEW MO  PER PT     Essential hypertension 05/17/2019    GERD (gastroesophageal reflux disease)     High blood pressure     High cholesterol     Hyperlipemia     Hyperlipidemia     Hypertension     Low back pain 1987    Major depressive disorder 05/17/2019    Nicotine dependence 05/17/2019    Prostate disorder     BPH    Rotator cuff tear, left     Seasonal allergies     Urinary tract infection 1993    Vitamin D deficiency 05/17/2019    no current issues       Past Surgical History:   Procedure Laterality Date    APPENDECTOMY N/A 6/2/2025    Procedure: APPENDECTOMY LAPAROSCOPIC;  Surgeon: Ab Zayas MD;  Location: [facility] MAIN OR;  Service: General;  Laterality: N/A;     CHOLECYSTECTOMY  1997    COLONOSCOPY      ELISA- REPEAT IN 5 YEARS     COLONOSCOPY N/A 3/11/2025    Procedure: COLONOSCOPY WITH POLYPECTOMIES HOT AND COLD SNARE, BIOPSIES, INJECTION SPOT INK, INJECTION ELEVIEW;  Surgeon: Refugio Almeida MD;  Location: [facility] ENDOSCOPY;  Service: Gastroenterology;  Laterality: N/A;  COLON POLYPS, hemorrhoids    COLONOSCOPY N/A 5/23/2025    Procedure: COLONOSCOPY W/ EMR, clip placement, and tattoo;  Surgeon: Nikolai Moore MD;  Location: [facility] OR;  Service: Gastroenterology;  Laterality: N/A;    GALLBLADDER SURGERY      KNEE ARTHROSCOPY W/ MENISCAL REPAIR Right     OTHER SURGICAL HISTORY      SURGICAL CLIPS/gallbladder removed    OTHER SURGICAL HISTORY      right knee meniscus tear repair    SHOULDER ARTHROSCOPY W/ ROTATOR CUFF REPAIR Left 07/21/2021    Procedure: SHOULDER ARTHROSCOPY,SUBACROMINAL DECOMPRESSION, DISTAL CLAVICLE RESECTION, MINI OPEN  ROTATOR CUFF REPAIR;  Surgeon: Ulisses Kenney MD;  [place] [facility] [place] [facility]  Service: Orthopedics;  Laterality: Left;    TONSILLECTOMY      VASECTOMY  2004       Family History   Problem Relation Age of Onset    Cancer Mother     Diabetes Mother     Rheum arthritis Mother         40'S    Heart attack Mother     Breast cancer Mother         40'S    Arthritis Mother     Stroke Father     Heart disease Father     Heart attack Maternal Grandmother     Breast cancer Maternal Grandmother         50'S    Prostate cancer Maternal Grandfather     Nephrolithiasis Maternal Grandfather     Colon cancer Paternal Grandmother     Colon cancer Paternal Grandfather         60'S    Breast cancer Other         40'S    Heart attack Maternal Aunt     Malig Hyperthermia Neg Hx        Social History     Tobacco Use    Smoking status: Some Days     Types: Cigars     Passive exposure: Never    Smokeless tobacco: Never    Tobacco comments:     2-3 CIGARS WEEKLY   Vaping Use    Vaping status: Never Used   Substance Use Topics    Alcohol use: Yes      Alcohol/week: 6.0 standard drinks of alcohol     Types: 6 Drinks containing 0.5 oz of alcohol per week     Comment: Drinks daily; bourbon/couple mixed drinks daily    Drug use: Not Currently     Types: Marijuana     Comment: has tried in the past for back pain, noncurrently       MEDS:  Prior to Admission medications    Medication Sig Start Date End Date Taking? Authorizing Provider   acetaminophen-codeine (TYLENOL with CODEINE #3) 300-30 MG per tablet Take 1-2 tablets by mouth Every 6 (Six) Hours As Needed for Mild Pain. 6/2/25 6/2/26 Yes Ab Zayas MD   albuterol (PROVENTIL) (2.5 MG/3ML) 0.083% nebulizer solution Take 2.5 mg by nebulization Every 4 (Four) Hours As Needed for Wheezing. 5/13/24  Yes Prashanth Arthur MD   amphetamine-dextroamphetamine (ADDERALL) 20 MG tablet Take 1 tablet by mouth 2 (Two) Times a Day. Dose adjustment 6/3/25  Yes Ab Snyder DO   atorvastatin (LIPITOR) 20 MG tablet Take 1 tablet by mouth Every Night. 5/1/25  Yes Prashanth Arthur MD   busPIRone (BUSPAR) 15 MG tablet Take 1 tablet by mouth 2 (Two) Times a Day. 5/1/25  Yes Prashanth Arthur MD   CBD (cannabidiol) oral oil Take 3 drops by mouth Daily.   Yes ProviderLashell MD   finasteride (PROSCAR) 5 MG tablet Take 1 tablet by mouth Daily. 3/27/25  Yes Prashanth Arthur MD   hyoscyamine (ANASPAZ,LEVSIN) 0.125 MG tablet Take 1 tablet by mouth 4 (Four) Times a Day With Meals & at Bedtime.  Patient taking differently: Take 1 tablet by mouth Every 8 (Eight) Hours As Needed. 1/23/25  Yes Lena Schultz APRN   lansoprazole (Prevacid) 30 MG capsule Take 1 capsule by mouth Daily. 5/1/25  Yes Prashanth Arthur MD   methocarbamol (ROBAXIN) 750 MG tablet Take 1 tablet by mouth 4 (Four) Times a Day As Needed for Muscle Spasms. 3/7/25  Yes Prashanth Arthur MD   metoprolol succinate XL (TOPROL-XL) 50 MG 24 hr tablet Take 1 tablet by mouth Daily. 5/1/25  Yes Prashanth Arthur MD   Simethicone (GAS-X PO) Take  by mouth.    Yes Provider, MD Lashell   tamsulosin (FLOMAX) 0.4 MG capsule 24 hr capsule Take 1 capsule by mouth Daily. 5/1/25  Yes Prashanth Arthur MD   Tirzepatide (Mounjaro) 7.5 MG/0.5ML solution auto-injector Inject 7.5 mg under the skin into the appropriate area as directed 1 (One) Time Per Week. 6/3/25  Yes Prashanth Arthur MD   traZODone (DESYREL) 100 MG tablet Take 1 tablet by mouth Every Night. 5/1/25  Yes Prashanth Arthur MD   VITAMIN D PO Take 1 capsule by mouth Daily.   Yes Provider, MD Lashell             No current facility-administered medications for this visit.       Allergies:  Hydrocodone-acetaminophen, Nsaids, Oxycodone-acetaminophen, Sulfa antibiotics, and Voltaren [diclofenac]    Objective     Vital Signs        Physical Exam:     Incisions without evidence of infection, abdomen soft, appropriate tender, no hernia    Results Review: I have reviewed the patient's labs and imaging    LABS/IMAGING:    Imaging Results (Last 72 Hours)       ** No results found for the last 72 hours. **             Lab Results (last 72 hours)       ** No results found for the last 72 hours. **               Result Review :Labs  Result Review  Imaging  Med Tab  Media    Assessment & Plan     * No active hospital problems. *     Status post laparoscopic appendectomy 6/2/2025.  Pathology serrated polyp, margins negative, negative for tubulovillous adenoma    I reviewed the details of the surgery with the patient.  I reviewed the pathology.  Slowly increase activity as tolerated.  Follow-up as needed.  All questions answered.  He agrees with the plan.  Thank you for the consult          Ab Zayas MD  06/24/25 16:49 EDT

## 2025-06-24 NOTE — LETTER
June 24, 2025     Prashanth Arthur MD  [address]  [place] [address]    Patient: Jung Burkett Jr.   YOB: 1965   Date of Visit: 6/24/2025     Dear Prashanth Arthur MD:       Thank you for referring Jung Burkett to me for evaluation. Below are the relevant portions of my assessment and plan of care.    If you have questions, please do not hesitate to call me. I look forward to following Jung along with you.         Sincerely,        Ab Zayas MD        CC: MD Chana Randhawa, Ab Livingston MD  06/24/25 1657  Sign when Signing Visit  General Surgery/Colorectal Surgery   Post -op Follow up Note    Patient Name:  Jung Burkett Jr.  YOB: 1965  [id number]    Referring Provider: No ref. provider found    Patient Care Team:  Prashanth Arthur MD as PCP - General (Family Medicine)  Titi Guzman MD as Consulting Physician (Hematology and Oncology)  Yannick Espinoza MD as Consulting Physician (Pulmonary Disease)    Chief complaint follow up    Subjective.     History of present illness:    Status post laparoscopic appendectomy 6/2/2025.  Pathology serrated polyp, margins negative, negative for tubulovillous adenoma    He comes in for follow-up.  No fever, redness.  He is pleased with her surgery    History:  Past Medical History:   Diagnosis Date   • Acid reflux    • ADHD (attention deficit hyperactivity disorder) 1970   • Anesthesia     pt states b/p dropped postop x1   • Anxiety    • Anxiety disorder 05/17/2019   • Arthritis     generalized   • Asthma    • Benign prostatic hyperplasia 1989   • Blood disease     none   • Cervicalgia    • Chronic allergic rhinitis    • Colon polyp 2005   • Depression    • Diabetes mellitus, type 2 05/17/2019    DOES NOT CHECK BS   • Diabetic eye exam 01/2019    20/20 PER PT    • Encounter for diabetic foot exam     WITHIN FEW MO  PER PT    • Essential hypertension 05/17/2019   • GERD (gastroesophageal  reflux disease)    • High blood pressure    • High cholesterol    • Hyperlipemia    • Hyperlipidemia    • Hypertension    • Low back pain 1987   • Major depressive disorder 05/17/2019   • Nicotine dependence 05/17/2019   • Prostate disorder     BPH   • Rotator cuff tear, left    • Seasonal allergies    • Urinary tract infection 1993   • Vitamin D deficiency 05/17/2019    no current issues       Past Surgical History:   Procedure Laterality Date   • APPENDECTOMY N/A 6/2/2025    Procedure: APPENDECTOMY LAPAROSCOPIC;  Surgeon: Ab Zayas MD;  Location: [facility] OR;  Service: General;  Laterality: N/A;   • CHOLECYSTECTOMY  1997   • COLONOSCOPY      ELISA- REPEAT IN 5 YEARS    • COLONOSCOPY N/A 3/11/2025    Procedure: COLONOSCOPY WITH POLYPECTOMIES HOT AND COLD SNARE, BIOPSIES, INJECTION SPOT INK, INJECTION ELEVIEW;  Surgeon: Refugio Almeida MD;  Location: [facility] ENDOSCOPY;  Service: Gastroenterology;  Laterality: N/A;  COLON POLYPS, hemorrhoids   • COLONOSCOPY N/A 5/23/2025    Procedure: COLONOSCOPY W/ EMR, clip placement, and tattoo;  Surgeon: Nikolai Moore MD;  Location: [facility] OR;  Service: Gastroenterology;  Laterality: N/A;   • GALLBLADDER SURGERY     • KNEE ARTHROSCOPY W/ MENISCAL REPAIR Right    • OTHER SURGICAL HISTORY      SURGICAL CLIPS/gallbladder removed   • OTHER SURGICAL HISTORY      right knee meniscus tear repair   • SHOULDER ARTHROSCOPY W/ ROTATOR CUFF REPAIR Left 07/21/2021    Procedure: SHOULDER ARTHROSCOPY,SUBACROMINAL DECOMPRESSION, DISTAL CLAVICLE RESECTION, MINI OPEN  ROTATOR CUFF REPAIR;  Surgeon: Ulisses Kenney MD;  Location: [facility] [place] [facility];  Service: Orthopedics;  Laterality: Left;   • TONSILLECTOMY     • VASECTOMY  2004       Family History   Problem Relation Age of Onset   • Cancer Mother    • Diabetes Mother    • Rheum arthritis Mother         40'S   • Heart attack Mother    • Breast cancer Mother         40'S   • Arthritis Mother    • Stroke Father    • Heart  disease Father    • Heart attack Maternal Grandmother    • Breast cancer Maternal Grandmother         50'S   • Prostate cancer Maternal Grandfather    • Nephrolithiasis Maternal Grandfather    • Colon cancer Paternal Grandmother    • Colon cancer Paternal Grandfather         60'S   • Breast cancer Other         40'S   • Heart attack Maternal Aunt    • Malig Hyperthermia Neg Hx        Social History     Tobacco Use   • Smoking status: Some Days     Types: Cigars     Passive exposure: Never   • Smokeless tobacco: Never   • Tobacco comments:     2-3 CIGARS WEEKLY   Vaping Use   • Vaping status: Never Used   Substance Use Topics   • Alcohol use: Yes     Alcohol/week: 6.0 standard drinks of alcohol     Types: 6 Drinks containing 0.5 oz of alcohol per week     Comment: Drinks daily; bourbon/couple mixed drinks daily   • Drug use: Not Currently     Types: Marijuana     Comment: has tried in the past for back pain, noncurrently       MEDS:  Prior to Admission medications    Medication Sig Start Date End Date Taking? Authorizing Provider   acetaminophen-codeine (TYLENOL with CODEINE #3) 300-30 MG per tablet Take 1-2 tablets by mouth Every 6 (Six) Hours As Needed for Mild Pain. 6/2/25 6/2/26 Yes Ab Zayas MD   albuterol (PROVENTIL) (2.5 MG/3ML) 0.083% nebulizer solution Take 2.5 mg by nebulization Every 4 (Four) Hours As Needed for Wheezing. 5/13/24  Yes Prashanth Arthur MD   amphetamine-dextroamphetamine (ADDERALL) 20 MG tablet Take 1 tablet by mouth 2 (Two) Times a Day. Dose adjustment 6/3/25  Yes Ab Snyder DO   atorvastatin (LIPITOR) 20 MG tablet Take 1 tablet by mouth Every Night. 5/1/25  Yes Prashanth Arthur MD   busPIRone (BUSPAR) 15 MG tablet Take 1 tablet by mouth 2 (Two) Times a Day. 5/1/25  Yes Prashanth Arthur MD   CBD (cannabidiol) oral oil Take 3 drops by mouth Daily.   Yes Lashell Martinez MD   finasteride (PROSCAR) 5 MG tablet Take 1 tablet by mouth Daily. 3/27/25  Yes Prashanth Arthur  MD   hyoscyamine (ANASPAZ,LEVSIN) 0.125 MG tablet Take 1 tablet by mouth 4 (Four) Times a Day With Meals & at Bedtime.  Patient taking differently: Take 1 tablet by mouth Every 8 (Eight) Hours As Needed. 1/23/25  Yes Lena Schultz APRN   lansoprazole (Prevacid) 30 MG capsule Take 1 capsule by mouth Daily. 5/1/25  Yes Prashanth Arthur MD   methocarbamol (ROBAXIN) 750 MG tablet Take 1 tablet by mouth 4 (Four) Times a Day As Needed for Muscle Spasms. 3/7/25  Yes Prashanth Arthur MD   metoprolol succinate XL (TOPROL-XL) 50 MG 24 hr tablet Take 1 tablet by mouth Daily. 5/1/25  Yes Prashanth Arthur MD   Simethicone (GAS-X PO) Take  by mouth.   Yes ProviderLashell MD   tamsulosin (FLOMAX) 0.4 MG capsule 24 hr capsule Take 1 capsule by mouth Daily. 5/1/25  Yes Prashanth Arthur MD   Tirzepatide (Mounjaro) 7.5 MG/0.5ML solution auto-injector Inject 7.5 mg under the skin into the appropriate area as directed 1 (One) Time Per Week. 6/3/25  Yes Prashanth Arthur MD   traZODone (DESYREL) 100 MG tablet Take 1 tablet by mouth Every Night. 5/1/25  Yes Prashanth Arthur MD   VITAMIN D PO Take 1 capsule by mouth Daily.   Yes Provider, MD Lashell             No current facility-administered medications for this visit.       Allergies:  Hydrocodone-acetaminophen, Nsaids, Oxycodone-acetaminophen, Sulfa antibiotics, and Voltaren [diclofenac]    Objective    Vital Signs        Physical Exam:     Incisions without evidence of infection, abdomen soft, appropriate tender, no hernia    Results Review: I have reviewed the patient's labs and imaging    LABS/IMAGING:    Imaging Results (Last 72 Hours)       ** No results found for the last 72 hours. **             Lab Results (last 72 hours)       ** No results found for the last 72 hours. **               Result Review:Labs  Result Review  Imaging  Med Tab  Media    Assessment & Plan    * No active hospital problems. *     Status post laparoscopic appendectomy 6/2/2025.   Pathology serrated polyp, margins negative, negative for tubulovillous adenoma    I reviewed the details of the surgery with the patient.  I reviewed the pathology.  Slowly increase activity as tolerated.  Follow-up as needed.  All questions answered.  He agrees with the plan.  Thank you for the consult          Ab Zayas MD  06/24/25 16:49 EDT

## 2025-07-01 DIAGNOSIS — G47.11 IDIOPATHIC HYPERSOMNIA: ICD-10-CM

## 2025-07-01 RX ORDER — DEXTROAMPHETAMINE SACCHARATE, AMPHETAMINE ASPARTATE, DEXTROAMPHETAMINE SULFATE AND AMPHETAMINE SULFATE 5; 5; 5; 5 MG/1; MG/1; MG/1; MG/1
20 TABLET ORAL 2 TIMES DAILY
Qty: 60 TABLET | Refills: 0 | Status: SHIPPED | OUTPATIENT
Start: 2025-07-01

## 2025-07-01 NOTE — TELEPHONE ENCOUNTER
[facility] [address]   [address]  [facility] [address]  Phone: [phone]  Fax: [phone]            June 9, 2020      Re: Abdoul Haywood  [address] [facility] [address]        This is to certify that Abdoul Haywood has been seen in my office on October 15, 2019. I am his pediatric doctor at [facility]. Any questions please give my office a call at number listed above.         SIGNATURE:___________________________________________,   6/9/2020      Christopher Bourne MD   Patient requests refill of amphetamine-dextroamphetamine (ADDERALL) 20 MG tablet [9082] (Order [id number])   Pharmacy [facility]

## 2025-07-02 ENCOUNTER — TELEPHONE (OUTPATIENT)
Dept: FAMILY MEDICINE CLINIC | Facility: CLINIC | Age: 60
End: 2025-07-02
Payer: COMMERCIAL

## 2025-07-02 DIAGNOSIS — B36.9 FUNGAL SKIN INFECTION: Primary | ICD-10-CM

## 2025-07-02 RX ORDER — NYSTATIN 100000 [USP'U]/G
POWDER TOPICAL 3 TIMES DAILY
Qty: 15 G | Refills: 1 | Status: SHIPPED | OUTPATIENT
Start: 2025-07-02

## 2025-07-02 NOTE — TELEPHONE ENCOUNTER
"Caller: Jung Burkett Jr. \"Mauro\"    Relationship: Self    Best call back number: [phone]     What medication are you requesting: TO TREAT SYMPTOMS    What are your current symptoms: RASH FROM YEAST IN BUTT CRACK    How long have you been experiencing symptoms: ABOUT A WEEK    Have you had these symptoms before:    [x] Yes  [] No    Have you been treated for these symptoms before:   [x] Yes  [] No    If a prescription is needed, what is your preferred pharmacy and phone number: [facility] - [place] - [address] - [phone] [facility] [phone]      Additional notes: PATIENT STATES THIS IS THE SAME RASH HE HAD A COUPLE YEARS AGO AND THE DOCTOR GAVE HIM A MEDICATION FOR YEAST AND IT TOOK CARE OF IT    PLEASE CALL AND ADVISE          "

## 2025-07-02 NOTE — TELEPHONE ENCOUNTER
Let's start with some nystatin powder TID. If symptoms have not improved by next week, he should send pictures in the chart and we can reevaluate.     Thank you,    Prashanth Arthur       This document has been electronically signed by Prashanth Arthur MD on July 2, 2025 18:45 EDT

## 2025-07-22 DIAGNOSIS — G47.11 IDIOPATHIC HYPERSOMNIA: ICD-10-CM

## 2025-07-22 NOTE — TELEPHONE ENCOUNTER
Patient called requesting refill. He states he only has 3 pills left as of today (7/22/25).    Informed him that he should have medication to last him until 8/1/25.     He states he can not be without his medication, If there is anything we can do. He does report that he has not been taking anymore than prescribed.

## 2025-07-23 RX ORDER — DEXTROAMPHETAMINE SACCHARATE, AMPHETAMINE ASPARTATE, DEXTROAMPHETAMINE SULFATE AND AMPHETAMINE SULFATE 5; 5; 5; 5 MG/1; MG/1; MG/1; MG/1
20 TABLET ORAL 2 TIMES DAILY
Qty: 60 TABLET | Refills: 0 | Status: SHIPPED | OUTPATIENT
Start: 2025-07-23 | End: 2025-07-23

## 2025-07-23 RX ORDER — DEXTROAMPHETAMINE SACCHARATE, AMPHETAMINE ASPARTATE, DEXTROAMPHETAMINE SULFATE AND AMPHETAMINE SULFATE 5; 5; 5; 5 MG/1; MG/1; MG/1; MG/1
20 TABLET ORAL 2 TIMES DAILY
Qty: 60 TABLET | Refills: 0 | Status: SHIPPED | OUTPATIENT
Start: 2025-07-31

## 2025-07-31 ENCOUNTER — OFFICE VISIT (OUTPATIENT)
Dept: PULMONOLOGY | Facility: CLINIC | Age: 60
End: 2025-07-31
Payer: COMMERCIAL

## 2025-07-31 VITALS
WEIGHT: 176 LBS | DIASTOLIC BLOOD PRESSURE: 79 MMHG | HEART RATE: 73 BPM | HEIGHT: 67 IN | TEMPERATURE: 97.1 F | SYSTOLIC BLOOD PRESSURE: 121 MMHG | OXYGEN SATURATION: 96 % | BODY MASS INDEX: 27.62 KG/M2 | RESPIRATION RATE: 16 BRPM

## 2025-07-31 DIAGNOSIS — J84.9 ILD (INTERSTITIAL LUNG DISEASE): Primary | ICD-10-CM

## 2025-07-31 RX ORDER — BUDESONIDE AND FORMOTEROL FUMARATE DIHYDRATE 80; 4.5 UG/1; UG/1
2 AEROSOL RESPIRATORY (INHALATION)
Qty: 10.2 G | Refills: 6 | Status: SHIPPED | OUTPATIENT
Start: 2025-07-31

## 2025-07-31 NOTE — PROGRESS NOTES
"Chief Complaint  Follow-up (6 month) and Interstitial lung disease    Subjective        Jung Burkett Jr. presents to [facility] PULMONARY & CRITICAL CARE MEDICINE  History of Present Illness  History of Present Illness  The patient is a 60-year-old male who presents for follow-up of interstitial lung disease (ILD).    He has been diagnosed with scarring in the lower part of his lungs, which he believes predates his two COVID-19 infections. His mother and aunt also had similar conditions. He recalls that an MRI scan of his back revealed this issue around 2015. 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 He has since retired and no longer has such exposure. He has a history of smoking cigars.    He experiences coughing, congestion, and excessive mucus production, which he suspects may be due to allergies. He is unsure if he has asthma but notes that these symptoms are often triggered by cold weather and exercise. He also mentions a possible allergy to his cat. He uses albuterol via a nebulizer for symptom management.    He underwent shoulder surgery and was advised against returning to work due to neck and back issues. He experiences pain throughout his spine and neck. He was informed that he is not a suitable candidate for neck surgery.    PAST SURGICAL HISTORY:  Shoulder surgery    SOCIAL HISTORY  Occupations: Worked in a glass factory ([employer])  Tobacco: Smokes cigars    FAMILY HISTORY  - Mother: Rheumatoid arthritis, details unknown  - Negative for autoimmune diseases    Objective   Vital Signs:  /79 (BP Location: Left arm, Patient Position: Sitting, Cuff Size: Adult)   Pulse 73   Temp 97.1 °F (36.2 °C) (Temporal)   Resp 16   Ht 170.2 cm (67\")   Wt 79.8 kg (176 lb)   SpO2 96% Comment: room air  BMI 27.57 kg/m²   Estimated body mass index is 27.57 kg/m² as calculated from the following:    Height as of this encounter: 170.2 " "cm (67\").    Weight as of this encounter: 79.8 kg (176 lb).            Physical Exam   Physical Exam  Lungs: Bilateral lung bases with mild fibrosis. No wheezing or crackles noted.  Extremities: No clubbing observed.    Result Review :         Results  Imaging   - CT scan of the lungs: 2021, Mild fibrosis at the lung bases.   - High resolution CT scan of the lungs: 03/2024, Small nodule in the right lung and early scarring with jagged edges.    Diagnostic Testing   - Pulmonary Function Test (PFT): Mild restrictive defect consistent with scarring and almost 300 cm³ of improvement post bronchodilator therapy, suggestive of asthma.              Assessment and Plan   Diagnoses and all orders for this visit:    1. ILD (interstitial lung disease) (Primary)  -     Cyclic Citrul Peptide Antibody, IgG / IgA; Future  -     Complete PFT - Pre & Post Bronchodilator; Future  -     CT Chest Hi Resolution Diagnostic; Future    Other orders  -     budesonide-formoterol (SYMBICORT) 80-4.5 MCG/ACT inhaler; Inhale 2 puffs 2 (Two) Times a Day.  Dispense: 10.2 g; Refill: 6      Assessment & Plan  1. Interstitial Lung Disease (ILD).  The patient's lung function is currently satisfactory, with only a minor presence of fibrosis. His Pulmonary Function Test (PFT) results indicate a mild restrictive defect, consistent with the observed scarring. His mother had rheumatoid arthritis, which can cause lung scarring. An anti-CCP antibody test will be conducted due to his family history of rheumatoid arthritis. A CT scan and PFTs will be ordered for 10/2025 to monitor the progression of his ILD. No treatment for ILD will be initiated unless there is evidence of disease progression. Risks and benefits of monitoring versus immediate treatment were discussed, noting that some medications for ILD can be unpleasant and are only warranted if there is progression.    2. Asthma.  The patient's PFT results are suggestive of asthma, with significant " improvement post bronchodilator therapy. A Symbicort inhaler will be prescribed, to be used as 2 puffs twice daily or as needed, but not exceeding twice daily usage. The inhaler contains a steroid which will help reduce inflammation and control symptoms. Risks and benefits of Symbicort were discussed, including its role in managing asthma symptoms and potential side effects.    Follow-up  Follow-up appointment scheduled in 4 months.         Follow Up   Return in about 4 months (around 11/30/2025).  Patient was given instructions and counseling regarding his condition or for health maintenance advice. Please see specific information pulled into the AVS if appropriate.         Patient or patient representative verbalized consent for the use of Ambient Listening during the visit with  Michele Moss DO for chart documentation. 7/31/2025  12:40 EDT

## 2025-08-20 DIAGNOSIS — G47.11 IDIOPATHIC HYPERSOMNIA: ICD-10-CM

## 2025-08-20 RX ORDER — DEXTROAMPHETAMINE SACCHARATE, AMPHETAMINE ASPARTATE, DEXTROAMPHETAMINE SULFATE AND AMPHETAMINE SULFATE 5; 5; 5; 5 MG/1; MG/1; MG/1; MG/1
20 TABLET ORAL 2 TIMES DAILY
Qty: 60 TABLET | Refills: 0 | Status: SHIPPED | OUTPATIENT
Start: 2025-08-30

## (undated) DEVICE — SOL IRRG H2O PL/BG 1000ML STRL

## (undated) DEVICE — 90-S CRUISE, SUCTION PROBE, NON-BENDABLE, MAX CUT LEVEL 1: Brand: SERFAS ENERGY

## (undated) DEVICE — RETRACTABLE L-HOOK LAPAROSCOPIC SEALER/DIVIDER: Brand: LIGASURE

## (undated) DEVICE — SOL IRR NACL 0.9PCT 3000ML

## (undated) DEVICE — Device: Brand: BLACK EYE

## (undated) DEVICE — APPL CHLORAPREP HI/LITE 26ML ORNG

## (undated) DEVICE — FMS FLUID MANAGEMENT SYSTEM INFLOW TUBING (FMS VUE): Brand: FMS

## (undated) DEVICE — SENSR O2 OXIMAX FNGR A/ 18IN NONSTR

## (undated) DEVICE — STERILE POLYISOPRENE POWDER-FREE SURGICAL GLOVES: Brand: PROTEXIS

## (undated) DEVICE — CANN O2 ETCO2 FITS ALL CONN CO2 SMPL A/ 7IN DISP LF

## (undated) DEVICE — SINGLE-USE BIOPSY FORCEPS: Brand: RADIAL JAW 4

## (undated) DEVICE — SUT MNCRYL PLS ANTIB UD 3/0 PS2 27IN

## (undated) DEVICE — DRSNG WND GZ CURAD OIL EMULSION 3X3IN STRL

## (undated) DEVICE — TISSUE RETRIEVAL SYSTEM: Brand: INZII RETRIEVAL SYSTEM

## (undated) DEVICE — SYR LUERLOK 30CC

## (undated) DEVICE — SLV SCD LEG COMFORT KENDALLSCD MD REPROC

## (undated) DEVICE — THE REVEAL DISTAL ATTACHMENT CAP IS ATTACHED TO THE DISTAL END OF THE ENDOSCOPE TO FACILITATE ENDOSCOPIC THERAPY AND IS INTENDED FOR GASTROINTESTINAL MUCOSAL RESECTION (ENDOSCOPIC MUCOSAL RESECTION) AND KEEPING THE SUITABLE DEPTH OF ENDOSCOPE'S VIEW FIELD.: Brand: REVEAL

## (undated) DEVICE — ANTIBACTERIAL VIOLET BRAIDED (POLYGLACTIN 910), SYNTHETIC ABSORBABLE SURGICAL SUTURE: Brand: COATED VICRYL

## (undated) DEVICE — LN SMPL CO2 SHTRM SD STREAM W/M LUER

## (undated) DEVICE — TROCAR: Brand: KII® SLEEVE

## (undated) DEVICE — TP NDL SCORPION MULTIFIRE

## (undated) DEVICE — SLV SCD KN/LEN ADJ EXPRSS BLENDED MD 1P/U

## (undated) DEVICE — SHOULDER P.A.D. III: Brand: DEROYAL

## (undated) DEVICE — SUT MNCRYL 4/0 PS2 18 IN

## (undated) DEVICE — ST TBG CONN PNEUMOCLEAR EVAC SMOKE HEAT/HUMID

## (undated) DEVICE — OSC BEACH CHAIR SHOULDER-LF: Brand: MEDLINE INDUSTRIES, INC.

## (undated) DEVICE — SINGLE-USE POLYPECTOMY SNARE: Brand: CAPTIVATOR II

## (undated) DEVICE — GLV SURG BIOGEL LTX PF 7 1/2

## (undated) DEVICE — TROCARS: Brand: KII® BALLOON BLUNT TIP SYSTEM

## (undated) DEVICE — GAUZE,SPONGE,4"X4",16PLY,STRL,LF,10/TRAY: Brand: MEDLINE

## (undated) DEVICE — LASSO POLYPECTOMY SNARE: Brand: LASSO

## (undated) DEVICE — FMS FLUID MANAGEMENT SYSTEM OUTFLOW TUBING WITHOUT ONE-WAY VALVE (FMS VUE OR FMS DUO PLUS): Brand: FMS

## (undated) DEVICE — 2, DISPOSABLE SUCTION/IRRIGATOR WITHOUT DISPOSABLE TIP: Brand: STRYKEFLOW

## (undated) DEVICE — Device: Brand: SINGLE USE INJECTOR NM600/610

## (undated) DEVICE — GLV SURG SENSICARE W/ALOE PF LF 7 STRL

## (undated) DEVICE — TUBING, SUCTION, 1/4" X 10', STRAIGHT: Brand: MEDLINE

## (undated) DEVICE — GENERAL LAPAROSCOPY-LF: Brand: MEDLINE INDUSTRIES, INC.

## (undated) DEVICE — SOL IRR H2O BO 1000ML STRL

## (undated) DEVICE — DEFENDO AIR WATER SUCTION AND BIOPSY VALVE KIT FOR  OLYMPUS: Brand: DEFENDO AIR/WATER/SUCTION AND BIOPSY VALVE

## (undated) DEVICE — GLV SURG SENSICARE SLT PF LF 7 STRL

## (undated) DEVICE — PENCL E/S HNDSWCH ROCKR CB

## (undated) DEVICE — PAD GRND REM POLYHESIVE A/ DISP

## (undated) DEVICE — THE STERILE LIGHT HANDLE COVER IS USED WITH STERIS SURGICAL LIGHTING AND VISUALIZATION SYSTEMS.

## (undated) DEVICE — FRCP BIOP RADLJAW4 HOT 2.2X240 BX40

## (undated) DEVICE — ADAPT CLN BIOGUARD AIR/H2O DISP

## (undated) DEVICE — LAPAROVUE VISIBILITY SYSTEM LAPAROSCOPIC SOLUTIONS: Brand: LAPAROVUE

## (undated) DEVICE — INTENDED FOR TISSUE SEPARATION, AND OTHER PROCEDURES THAT REQUIRE A SHARP SURGICAL BLADE TO PUNCTURE OR CUT.: Brand: BARD-PARKER ® CARBON RIB-BACK BLADES

## (undated) DEVICE — SOLIDIFIER LIQLOC PLS 1500CC BT

## (undated) DEVICE — ECHELON FLEX POWERED PLUS ARTICULATING ENDOSCOPIC LINEAR CUTTER , 60MM: Brand: ECHELON FLEX

## (undated) DEVICE — BLD CUT FORMLA AGGR PLS 4.0MM

## (undated) DEVICE — THE CARR-LOCKE INJECTION NEEDLE IS A SINGLE USE, DISPOSABLE, FLEXIBLE SHEATH INJECTION NEEDLE USED FOR THE INJECTION OF VARIOUS TYPES OF MEDIA THROUGH FLEXIBLE ENDOSCOPES.

## (undated) DEVICE — COLD THERAPY WRAP: Brand: DEROYAL

## (undated) DEVICE — INJ SUB/MUCUS ELEVIEW FOR/GI/ENDO/PROC AMPL/10ML

## (undated) DEVICE — BUR BRL FORMLA 12FLUT 4MM

## (undated) DEVICE — LAPAROSCOPIC TROCAR SLEEVE/SINGLE USE: Brand: KII® OPTICAL ACCESS SYSTEM

## (undated) DEVICE — KT ORCA ORCAPOD DISP STRL

## (undated) DEVICE — SUT ETHLN 3-0 FS118IN 663H

## (undated) DEVICE — SUT VIC 0 UR6 27IN VCP603H

## (undated) DEVICE — 3M™ IOBAN™ 2 ANTIMICROBIAL INCISE DRAPE 6650EZ: Brand: IOBAN™ 2

## (undated) DEVICE — STERILE POLYISOPRENE POWDER-FREE SURGICAL GLOVES WITH EMOLLIENT COATING: Brand: PROTEXIS

## (undated) DEVICE — GLV SURG BIOGEL LTX PF 8 1/2

## (undated) DEVICE — Device

## (undated) DEVICE — PENCL SMOKE/EVAC MEGADYNE TELESCP 10FT

## (undated) DEVICE — COLD THERAPY BLANKET: Brand: DEROYAL

## (undated) DEVICE — SUT VIC 2/0 CT1 36IN